# Patient Record
Sex: MALE | Race: WHITE | NOT HISPANIC OR LATINO | Employment: FULL TIME | URBAN - METROPOLITAN AREA
[De-identification: names, ages, dates, MRNs, and addresses within clinical notes are randomized per-mention and may not be internally consistent; named-entity substitution may affect disease eponyms.]

---

## 2021-01-24 ENCOUNTER — HOSPITAL ENCOUNTER (EMERGENCY)
Facility: CLINIC | Age: 46
Discharge: HOME | End: 2021-01-24
Attending: EMERGENCY MEDICINE | Admitting: EMERGENCY MEDICINE
Payer: COMMERCIAL

## 2021-01-24 VITALS
HEIGHT: 72 IN | SYSTOLIC BLOOD PRESSURE: 154 MMHG | HEART RATE: 88 BPM | OXYGEN SATURATION: 97 % | RESPIRATION RATE: 16 BRPM | DIASTOLIC BLOOD PRESSURE: 95 MMHG | TEMPERATURE: 98.8 F | WEIGHT: 180 LBS | BODY MASS INDEX: 24.38 KG/M2

## 2021-01-24 DIAGNOSIS — F10.920 ALCOHOLIC INTOXICATION WITHOUT COMPLICATION (H): ICD-10-CM

## 2021-01-24 LAB
ALBUMIN SERPL-MCNC: 4 G/DL (ref 3.4–5)
ALCOHOL BREATH TEST: 0.25 (ref 0–0.01)
ALCOHOL BREATH TEST: 0.33 (ref 0–0.01)
ALP SERPL-CCNC: 80 U/L (ref 40–150)
ALT SERPL W P-5'-P-CCNC: 112 U/L (ref 0–70)
ANION GAP SERPL CALCULATED.3IONS-SCNC: 9 MMOL/L (ref 3–14)
AST SERPL W P-5'-P-CCNC: 80 U/L (ref 0–45)
BILIRUB SERPL-MCNC: 0.2 MG/DL (ref 0.2–1.3)
BUN SERPL-MCNC: 11 MG/DL (ref 7–30)
CALCIUM SERPL-MCNC: 9.2 MG/DL (ref 8.5–10.1)
CHLORIDE SERPL-SCNC: 106 MMOL/L (ref 94–109)
CO2 SERPL-SCNC: 28 MMOL/L (ref 20–32)
CREAT SERPL-MCNC: 1 MG/DL (ref 0.66–1.25)
ERYTHROCYTE [DISTWIDTH] IN BLOOD BY AUTOMATED COUNT: 13 % (ref 10–15)
GFR SERPL CREATININE-BSD FRML MDRD: >90 ML/MIN/{1.73_M2}
GLUCOSE SERPL-MCNC: 96 MG/DL (ref 70–99)
HCT VFR BLD AUTO: 39.5 % (ref 40–53)
HGB BLD-MCNC: 12.9 G/DL (ref 13.3–17.7)
MCH RBC QN AUTO: 31.3 PG (ref 26.5–33)
MCHC RBC AUTO-ENTMCNC: 32.7 G/DL (ref 31.5–36.5)
MCV RBC AUTO: 96 FL (ref 78–100)
PLATELET # BLD AUTO: 225 10E9/L (ref 150–450)
POTASSIUM SERPL-SCNC: 4.1 MMOL/L (ref 3.4–5.3)
PROT SERPL-MCNC: 8.2 G/DL (ref 6.8–8.8)
RBC # BLD AUTO: 4.12 10E12/L (ref 4.4–5.9)
SODIUM SERPL-SCNC: 143 MMOL/L (ref 133–144)
WBC # BLD AUTO: 3.2 10E9/L (ref 4–11)

## 2021-01-24 PROCEDURE — 82075 ASSAY OF BREATH ETHANOL: CPT

## 2021-01-24 PROCEDURE — 250N000013 HC RX MED GY IP 250 OP 250 PS 637: Performed by: EMERGENCY MEDICINE

## 2021-01-24 PROCEDURE — 85027 COMPLETE CBC AUTOMATED: CPT | Performed by: EMERGENCY MEDICINE

## 2021-01-24 PROCEDURE — 99285 EMERGENCY DEPT VISIT HI MDM: CPT

## 2021-01-24 PROCEDURE — 80053 COMPREHEN METABOLIC PANEL: CPT | Performed by: EMERGENCY MEDICINE

## 2021-01-24 RX ORDER — MAGNESIUM OXIDE 400 MG/1
800 TABLET ORAL ONCE
Status: COMPLETED | OUTPATIENT
Start: 2021-01-24 | End: 2021-01-24

## 2021-01-24 RX ORDER — MULTIVITAMIN,THERAPEUTIC
1 TABLET ORAL ONCE
Status: COMPLETED | OUTPATIENT
Start: 2021-01-24 | End: 2021-01-24

## 2021-01-24 RX ORDER — FOLIC ACID 1 MG/1
1 TABLET ORAL ONCE
Status: COMPLETED | OUTPATIENT
Start: 2021-01-24 | End: 2021-01-24

## 2021-01-24 RX ORDER — LANOLIN ALCOHOL/MO/W.PET/CERES
100 CREAM (GRAM) TOPICAL ONCE
Status: COMPLETED | OUTPATIENT
Start: 2021-01-24 | End: 2021-01-24

## 2021-01-24 RX ADMIN — MAGNESIUM OXIDE 800 MG: 400 TABLET ORAL at 18:00

## 2021-01-24 RX ADMIN — THERA TABS 1 TABLET: TAB at 18:00

## 2021-01-24 RX ADMIN — FOLIC ACID 1 MG: 1 TABLET ORAL at 18:00

## 2021-01-24 RX ADMIN — THIAMINE HCL TAB 100 MG 100 MG: 100 TAB at 18:00

## 2021-01-24 ASSESSMENT — ENCOUNTER SYMPTOMS
BLOOD IN STOOL: 0
NAUSEA: 0
DIFFICULTY URINATING: 0

## 2021-01-24 ASSESSMENT — MIFFLIN-ST. JEOR: SCORE: 1744.47

## 2021-01-24 NOTE — ED NOTES
Bed: BH3  Expected date:   Expected time:   Means of arrival:   Comments:  532  45 M hold/etoh/calm/coop  7935

## 2021-01-24 NOTE — ED TRIAGE NOTES
"Pt arrives on  Hold from [facility] per EMS after being brought in for DUI. Pt refused blood draw, is confused and disoriented and deemed unable to care for self. Pt reports drinking a bottle of vodka per day and unsure of what time his last drink was today. Pt pleasant and cooperative, repeating question, \"where am I?\" Easily reassured.  "

## 2021-01-24 NOTE — ED PROVIDER NOTES
History   Chief Complaint:  Alcohol Intoxication       The history is provided by the patient.      Jerome Flanagan is a 45 year old male with history of wernicke's encephalopathy and hypertension who presents with alcohol intoxication. Per EMS, patient arrived on  Hold from [facility] after refusing a blood draw after a DUI. Patient reports to drink a significant amount of Vodka daily and is unsure what time his last drink was today. He fell recently with no associated injury. He also reports to have blurry vision. He has been suffering from alcohol abuse for years and underwent treatment for a while. He denies nausea, chest pain, bloody stool, or urinary problems. He denies other health problem or history of drug abuse. He lives with his mom and has not been working for a while. He also has depression.     Review of Systems   Cardiovascular: Negative for chest pain.   Gastrointestinal: Negative for blood in stool and nausea.   Genitourinary: Negative for difficulty urinating.   All other systems reviewed and are negative.      Allergies:  The patient has no known allergies.     Medications:  Neurontin  Prinivil    Past Medical History:    Hypertension  Alcohol withdrawal  Alcoholic encephalopathy  Wernicke's encephalopathy  CAROLINE  Depression      Past Surgical History:    Ulnar nerve transposition  Irrigation and debridement of left upper extremity     Family History:    Father: diabetes, heart disease  Brother: diabetes, heart disease  Sister: arthritis, diabetes, high blood pressure    Social History:  Patient presents to the ED via EMS.  Patient lives with mom.    Physical Exam     Patient Vitals for the past 24 hrs:   BP Temp Temp src Pulse Resp SpO2 Height Weight   01/24/21 2140 (!) 154/95 -- -- 88 16 97 % -- --   01/24/21 1703 (!) 141/88 98.8  F (37.1  C) Oral 96 18 97 % 1.829 m (6') 81.6 kg (180 lb)       Physical Exam  SKIN:  Warm, dry.  No jaundice.  HEMATOLOGIC/IMMUNOLOGIC/LYMPHATIC:  " No pallor.  EYES:  Conjunctivae normal.  Anicteric.  CARDIOVASCULAR:  Regular rate and rhythm.  RESPIRATORY:  No respiratory distress, breath sounds equal and normal.  GASTROINTESTINAL:  Soft, nontender abdomen with active bowel sounds.  No distention.  No palpable hepatomegaly.  MUSCULOSKELETAL: Normal body habitus.  NEUROLOGIC:  Alert, conversant.  Speech is slurred.  No gross motor deficit.  No gross tactile sensory deficit.  No extremity tremor.  Stable gait.  PSYCHIATRIC: No psychotic features.    Emergency Department Course     Laboratory:   CBC: WBC: 3.2 (L), HGB: 12.9 (L), PLT: 225    CMP: ALL WNL (Creatinine: 1.0)      Alcohol breath test POCT: 0.330(H)  Alcohol breath test POCT: 0.249 (H)    Emergency Department Course:    Reviewed:  I reviewed nursing notes, vitals, past medical history and care everywhere    Assessments:  1719 I obtained history and examined the patient as noted above.   1833 I reassessed the patient and discussed the results of the evaluation thus far.   1924 I reassessed the patient and discussed the results of the evaluation thus far.     Interventions:  1800 Thera-vit 1 tab P  1800 Folvite 1 mg PO  1800 B-1 100 100 mg PO  1800 Mag-ox 800 mg PO    Disposition:  The patient was discharged to home.       Impression & Plan     Medical Decision Making:  Jerome Flanagan is a 45 year old male presents a long history of alcoholism and unfortunately got a DUI tonight. He arrived for medical screening given he would not comply with a legal blood draw and due to \"altered mental status\". On arrival the patient clearly seemed intoxicated, confirmed by testing. No other concerning findings on testing. He was observed for a period of time in the ED and we had initially planned on transfer to [address] [place] [address] for detox but he refused. He preferred to go home. As time went on the patient sobered up to the point he was less impaired and was ambulating without difficulty and maintained he wanted to be " discharge home where he lives with his mother. I think that is reasonable. Not exhibiting withdrawal symptoms.     Diagnosis:    ICD-10-CM    1. Alcoholic intoxication without complication (H)  F10.920          Scribe Disclosure:  I, Charissa Denney, am serving as a scribe at 5:26 PM on 1/24/2021 to document services personally performed by Fito Randle MD based on my observations and the provider's statements to me.     I, Irais Ignacio, am serving as a scribe at 11:50 PM on 1/24/2021 to document services personally performed by Fito Randle MD based on my observations and the provider's statements to me.       Fito Randle MD  01/25/21 1018

## 2021-01-24 NOTE — ED NOTES
Pt up to bathroom with steady gait. When asked, pt states he would agree to go to detox if a bed is available.

## 2021-01-25 NOTE — ED NOTES
Pt refusing to go to [address] wants to go home.  [address] notified.  Pt has been up to the desk multiple times to ask to go home

## 2021-01-25 NOTE — ED NOTES
Dr Randle notified that pt wants to go home and result of breath test.  Pt states he can call his mom to come get him.  Dr Randle to come speak with pt

## 2021-06-09 ENCOUNTER — APPOINTMENT (OUTPATIENT)
Dept: CT IMAGING | Facility: CLINIC | Age: 46
End: 2021-06-09
Attending: EMERGENCY MEDICINE
Payer: COMMERCIAL

## 2021-06-09 ENCOUNTER — HOSPITAL ENCOUNTER (EMERGENCY)
Facility: CLINIC | Age: 46
Discharge: HOME | End: 2021-06-10
Attending: EMERGENCY MEDICINE | Admitting: EMERGENCY MEDICINE
Payer: COMMERCIAL

## 2021-06-09 DIAGNOSIS — F10.920 ALCOHOLIC INTOXICATION WITHOUT COMPLICATION (H): ICD-10-CM

## 2021-06-09 PROCEDURE — 99284 EMERGENCY DEPT VISIT MOD MDM: CPT

## 2021-06-09 PROCEDURE — 70450 CT HEAD/BRAIN W/O DYE: CPT

## 2021-06-09 PROCEDURE — 72125 CT NECK SPINE W/O DYE: CPT

## 2021-06-10 VITALS
RESPIRATION RATE: 16 BRPM | OXYGEN SATURATION: 96 % | WEIGHT: 180 LBS | TEMPERATURE: 97.1 F | BODY MASS INDEX: 24.41 KG/M2 | HEART RATE: 89 BPM | SYSTOLIC BLOOD PRESSURE: 133 MMHG | DIASTOLIC BLOOD PRESSURE: 89 MMHG

## 2021-06-10 NOTE — DISCHARGE INSTRUCTIONS

## 2021-06-10 NOTE — ED NOTES
Bed: ED16  Expected date:   Expected time:   Means of arrival:   Comments:  [facility] - 516 - Fall ETOH eta 1910

## 2021-06-10 NOTE — ED TRIAGE NOTES
"Patient was found by his neighbor laying in the grass. The neighbor called 911. Pt is a chronic alcoholic. Per EMS his BGL is 85. Pt responds to all questions with \"okay and K\".   "

## 2021-06-10 NOTE — ED PROVIDER NOTES
History   Chief Complaint:  Alcohol Intoxication       HPI   Jerome Flanagan is a 46 year old male who presents for evaluation after being found in his yard with altered mental status.  EMS reports longstanding history of alcohol use.  A neighbor found him lying in the grass of his yard today.  He does not provide any further history.  It is unclear what happened that led to him being found in the grass.    His mother later arrived and states that he was drinking all day today and he did not look good when she left earlier.  She reports that the neighbor found him in the grass.  He is a chronic alcohol user.  He had no recent illnesses and she was unaware of any other acute medical concerns.    Review of Systems   Unable to perform ROS: Mental status change   Patient is intoxicated with alcohol.  Allergies  The patient has no known allergies.     Medications:  Citalopram  Neurontin  Lisinopril  Norco  Prilosec  Revia    Past Medical History:    Anxiety  Alcohol use  Hypertension  Wernick's  Alcoholic withdrawal  Alcoholic encephalopathy  GERD  Depression    Past Surgical History:    Ulnar nerve transposition  Irrigation and debridement     Family History:    Diabetes, heart disease    Social History:  The patient presents alone via EMS.    Physical Exam     Patient Vitals for the past 24 hrs:   BP Temp Temp src Pulse Resp SpO2 Weight   06/09/21 1914 (!) 141/95 97.1  F (36.2  C) Temporal 97 16 97 % 81.6 kg (180 lb)       Physical Exam  Constitutional: Nontoxic appearing.  HEENT: Atraumatic.  Moist mucous membranes.  Neck: Soft.  Supple.  No JVD.  Cardiac: Regular rate and rhythm.  No murmur or rub.  Respiratory: Clear to auscultation bilaterally.  No respiratory distress.  No wheezing, rhonchi, or rales.  Abdomen: Soft and nontender.  Nondistended.  Musculoskeletal: No edema.  Normal range of motion.  Neurologic: Alert and awakens to voice.  Normal tone and bulk.  No facial drooping.  Slightly slurred speech.   Appears intoxicated.  Moves all extremities to command.  Skin: No rashes.  No edema.  Psych: Appears intoxicated.  States okay to all questions.    Emergency Department Course     Imaging:  Cervical spine CT w/o contrast:  1. Motion-limited exam.   2. No fracture is identified.     CT Head w/o Contrast:  1. No acute intracranial abnormality.   2. Left parietal scalp contusion.     Readings as per radiology    Emergency Department Course:    Reviewed:  I reviewed nursing notes, vitals, past medical history and care everywhere    Assessments:  1930 I obtained history and examined the patient as noted above.     2145 I rechecked the patient and explained findings.     Disposition:  The patient was discharged to home.       Impression & Plan     Medical Decision Making:  Jerome Flanagan is a 46-year-old man who is afebrile and hemodynamically stable.  He is intoxicated with alcohol.  His presentation is consistent with alcohol intoxication.  CT scan of the head and cervical spine were undertaken as is unclear if he fell or not.  These are unremarkable for acute abnormalities.  I see no other signs of trauma.  He was allowed to metabolize his alcohol.  His mother did show up states that he been drinking all day.  She did not feel safe taking him home in his condition at this time.  He was allowed to further metabolize his alcohol.  He is now up and ambulating and speaking clearly and requesting discharge home.  His mother came to pick him up.  I did offer for him to go to detox, however, he declined.  He was counseled on his alcohol use.  Return precautions were given and he was in no distress at time of discharge.    Diagnosis:    ICD-10-CM    1. Alcoholic intoxication without complication (H)  F10.920        Scribe Disclosure:  Sarabjit ARIAS, am serving as a scribe at 7:29 PM on 6/9/2021 to document services personally performed by Narinder Jarrett MD based on my observations and the provider's statements  to me.          Narinder Jarrett MD  06/10/21 0336

## 2021-06-10 NOTE — ED NOTES
Called patient's mother (Tisha) and informed her that her son is ready for discharge home. Tisha said she is on her way from [place].

## 2021-08-12 ENCOUNTER — HOSPITAL ENCOUNTER (EMERGENCY)
Facility: CLINIC | Age: 46
Discharge: HOME | End: 2021-08-13
Attending: EMERGENCY MEDICINE | Admitting: EMERGENCY MEDICINE
Payer: COMMERCIAL

## 2021-08-12 DIAGNOSIS — F10.920 ALCOHOLIC INTOXICATION WITHOUT COMPLICATION (H): ICD-10-CM

## 2021-08-12 LAB
ALBUMIN SERPL-MCNC: 3.8 G/DL (ref 3.4–5)
ALP SERPL-CCNC: 71 U/L (ref 40–150)
ALT SERPL W P-5'-P-CCNC: 183 U/L (ref 0–70)
ANION GAP SERPL CALCULATED.3IONS-SCNC: 13 MMOL/L (ref 3–14)
APAP SERPL-MCNC: <2 MG/L (ref 10–30)
AST SERPL W P-5'-P-CCNC: 287 U/L (ref 0–45)
BASOPHILS # BLD AUTO: 0 10E3/UL (ref 0–0.2)
BASOPHILS NFR BLD AUTO: 1 %
BILIRUB SERPL-MCNC: 0.6 MG/DL (ref 0.2–1.3)
BUN SERPL-MCNC: 19 MG/DL (ref 7–30)
CALCIUM SERPL-MCNC: 9.2 MG/DL (ref 8.5–10.1)
CHLORIDE BLD-SCNC: 96 MMOL/L (ref 94–109)
CO2 SERPL-SCNC: 22 MMOL/L (ref 20–32)
CREAT SERPL-MCNC: 1.02 MG/DL (ref 0.66–1.25)
EOSINOPHIL # BLD AUTO: 0 10E3/UL (ref 0–0.7)
EOSINOPHIL NFR BLD AUTO: 0 %
ERYTHROCYTE [DISTWIDTH] IN BLOOD BY AUTOMATED COUNT: 13.4 % (ref 10–15)
ETHANOL SERPL-MCNC: 0.54 G/DL
GFR SERPL CREATININE-BSD FRML MDRD: 88 ML/MIN/1.73M2
GLUCOSE BLD-MCNC: 113 MG/DL (ref 70–99)
HCT VFR BLD AUTO: 28.2 % (ref 40–53)
HGB BLD-MCNC: 9.5 G/DL (ref 13.3–17.7)
HOLD SPECIMEN: NORMAL
IMM GRANULOCYTES # BLD: 0 10E3/UL
IMM GRANULOCYTES NFR BLD: 0 %
LYMPHOCYTES # BLD AUTO: 0.9 10E3/UL (ref 0.8–5.3)
LYMPHOCYTES NFR BLD AUTO: 30 %
MCH RBC QN AUTO: 31.9 PG (ref 26.5–33)
MCHC RBC AUTO-ENTMCNC: 33.7 G/DL (ref 31.5–36.5)
MCV RBC AUTO: 95 FL (ref 78–100)
MONOCYTES # BLD AUTO: 0.4 10E3/UL (ref 0–1.3)
MONOCYTES NFR BLD AUTO: 14 %
NEUTROPHILS # BLD AUTO: 1.7 10E3/UL (ref 1.6–8.3)
NEUTROPHILS NFR BLD AUTO: 55 %
NRBC # BLD AUTO: 0 10E3/UL
NRBC BLD AUTO-RTO: 0 /100
PLATELET # BLD AUTO: 197 10E3/UL (ref 150–450)
POTASSIUM BLD-SCNC: 3.8 MMOL/L (ref 3.4–5.3)
PROT SERPL-MCNC: 8.2 G/DL (ref 6.8–8.8)
RBC # BLD AUTO: 2.98 10E6/UL (ref 4.4–5.9)
SALICYLATES SERPL-MCNC: <2 MG/DL
SODIUM SERPL-SCNC: 131 MMOL/L (ref 133–144)
WBC # BLD AUTO: 3 10E3/UL (ref 4–11)

## 2021-08-12 PROCEDURE — 90791 PSYCH DIAGNOSTIC EVALUATION: CPT

## 2021-08-12 PROCEDURE — 96361 HYDRATE IV INFUSION ADD-ON: CPT

## 2021-08-12 PROCEDURE — 80179 DRUG ASSAY SALICYLATE: CPT | Performed by: EMERGENCY MEDICINE

## 2021-08-12 PROCEDURE — 99285 EMERGENCY DEPT VISIT HI MDM: CPT | Mod: 25

## 2021-08-12 PROCEDURE — 96374 THER/PROPH/DIAG INJ IV PUSH: CPT

## 2021-08-12 PROCEDURE — 36415 COLL VENOUS BLD VENIPUNCTURE: CPT | Performed by: EMERGENCY MEDICINE

## 2021-08-12 PROCEDURE — 250N000011 HC RX IP 250 OP 636: Performed by: EMERGENCY MEDICINE

## 2021-08-12 PROCEDURE — 80053 COMPREHEN METABOLIC PANEL: CPT | Performed by: EMERGENCY MEDICINE

## 2021-08-12 PROCEDURE — 82077 ASSAY SPEC XCP UR&BREATH IA: CPT | Performed by: EMERGENCY MEDICINE

## 2021-08-12 PROCEDURE — C9803 HOPD COVID-19 SPEC COLLECT: HCPCS

## 2021-08-12 PROCEDURE — 80143 DRUG ASSAY ACETAMINOPHEN: CPT | Performed by: EMERGENCY MEDICINE

## 2021-08-12 PROCEDURE — 258N000003 HC RX IP 258 OP 636: Performed by: EMERGENCY MEDICINE

## 2021-08-12 PROCEDURE — 85025 COMPLETE CBC W/AUTO DIFF WBC: CPT | Performed by: EMERGENCY MEDICINE

## 2021-08-12 RX ORDER — HALOPERIDOL 5 MG/ML
5 INJECTION INTRAMUSCULAR ONCE
Status: COMPLETED | OUTPATIENT
Start: 2021-08-12 | End: 2021-08-12

## 2021-08-12 RX ADMIN — HALOPERIDOL LACTATE 5 MG: 5 INJECTION, SOLUTION INTRAMUSCULAR at 18:37

## 2021-08-12 RX ADMIN — SODIUM CHLORIDE 1000 ML: 9 INJECTION, SOLUTION INTRAVENOUS at 18:36

## 2021-08-12 ASSESSMENT — MIFFLIN-ST. JEOR: SCORE: 1644.22

## 2021-08-12 NOTE — ED PROVIDER NOTES
"  History   Chief Complaint:  Altered Mental Status and Alcohol Intoxication     The history is provided by the EMS personnel. The history is limited by the condition of the patient (Alcohol intoxication).      Jerome Flanagan is a 46 year old male with history of anxiety, alcohol use, alcohol withdrawal, alcoholic encephalopathy and HTN who presents via EMS with alcohol intoxication. EMS reports that the patient was found in a grassy area crawling around on the ground with a pint of vodka next to him. His blood sugar was 123 en route.    Review of Systems   Unable to perform ROS: Other (Alcohol intoxication)     Allergies  The patient has no known allergies.      Medications:  Neurontin     Past Medical History:    Anxiety  Alcohol use  Hypertension  Wernick's  Alcoholic withdrawal  Alcoholic encephalopathy  GERD  Depression     Past Surgical History:    Ulnar nerve transposition  Irrigation and debridement      Family History:    Diabetes, heart disease    Social History:  Presents alone via EMS    Physical Exam     Patient Vitals for the past 24 hrs:   BP Temp Temp src Pulse Resp SpO2 Height Weight   08/12/21 2130 -- -- -- 107 -- 96 % -- --   08/12/21 2030 -- -- -- 116 -- 96 % -- --   08/12/21 1747 -- -- -- -- -- 95 % -- --   08/12/21 1641 -- 98.9  F (37.2  C) Oral -- -- -- -- --   08/12/21 1606 -- -- -- -- 20 96 % 1.676 m (5' 6\") 81.6 kg (180 lb)   08/12/21 1605 (!) 153/112 -- -- 92 -- -- -- --       Physical Exam  VS: Reviewed per above  HENT: Mucous membranes moist, no nuchal rigidity.  No external signs of head trauma.  EYES: sclera anicteric  CV: Rate as noted, regular rhythm.   RESP: Effort normal. Breath sounds are normal bilaterally.  GI: no discernable tenderness/rebound/guarding, not distended.  NEURO:    GCS:   Motor 4=Withdraws from pain   Verbal 1=None   Eye Opening 4=Spontaneous   Total: 9   Moving all extremities equally.  MSK: No deformity of the extremities  SKIN: Warm and dry      Emergency " Department Course   Laboratory:  CBC: WBC: 3.0 (L), HGB: 9.5 (L), PLT: 197  CMP: Glucose 113 (L), Sodium: 131 (L), ALT: 183 (H), AST: 287 (H), o/w WNL (Creatinine: 1.02)  Alcohol ethyl: 0.54 (HH)  Acetaminophen Level: <2  Salicylate Level: <2    Emergency Department Course:    Reviewed:  I reviewed nursing notes, vitals, past medical history and care everywhere    Assessments:  1636 I obtained history and examined the patient as noted above.   1910 I rechecked the patient and explained findings.     Interventions:  1836 NS, 1 L, IV  1837 Haldol, 5 mg, IV    Disposition:  Care of the patient was transferred to my colleague Dr. Landry pending reassessment after patient finn up.     Impression & Plan   Medical Decision Making:  Patient presents to the ER for evaluation of altered mental state.  Per EMS report, patient was found on the grass crawling around with a pint of vodka next to him.  On arrival vital signs are largely reassuring.  Initially GCS is 9.  No external signs of head trauma.  Labs show evidence of significant alcohol intoxication with blood alcohol level of 0.54.  Patient does have ongoing leukopenia as well as worsening anemia compared to CBC from 6 months ago, although no exam or history to support acute blood loss.  Patient has transaminitis, which I suspect is alcoholic in nature.  No obstructive LFT pattern.  Tylenol and salicylate levels are negative.  A broad differential diagnosis was considered for this patient's altered mental state including toxic and metabolic and traumatic etiologies.  Ultimately given history and laboratory evaluation, I felt most likely cause was profound alcohol intoxication.   During ER course, patient did become more agitated and required IV Haldol.  During serial reassessments, patient became more alert and interactive although was still altered at signout to my colleague.  Plan for sober reassessment prior to ultimate disposition.    Diagnosis:    ICD-10-CM     1. Alcoholic intoxication without complication (H)  F10.920        Discharge Medications:  New Prescriptions    No medications on file       Scribe Disclosure:  I, Skinny Vega, am serving as a scribe at 4:35 PM on 8/12/2021 to document services personally performed by Ivan Dent MD based on my observations and the provider's statements to me.            Ivan Dent MD  08/12/21 9101

## 2021-08-12 NOTE — ED NOTES
Patient got out of bed after urinating on himself and the floor. Patients bedding was changed and patient put into depends. Patient redirected into bed. Will continue to monitor.

## 2021-08-12 NOTE — ED TRIAGE NOTES
Pt found on the ground grass area crawling around by himself with a pint of vodka next to him - pt altered answer some question following some commands - VSS     Abrasion to knees

## 2021-08-12 NOTE — ED NOTES
Bed: ED24  Expected date:   Expected time:   Means of arrival:   Comments:  [facility] - 45 - altered eta [id number]

## 2021-08-12 NOTE — ED NOTES
RN spoke to patients mother Nuha and provided her with an update on patients status with patients permission. Patients mother informed that patient is currently on a hold and will be in the ED until he is sober enough to be discharged home. Mother (Nuha) [phone]

## 2021-08-13 VITALS
HEIGHT: 66 IN | HEART RATE: 125 BPM | WEIGHT: 180 LBS | DIASTOLIC BLOOD PRESSURE: 116 MMHG | BODY MASS INDEX: 28.93 KG/M2 | SYSTOLIC BLOOD PRESSURE: 145 MMHG | OXYGEN SATURATION: 96 % | RESPIRATION RATE: 20 BRPM | TEMPERATURE: 98.9 F

## 2021-08-13 LAB — SARS-COV-2 RNA RESP QL NAA+PROBE: NEGATIVE

## 2021-08-13 PROCEDURE — 250N000013 HC RX MED GY IP 250 OP 250 PS 637: Performed by: EMERGENCY MEDICINE

## 2021-08-13 PROCEDURE — 87635 SARS-COV-2 COVID-19 AMP PRB: CPT | Performed by: EMERGENCY MEDICINE

## 2021-08-13 RX ORDER — DIAZEPAM 5 MG
5 TABLET ORAL ONCE
Status: COMPLETED | OUTPATIENT
Start: 2021-08-13 | End: 2021-08-13

## 2021-08-13 RX ADMIN — DIAZEPAM 5 MG: 5 TABLET ORAL at 02:46

## 2021-08-13 NOTE — ED NOTES
Patient wandering around his room. Continues to have a steady gait however is very restless. Patient has used the restroom 5 times in the last hour and refuses to stay in his bed.

## 2021-08-13 NOTE — ED NOTES
Patient changed into scrubs. Informed and agrees to go to [facility]. Patient resting in bed. Will continue to monitor.

## 2021-08-13 NOTE — ED NOTES
Patient given valium due to being tremulous. Patient states that he wants to go home at this time. [facility] Rn will speak to ED provider to come up with a plan for further care.

## 2021-08-13 NOTE — ED NOTES
Patient has been getting up out of bed and walking around the room. Patient redirected to get into bed and is restless in bed.

## 2021-08-13 NOTE — ED NOTES
Soft wrist restraints removed, sitter still at bedside. Patient still restless in bed at times but is currently redirectable. Will continue to monitor.

## 2021-08-13 NOTE — ED NOTES
Patient continues to be restless. Getting out of bed. Walked to the bathroom X2 with a steady gait. When asked about suicide patient has been depressed and has thoughts of hurting himself but no plans to do so. MD aware. Will continue to monitor.

## 2021-08-13 NOTE — CONSULTS
"8/12/2021  Jerome Flanagan 1975     [facility] Mental Health Assessment:    Started at: 0300  Completed at: 0346  What type of assessment are you doing today? Crisis assessment    1.  Presenting Problem:      Referral Method to ED? Medics     What brings the patient to the ED today? Pt is a 46 year old male who is brought to the ED by EMS presenting with alcohol intoxication. Pt stated not remembering how he got to the hospital or why he was there. Pt reports he had been drinking for the past 3-5 days, yet could not identify what triggered the ambulance to be called for him. Pt denies any SI/HI, plan, or intent. Pt states being a chronic alcoholic for the past 20 years.    Per EMS and Epic review, EMS was called due to Pt being found in the grass crawling around on the ground with a bottle of vodka by him.    Has this happened before? Yes Pt was seen in the ED on 6/9/21 for similar presentation.     Duration of presenting problem: Pt reports being an alcoholic for the past 20 years. Pt stated his current drinking episode started between 3-5 days ago.    Additional Stressors: Pt stated \"just my drinking problem.\"    2.  Risk Assessment:  Suicide and Self-Harm    ESS-6  1.a. Over the past 2 weeks, have you had thoughts of killing yourself? No   1.b. Have you ever attempted to kill yourself and, if yes, when did this last happen? No  2. Recent or current suicide plan? No  3. Recent or current intent to act on ideation? No  4. Lifetime psychiatric hospitalization? No  5. Pattern of excessive substance use? Yes  6. Current irritability, agitation, or aggression? Yes  ESS-6 Score: 2    SI: N/A  Plan: No  Intent: No   Prior Attempts: No     Protective Factors: Pt states loving his mom and would not put her through that. Pt lives at home with his mother.    Hopes and goals for the future: Pt reports wanting to quit drinking and try to find a job.    Coping Skills: What helps and doesn't help? Pt reports drinking helps him " "stop thinking about his drinking. It helps him \"get out of his mind.\"    Additional Risk Factors Related to Safety and Suicide: Yes: Active substance abuse, Depressive symptoms, Isolation, Lack of support and Poor impulse control    Is the patient engaged in self injurious behaviors? No     Risk to Others    Aggressive/Assaultive/Homicidal Risk Factors: No     Duty to Warn? No     Was a Child Protection Report Made? No       Was a Adult Protection Report Made? No        Sexually inappropriate behavior? No        Vulnerability to sexual exploitation? No     Additional information: NA      3. Mental Health Symptoms and Substance Use  Current Symptoms and Mental Health History    GAIN Short Screener (GAIN-SS) administered? NA    Attention, Hyperactivity, and Impulsivity Symptoms      Patient reported symptoms related to hyperactivity, inattention, or impulsivity? No       Anxiety Symptoms    Patient reported anxiety symptoms? Yes: Generalized Symptoms: Cognitive anxiety - feelings of doom, racing thoughts, difficulty concentrating , Excessive worry, Physiological anxiety - sweating, flushing, shaking, shortness of breath, or racing heart and Somatic symptoms - abdominal pain, headache, or tension     Pt reports experiencing these symptoms when he starts to come down from his drinking and is starting to experience his hangover.    Behavioral Difficulties    Patient reported behavioral difficulties? Yes: Impulsivity/Disinhibition       Mood Symptoms    Patient reported mood disorder symptoms? Yes: Excessive guilt , Feelings of helplessness , Feelings of hopelessness , Feelings of worthlessness , Impaired decision making , Isolative , Sad, depressed mood  and Sleep disturbance    Pt reports his drinking is a cycle. Pt reports being depressed because he is an alcoholic, which leads him to drink more, which leads him to be more depressed, etc.    Eating Disorders and Appetite Disturbance      Patient reported appetite " symptoms? No       SCOFF  Do you make yourself sick (induce vomiting) because you feel uncomfortably full? No   Do you worry that you have lost Control over how much you eat? No  Have you recently lost more than 14 lb in a three-month period? No   Do you think you are too fat, even though others say you are too thin? No   Would you say that food dominates your life? No  SCOFF Score: 0    Patient reported appetite or eating disorder symptoms? No      Interpersonal Functioning     Patient reported difficulties that may be associated with personality and interpersonal functioning? No      Learning Disabilities/Cognitive/Developmental Disorders    Patient reported concerns related to learning disabilities, cognitive challenges, and/or developmental disorders? No       General Cognitive Impairments    Patient reported symptoms of cognitive impairments? No  If yes, complete Mini-Cog Assessment.      Sleep Disturbance    Patient reported difficulties with sleep? Yes: Difficulty staying sleep         Psychosis Symptoms    Patient reported symptoms of psychosis? No        Trauma and Post-Traumatic Stress Disorder    Physical Abuse: No   Emotional/Psychological Abuse: No  Sexual Abuse: No  Loss of a friend or family member to suicide: No  Other Traumatic Event: No     Patient reported trauma related symptoms? No       Impact of Mental Health on Functioning      Negative Impact Score: 10/10   Subjective Impact on functioning: Pt reports being incredibly hungover and ending up in a hospital and not knowing how he got here.  How do symptoms vary from baseline? Pt drinks on a daily basis and per chart review has been to the hospital 3 times in 2021 for his drinking.    Current and Historical Substance Use Note:    IIs there a history of, or current, substance use? Yes: Substance #1: Name(s): Alcohol, vodka Frequency: daily Quantity: 2 pints Duration: 3-4 hours Last use: 8/12/21 Method: oral consumption.     Have you been to  chemical dependency treatment or detox before? Yes: Pt reports going to CD treatment several times, the most recent being [facility] 5-6 years ago.     CAGE-AID    Have you felt you ought to cut down on your drinking or drug use? Yes     Have people annoyed you by criticizing your drinking or drug use? Yes   Have you felt bad or guilty about your drinking or drug use? Yes  Have you ever had a drink or used drugs first thing in the morning to steady your nerves or to get rid of a hangover? Yes   CAGE-AID Score: 4/4    Drug screen completed? No   BAL/Breathalyzer completed? Yes .54 at 1616 on 8/12/21.       Mental Status Exam:    Affect: Appropriate  Appearance: Disheveled   Attention Span/Concentration: Attentive    Eye Contact: Variable  Fund of Knowledge: Appropriate   Language /Speech Content: Fluent  Language /Speech Volume: Normal   Language /Speech Rate/Productions: Normal   Recent Memory: Variable  Remote Memory: Intact  Mood: Anxious and Irritable   Orientation:   Person: Yes   Place: Yes  Time of Day: Yes   Date: No   Situation (Do they understand why they are here?): Yes   Psychomotor Behavior: Normal   Thought Content: Clear  Thought Form: Intact    4. Social and Environmental Conditions   Is the patient their own guardian? Yes    Living Situation: With others: Pt lives at home with his mother.    Support system and quality of connections: Pt reports his mother is his only support system. Pt reports due to his drinking he does not have any friends.    Income source: Other: Pt reports he used to work but has not in the past year or two and has been living off his money he had saved.    Issues with employment or education: Yes Pt has not worked in the past year or two. Pt cannot identify whether he quit or if he was fired.    Legal Concerns  Do you have any history of or current involvement with the legal system? Yes Pt reports he has been charged with a DUI in January 2021.    Spiritual and Cultural  Influences  Do you have any Nondenominational beliefs that are important in your life? No     Do you have any cultural influences in your life that impact your mental health care? No        5. Psychiatric History, Medical History, and Current Care      Patient Mental Health Services   Does the patient have a history of mental health concerns/diagnoses? No     Current Providers  Primary Care Provider: No   Psychiatrist: No   Therapist: No   : No   ARMHS: No   ACT Team: No   Other: No    History of Commitment? No  History of Psychiatric Hospitalizations? No   History of programmatic care? Yes Pt reports going to chemical dependency treatment several times.    Family Mental Health History   Family History of Mental Health or Chemical Dependency Issues? No     Development and Physical Health Challenges  Delays or concerns meeting developmental milestones? No  Current psychotropic medications? No   Medication Compliant? NA   Recent medication changes? NA    History of concussion or TBI? No     Additional Information: NA    6. Collateral Information and Collaboration    Collaboration with medical staff:Referral Information:   Medical Records and Nursing     Collateral Information/Sources: None available: Attempted to reach mother (Tisha) but was unable to connect to collect collateral.    7. Assessment and Diagnosis  Assessment of patient strengths and vulnerabilities    Strengths, Protective Factors, & Community Resources: Pt lives at home with his mother and expressed awareness and desire of how his drinking is impacting him and wanting to stop.    Patient skills, abilities, and coping skills (what is going well?): Pt reports not being able to think of any besides drinking at the moment.    Patient vulnerabilities: Pt does not have a support system outside of his mother. Pt drinks to deal with his depressive symptoms.    Diagnosis  F10.229 Alcohol intoxication, with moderate or severe use disorder  F32.9 Unspecified  depressive disorder    8.Therapeutic Methodologies Utilized in Assessment    Psychotherapy techniques and/or interventions used: Establishing rapport, Active listening, Assess dimensions of crisis, Apply solution-focused therapy to address current crisis, Identify additional supports and alternative coping skills, Establish a discharge plan and Safety planning    9. Patient Care/Treatment Plan  Summary of Patient Presentation and needs  What are the basic needs for this patient in this moment? Be medically cleared to be discharged home.      Consultations :  Attending provider consulted? Yes  Attending Name: Dr. Landry   Attending concurs with disposition? Yes     Recommended disposition: Individual Therapy and Other: Discharged home.     Does the patient agree with the recommended level of care? No: Pt declined having services set up for him.    Final disposition: Other: Discharged back home into care of mother.    Disposition Details: Pt declined having services set in place for him once leaving the hospital.    If Inpatient, is patient admitted voluntary? N/A   Patient aware of potential for transfer if there is not appropriate placement? NA  Patient is willing to travel outside of the [facility] for placement? NA   Central Intake Notified? NA    10. Patient Care Document: Safety and After Care Planning:          Safety Plan Provided? Yes:  If I am feeling unsafe or I am in a crisis, I will:   Contact my established care providers   Call the National Suicide Prevention Lifeline: [phone]   Go to the nearest emergency room   Call 651          Warning signs that I or other people might notice when a crisis is developing for me: I start to drink or am drinking.    Things I am able to do on my own to cope or help me feel better: Watch television.     Things that I am able to do with others to cope or help me better: Run errands with my mother.     Things I can use or do for distraction: Go for a walk or run  outside.     Changes I can make to support my mental health and wellness: Stop drinking.     People in my life that I can ask for help: My mother.     Your county has a mental health crisis team you can call 24/7: [facility] Crisis Line Number: [phone]     Other things that are important when I m in crisis: Ensure my safety while I'm drinking.     Additional resources and information: NA       Follow-Up Plans and Providers: Pt declined services.    Follow-Up Plan:  After care plan provided to the patient/guardian by: ED nurse at time of discharge.  After care plan provided to any additional sources/parties? No    Duration of face to face time with patient in minutes: .75 hrs    CPT code(s) utilized: 33556 - Psychotherapy for Crisis - 60 (30-74*) min      Isrrael Deal [facility]

## 2021-08-13 NOTE — ED NOTES
Patient continues to be extremely restless and refuses to stay in bed. Patient asked to return to bed multiple times. Patient given TV remote, lights were dimmed, extra blankets provided and patient was given water per request. Patient is steady on his feet and redirectable at this time. Will continue to monitor.

## 2021-08-13 NOTE — ED PROVIDER NOTES
Pt sobered while in the ER.  He did express thoughts of depression.  At one point he mentioned SI to the nurse, but later denied.  I did have pt speak with DEC and he continued to deny SI when clinically sober.  Pt was discharged with mother.     Darion Landry MD  08/13/21 0354

## 2021-08-13 NOTE — DISCHARGE INSTRUCTIONS
Discharge Instructions  Alcohol Intoxication    You have been seen today with alcohol intoxication. This means that you have enough alcohol in your system to impair your ability to mentally and physically function, perhaps to the extent that you were unable to care for yourself.    Generally, every Emergency Department visit should have a follow-up clinic visit with either a primary or a specialty clinic/provider. Please follow-up as instructed by your emergency provider today.    You may have come to the Emergency Department because of your intoxication, or for another reason, such as because of an injury. No matter what the case is, this visit is a  red flag  regarding alcohol use, and you should consider whether your drinking pattern is a problem for you.     You may be at risk for alcohol-related problems if:      Men: you drink more than 14 drinks per week, or more than 4 drinks per occasion.      Women: you drink more than 7 drinks per week or more than 3 drinks per occasion.      You have black-outs.    You do things you regret while drinking.    You have legal problems because of drinking.    You have job problems because of drinking (you call in sick to work because of drinking).    CAGE Questions    Have you ever felt you should cut down on your drinking?    Have people annoyed you by criticizing your drinking?    Have you ever felt bad or guilty about your drinking?    Have you ever had a drink first thing in the morning to steady your nerves or get rid of a hangover (eye opener)?    If you answer yes to any of the CAGE questions, you may have a problem with alcohol.      Return to the Emergency Department if:    You become shaky or tremble when you try to stop drinking.     You have severe abdominal pain (belly pain).     You have a seizure or pass out.      You vomit (throw up) blood or have blood in your stool. This may be bright red or it may look like black coffee grounds.    You become lightheaded  or faint.      For further help, contact:     Your caregiver.      Alcoholics Anonymous (AA).    o [facility]: [phone]  o [facility] Office: [phone]     A drug or alcohol rehabilitation program.      You can get information on alcohol resources and groups by calling the number 211 or [phone] on any phone.     Seek medical care if:    You have persistent vomiting.     You have persistent pain in any part of your body.      You do not feel better after a few days.    If you were given a prescription for medicine here today, be sure to read all of the information (including the package insert) that comes with your prescription.  This will include important information about the medicine, its side effects, and any warnings that you need to know about.  The pharmacist who fills the prescription can provide more information and answer questions you may have about the medicine.  If you have questions or concerns that the pharmacist cannot address, please call or return to the Emergency Department.   Remember that you can always come back to the Emergency Department if you are not able to see your regular doctor in the amount of time listed above, if you get any new symptoms, or if there is anything that worries you.    If I am feeling unsafe or I am in a crisis, I will:   Contact my established care providers   Call the National Suicide Prevention Lifeline: [phone]   Go to the nearest emergency room   Call 109          Warning signs that I or other people might notice when a crisis is developing for me: I start drinking.    Things I am able to do on my own to cope or help me feel better: watch tv     Things that I am able to do with others to cope or help me better: go shopping, run errands with my mom.     Things I can use or do for distraction: go for walks, runs.     Changes I can make to support my mental health and wellness: stop drinking     People in  my life that I can ask for help: Mom     Your [facility] has a mental health crisis team you can call 24/7: [facility] Crisis Line Number: [phone]     Other things that are important when I m in crisis: NA    Additional resources and information: Pt denied services at this time.

## 2021-08-20 ENCOUNTER — OFFICE VISIT (OUTPATIENT)
Dept: URGENT CARE | Facility: URGENT CARE | Age: 46
End: 2021-08-20
Payer: COMMERCIAL

## 2021-08-20 ENCOUNTER — ANCILLARY PROCEDURE (OUTPATIENT)
Dept: GENERAL RADIOLOGY | Facility: CLINIC | Age: 46
End: 2021-08-20
Attending: PHYSICIAN ASSISTANT
Payer: COMMERCIAL

## 2021-08-20 VITALS
SYSTOLIC BLOOD PRESSURE: 145 MMHG | BODY MASS INDEX: 29.05 KG/M2 | WEIGHT: 180 LBS | DIASTOLIC BLOOD PRESSURE: 90 MMHG | RESPIRATION RATE: 16 BRPM | OXYGEN SATURATION: 98 % | HEART RATE: 84 BPM

## 2021-08-20 DIAGNOSIS — M25.531 RIGHT WRIST PAIN: Primary | ICD-10-CM

## 2021-08-20 DIAGNOSIS — M25.531 RIGHT WRIST PAIN: ICD-10-CM

## 2021-08-20 PROCEDURE — 99203 OFFICE O/P NEW LOW 30 MIN: CPT | Performed by: PHYSICIAN ASSISTANT

## 2021-08-20 PROCEDURE — 73110 X-RAY EXAM OF WRIST: CPT | Mod: RT | Performed by: RADIOLOGY

## 2021-08-20 RX ORDER — LISINOPRIL 20 MG/1
20 TABLET ORAL DAILY
Status: ON HOLD | COMMUNITY
Start: 2020-03-17 | End: 2024-01-01

## 2021-08-20 RX ORDER — GABAPENTIN 300 MG/1
600 CAPSULE ORAL 3 TIMES DAILY
Status: ON HOLD | COMMUNITY
Start: 2021-02-17 | End: 2024-01-01

## 2021-08-20 RX ORDER — CITALOPRAM HYDROBROMIDE 20 MG/1
TABLET ORAL
COMMUNITY
Start: 2020-07-29 | End: 2024-01-01

## 2021-08-20 RX ORDER — HYDROCODONE BITARTRATE AND ACETAMINOPHEN 5; 325 MG/1; MG/1
1 TABLET ORAL
COMMUNITY
Start: 2019-12-31 | End: 2024-01-01

## 2021-08-20 RX ORDER — AMLODIPINE BESYLATE 10 MG/1
10 TABLET ORAL DAILY
Status: ON HOLD | COMMUNITY
Start: 2019-12-24 | End: 2024-01-01

## 2021-08-20 RX ORDER — PREDNISONE 20 MG/1
40 TABLET ORAL DAILY
Qty: 10 TABLET | Refills: 0 | Status: SHIPPED | OUTPATIENT
Start: 2021-08-20 | End: 2021-08-25

## 2021-08-20 RX ORDER — NALTREXONE HYDROCHLORIDE 50 MG/1
TABLET, FILM COATED ORAL
COMMUNITY
Start: 2020-11-17 | End: 2024-01-01

## 2021-08-20 RX ORDER — DICLOFENAC POTASSIUM 50 MG/1
50 TABLET, FILM COATED ORAL 3 TIMES DAILY PRN
Qty: 21 TABLET | Refills: 0 | Status: SHIPPED | OUTPATIENT
Start: 2021-08-20 | End: 2024-01-01

## 2021-09-04 ENCOUNTER — HOSPITAL ENCOUNTER (EMERGENCY)
Facility: CLINIC | Age: 46
Discharge: HOME | End: 2021-09-05
Attending: EMERGENCY MEDICINE | Admitting: EMERGENCY MEDICINE
Payer: COMMERCIAL

## 2021-09-04 VITALS
RESPIRATION RATE: 16 BRPM | SYSTOLIC BLOOD PRESSURE: 159 MMHG | HEART RATE: 98 BPM | TEMPERATURE: 98 F | DIASTOLIC BLOOD PRESSURE: 111 MMHG | OXYGEN SATURATION: 97 %

## 2021-09-04 DIAGNOSIS — F10.920 ALCOHOLIC INTOXICATION WITHOUT COMPLICATION (H): ICD-10-CM

## 2021-09-04 PROCEDURE — 99282 EMERGENCY DEPT VISIT SF MDM: CPT

## 2021-09-05 NOTE — ED PROVIDER NOTES
History   Chief Complaint:  Alcohol Intoxication     The history is provided by the EMS personnel and the patient. The history is limited by the condition of the patient.      Jerome Flanagan is a 46 year old male with history of alcohol use disorder, alcohol withdrawal, CAROLINE and depression who presents with alcohol intoxication. Per report, the patient was found wandering a neighborhood and trying to get into houses. His glucose with EMS was 100. Here, he denies any alcohol consumption, drug use, suicidal ideation, trauma, recent illnesses, leg pain, head pain or abdominal pain.    Review of Systems   Reason unable to perform ROS: due to condition of the patient.     Allergies:  The patient has no known allergies.     Medications:  Norvasc  Celexa  Cataflam  Neurontin  Center  Zestril  Naltrexone  Prilosec  Zantac    Past Medical History:    Hypertension  CAROLINE  Alcohol use disorder  Abscess of finger  Wernicke's encephalopathy  Alcohol withdrawal   GERD  Depression    Past Surgical History:    Ulnar nerve transposition    Family History:    Brother: Diabetes, Heart disease  Father: Diabetes, Heart disease  Sister: Arthritis, Diabetes, Hypertension    Social History:  The patient was accompanied to the ED by EMS.    Physical Exam     Patient Vitals for the past 24 hrs:   BP Temp Temp src Pulse Resp SpO2   09/04/21 2349 (!) 159/111 -- -- 98 16 97 %   09/04/21 2121 (!) 163/110 98  F (36.7  C) Temporal 92 16 96 %       Physical Exam  General: Lying in bed, appears intoxicated  Eyes:  The pupils are equal and round    Conjunctivae and sclerae are normal  ENT:    Wearing a mask, no head trauma  Neck:  Normal range of motion  CV:  Regular rate, regular rhythm     Skin warm and well perfused   Resp:  Non labored breathing on room air    No tachypnea    No cough heard  GI:  Abdomen is soft, there is no rigidity    No distension    No rebound tenderness     No abdominal tenderness  MS:  Normal muscular tone  Skin:  No rash  or acute skin lesions noted  Neuro:   Awake, alert.      Speech is slurred    Face is symmetric.     Moves all extremities equally  Psych:  Flat affect    Emergency Department Course   Emergency Department Course:  Reviewed:  I reviewed nursing notes, vitals, past medical history and care everywhere    Assessments:  2217 I obtained history and examined the patient as noted above.     0247 I rechecked and updated the patient regarding the plan for care. He was ambulatory with a steady gate and had clear speech.     Disposition:  The patient was discharged to home.     Impression & Plan   Medical Decision Making:  Jerome Flanagan is a 46 year old male presents after being found intoxicated in public and brought in by EMS for evaluation and medical clearance.  Trauma exam is normal. Exam is consistent with isolated alcohol intoxication and the patient's mental status and ability to walk steadily improved with time. After a period of close monitoring in the ED, I now consider the patient clinically sober such that discharge is safe. Patient denies any thoughts of self harm.  The patient declined my help with alcohol abuse resources.  He can represent to the ED for acute problems and I specifically discussed the risk for alcohol withdrawal, but there is no evidence of such at this time.     Diagnosis:    ICD-10-CM    1. Alcoholic intoxication without complication (H)  F10.920        Scribe Disclosure:  I, Alonso Haddad, am serving as a scribe at 10:14 PM on 9/4/2021 to document services personally performed by Marlee Douglas MD based on my observations and the provider's statements to me.      Marlee Douglas MD  09/05/21 3467

## 2021-09-05 NOTE — ED NOTES
Bed: ED16  Expected date: 9/4/21  Expected time: 9:11 PM  Means of arrival: Ambulance  Comments:  Luna [age]M intoxicated

## 2021-09-05 NOTE — ED TRIAGE NOTES
Pt was found wandering neighborhood trying to get into houses, bystander called EMS. Glucose 100 per ems.

## 2021-09-08 DIAGNOSIS — M25.531 RIGHT WRIST PAIN: ICD-10-CM

## 2021-09-08 RX ORDER — PREDNISONE 20 MG/1
TABLET ORAL
Qty: 10 TABLET | Refills: 0 | OUTPATIENT
Start: 2021-09-08

## 2021-09-10 NOTE — TELEPHONE ENCOUNTER
Called and spoke to pharmacy and inform them that we do not refill medication because urgent care does not do refills. Pt will need to follow up with PCP.    ARVIN Manning on 9/10/2021 at 10:23 AM

## 2021-10-31 ENCOUNTER — HEALTH MAINTENANCE LETTER (OUTPATIENT)
Age: 46
End: 2021-10-31

## 2022-10-23 ENCOUNTER — HEALTH MAINTENANCE LETTER (OUTPATIENT)
Age: 47
End: 2022-10-23

## 2022-12-10 ENCOUNTER — HEALTH MAINTENANCE LETTER (OUTPATIENT)
Age: 47
End: 2022-12-10

## 2023-01-01 ENCOUNTER — HOSPITAL ENCOUNTER (EMERGENCY)
Facility: CLINIC | Age: 48
Discharge: HOME | End: 2023-08-06
Attending: EMERGENCY MEDICINE | Admitting: EMERGENCY MEDICINE
Payer: COMMERCIAL

## 2023-01-01 ENCOUNTER — APPOINTMENT (OUTPATIENT)
Dept: CT IMAGING | Facility: CLINIC | Age: 48
End: 2023-01-01
Attending: EMERGENCY MEDICINE
Payer: COMMERCIAL

## 2023-01-01 ENCOUNTER — HOSPITAL ENCOUNTER (EMERGENCY)
Facility: CLINIC | Age: 48
Discharge: HOME | End: 2023-08-09
Attending: EMERGENCY MEDICINE | Admitting: EMERGENCY MEDICINE
Payer: COMMERCIAL

## 2023-01-01 VITALS
HEART RATE: 89 BPM | DIASTOLIC BLOOD PRESSURE: 62 MMHG | RESPIRATION RATE: 18 BRPM | SYSTOLIC BLOOD PRESSURE: 122 MMHG | OXYGEN SATURATION: 90 % | TEMPERATURE: 97.8 F

## 2023-01-01 VITALS
SYSTOLIC BLOOD PRESSURE: 125 MMHG | DIASTOLIC BLOOD PRESSURE: 84 MMHG | HEART RATE: 79 BPM | OXYGEN SATURATION: 95 % | TEMPERATURE: 98.1 F | RESPIRATION RATE: 16 BRPM

## 2023-01-01 DIAGNOSIS — F10.220 ALCOHOL DEPENDENCE WITH UNCOMPLICATED INTOXICATION (H): ICD-10-CM

## 2023-01-01 DIAGNOSIS — F10.920 ALCOHOLIC INTOXICATION WITHOUT COMPLICATION (H): ICD-10-CM

## 2023-01-01 PROCEDURE — 99285 EMERGENCY DEPT VISIT HI MDM: CPT | Mod: 25

## 2023-01-01 PROCEDURE — 70450 CT HEAD/BRAIN W/O DYE: CPT

## 2023-01-01 PROCEDURE — 72125 CT NECK SPINE W/O DYE: CPT

## 2023-01-01 PROCEDURE — 250N000013 HC RX MED GY IP 250 OP 250 PS 637: Performed by: EMERGENCY MEDICINE

## 2023-01-01 PROCEDURE — 99285 EMERGENCY DEPT VISIT HI MDM: CPT

## 2023-01-01 RX ORDER — HYDROXYZINE HYDROCHLORIDE 25 MG/1
50 TABLET, FILM COATED ORAL ONCE
Status: COMPLETED | OUTPATIENT
Start: 2023-01-01 | End: 2023-01-01

## 2023-01-01 RX ADMIN — HYDROXYZINE HYDROCHLORIDE 50 MG: 25 TABLET ORAL at 16:30

## 2023-01-01 ASSESSMENT — ACTIVITIES OF DAILY LIVING (ADL)
ADLS_ACUITY_SCORE: 35

## 2023-08-06 NOTE — DISCHARGE INSTRUCTIONS
Seek help for your addiction.  Follow-up with primary care.  Return as needed.    Treatment Resources    [facility] [facility]- [place], [place] - 1- [phone]    [facility] - [facility] Treatment [facility] - [place], [place] - [phone]     Hot Line - Help - meeting places and times - [phone]     [place] - Chemical Dependency Treatment -Outpatient -[facility] [phone] [facility] [phone]    [facility] - Chemical Dependency Therapy - [facility] [phone]    [facility] Chemical Dependency Program -   [place], [place] - [phone]    [facility] - Drug & Alcohol Rehab Center -  [facility] [phone]    [facility] - Chemical Dependency Treatment - [place], [place] - [phone]    [place] - Substance Abuse Services - [facility] [phone]

## 2023-08-06 NOTE — ED PROVIDER NOTES
History     Chief Complaint:  Alcohol Intoxication       The history is provided by the patient.      Jerome Flanagan is a 48 year old male with a history of alcohol abuse who presents with alcohol intoxication.  He drinks daily and recently was kicked out of his mother's home so he has been staying in a hotel.  He passed out in a common area prompting call to paramedics.  He denies any fall or injury.  He denies any pain or concerns at this time.  He declines detox.  He does not think anyone would come pick him up today.    Independent Historian:   See above.     Review of External Notes:   Reviewed telemedicine appointment note from 4/4/23 with primary care. His alcohol use was addressed but he did not feel ready to stop drinking.     Medications:    The patient is not currently taking any prescribed medications    Past Medical History:    No past medical history     Past Surgical History:    Ulnar nerve transposition  Left long finger irrigation and debridement      Physical Exam   Patient Vitals for the past 24 hrs:   BP Pulse Resp SpO2   08/06/23 1358 125/84 79 16 95 %        Physical Exam  General: WD/WN; well appearing middle-aged  man; cooperative  Head:  Atraumatic  Eyes:  Conjunctivae, lids, and sclerae are normal  ENT:    Normal nose; MMM  Neck:  Supple; normal ROM  Resp:  No respiratory distress  GI:  Nondistended    MS:  Normal ROM  Skin:  Warm; non-diaphoretic; no pallor  Neuro:  Awake; A&Ox3  Psych: Normal mood and affect; normal speech  Vitals reviewed.    Emergency Department Course     Emergency Department Course & Assessments:  Interventions:  Medications   hydrOXYzine (ATARAX) tablet 50 mg (50 mg Oral $Given 8/6/23 1630)      Assessments:  1532 I obtained history and examined the patient as noted above.     Independent Interpretation (X-rays, CTs, rhythm strip):  Not applicable.    Consultations/Discussion of Management or Tests:  Not applicable.     Social Determinants of Health  affecting care:   Patient was recently kicked out of his mother's house and has been staying at a hotel.   Substance abuse.  Established care providers.    Disposition:  Care of the patient was transferred to my colleague, Dr. Elizabeth, pending sobriety or safe ride home.    Impression & Plan    Medical Decision Making:  Jerome is a 48 year old man with a history of alcohol abuse who was found intoxicated at his hotel prompting his visit.  He denies fall/trauma.  He has no complaints.  He declines detox.  I note he had a visit with primary care earlier this year and did not feel ready to stop drinking alcohol.  He appears well on exam.  There is no obvious trauma to warrant imaging.  He does seem intoxicated and endorses alcohol use today.  There is no indication for serum studies to evaluate for alternate causes of his presentation.  He was placed on a health officer hold and allowed to metabolize his alcohol in the emergency department.  He requested anxiolytics and was given hydroxyzine.  At the end of my shift he continues to metabolize his alcohol.  I am told he has called his mother who will pick him up.  If she does not, he would be appropriate for discharge when clinically sober.  At the end of my shift he was endorsed to my partner, Dr. Elizabeth.    Diagnosis:    ICD-10-CM    1. Alcohol dependence with uncomplicated intoxication (H)  F10.220              Scribe Disclosure:  I, Kellie Rosario, am serving as a scribe at 2:56 PM on 8/6/2023 to document services personally performed by Fara Feliz MD based on my observations and the provider's statements to me.     8/6/2023   Fara Feliz MD Dixson, Kylie S, MD  08/09/23 0690

## 2023-08-06 NOTE — ED NOTES
Bed: [facility]  Expected date:   Expected time:   Means of arrival:   Comments:  520  48 M highly intoxicated/confused/coop/VSS  1345

## 2023-08-06 NOTE — ED TRIAGE NOTES
"Pt found passed out at [employer] in the [place], pt was a guest in the hotel. Denies SI, states \" drinking way too much daily\". Patient denies drug use.     Triage Assessment       Row Name 08/06/23 4162       Triage Assessment (Adult)    Airway WDL WDL       Respiratory WDL    Respiratory WDL WDL       Skin Circulation/Temperature WDL    Skin Circulation/Temperature WDL WDL       Cardiac WDL    Cardiac WDL WDL       Peripheral/Neurovascular WDL    Peripheral Neurovascular WDL WDL       Cognitive/Neuro/Behavioral WDL    Cognitive/Neuro/Behavioral WDL orientation                    "
show

## 2023-08-06 NOTE — ED PROVIDER NOTES
This patient was signed out to me awaiting a ride home.  He is intoxicated.  His mother came to pick him up at 1803.     Nadia Elizabeth MD  08/06/23 9775

## 2023-08-09 NOTE — ED PROVIDER NOTES
History     Chief Complaint:  Alcohol Intoxication       History limited by: Acute alcohol intoxication. History supplemented by nurse report.      Jerome Flanagan is a 48 year old male who presents via EMS with alcohol intoxication.  He was found lying in the grass in the neighborhood of his home.  On CRYSTAL assessment by EMS, he blew a 0.4.  C-collar placed by EMS due to unwitnessed fall and patient was brought to the ER for further evaluation.  Patient has no complaints at this time.    Review of Systems   Unable to perform ROS: Other (alcohol intoxication)       Independent Historian:   Nurse report, otherwise none.     Review of External Notes:   //23 family medicine telemedicine note      Medications:    Amlodipine  Citalopram  Gabapentin  Lisinopril  Naltrexone  Omeprazole   Ranitidine     Past Medical History:    Alcohol use disorder    Past Surgical History:    Ulnar nerve transposition  Left long finger irrigation and debridement       Physical Exam   Patient Vitals for the past 24 hrs:   BP Temp Temp src Pulse Resp SpO2   08/08/23 2238 -- -- -- -- -- 90 %   08/08/23 1944 125/58 97.8  F (36.6  C) Temporal 101 14 94 %        Physical Exam  Constitutional:       General: Not in acute distress.        Appearance: Normal appearance.   HENT:      Head: Normocephalic and atraumatic.  No espinosa sign.  No raccoon's eyes.  Eyes:      Extraocular Movements: Extraocular movements intact.      Conjunctiva/sclera: Conjunctivae normal.      Pupils: PERRL  Cardiovascular:      Rate and Rhythm: Normal rate and regular rhythm.   Pulmonary:      Effort: Pulmonary effort is normal. No respiratory distress.      Breath sounds: Normal breath sounds.   Abdominal:      General: Abdomen is flat. There is no distension.      Palpations: Abdomen is soft.      Tenderness: There is no abdominal tenderness.   Musculoskeletal:      Cervical back: Cervical collar in place.  No midline cervical tenderness to palpation.     Right lower  leg: No edema.      Left lower leg: No edema.   Skin:     General: Skin is warm and dry.   Neurological:      General: No focal deficit present.  Moving all 4 extremities.     Mental Status: Intoxicated.  Opens eyes to voice and tracks with eyes.  Psychiatric:         Mood and Affect: Mood normal.         Behavior: Behavior normal.    Emergency Department Course     Imaging:  Cervical spine CT w/o contrast   Final Result   IMPRESSION: No acute fracture identified.      CT Head w/o Contrast   Final Result   IMPRESSION:   1.  No acute intracranial abnormality.         Report per radiology    Laboratory:  Labs Ordered and Resulted from Time of ED Arrival to Time of ED Departure - No data to display       Emergency Department Course & Assessments:    Interventions:  Medications - No data to display     Independent Interpretation (X-rays, CTs, rhythm strip):  None    Assessments/Consultations/Discussion of Management or Tests:     ED Course as of 08/08/23 2242   Tue Aug 08, 2023   2010 Initial assessment. I gathered history and examined the patient as noted above.     Patient has a breath alcohol 0.373.       Social Determinants of Health affecting care:   Alcohol use disorder    Disposition:  Care of the patient was transferred to my colleague Dr. Herrera pending clinical sobriety and reevaluation.     Impression & Plan      Medical Decision Makin-year-old male as described above presents to the emergency department for acute alcohol intoxication.  Patient hemodynamically stable at time evaluation.  Afebrile.  No complaints at this time.  Patient arrived in cervical collar by EMS due to possible unwitnessed fall.  Patient has no focal neurological deficits and opens eyes to voice with even pupils bilaterally.  No obvious trauma to the face or neck region.  No midline cervical spine tenderness to palpation.  Nonetheless, given patient is acutely intoxicated and unreliable historian/reluctant historian, CT head  ordered for evaluation for ICH in addition to CT cervical spine as C-spine cannot be cleared per Nexus criteria due to intoxication.  No other complaints at this time or other injuries on head to toe examination to indicate blood work or imaging.  Nonetheless, given patient is intoxicated at this time and reluctant historian, he will require sober re-evaluation.  Consider expansion of work-up if vital sign changes or patient has new complaints once clinically sober.  Additional work-up and orders as listed in chart.    Please refer to ED course above for details on the patient's treatment course and any changes or updates in care plan beyond my initial evaluation and MDM.      Diagnosis:    ICD-10-CM    1. Alcoholic intoxication without complication (H)  F10.920              Scribe Disclosure:  Margarita ARIAS, am serving as a scribe at 7:42 PM on 8/8/2023 to document services personally performed by Kevin Porter DO based on my observations and the provider's statements to me.     8/8/2023   Kevin Porter DO Yeh, Ferris, DO  08/08/23 7419

## 2023-08-09 NOTE — ED PROVIDER NOTES
Signout received, patient found down in the grass near his home, he blew passively a 0.4, c-collar had been placed by EMS.  CT imaging of head and neck is unremarkable.  Signout received to monitor closely for airway protection and reassess once clinically sober.     4:49 AM He has been able to ambulate to the bathroom however had short shuffling gait and will need continued observation pending clinical spreading with steady gait and ability to tolerate p.o.  Patient signed out to oncoming dayshift provider.    5:26 AM patient amatory with a steady gait, able to tolerate p.o., discharged.    Cervical spine CT w/o contrast   Final Result   IMPRESSION: No acute fracture identified.      CT Head w/o Contrast   Final Result   IMPRESSION:   1.  No acute intracranial abnormality.          Darion Herrera MD  [employer]  4:48 AM 08/09/23        Darion Herrera MD  08/09/23 0449       Darion Herrera MD  08/09/23 5647

## 2023-08-09 NOTE — ED TRIAGE NOTES
[facility]  ED Arrival Note      Means of Arrival: EMS  Comes from: Home    Story:pt found lying in the grass near his home in [place] . Hx of ETOH.          EMS/PD Interventions: N/A  EMS Medications: N/A    Meets Stroke Criteria? No  Meets Trauma Criteria? No  Shock Index: >1      Directed to: Main ED  Belongings: In room locker

## 2023-08-09 NOTE — ED NOTES
Pt resting in bed on his left side with his eyes closed and respirations are regular and unlabored.

## 2023-08-09 NOTE — ED NOTES
Bed: [facility]  Expected date:   Expected time:   Means of arrival:   Comments:  540 ETOH

## 2024-01-01 ENCOUNTER — LAB (OUTPATIENT)
Dept: LAB | Facility: CLINIC | Age: 49
End: 2024-01-01
Payer: COMMERCIAL

## 2024-01-01 ENCOUNTER — HOSPITAL ENCOUNTER (OUTPATIENT)
Dept: BEHAVIORAL HEALTH | Facility: CLINIC | Age: 49
Discharge: HOME | End: 2024-02-20
Attending: FAMILY MEDICINE
Payer: COMMERCIAL

## 2024-01-01 ENCOUNTER — HOSPITAL ENCOUNTER (OUTPATIENT)
Dept: BEHAVIORAL HEALTH | Facility: CLINIC | Age: 49
Discharge: HOME | End: 2024-02-18
Attending: FAMILY MEDICINE
Payer: COMMERCIAL

## 2024-01-01 ENCOUNTER — HOSPITAL ENCOUNTER (OUTPATIENT)
Dept: BEHAVIORAL HEALTH | Facility: CLINIC | Age: 49
Discharge: HOME | End: 2024-02-21
Attending: FAMILY MEDICINE
Payer: COMMERCIAL

## 2024-01-01 ENCOUNTER — HOSPITAL ENCOUNTER (OUTPATIENT)
Dept: BEHAVIORAL HEALTH | Facility: CLINIC | Age: 49
Discharge: HOME | End: 2024-02-26
Attending: FAMILY MEDICINE
Payer: COMMERCIAL

## 2024-01-01 ENCOUNTER — HOSPITAL ENCOUNTER (OUTPATIENT)
Dept: BEHAVIORAL HEALTH | Facility: CLINIC | Age: 49
Discharge: HOME | End: 2024-02-22
Attending: FAMILY MEDICINE
Payer: COMMERCIAL

## 2024-01-01 ENCOUNTER — APPOINTMENT (OUTPATIENT)
Dept: GENERAL RADIOLOGY | Facility: CLINIC | Age: 49
DRG: 896 | End: 2024-01-01
Attending: INTERNAL MEDICINE
Payer: COMMERCIAL

## 2024-01-01 ENCOUNTER — APPOINTMENT (OUTPATIENT)
Dept: ULTRASOUND IMAGING | Facility: CLINIC | Age: 49
DRG: 896 | End: 2024-01-01
Attending: INTERNAL MEDICINE
Payer: COMMERCIAL

## 2024-01-01 ENCOUNTER — HOSPITAL ENCOUNTER (OUTPATIENT)
Dept: BEHAVIORAL HEALTH | Facility: CLINIC | Age: 49
Discharge: HOME | End: 2024-02-16
Attending: FAMILY MEDICINE
Payer: COMMERCIAL

## 2024-01-01 ENCOUNTER — HOSPITAL ENCOUNTER (OUTPATIENT)
Dept: BEHAVIORAL HEALTH | Facility: CLINIC | Age: 49
Discharge: HOME | End: 2024-02-27
Attending: FAMILY MEDICINE
Payer: COMMERCIAL

## 2024-01-01 ENCOUNTER — HEALTH MAINTENANCE LETTER (OUTPATIENT)
Age: 49
End: 2024-01-01

## 2024-01-01 ENCOUNTER — HOSPITAL ENCOUNTER (OUTPATIENT)
Dept: BEHAVIORAL HEALTH | Facility: CLINIC | Age: 49
Discharge: HOME | End: 2024-02-13
Attending: FAMILY MEDICINE
Payer: COMMERCIAL

## 2024-01-01 ENCOUNTER — PATIENT OUTREACH (OUTPATIENT)
Dept: CARE COORDINATION | Facility: CLINIC | Age: 49
End: 2024-01-01

## 2024-01-01 ENCOUNTER — HOSPITAL ENCOUNTER (OUTPATIENT)
Dept: BEHAVIORAL HEALTH | Facility: CLINIC | Age: 49
Discharge: HOME | End: 2024-02-03
Attending: FAMILY MEDICINE
Payer: COMMERCIAL

## 2024-01-01 ENCOUNTER — TELEPHONE (OUTPATIENT)
Dept: BEHAVIORAL HEALTH | Facility: CLINIC | Age: 49
End: 2024-01-01
Payer: COMMERCIAL

## 2024-01-01 ENCOUNTER — HOSPITAL ENCOUNTER (OUTPATIENT)
Dept: BEHAVIORAL HEALTH | Facility: CLINIC | Age: 49
Discharge: HOME | End: 2024-02-01
Attending: FAMILY MEDICINE
Payer: COMMERCIAL

## 2024-01-01 ENCOUNTER — APPOINTMENT (OUTPATIENT)
Dept: ULTRASOUND IMAGING | Facility: CLINIC | Age: 49
DRG: 641 | End: 2024-01-01
Payer: COMMERCIAL

## 2024-01-01 ENCOUNTER — HOSPITAL ENCOUNTER (OUTPATIENT)
Dept: BEHAVIORAL HEALTH | Facility: CLINIC | Age: 49
Discharge: HOME | End: 2024-02-17
Attending: FAMILY MEDICINE
Payer: COMMERCIAL

## 2024-01-01 ENCOUNTER — HOSPITAL ENCOUNTER (INPATIENT)
Facility: CLINIC | Age: 49
LOS: 18 days | Discharge: PSYCHIATRIC OR SUBSTANCE USE FACILITY | DRG: 896 | End: 2024-02-01
Attending: EMERGENCY MEDICINE | Admitting: INTERNAL MEDICINE
Payer: COMMERCIAL

## 2024-01-01 ENCOUNTER — HOSPITAL ENCOUNTER (OUTPATIENT)
Dept: BEHAVIORAL HEALTH | Facility: CLINIC | Age: 49
Discharge: HOME | End: 2024-02-12
Attending: FAMILY MEDICINE
Payer: COMMERCIAL

## 2024-01-01 ENCOUNTER — HOSPITAL ENCOUNTER (OUTPATIENT)
Dept: BEHAVIORAL HEALTH | Facility: CLINIC | Age: 49
Discharge: HOME | End: 2024-02-24
Attending: FAMILY MEDICINE
Payer: COMMERCIAL

## 2024-01-01 ENCOUNTER — HOSPITAL ENCOUNTER (OUTPATIENT)
Dept: BEHAVIORAL HEALTH | Facility: CLINIC | Age: 49
Discharge: HOME | End: 2024-02-28
Attending: FAMILY MEDICINE
Payer: COMMERCIAL

## 2024-01-01 ENCOUNTER — HOSPITAL ENCOUNTER (OUTPATIENT)
Dept: BEHAVIORAL HEALTH | Facility: CLINIC | Age: 49
Discharge: HOME | End: 2024-02-08
Attending: FAMILY MEDICINE
Payer: COMMERCIAL

## 2024-01-01 ENCOUNTER — TELEPHONE (OUTPATIENT)
Dept: BEHAVIORAL HEALTH | Facility: CLINIC | Age: 49
End: 2024-01-01

## 2024-01-01 ENCOUNTER — APPOINTMENT (OUTPATIENT)
Dept: PHYSICAL THERAPY | Facility: CLINIC | Age: 49
DRG: 896 | End: 2024-01-01
Payer: COMMERCIAL

## 2024-01-01 ENCOUNTER — INFUSION THERAPY VISIT (OUTPATIENT)
Dept: INFUSION THERAPY | Facility: CLINIC | Age: 49
End: 2024-01-01
Payer: COMMERCIAL

## 2024-01-01 ENCOUNTER — HOSPITAL ENCOUNTER (OUTPATIENT)
Dept: BEHAVIORAL HEALTH | Facility: CLINIC | Age: 49
Discharge: HOME | End: 2024-02-02
Attending: FAMILY MEDICINE
Payer: COMMERCIAL

## 2024-01-01 ENCOUNTER — VIRTUAL VISIT (OUTPATIENT)
Dept: ADDICTION MEDICINE | Facility: CLINIC | Age: 49
End: 2024-01-01
Payer: COMMERCIAL

## 2024-01-01 ENCOUNTER — HOSPITAL ENCOUNTER (INPATIENT)
Facility: CLINIC | Age: 49
LOS: 3 days | Discharge: OTHER INSTITUTION | DRG: 641 | End: 2024-02-15
Attending: EMERGENCY MEDICINE | Admitting: FAMILY MEDICINE
Payer: COMMERCIAL

## 2024-01-01 ENCOUNTER — HOSPITAL ENCOUNTER (OUTPATIENT)
Dept: BEHAVIORAL HEALTH | Facility: CLINIC | Age: 49
Discharge: HOME | End: 2024-02-09
Attending: FAMILY MEDICINE
Payer: COMMERCIAL

## 2024-01-01 ENCOUNTER — HOSPITAL ENCOUNTER (OUTPATIENT)
Dept: ULTRASOUND IMAGING | Facility: CLINIC | Age: 49
Discharge: HOME | End: 2024-02-21
Attending: FAMILY MEDICINE | Admitting: FAMILY MEDICINE
Payer: COMMERCIAL

## 2024-01-01 ENCOUNTER — MEDICAL CORRESPONDENCE (OUTPATIENT)
Dept: HEALTH INFORMATION MANAGEMENT | Facility: CLINIC | Age: 49
End: 2024-01-01
Payer: COMMERCIAL

## 2024-01-01 ENCOUNTER — HOSPITAL ENCOUNTER (OUTPATIENT)
Dept: BEHAVIORAL HEALTH | Facility: CLINIC | Age: 49
Discharge: HOME | End: 2024-02-11
Attending: FAMILY MEDICINE
Payer: COMMERCIAL

## 2024-01-01 ENCOUNTER — APPOINTMENT (OUTPATIENT)
Dept: MRI IMAGING | Facility: CLINIC | Age: 49
DRG: 896 | End: 2024-01-01
Attending: PSYCHIATRY & NEUROLOGY
Payer: COMMERCIAL

## 2024-01-01 ENCOUNTER — APPOINTMENT (OUTPATIENT)
Dept: CT IMAGING | Facility: CLINIC | Age: 49
DRG: 896 | End: 2024-01-01
Attending: EMERGENCY MEDICINE
Payer: COMMERCIAL

## 2024-01-01 ENCOUNTER — APPOINTMENT (OUTPATIENT)
Dept: OCCUPATIONAL THERAPY | Facility: CLINIC | Age: 49
DRG: 896 | End: 2024-01-01
Payer: COMMERCIAL

## 2024-01-01 ENCOUNTER — HOSPITAL ENCOUNTER (OUTPATIENT)
Dept: BEHAVIORAL HEALTH | Facility: CLINIC | Age: 49
Discharge: HOME | End: 2024-02-05
Attending: FAMILY MEDICINE
Payer: COMMERCIAL

## 2024-01-01 ENCOUNTER — HOSPITAL ENCOUNTER (OUTPATIENT)
Dept: BEHAVIORAL HEALTH | Facility: CLINIC | Age: 49
Discharge: HOME | End: 2024-02-19
Attending: FAMILY MEDICINE
Payer: COMMERCIAL

## 2024-01-01 ENCOUNTER — HOSPITAL ENCOUNTER (OUTPATIENT)
Dept: BEHAVIORAL HEALTH | Facility: CLINIC | Age: 49
Discharge: HOME | End: 2024-02-25
Attending: FAMILY MEDICINE
Payer: COMMERCIAL

## 2024-01-01 ENCOUNTER — HOSPITAL ENCOUNTER (OUTPATIENT)
Dept: BEHAVIORAL HEALTH | Facility: CLINIC | Age: 49
Discharge: HOME | End: 2024-02-04
Attending: FAMILY MEDICINE
Payer: COMMERCIAL

## 2024-01-01 ENCOUNTER — APPOINTMENT (OUTPATIENT)
Dept: PHYSICAL THERAPY | Facility: CLINIC | Age: 49
DRG: 896 | End: 2024-01-01
Attending: INTERNAL MEDICINE
Payer: COMMERCIAL

## 2024-01-01 ENCOUNTER — HOSPITAL ENCOUNTER (OUTPATIENT)
Dept: BEHAVIORAL HEALTH | Facility: CLINIC | Age: 49
Discharge: HOME | End: 2024-02-15
Attending: FAMILY MEDICINE
Payer: COMMERCIAL

## 2024-01-01 ENCOUNTER — LAB (OUTPATIENT)
Dept: LAB | Facility: CLINIC | Age: 49
DRG: 641 | End: 2024-01-01
Payer: COMMERCIAL

## 2024-01-01 ENCOUNTER — HOSPITAL ENCOUNTER (OUTPATIENT)
Dept: BEHAVIORAL HEALTH | Facility: CLINIC | Age: 49
Discharge: HOME | End: 2024-02-08
Attending: FAMILY MEDICINE | Admitting: FAMILY MEDICINE
Payer: COMMERCIAL

## 2024-01-01 ENCOUNTER — HOSPITAL ENCOUNTER (OUTPATIENT)
Dept: BEHAVIORAL HEALTH | Facility: CLINIC | Age: 49
Discharge: HOME | End: 2024-02-23
Attending: FAMILY MEDICINE
Payer: COMMERCIAL

## 2024-01-01 ENCOUNTER — HOSPITAL ENCOUNTER (OUTPATIENT)
Dept: BEHAVIORAL HEALTH | Facility: CLINIC | Age: 49
Discharge: HOME | End: 2024-02-10
Attending: FAMILY MEDICINE
Payer: COMMERCIAL

## 2024-01-01 ENCOUNTER — HOSPITAL ENCOUNTER (OUTPATIENT)
Dept: BEHAVIORAL HEALTH | Facility: CLINIC | Age: 49
Discharge: HOME | End: 2024-02-07
Attending: FAMILY MEDICINE
Payer: COMMERCIAL

## 2024-01-01 ENCOUNTER — DOCUMENTATION ONLY (OUTPATIENT)
Dept: BEHAVIORAL HEALTH | Facility: CLINIC | Age: 49
End: 2024-01-01

## 2024-01-01 ENCOUNTER — APPOINTMENT (OUTPATIENT)
Dept: ULTRASOUND IMAGING | Facility: CLINIC | Age: 49
DRG: 896 | End: 2024-01-01
Attending: HOSPITALIST
Payer: COMMERCIAL

## 2024-01-01 ENCOUNTER — HOSPITAL ENCOUNTER (OUTPATIENT)
Dept: BEHAVIORAL HEALTH | Facility: CLINIC | Age: 49
Discharge: HOME | End: 2024-02-06
Attending: FAMILY MEDICINE
Payer: COMMERCIAL

## 2024-01-01 ENCOUNTER — APPOINTMENT (OUTPATIENT)
Dept: OCCUPATIONAL THERAPY | Facility: CLINIC | Age: 49
DRG: 896 | End: 2024-01-01
Attending: INTERNAL MEDICINE
Payer: COMMERCIAL

## 2024-01-01 VITALS
SYSTOLIC BLOOD PRESSURE: 108 MMHG | HEART RATE: 75 BPM | DIASTOLIC BLOOD PRESSURE: 74 MMHG | OXYGEN SATURATION: 99 % | TEMPERATURE: 97.7 F

## 2024-01-01 VITALS
OXYGEN SATURATION: 98 % | HEIGHT: 70 IN | RESPIRATION RATE: 18 BRPM | HEART RATE: 76 BPM | SYSTOLIC BLOOD PRESSURE: 132 MMHG | DIASTOLIC BLOOD PRESSURE: 88 MMHG | TEMPERATURE: 97.8 F | BODY MASS INDEX: 23.02 KG/M2 | WEIGHT: 160.8 LBS

## 2024-01-01 VITALS
TEMPERATURE: 99 F | DIASTOLIC BLOOD PRESSURE: 90 MMHG | HEART RATE: 77 BPM | SYSTOLIC BLOOD PRESSURE: 117 MMHG | OXYGEN SATURATION: 98 % | RESPIRATION RATE: 18 BRPM

## 2024-01-01 VITALS
HEART RATE: 79 BPM | OXYGEN SATURATION: 98 % | RESPIRATION RATE: 18 BRPM | SYSTOLIC BLOOD PRESSURE: 110 MMHG | TEMPERATURE: 98.4 F | DIASTOLIC BLOOD PRESSURE: 75 MMHG

## 2024-01-01 VITALS — BODY MASS INDEX: 26.54 KG/M2 | WEIGHT: 185 LBS

## 2024-01-01 DIAGNOSIS — G47.00 INSOMNIA, UNSPECIFIED TYPE: ICD-10-CM

## 2024-01-01 DIAGNOSIS — D64.9 ACUTE ON CHRONIC ANEMIA: ICD-10-CM

## 2024-01-01 DIAGNOSIS — R56.9 ALCOHOL WITHDRAWAL SEIZURE WITH COMPLICATION (H): ICD-10-CM

## 2024-01-01 DIAGNOSIS — F10.20 ALCOHOL USE DISORDER, SEVERE, DEPENDENCE (H): Primary | ICD-10-CM

## 2024-01-01 DIAGNOSIS — K70.10 ALCOHOLIC HEPATITIS WITHOUT ASCITES (H): ICD-10-CM

## 2024-01-01 DIAGNOSIS — F41.1 GENERALIZED ANXIETY DISORDER: ICD-10-CM

## 2024-01-01 DIAGNOSIS — E63.9 NUTRITIONAL DEFICIENCY: ICD-10-CM

## 2024-01-01 DIAGNOSIS — K70.10 ALCOHOLIC HEPATITIS WITHOUT ASCITES (H): Primary | ICD-10-CM

## 2024-01-01 DIAGNOSIS — D69.6 THROMBOCYTOPENIA (H): ICD-10-CM

## 2024-01-01 DIAGNOSIS — K21.00 GASTROESOPHAGEAL REFLUX DISEASE WITH ESOPHAGITIS, UNSPECIFIED WHETHER HEMORRHAGE: ICD-10-CM

## 2024-01-01 DIAGNOSIS — M1A.9XX1 TOPHACEOUS GOUT: Primary | ICD-10-CM

## 2024-01-01 DIAGNOSIS — R52 PAIN: ICD-10-CM

## 2024-01-01 DIAGNOSIS — F10.20 ALCOHOL USE DISORDER, SEVERE, DEPENDENCE (H): ICD-10-CM

## 2024-01-01 DIAGNOSIS — D64.9 ACUTE ON CHRONIC ANEMIA: Primary | ICD-10-CM

## 2024-01-01 DIAGNOSIS — R79.0 LOW MAGNESIUM LEVELS: ICD-10-CM

## 2024-01-01 DIAGNOSIS — I80.8 PHLEBITIS AND THROMBOPHLEBITIS OF SUPERFICIAL VEINS OF UPPER EXTREMITIES: ICD-10-CM

## 2024-01-01 DIAGNOSIS — I10 ESSENTIAL HYPERTENSION: ICD-10-CM

## 2024-01-01 DIAGNOSIS — E87.5 HYPERKALEMIA: ICD-10-CM

## 2024-01-01 DIAGNOSIS — E78.5 HYPERLIPIDEMIA LDL GOAL <100: Primary | ICD-10-CM

## 2024-01-01 DIAGNOSIS — F10.939 ALCOHOL WITHDRAWAL SEIZURE WITH COMPLICATION (H): ICD-10-CM

## 2024-01-01 DIAGNOSIS — D50.9 IRON DEFICIENCY ANEMIA, UNSPECIFIED IRON DEFICIENCY ANEMIA TYPE: ICD-10-CM

## 2024-01-01 DIAGNOSIS — M10.9 GOUT, UNSPECIFIED CAUSE, UNSPECIFIED CHRONICITY, UNSPECIFIED SITE: ICD-10-CM

## 2024-01-01 DIAGNOSIS — K59.00 CONSTIPATION, UNSPECIFIED CONSTIPATION TYPE: ICD-10-CM

## 2024-01-01 DIAGNOSIS — R89.9 ABNORMAL LABORATORY TEST: Primary | ICD-10-CM

## 2024-01-01 DIAGNOSIS — G47.00 INSOMNIA, UNSPECIFIED TYPE: Primary | ICD-10-CM

## 2024-01-01 LAB
ABO/RH(D): NORMAL
ABO/RH(D): NORMAL
ALBUMIN SERPL BCG-MCNC: 2.8 G/DL (ref 3.5–5.2)
ALBUMIN SERPL BCG-MCNC: 3.1 G/DL (ref 3.5–5.2)
ALBUMIN SERPL BCG-MCNC: 3.2 G/DL (ref 3.5–5.2)
ALBUMIN SERPL BCG-MCNC: 3.4 G/DL (ref 3.5–5.2)
ALBUMIN SERPL BCG-MCNC: 3.9 G/DL (ref 3.5–5.2)
ALBUMIN SERPL BCG-MCNC: 4 G/DL (ref 3.5–5.2)
ALBUMIN SERPL BCG-MCNC: 4 G/DL (ref 3.5–5.2)
ALBUMIN SERPL BCG-MCNC: 4.2 G/DL (ref 3.5–5.2)
ALBUMIN SERPL BCG-MCNC: 4.2 G/DL (ref 3.5–5.2)
ALBUMIN SERPL BCG-MCNC: 4.3 G/DL (ref 3.5–5.2)
ALBUMIN SERPL BCG-MCNC: 4.4 G/DL (ref 3.5–5.2)
ALBUMIN UR-MCNC: NEGATIVE MG/DL
ALBUMIN UR-MCNC: NEGATIVE MG/DL
ALP SERPL-CCNC: 121 U/L (ref 40–150)
ALP SERPL-CCNC: 63 U/L (ref 40–150)
ALP SERPL-CCNC: 67 U/L (ref 40–150)
ALP SERPL-CCNC: 67 U/L (ref 40–150)
ALP SERPL-CCNC: 68 U/L (ref 40–150)
ALP SERPL-CCNC: 69 U/L (ref 40–150)
ALP SERPL-CCNC: 70 U/L (ref 40–150)
ALP SERPL-CCNC: 72 U/L (ref 40–150)
ALP SERPL-CCNC: 73 U/L (ref 40–150)
ALP SERPL-CCNC: 84 U/L (ref 40–150)
ALP SERPL-CCNC: 90 U/L (ref 40–150)
ALT SERPL W P-5'-P-CCNC: 10 U/L (ref 0–70)
ALT SERPL W P-5'-P-CCNC: 11 U/L (ref 0–70)
ALT SERPL W P-5'-P-CCNC: 12 U/L (ref 0–70)
ALT SERPL W P-5'-P-CCNC: 12 U/L (ref 0–70)
ALT SERPL W P-5'-P-CCNC: 13 U/L (ref 0–70)
ALT SERPL W P-5'-P-CCNC: 14 U/L (ref 0–70)
ALT SERPL W P-5'-P-CCNC: 15 U/L (ref 0–70)
ALT SERPL W P-5'-P-CCNC: 16 U/L (ref 0–70)
ALT SERPL W P-5'-P-CCNC: 19 U/L (ref 0–70)
ALT SERPL W P-5'-P-CCNC: 27 U/L (ref 0–70)
ALT SERPL W P-5'-P-CCNC: 41 U/L (ref 0–70)
ANION GAP SERPL CALCULATED.3IONS-SCNC: 10 MMOL/L (ref 7–15)
ANION GAP SERPL CALCULATED.3IONS-SCNC: 11 MMOL/L (ref 7–15)
ANION GAP SERPL CALCULATED.3IONS-SCNC: 12 MMOL/L (ref 7–15)
ANION GAP SERPL CALCULATED.3IONS-SCNC: 13 MMOL/L (ref 7–15)
ANION GAP SERPL CALCULATED.3IONS-SCNC: 13 MMOL/L (ref 7–15)
ANION GAP SERPL CALCULATED.3IONS-SCNC: 14 MMOL/L (ref 7–15)
ANION GAP SERPL CALCULATED.3IONS-SCNC: 15 MMOL/L (ref 7–15)
ANION GAP SERPL CALCULATED.3IONS-SCNC: 25 MMOL/L (ref 7–15)
ANION GAP SERPL CALCULATED.3IONS-SCNC: 37 MMOL/L (ref 7–15)
ANION GAP SERPL CALCULATED.3IONS-SCNC: 8 MMOL/L (ref 7–15)
ANION GAP SERPL CALCULATED.3IONS-SCNC: 8 MMOL/L (ref 7–15)
ANION GAP SERPL CALCULATED.3IONS-SCNC: 9 MMOL/L (ref 7–15)
ANTIBODY SCREEN: NEGATIVE
ANTIBODY SCREEN: NEGATIVE
APPEARANCE UR: CLEAR
APPEARANCE UR: CLEAR
APTT PPP: 28 SECONDS (ref 22–38)
AST SERPL W P-5'-P-CCNC: 10 U/L (ref 0–45)
AST SERPL W P-5'-P-CCNC: 104 U/L (ref 0–45)
AST SERPL W P-5'-P-CCNC: 12 U/L (ref 0–45)
AST SERPL W P-5'-P-CCNC: 15 U/L (ref 0–45)
AST SERPL W P-5'-P-CCNC: 17 U/L (ref 0–45)
AST SERPL W P-5'-P-CCNC: 18 U/L (ref 0–45)
AST SERPL W P-5'-P-CCNC: 190 U/L (ref 0–45)
AST SERPL W P-5'-P-CCNC: 24 U/L (ref 0–45)
AST SERPL W P-5'-P-CCNC: 34 U/L (ref 0–45)
ATRIAL RATE - MUSE: 101 BPM
B-OH-BUTYR SERPL-SCNC: 2.3 MMOL/L
B-OH-BUTYR SERPL-SCNC: 5.1 MMOL/L
B-OH-BUTYR SERPL-SCNC: <0.18 MMOL/L
B-OH-BUTYR SERPL-SCNC: >9 MMOL/L
BACTERIA BLD CULT: NO GROWTH
BACTERIA BLD CULT: NO GROWTH
BASE EXCESS BLDV CALC-SCNC: -11.8 MMOL/L (ref -7.7–1.9)
BASE EXCESS BLDV CALC-SCNC: -4.5 MMOL/L (ref -3–3)
BASOPHILS # BLD AUTO: 0 10E3/UL (ref 0–0.2)
BASOPHILS # BLD AUTO: 0.1 10E3/UL (ref 0–0.2)
BASOPHILS # BLD AUTO: ABNORMAL 10*3/UL
BASOPHILS # BLD MANUAL: 0 10E3/UL (ref 0–0.2)
BASOPHILS # BLD MANUAL: 0.1 10E3/UL (ref 0–0.2)
BASOPHILS # BLD MANUAL: 0.1 10E3/UL (ref 0–0.2)
BASOPHILS NFR BLD AUTO: 0 %
BASOPHILS NFR BLD AUTO: 1 %
BASOPHILS NFR BLD AUTO: ABNORMAL %
BASOPHILS NFR BLD MANUAL: 0 %
BASOPHILS NFR BLD MANUAL: 2 %
BASOPHILS NFR BLD MANUAL: 2 %
BILIRUB DIRECT SERPL-MCNC: <0.2 MG/DL (ref 0–0.3)
BILIRUB DIRECT SERPL-MCNC: <0.2 MG/DL (ref 0–0.3)
BILIRUB SERPL-MCNC: 0.2 MG/DL
BILIRUB SERPL-MCNC: 0.3 MG/DL
BILIRUB SERPL-MCNC: 0.4 MG/DL
BILIRUB SERPL-MCNC: 0.4 MG/DL
BILIRUB SERPL-MCNC: 0.6 MG/DL
BILIRUB SERPL-MCNC: 0.8 MG/DL
BILIRUB SERPL-MCNC: 1.9 MG/DL
BILIRUB SERPL-MCNC: <0.2 MG/DL
BILIRUB UR QL STRIP: NEGATIVE
BILIRUB UR QL STRIP: NEGATIVE
BLD PROD TYP BPU: NORMAL
BLD PROD TYP BPU: NORMAL
BLOOD COMPONENT TYPE: NORMAL
BLOOD COMPONENT TYPE: NORMAL
BUN SERPL-MCNC: 10.6 MG/DL (ref 6–20)
BUN SERPL-MCNC: 14.4 MG/DL (ref 6–20)
BUN SERPL-MCNC: 15.6 MG/DL (ref 6–20)
BUN SERPL-MCNC: 19.9 MG/DL (ref 6–20)
BUN SERPL-MCNC: 25.2 MG/DL (ref 6–20)
BUN SERPL-MCNC: 26 MG/DL (ref 6–20)
BUN SERPL-MCNC: 30 MG/DL (ref 6–20)
BUN SERPL-MCNC: 31.5 MG/DL (ref 6–20)
BUN SERPL-MCNC: 37 MG/DL (ref 6–20)
BUN SERPL-MCNC: 37.6 MG/DL (ref 6–20)
BUN SERPL-MCNC: 39.1 MG/DL (ref 6–20)
BUN SERPL-MCNC: 39.3 MG/DL (ref 6–20)
BUN SERPL-MCNC: 41 MG/DL (ref 6–20)
BUN SERPL-MCNC: 42.2 MG/DL (ref 6–20)
BUN SERPL-MCNC: 43.8 MG/DL (ref 6–20)
BUN SERPL-MCNC: 44.2 MG/DL (ref 6–20)
BUN SERPL-MCNC: 45.8 MG/DL (ref 6–20)
BUN SERPL-MCNC: 46.1 MG/DL (ref 6–20)
BUN SERPL-MCNC: 9.7 MG/DL (ref 6–20)
CA-I BLD-MCNC: 5.3 MG/DL (ref 4.4–5.2)
CA-I BLD-MCNC: 5.3 MG/DL (ref 4.4–5.2)
CALCIUM SERPL-MCNC: 10 MG/DL (ref 8.6–10)
CALCIUM SERPL-MCNC: 10 MG/DL (ref 8.6–10)
CALCIUM SERPL-MCNC: 10.2 MG/DL (ref 8.6–10)
CALCIUM SERPL-MCNC: 10.4 MG/DL (ref 8.6–10)
CALCIUM SERPL-MCNC: 10.5 MG/DL (ref 8.6–10)
CALCIUM SERPL-MCNC: 7.8 MG/DL (ref 8.6–10)
CALCIUM SERPL-MCNC: 8 MG/DL (ref 8.6–10)
CALCIUM SERPL-MCNC: 8.1 MG/DL (ref 8.6–10)
CALCIUM SERPL-MCNC: 8.2 MG/DL (ref 8.6–10)
CALCIUM SERPL-MCNC: 8.3 MG/DL (ref 8.6–10)
CALCIUM SERPL-MCNC: 8.3 MG/DL (ref 8.6–10)
CALCIUM SERPL-MCNC: 9.3 MG/DL (ref 8.6–10)
CALCIUM SERPL-MCNC: 9.4 MG/DL (ref 8.6–10)
CALCIUM SERPL-MCNC: 9.8 MG/DL (ref 8.6–10)
CALCIUM SERPL-MCNC: 9.9 MG/DL (ref 8.6–10)
CALCIUM SERPL-MCNC: 9.9 MG/DL (ref 8.6–10)
CHLORIDE SERPL-SCNC: 100 MMOL/L (ref 98–107)
CHLORIDE SERPL-SCNC: 100 MMOL/L (ref 98–107)
CHLORIDE SERPL-SCNC: 101 MMOL/L (ref 98–107)
CHLORIDE SERPL-SCNC: 101 MMOL/L (ref 98–107)
CHLORIDE SERPL-SCNC: 102 MMOL/L (ref 98–107)
CHLORIDE SERPL-SCNC: 103 MMOL/L (ref 98–107)
CHLORIDE SERPL-SCNC: 104 MMOL/L (ref 98–107)
CHLORIDE SERPL-SCNC: 104 MMOL/L (ref 98–107)
CHLORIDE SERPL-SCNC: 112 MMOL/L (ref 98–107)
CHLORIDE SERPL-SCNC: 87 MMOL/L (ref 98–107)
CHLORIDE SERPL-SCNC: 98 MMOL/L (ref 98–107)
CHLORIDE SERPL-SCNC: 99 MMOL/L (ref 98–107)
CODING SYSTEM: NORMAL
CODING SYSTEM: NORMAL
COLOR UR AUTO: NORMAL
COLOR UR AUTO: YELLOW
CREAT SERPL-MCNC: 0.8 MG/DL (ref 0.67–1.17)
CREAT SERPL-MCNC: 0.84 MG/DL (ref 0.67–1.17)
CREAT SERPL-MCNC: 0.87 MG/DL (ref 0.67–1.17)
CREAT SERPL-MCNC: 0.89 MG/DL (ref 0.67–1.17)
CREAT SERPL-MCNC: 0.93 MG/DL (ref 0.67–1.17)
CREAT SERPL-MCNC: 0.93 MG/DL (ref 0.67–1.17)
CREAT SERPL-MCNC: 1.09 MG/DL (ref 0.67–1.17)
CREAT SERPL-MCNC: 1.16 MG/DL (ref 0.67–1.17)
CREAT SERPL-MCNC: 1.23 MG/DL (ref 0.67–1.17)
CREAT SERPL-MCNC: 1.27 MG/DL (ref 0.67–1.17)
CREAT SERPL-MCNC: 1.28 MG/DL (ref 0.67–1.17)
CREAT SERPL-MCNC: 1.28 MG/DL (ref 0.67–1.17)
CREAT SERPL-MCNC: 1.3 MG/DL (ref 0.67–1.17)
CREAT SERPL-MCNC: 1.31 MG/DL (ref 0.67–1.17)
CREAT SERPL-MCNC: 1.36 MG/DL (ref 0.67–1.17)
CREAT SERPL-MCNC: 1.36 MG/DL (ref 0.67–1.17)
CREAT SERPL-MCNC: 1.4 MG/DL (ref 0.67–1.17)
CREAT SERPL-MCNC: 1.42 MG/DL (ref 0.67–1.17)
CREAT SERPL-MCNC: 1.44 MG/DL (ref 0.67–1.17)
CREAT SERPL-MCNC: 1.66 MG/DL (ref 0.67–1.17)
CROSSMATCH: NORMAL
CROSSMATCH: NORMAL
CRP SERPL-MCNC: 109.3 MG/L
CRP SERPL-MCNC: 210.86 MG/L
CRP SERPL-MCNC: 283.53 MG/L
DAT POLY: NEGATIVE
DEPRECATED HCO3 PLAS-SCNC: 10 MMOL/L (ref 22–29)
DEPRECATED HCO3 PLAS-SCNC: 12 MMOL/L (ref 22–29)
DEPRECATED HCO3 PLAS-SCNC: 21 MMOL/L (ref 22–29)
DEPRECATED HCO3 PLAS-SCNC: 22 MMOL/L (ref 22–29)
DEPRECATED HCO3 PLAS-SCNC: 23 MMOL/L (ref 22–29)
DEPRECATED HCO3 PLAS-SCNC: 23 MMOL/L (ref 22–29)
DEPRECATED HCO3 PLAS-SCNC: 24 MMOL/L (ref 22–29)
DEPRECATED HCO3 PLAS-SCNC: 25 MMOL/L (ref 22–29)
DEPRECATED HCO3 PLAS-SCNC: 25 MMOL/L (ref 22–29)
DEPRECATED HCO3 PLAS-SCNC: 27 MMOL/L (ref 22–29)
DEPRECATED HCO3 PLAS-SCNC: 30 MMOL/L (ref 22–29)
DIASTOLIC BLOOD PRESSURE - MUSE: NORMAL MMHG
EGFRCR SERPLBLD CKD-EPI 2021: 51 ML/MIN/1.73M2
EGFRCR SERPLBLD CKD-EPI 2021: 60 ML/MIN/1.73M2
EGFRCR SERPLBLD CKD-EPI 2021: 61 ML/MIN/1.73M2
EGFRCR SERPLBLD CKD-EPI 2021: 62 ML/MIN/1.73M2
EGFRCR SERPLBLD CKD-EPI 2021: 65 ML/MIN/1.73M2
EGFRCR SERPLBLD CKD-EPI 2021: 65 ML/MIN/1.73M2
EGFRCR SERPLBLD CKD-EPI 2021: 68 ML/MIN/1.73M2
EGFRCR SERPLBLD CKD-EPI 2021: 68 ML/MIN/1.73M2
EGFRCR SERPLBLD CKD-EPI 2021: 69 ML/MIN/1.73M2
EGFRCR SERPLBLD CKD-EPI 2021: 69 ML/MIN/1.73M2
EGFRCR SERPLBLD CKD-EPI 2021: 70 ML/MIN/1.73M2
EGFRCR SERPLBLD CKD-EPI 2021: 73 ML/MIN/1.73M2
EGFRCR SERPLBLD CKD-EPI 2021: 78 ML/MIN/1.73M2
EGFRCR SERPLBLD CKD-EPI 2021: 84 ML/MIN/1.73M2
EGFRCR SERPLBLD CKD-EPI 2021: >90 ML/MIN/1.73M2
EOSINOPHIL # BLD AUTO: 0.1 10E3/UL (ref 0–0.7)
EOSINOPHIL # BLD AUTO: 0.2 10E3/UL (ref 0–0.7)
EOSINOPHIL # BLD AUTO: 0.2 10E3/UL (ref 0–0.7)
EOSINOPHIL # BLD AUTO: ABNORMAL 10*3/UL
EOSINOPHIL # BLD MANUAL: 0 10E3/UL (ref 0–0.7)
EOSINOPHIL # BLD MANUAL: 0.1 10E3/UL (ref 0–0.7)
EOSINOPHIL # BLD MANUAL: 0.2 10E3/UL (ref 0–0.7)
EOSINOPHIL NFR BLD AUTO: 1 %
EOSINOPHIL NFR BLD AUTO: 2 %
EOSINOPHIL NFR BLD AUTO: 2 %
EOSINOPHIL NFR BLD AUTO: ABNORMAL %
EOSINOPHIL NFR BLD MANUAL: 0 %
EOSINOPHIL NFR BLD MANUAL: 1 %
EOSINOPHIL NFR BLD MANUAL: 3 %
ERYTHROCYTE [DISTWIDTH] IN BLOOD BY AUTOMATED COUNT: 13.6 % (ref 10–15)
ERYTHROCYTE [DISTWIDTH] IN BLOOD BY AUTOMATED COUNT: 13.8 % (ref 10–15)
ERYTHROCYTE [DISTWIDTH] IN BLOOD BY AUTOMATED COUNT: 13.9 % (ref 10–15)
ERYTHROCYTE [DISTWIDTH] IN BLOOD BY AUTOMATED COUNT: 14.1 % (ref 10–15)
ERYTHROCYTE [DISTWIDTH] IN BLOOD BY AUTOMATED COUNT: 14.1 % (ref 10–15)
ERYTHROCYTE [DISTWIDTH] IN BLOOD BY AUTOMATED COUNT: 14.4 % (ref 10–15)
ERYTHROCYTE [DISTWIDTH] IN BLOOD BY AUTOMATED COUNT: 14.7 % (ref 10–15)
ERYTHROCYTE [DISTWIDTH] IN BLOOD BY AUTOMATED COUNT: 14.9 % (ref 10–15)
ERYTHROCYTE [DISTWIDTH] IN BLOOD BY AUTOMATED COUNT: 15.1 % (ref 10–15)
ERYTHROCYTE [DISTWIDTH] IN BLOOD BY AUTOMATED COUNT: 15.2 % (ref 10–15)
ERYTHROCYTE [DISTWIDTH] IN BLOOD BY AUTOMATED COUNT: 15.4 % (ref 10–15)
ERYTHROCYTE [DISTWIDTH] IN BLOOD BY AUTOMATED COUNT: 15.6 % (ref 10–15)
ERYTHROCYTE [DISTWIDTH] IN BLOOD BY AUTOMATED COUNT: 15.9 % (ref 10–15)
ERYTHROCYTE [DISTWIDTH] IN BLOOD BY AUTOMATED COUNT: 16.5 % (ref 10–15)
ERYTHROCYTE [DISTWIDTH] IN BLOOD BY AUTOMATED COUNT: 16.8 % (ref 10–15)
ETHANOL SERPL-MCNC: <0.01 G/DL
FERRITIN SERPL-MCNC: 3172 NG/ML (ref 31–409)
FERRITIN SERPL-MCNC: 987 NG/ML (ref 31–409)
FIBRINOGEN PPP-MCNC: 417 MG/DL (ref 170–490)
FOLATE SERPL-MCNC: 27.6 NG/ML (ref 4.6–34.8)
GLUCOSE BLDC GLUCOMTR-MCNC: 102 MG/DL (ref 70–99)
GLUCOSE BLDC GLUCOMTR-MCNC: 74 MG/DL (ref 70–99)
GLUCOSE BLDC GLUCOMTR-MCNC: 95 MG/DL (ref 70–99)
GLUCOSE BLDC GLUCOMTR-MCNC: 99 MG/DL (ref 70–99)
GLUCOSE SERPL-MCNC: 103 MG/DL (ref 70–99)
GLUCOSE SERPL-MCNC: 103 MG/DL (ref 70–99)
GLUCOSE SERPL-MCNC: 104 MG/DL (ref 70–99)
GLUCOSE SERPL-MCNC: 107 MG/DL (ref 70–99)
GLUCOSE SERPL-MCNC: 115 MG/DL (ref 70–99)
GLUCOSE SERPL-MCNC: 121 MG/DL (ref 70–99)
GLUCOSE SERPL-MCNC: 123 MG/DL (ref 70–99)
GLUCOSE SERPL-MCNC: 137 MG/DL (ref 70–99)
GLUCOSE SERPL-MCNC: 148 MG/DL (ref 70–99)
GLUCOSE SERPL-MCNC: 152 MG/DL (ref 70–99)
GLUCOSE SERPL-MCNC: 160 MG/DL (ref 70–99)
GLUCOSE SERPL-MCNC: 165 MG/DL (ref 70–99)
GLUCOSE SERPL-MCNC: 176 MG/DL (ref 70–99)
GLUCOSE SERPL-MCNC: 186 MG/DL (ref 70–99)
GLUCOSE SERPL-MCNC: 224 MG/DL (ref 70–99)
GLUCOSE SERPL-MCNC: 91 MG/DL (ref 70–99)
GLUCOSE SERPL-MCNC: 94 MG/DL (ref 70–99)
GLUCOSE SERPL-MCNC: 97 MG/DL (ref 70–99)
GLUCOSE SERPL-MCNC: 99 MG/DL (ref 70–99)
GLUCOSE UR STRIP-MCNC: 70 MG/DL
GLUCOSE UR STRIP-MCNC: NEGATIVE MG/DL
HAPTOGLOB SERPL-MCNC: 49 MG/DL (ref 30–200)
HAPTOGLOB SERPL-MCNC: 65 MG/DL (ref 30–200)
HBA1C MFR BLD: 4.6 %
HBV SURFACE AB SERPL IA-ACNC: <3.5 M[IU]/ML
HBV SURFACE AB SERPL IA-ACNC: NONREACTIVE M[IU]/ML
HBV SURFACE AG SERPL QL IA: NONREACTIVE
HCO3 BLDV-SCNC: 13 MMOL/L (ref 21–28)
HCO3 BLDV-SCNC: 21 MMOL/L (ref 21–28)
HCT VFR BLD AUTO: 21 % (ref 40–53)
HCT VFR BLD AUTO: 21.8 % (ref 40–53)
HCT VFR BLD AUTO: 22.9 % (ref 40–53)
HCT VFR BLD AUTO: 23.5 % (ref 40–53)
HCT VFR BLD AUTO: 23.7 % (ref 40–53)
HCT VFR BLD AUTO: 24.9 % (ref 40–53)
HCT VFR BLD AUTO: 25.1 % (ref 40–53)
HCT VFR BLD AUTO: 25.4 % (ref 40–53)
HCT VFR BLD AUTO: 25.4 % (ref 40–53)
HCT VFR BLD AUTO: 26 % (ref 40–53)
HCT VFR BLD AUTO: 26.4 % (ref 40–53)
HCT VFR BLD AUTO: 27.6 % (ref 40–53)
HCT VFR BLD AUTO: 28.6 % (ref 40–53)
HCT VFR BLD AUTO: 30.3 % (ref 40–53)
HCT VFR BLD AUTO: 31.2 % (ref 40–53)
HCT VFR BLD AUTO: 31.7 % (ref 40–53)
HCT VFR BLD AUTO: 33.4 % (ref 40–53)
HCT VFR BLD AUTO: 33.9 % (ref 40–53)
HCV AB SERPL QL IA: NONREACTIVE
HEMOCCULT STL QL: POSITIVE
HGB BLD-MCNC: 10.1 G/DL (ref 13.3–17.7)
HGB BLD-MCNC: 10.4 G/DL (ref 13.3–17.7)
HGB BLD-MCNC: 10.7 G/DL (ref 13.3–17.7)
HGB BLD-MCNC: 7 G/DL (ref 13.3–17.7)
HGB BLD-MCNC: 7.1 G/DL (ref 13.3–17.7)
HGB BLD-MCNC: 7.2 G/DL (ref 13.3–17.7)
HGB BLD-MCNC: 7.5 G/DL (ref 13.3–17.7)
HGB BLD-MCNC: 7.8 G/DL (ref 13.3–17.7)
HGB BLD-MCNC: 7.8 G/DL (ref 13.3–17.7)
HGB BLD-MCNC: 8.2 G/DL (ref 13.3–17.7)
HGB BLD-MCNC: 8.4 G/DL (ref 13.3–17.7)
HGB BLD-MCNC: 8.5 G/DL (ref 13.3–17.7)
HGB BLD-MCNC: 8.5 G/DL (ref 13.3–17.7)
HGB BLD-MCNC: 8.7 G/DL (ref 13.3–17.7)
HGB BLD-MCNC: 8.7 G/DL (ref 13.3–17.7)
HGB BLD-MCNC: 8.9 G/DL (ref 13.3–17.7)
HGB BLD-MCNC: 9 G/DL (ref 13.3–17.7)
HGB BLD-MCNC: 9.8 G/DL (ref 13.3–17.7)
HGB BLD-MCNC: 9.9 G/DL (ref 13.3–17.7)
HGB UR QL STRIP: NEGATIVE
HGB UR QL STRIP: NEGATIVE
HIV 1+2 AB+HIV1 P24 AG SERPL QL IA: NONREACTIVE
HOLD SPECIMEN: NORMAL
HOLD SPECIMEN: NORMAL
IMM GRANULOCYTES # BLD: 0.1 10E3/UL
IMM GRANULOCYTES # BLD: 0.2 10E3/UL
IMM GRANULOCYTES # BLD: ABNORMAL 10*3/UL
IMM GRANULOCYTES NFR BLD: 1 %
IMM GRANULOCYTES NFR BLD: 2 %
IMM GRANULOCYTES NFR BLD: ABNORMAL %
INR PPP: 0.91 (ref 0.85–1.15)
INR PPP: 1.02 (ref 0.85–1.15)
INTERPRETATION ECG - MUSE: NORMAL
IRON BINDING CAPACITY (ROCHE): 119 UG/DL (ref 240–430)
IRON SATN MFR SERPL: 10 % (ref 15–46)
IRON SERPL-MCNC: 12 UG/DL (ref 61–157)
IRON SERPL-MCNC: 132 UG/DL (ref 61–157)
ISSUE DATE AND TIME: NORMAL
ISSUE DATE AND TIME: NORMAL
KETONES UR STRIP-MCNC: NEGATIVE MG/DL
KETONES UR STRIP-MCNC: NEGATIVE MG/DL
LACTATE SERPL-SCNC: 0.8 MMOL/L (ref 0.7–2)
LACTATE SERPL-SCNC: 1 MMOL/L (ref 0.7–2)
LACTATE SERPL-SCNC: 1.2 MMOL/L (ref 0.7–2)
LACTATE SERPL-SCNC: 1.3 MMOL/L (ref 0.7–2)
LACTATE SERPL-SCNC: 1.4 MMOL/L (ref 0.7–2)
LACTATE SERPL-SCNC: 1.6 MMOL/L (ref 0.7–2)
LDH SERPL L TO P-CCNC: 215 U/L (ref 0–250)
LEUKOCYTE ESTERASE UR QL STRIP: NEGATIVE
LEUKOCYTE ESTERASE UR QL STRIP: NEGATIVE
LIPASE SERPL-CCNC: 203 U/L (ref 13–60)
LYMPHOCYTES # BLD AUTO: 0.8 10E3/UL (ref 0.8–5.3)
LYMPHOCYTES # BLD AUTO: 0.8 10E3/UL (ref 0.8–5.3)
LYMPHOCYTES # BLD AUTO: 1 10E3/UL (ref 0.8–5.3)
LYMPHOCYTES # BLD AUTO: 1.6 10E3/UL (ref 0.8–5.3)
LYMPHOCYTES # BLD AUTO: 2 10E3/UL (ref 0.8–5.3)
LYMPHOCYTES # BLD AUTO: 2.6 10E3/UL (ref 0.8–5.3)
LYMPHOCYTES # BLD AUTO: 2.8 10E3/UL (ref 0.8–5.3)
LYMPHOCYTES # BLD AUTO: ABNORMAL 10*3/UL
LYMPHOCYTES # BLD MANUAL: 0.6 10E3/UL (ref 0.8–5.3)
LYMPHOCYTES # BLD MANUAL: 0.9 10E3/UL (ref 0.8–5.3)
LYMPHOCYTES # BLD MANUAL: 1.3 10E3/UL (ref 0.8–5.3)
LYMPHOCYTES NFR BLD AUTO: 13 %
LYMPHOCYTES NFR BLD AUTO: 15 %
LYMPHOCYTES NFR BLD AUTO: 16 %
LYMPHOCYTES NFR BLD AUTO: 17 %
LYMPHOCYTES NFR BLD AUTO: 19 %
LYMPHOCYTES NFR BLD AUTO: 24 %
LYMPHOCYTES NFR BLD AUTO: 24 %
LYMPHOCYTES NFR BLD AUTO: ABNORMAL %
LYMPHOCYTES NFR BLD MANUAL: 18 %
LYMPHOCYTES NFR BLD MANUAL: 19 %
LYMPHOCYTES NFR BLD MANUAL: 6 %
MAGNESIUM SERPL-MCNC: 1.1 MG/DL (ref 1.7–2.3)
MAGNESIUM SERPL-MCNC: 1.2 MG/DL (ref 1.7–2.3)
MAGNESIUM SERPL-MCNC: 1.3 MG/DL (ref 1.7–2.3)
MAGNESIUM SERPL-MCNC: 1.4 MG/DL (ref 1.7–2.3)
MAGNESIUM SERPL-MCNC: 1.4 MG/DL (ref 1.7–2.3)
MAGNESIUM SERPL-MCNC: 1.5 MG/DL (ref 1.7–2.3)
MAGNESIUM SERPL-MCNC: 1.6 MG/DL (ref 1.7–2.3)
MAGNESIUM SERPL-MCNC: 1.6 MG/DL (ref 1.7–2.3)
MAGNESIUM SERPL-MCNC: 1.7 MG/DL (ref 1.7–2.3)
MAGNESIUM SERPL-MCNC: 1.7 MG/DL (ref 1.7–2.3)
MAGNESIUM SERPL-MCNC: 1.9 MG/DL (ref 1.7–2.3)
MAGNESIUM SERPL-MCNC: 2.2 MG/DL (ref 1.7–2.3)
MAGNESIUM SERPL-MCNC: 2.3 MG/DL (ref 1.7–2.3)
MAGNESIUM SERPL-MCNC: 2.4 MG/DL (ref 1.7–2.3)
MAGNESIUM SERPL-MCNC: 2.4 MG/DL (ref 1.7–2.3)
MCH RBC QN AUTO: 30.2 PG (ref 26.5–33)
MCH RBC QN AUTO: 30.4 PG (ref 26.5–33)
MCH RBC QN AUTO: 30.6 PG (ref 26.5–33)
MCH RBC QN AUTO: 30.7 PG (ref 26.5–33)
MCH RBC QN AUTO: 30.8 PG (ref 26.5–33)
MCH RBC QN AUTO: 31.1 PG (ref 26.5–33)
MCH RBC QN AUTO: 31.1 PG (ref 26.5–33)
MCH RBC QN AUTO: 31.4 PG (ref 26.5–33)
MCH RBC QN AUTO: 31.6 PG (ref 26.5–33)
MCH RBC QN AUTO: 32.2 PG (ref 26.5–33)
MCH RBC QN AUTO: 32.2 PG (ref 26.5–33)
MCH RBC QN AUTO: 32.4 PG (ref 26.5–33)
MCH RBC QN AUTO: 32.4 PG (ref 26.5–33)
MCH RBC QN AUTO: 32.5 PG (ref 26.5–33)
MCH RBC QN AUTO: 32.5 PG (ref 26.5–33)
MCH RBC QN AUTO: 32.7 PG (ref 26.5–33)
MCH RBC QN AUTO: 33.6 PG (ref 26.5–33)
MCH RBC QN AUTO: 33.8 PG (ref 26.5–33)
MCHC RBC AUTO-ENTMCNC: 30.9 G/DL (ref 31.5–36.5)
MCHC RBC AUTO-ENTMCNC: 31.1 G/DL (ref 31.5–36.5)
MCHC RBC AUTO-ENTMCNC: 31.4 G/DL (ref 31.5–36.5)
MCHC RBC AUTO-ENTMCNC: 31.5 G/DL (ref 31.5–36.5)
MCHC RBC AUTO-ENTMCNC: 31.6 G/DL (ref 31.5–36.5)
MCHC RBC AUTO-ENTMCNC: 32.2 G/DL (ref 31.5–36.5)
MCHC RBC AUTO-ENTMCNC: 32.2 G/DL (ref 31.5–36.5)
MCHC RBC AUTO-ENTMCNC: 32.4 G/DL (ref 31.5–36.5)
MCHC RBC AUTO-ENTMCNC: 32.7 G/DL (ref 31.5–36.5)
MCHC RBC AUTO-ENTMCNC: 32.9 G/DL (ref 31.5–36.5)
MCHC RBC AUTO-ENTMCNC: 32.9 G/DL (ref 31.5–36.5)
MCHC RBC AUTO-ENTMCNC: 33.2 G/DL (ref 31.5–36.5)
MCHC RBC AUTO-ENTMCNC: 33.5 G/DL (ref 31.5–36.5)
MCHC RBC AUTO-ENTMCNC: 33.8 G/DL (ref 31.5–36.5)
MCHC RBC AUTO-ENTMCNC: 34.3 G/DL (ref 31.5–36.5)
MCHC RBC AUTO-ENTMCNC: 34.4 G/DL (ref 31.5–36.5)
MCV RBC AUTO: 100 FL (ref 78–100)
MCV RBC AUTO: 94 FL (ref 78–100)
MCV RBC AUTO: 95 FL (ref 78–100)
MCV RBC AUTO: 96 FL (ref 78–100)
MCV RBC AUTO: 97 FL (ref 78–100)
MCV RBC AUTO: 98 FL (ref 78–100)
MCV RBC AUTO: 99 FL (ref 78–100)
MONOCYTES # BLD AUTO: 0.4 10E3/UL (ref 0–1.3)
MONOCYTES # BLD AUTO: 0.5 10E3/UL (ref 0–1.3)
MONOCYTES # BLD AUTO: 0.5 10E3/UL (ref 0–1.3)
MONOCYTES # BLD AUTO: 0.7 10E3/UL (ref 0–1.3)
MONOCYTES # BLD AUTO: 0.9 10E3/UL (ref 0–1.3)
MONOCYTES # BLD AUTO: 1.1 10E3/UL (ref 0–1.3)
MONOCYTES # BLD AUTO: 1.3 10E3/UL (ref 0–1.3)
MONOCYTES # BLD AUTO: ABNORMAL 10*3/UL
MONOCYTES # BLD MANUAL: 0.2 10E3/UL (ref 0–1.3)
MONOCYTES # BLD MANUAL: 0.7 10E3/UL (ref 0–1.3)
MONOCYTES # BLD MANUAL: 0.8 10E3/UL (ref 0–1.3)
MONOCYTES NFR BLD AUTO: 10 %
MONOCYTES NFR BLD AUTO: 11 %
MONOCYTES NFR BLD AUTO: 11 %
MONOCYTES NFR BLD AUTO: 6 %
MONOCYTES NFR BLD AUTO: 7 %
MONOCYTES NFR BLD AUTO: 7 %
MONOCYTES NFR BLD AUTO: 8 %
MONOCYTES NFR BLD AUTO: ABNORMAL %
MONOCYTES NFR BLD MANUAL: 10 %
MONOCYTES NFR BLD MANUAL: 16 %
MONOCYTES NFR BLD MANUAL: 2 %
MUCOUS THREADS #/AREA URNS LPF: PRESENT /LPF
NEUTROPHILS # BLD AUTO: 3.3 10E3/UL (ref 1.6–8.3)
NEUTROPHILS # BLD AUTO: 3.8 10E3/UL (ref 1.6–8.3)
NEUTROPHILS # BLD AUTO: 4.7 10E3/UL (ref 1.6–8.3)
NEUTROPHILS # BLD AUTO: 7.1 10E3/UL (ref 1.6–8.3)
NEUTROPHILS # BLD AUTO: 7.4 10E3/UL (ref 1.6–8.3)
NEUTROPHILS # BLD AUTO: 7.7 10E3/UL (ref 1.6–8.3)
NEUTROPHILS # BLD AUTO: 9.8 10E3/UL (ref 1.6–8.3)
NEUTROPHILS # BLD AUTO: ABNORMAL 10*3/UL
NEUTROPHILS # BLD MANUAL: 3.1 10E3/UL (ref 1.6–8.3)
NEUTROPHILS # BLD MANUAL: 4.5 10E3/UL (ref 1.6–8.3)
NEUTROPHILS # BLD MANUAL: 9 10E3/UL (ref 1.6–8.3)
NEUTROPHILS NFR BLD AUTO: 64 %
NEUTROPHILS NFR BLD AUTO: 65 %
NEUTROPHILS NFR BLD AUTO: 69 %
NEUTROPHILS NFR BLD AUTO: 71 %
NEUTROPHILS NFR BLD AUTO: 72 %
NEUTROPHILS NFR BLD AUTO: 72 %
NEUTROPHILS NFR BLD AUTO: 76 %
NEUTROPHILS NFR BLD AUTO: ABNORMAL %
NEUTROPHILS NFR BLD MANUAL: 61 %
NEUTROPHILS NFR BLD MANUAL: 68 %
NEUTROPHILS NFR BLD MANUAL: 92 %
NITRATE UR QL: NEGATIVE
NITRATE UR QL: NEGATIVE
NRBC # BLD AUTO: 0 10E3/UL
NRBC # BLD AUTO: 0.2 10E3/UL
NRBC BLD AUTO-RTO: 0 /100
NRBC BLD MANUAL-RTO: 2 %
O2/TOTAL GAS SETTING VFR VENT: 0 %
O2/TOTAL GAS SETTING VFR VENT: 21 %
OXYHGB MFR BLDV: 73 % (ref 70–75)
P AXIS - MUSE: 65 DEGREES
PATH REPORT.COMMENTS IMP SPEC: NORMAL
PATH REPORT.FINAL DX SPEC: NORMAL
PATH REPORT.FINAL DX SPEC: NORMAL
PATH REPORT.GROSS SPEC: NORMAL
PATH REPORT.MICROSCOPIC SPEC OTHER STN: NORMAL
PATH REPORT.RELEVANT HX SPEC: NORMAL
PCO2 BLDV: 26 MM HG (ref 40–50)
PCO2 BLDV: 41 MM HG (ref 40–50)
PH BLDV: 7.32 [PH] (ref 7.32–7.43)
PH BLDV: 7.33 [PH] (ref 7.32–7.43)
PH UR STRIP: 6 [PH] (ref 5–7)
PH UR STRIP: 7 [PH] (ref 5–7)
PHOSPHATE SERPL-MCNC: 0.9 MG/DL (ref 2.5–4.5)
PHOSPHATE SERPL-MCNC: 1 MG/DL (ref 2.5–4.5)
PHOSPHATE SERPL-MCNC: 1.7 MG/DL (ref 2.5–4.5)
PHOSPHATE SERPL-MCNC: 1.9 MG/DL (ref 2.5–4.5)
PHOSPHATE SERPL-MCNC: 2.1 MG/DL (ref 2.5–4.5)
PHOSPHATE SERPL-MCNC: 2.4 MG/DL (ref 2.5–4.5)
PHOSPHATE SERPL-MCNC: 2.4 MG/DL (ref 2.5–4.5)
PHOSPHATE SERPL-MCNC: 2.5 MG/DL (ref 2.5–4.5)
PHOSPHATE SERPL-MCNC: 2.6 MG/DL (ref 2.5–4.5)
PHOSPHATE SERPL-MCNC: 2.8 MG/DL (ref 2.5–4.5)
PHOSPHATE SERPL-MCNC: 2.8 MG/DL (ref 2.5–4.5)
PHOSPHATE SERPL-MCNC: 3 MG/DL (ref 2.5–4.5)
PHOSPHATE SERPL-MCNC: 3.1 MG/DL (ref 2.5–4.5)
PHOSPHATE SERPL-MCNC: 3.3 MG/DL (ref 2.5–4.5)
PHOSPHATE SERPL-MCNC: 3.6 MG/DL (ref 2.5–4.5)
PHOTO IMAGE: NORMAL
PLAT MORPH BLD: ABNORMAL
PLAT MORPH BLD: NORMAL
PLAT MORPH BLD: NORMAL
PLATELET # BLD AUTO: 115 10E3/UL (ref 150–450)
PLATELET # BLD AUTO: 182 10E3/UL (ref 150–450)
PLATELET # BLD AUTO: 29 10E3/UL (ref 150–450)
PLATELET # BLD AUTO: 30 10E3/UL (ref 150–450)
PLATELET # BLD AUTO: 348 10E3/UL (ref 150–450)
PLATELET # BLD AUTO: 37 10E3/UL (ref 150–450)
PLATELET # BLD AUTO: 380 10E3/UL (ref 150–450)
PLATELET # BLD AUTO: 383 10E3/UL (ref 150–450)
PLATELET # BLD AUTO: 387 10E3/UL (ref 150–450)
PLATELET # BLD AUTO: 40 10E3/UL (ref 150–450)
PLATELET # BLD AUTO: 426 10E3/UL (ref 150–450)
PLATELET # BLD AUTO: 43 10E3/UL (ref 150–450)
PLATELET # BLD AUTO: 430 10E3/UL (ref 150–450)
PLATELET # BLD AUTO: 438 10E3/UL (ref 150–450)
PLATELET # BLD AUTO: 448 10E3/UL (ref 150–450)
PLATELET # BLD AUTO: 467 10E3/UL (ref 150–450)
PLATELET # BLD AUTO: 543 10E3/UL (ref 150–450)
PLATELET # BLD AUTO: 57 10E3/UL (ref 150–450)
PO2 BLDV: 31 MM HG (ref 25–47)
PO2 BLDV: 43 MM HG (ref 25–47)
POTASSIUM BLD-SCNC: 6.3 MMOL/L (ref 3.4–5.3)
POTASSIUM SERPL-SCNC: 3.1 MMOL/L (ref 3.4–5.3)
POTASSIUM SERPL-SCNC: 3.2 MMOL/L (ref 3.4–5.3)
POTASSIUM SERPL-SCNC: 3.2 MMOL/L (ref 3.4–5.3)
POTASSIUM SERPL-SCNC: 3.5 MMOL/L (ref 3.4–5.3)
POTASSIUM SERPL-SCNC: 3.6 MMOL/L (ref 3.4–5.3)
POTASSIUM SERPL-SCNC: 3.7 MMOL/L (ref 3.4–5.3)
POTASSIUM SERPL-SCNC: 3.8 MMOL/L (ref 3.4–5.3)
POTASSIUM SERPL-SCNC: 4 MMOL/L (ref 3.4–5.3)
POTASSIUM SERPL-SCNC: 4.5 MMOL/L (ref 3.4–5.3)
POTASSIUM SERPL-SCNC: 4.8 MMOL/L (ref 3.4–5.3)
POTASSIUM SERPL-SCNC: 5 MMOL/L (ref 3.4–5.3)
POTASSIUM SERPL-SCNC: 5 MMOL/L (ref 3.4–5.3)
POTASSIUM SERPL-SCNC: 5.2 MMOL/L (ref 3.4–5.3)
POTASSIUM SERPL-SCNC: 5.2 MMOL/L (ref 3.4–5.3)
POTASSIUM SERPL-SCNC: 5.4 MMOL/L (ref 3.4–5.3)
POTASSIUM SERPL-SCNC: 6 MMOL/L (ref 3.4–5.3)
POTASSIUM SERPL-SCNC: 6.1 MMOL/L (ref 3.4–5.3)
POTASSIUM SERPL-SCNC: 6.3 MMOL/L (ref 3.4–5.3)
POTASSIUM SERPL-SCNC: 6.3 MMOL/L (ref 3.4–5.3)
POTASSIUM SERPL-SCNC: 6.8 MMOL/L (ref 3.4–5.3)
POTASSIUM SERPL-SCNC: 7 MMOL/L (ref 3.4–5.3)
PR INTERVAL - MUSE: 122 MS
PROCALCITONIN SERPL IA-MCNC: 0.83 NG/ML
PROCALCITONIN SERPL IA-MCNC: 0.83 NG/ML
PROCALCITONIN SERPL IA-MCNC: 0.85 NG/ML
PROT SERPL-MCNC: 5.3 G/DL (ref 6.4–8.3)
PROT SERPL-MCNC: 5.6 G/DL (ref 6.4–8.3)
PROT SERPL-MCNC: 5.7 G/DL (ref 6.4–8.3)
PROT SERPL-MCNC: 6.6 G/DL (ref 6.4–8.3)
PROT SERPL-MCNC: 6.7 G/DL (ref 6.4–8.3)
PROT SERPL-MCNC: 7.1 G/DL (ref 6.4–8.3)
PROT SERPL-MCNC: 7.4 G/DL (ref 6.4–8.3)
PROT SERPL-MCNC: 7.7 G/DL (ref 6.4–8.3)
PROT SERPL-MCNC: 7.8 G/DL (ref 6.4–8.3)
PROT SERPL-MCNC: 7.9 G/DL (ref 6.4–8.3)
PROT SERPL-MCNC: 8.2 G/DL (ref 6.4–8.3)
QRS DURATION - MUSE: 78 MS
QT - MUSE: 368 MS
QTC - MUSE: 477 MS
R AXIS - MUSE: 60 DEGREES
RBC # BLD AUTO: 2.19 10E6/UL (ref 4.4–5.9)
RBC # BLD AUTO: 2.22 10E6/UL (ref 4.4–5.9)
RBC # BLD AUTO: 2.28 10E6/UL (ref 4.4–5.9)
RBC # BLD AUTO: 2.4 10E6/UL (ref 4.4–5.9)
RBC # BLD AUTO: 2.42 10E6/UL (ref 4.4–5.9)
RBC # BLD AUTO: 2.55 10E6/UL (ref 4.4–5.9)
RBC # BLD AUTO: 2.59 10E6/UL (ref 4.4–5.9)
RBC # BLD AUTO: 2.59 10E6/UL (ref 4.4–5.9)
RBC # BLD AUTO: 2.66 10E6/UL (ref 4.4–5.9)
RBC # BLD AUTO: 2.71 10E6/UL (ref 4.4–5.9)
RBC # BLD AUTO: 2.78 10E6/UL (ref 4.4–5.9)
RBC # BLD AUTO: 2.89 10E6/UL (ref 4.4–5.9)
RBC # BLD AUTO: 2.89 10E6/UL (ref 4.4–5.9)
RBC # BLD AUTO: 3.05 10E6/UL (ref 4.4–5.9)
RBC # BLD AUTO: 3.24 10E6/UL (ref 4.4–5.9)
RBC # BLD AUTO: 3.25 10E6/UL (ref 4.4–5.9)
RBC # BLD AUTO: 3.42 10E6/UL (ref 4.4–5.9)
RBC # BLD AUTO: 3.48 10E6/UL (ref 4.4–5.9)
RBC MORPH BLD: ABNORMAL
RBC MORPH BLD: NORMAL
RBC MORPH BLD: NORMAL
RBC URINE: 1 /HPF
RBC URINE: <1 /HPF
RETICS # AUTO: 0.05 10E6/UL (ref 0.03–0.1)
RETICS # AUTO: 0.05 10E6/UL (ref 0.03–0.1)
RETICS/RBC NFR AUTO: 2 % (ref 0.5–2)
RETICS/RBC NFR AUTO: 2.2 % (ref 0.5–2)
SAO2 % BLDV: 75 % (ref 70–75)
SARS-COV-2 RNA RESP QL NAA+PROBE: POSITIVE
SODIUM SERPL-SCNC: 132 MMOL/L (ref 135–145)
SODIUM SERPL-SCNC: 134 MMOL/L (ref 135–145)
SODIUM SERPL-SCNC: 135 MMOL/L (ref 135–145)
SODIUM SERPL-SCNC: 136 MMOL/L (ref 135–145)
SODIUM SERPL-SCNC: 137 MMOL/L (ref 135–145)
SODIUM SERPL-SCNC: 138 MMOL/L (ref 135–145)
SODIUM SERPL-SCNC: 140 MMOL/L (ref 135–145)
SODIUM SERPL-SCNC: 141 MMOL/L (ref 135–145)
SODIUM SERPL-SCNC: 141 MMOL/L (ref 135–145)
SP GR UR STRIP: 1.01 (ref 1–1.03)
SP GR UR STRIP: 1.01 (ref 1–1.03)
SPECIMEN EXPIRATION DATE: NORMAL
SQUAMOUS EPITHELIAL: <1 /HPF
SYSTOLIC BLOOD PRESSURE - MUSE: NORMAL MMHG
T AXIS - MUSE: 262 DEGREES
TRANSFERRIN SERPL-MCNC: 103 MG/DL (ref 200–360)
UNIT ABO/RH: NORMAL
UNIT ABO/RH: NORMAL
UNIT NUMBER: NORMAL
UNIT NUMBER: NORMAL
UNIT STATUS: NORMAL
UNIT STATUS: NORMAL
UNIT TYPE ISBT: 600
UNIT TYPE ISBT: 6200
UPPER GI ENDOSCOPY: NORMAL
UROBILINOGEN UR STRIP-MCNC: NORMAL MG/DL
UROBILINOGEN UR STRIP-MCNC: NORMAL MG/DL
VENTRICULAR RATE- MUSE: 101 BPM
VIT B12 SERPL-MCNC: 583 PG/ML (ref 232–1245)
WBC # BLD AUTO: 10.3 10E3/UL (ref 4–11)
WBC # BLD AUTO: 10.3 10E3/UL (ref 4–11)
WBC # BLD AUTO: 10.5 10E3/UL (ref 4–11)
WBC # BLD AUTO: 10.9 10E3/UL (ref 4–11)
WBC # BLD AUTO: 11.8 10E3/UL (ref 4–11)
WBC # BLD AUTO: 13.4 10E3/UL (ref 4–11)
WBC # BLD AUTO: 4.8 10E3/UL (ref 4–11)
WBC # BLD AUTO: 5 10E3/UL (ref 4–11)
WBC # BLD AUTO: 5.4 10E3/UL (ref 4–11)
WBC # BLD AUTO: 6.2 10E3/UL (ref 4–11)
WBC # BLD AUTO: 6.6 10E3/UL (ref 4–11)
WBC # BLD AUTO: 8.4 10E3/UL (ref 4–11)
WBC # BLD AUTO: 8.8 10E3/UL (ref 4–11)
WBC # BLD AUTO: 8.9 10E3/UL (ref 4–11)
WBC # BLD AUTO: 9.1 10E3/UL (ref 4–11)
WBC # BLD AUTO: 9.3 10E3/UL (ref 4–11)
WBC # BLD AUTO: 9.7 10E3/UL (ref 4–11)
WBC # BLD AUTO: 9.8 10E3/UL (ref 4–11)
WBC URINE: 1 /HPF
WBC URINE: 1 /HPF

## 2024-01-01 PROCEDURE — 250N000013 HC RX MED GY IP 250 OP 250 PS 637: Performed by: INTERNAL MEDICINE

## 2024-01-01 PROCEDURE — 120N000001 HC R&B MED SURG/OB

## 2024-01-01 PROCEDURE — 80053 COMPREHEN METABOLIC PANEL: CPT

## 2024-01-01 PROCEDURE — 86140 C-REACTIVE PROTEIN: CPT | Performed by: INTERNAL MEDICINE

## 2024-01-01 PROCEDURE — 250N000012 HC RX MED GY IP 250 OP 636 PS 637: Performed by: HOSPITALIST

## 2024-01-01 PROCEDURE — 99232 SBSQ HOSP IP/OBS MODERATE 35: CPT | Mod: GC

## 2024-01-01 PROCEDURE — 36415 COLL VENOUS BLD VENIPUNCTURE: CPT | Performed by: HOSPITALIST

## 2024-01-01 PROCEDURE — 83605 ASSAY OF LACTIC ACID: CPT | Performed by: INTERNAL MEDICINE

## 2024-01-01 PROCEDURE — HZ2ZZZZ DETOXIFICATION SERVICES FOR SUBSTANCE ABUSE TREATMENT: ICD-10-PCS | Performed by: INTERNAL MEDICINE

## 2024-01-01 PROCEDURE — 80048 BASIC METABOLIC PNL TOTAL CA: CPT | Performed by: INTERNAL MEDICINE

## 2024-01-01 PROCEDURE — 250N000011 HC RX IP 250 OP 636: Performed by: INTERNAL MEDICINE

## 2024-01-01 PROCEDURE — 81001 URINALYSIS AUTO W/SCOPE: CPT | Performed by: INTERNAL MEDICINE

## 2024-01-01 PROCEDURE — 84100 ASSAY OF PHOSPHORUS: CPT | Performed by: INTERNAL MEDICINE

## 2024-01-01 PROCEDURE — 85027 COMPLETE CBC AUTOMATED: CPT | Performed by: INTERNAL MEDICINE

## 2024-01-01 PROCEDURE — 120N000002 HC R&B MED SURG/OB UMMC

## 2024-01-01 PROCEDURE — 85041 AUTOMATED RBC COUNT: CPT | Performed by: INTERNAL MEDICINE

## 2024-01-01 PROCEDURE — 97530 THERAPEUTIC ACTIVITIES: CPT | Mod: GP | Performed by: PHYSICAL THERAPIST

## 2024-01-01 PROCEDURE — H2035 A/D TX PROGRAM, PER HOUR: HCPCS | Mod: HQ

## 2024-01-01 PROCEDURE — 36415 COLL VENOUS BLD VENIPUNCTURE: CPT | Performed by: INTERNAL MEDICINE

## 2024-01-01 PROCEDURE — 83540 ASSAY OF IRON: CPT | Performed by: INTERNAL MEDICINE

## 2024-01-01 PROCEDURE — 36415 COLL VENOUS BLD VENIPUNCTURE: CPT | Performed by: STUDENT IN AN ORGANIZED HEALTH CARE EDUCATION/TRAINING PROGRAM

## 2024-01-01 PROCEDURE — 93005 ELECTROCARDIOGRAM TRACING: CPT | Performed by: EMERGENCY MEDICINE

## 2024-01-01 PROCEDURE — 85007 BL SMEAR W/DIFF WBC COUNT: CPT | Performed by: EMERGENCY MEDICINE

## 2024-01-01 PROCEDURE — 80048 BASIC METABOLIC PNL TOTAL CA: CPT | Performed by: STUDENT IN AN ORGANIZED HEALTH CARE EDUCATION/TRAINING PROGRAM

## 2024-01-01 PROCEDURE — 99232 SBSQ HOSP IP/OBS MODERATE 35: CPT | Performed by: INTERNAL MEDICINE

## 2024-01-01 PROCEDURE — 70450 CT HEAD/BRAIN W/O DYE: CPT

## 2024-01-01 PROCEDURE — 99231 SBSQ HOSP IP/OBS SF/LOW 25: CPT | Performed by: INTERNAL MEDICINE

## 2024-01-01 PROCEDURE — 250N000011 HC RX IP 250 OP 636: Performed by: EMERGENCY MEDICINE

## 2024-01-01 PROCEDURE — 999N000099 HC STATISTIC MODERATE SEDATION < 10 MIN: Performed by: INTERNAL MEDICINE

## 2024-01-01 PROCEDURE — 82330 ASSAY OF CALCIUM: CPT

## 2024-01-01 PROCEDURE — 97535 SELF CARE MNGMENT TRAINING: CPT | Mod: GO | Performed by: OCCUPATIONAL THERAPIST

## 2024-01-01 PROCEDURE — 36415 COLL VENOUS BLD VENIPUNCTURE: CPT

## 2024-01-01 PROCEDURE — 83735 ASSAY OF MAGNESIUM: CPT | Performed by: INTERNAL MEDICINE

## 2024-01-01 PROCEDURE — 82010 KETONE BODYS QUAN: CPT | Performed by: INTERNAL MEDICINE

## 2024-01-01 PROCEDURE — 1002N00001 HC LODGING PLUS FACILITY CHARGE ADULT

## 2024-01-01 PROCEDURE — 88312 SPECIAL STAINS GROUP 1: CPT | Mod: 26 | Performed by: PATHOLOGY

## 2024-01-01 PROCEDURE — 86923 COMPATIBILITY TEST ELECTRIC: CPT | Performed by: INTERNAL MEDICINE

## 2024-01-01 PROCEDURE — 99239 HOSP IP/OBS DSCHRG MGMT >30: CPT | Mod: GC

## 2024-01-01 PROCEDURE — 258N000003 HC RX IP 258 OP 636: Performed by: INTERNAL MEDICINE

## 2024-01-01 PROCEDURE — 73130 X-RAY EXAM OF HAND: CPT | Mod: LT

## 2024-01-01 PROCEDURE — 90791 PSYCH DIAGNOSTIC EVALUATION: CPT | Performed by: COUNSELOR

## 2024-01-01 PROCEDURE — 82374 ASSAY BLOOD CARBON DIOXIDE: CPT | Performed by: INTERNAL MEDICINE

## 2024-01-01 PROCEDURE — 36415 COLL VENOUS BLD VENIPUNCTURE: CPT | Performed by: EMERGENCY MEDICINE

## 2024-01-01 PROCEDURE — 99233 SBSQ HOSP IP/OBS HIGH 50: CPT | Performed by: INTERNAL MEDICINE

## 2024-01-01 PROCEDURE — 250N000013 HC RX MED GY IP 250 OP 250 PS 637

## 2024-01-01 PROCEDURE — 97116 GAIT TRAINING THERAPY: CPT | Mod: GP

## 2024-01-01 PROCEDURE — 81001 URINALYSIS AUTO W/SCOPE: CPT | Performed by: STUDENT IN AN ORGANIZED HEALTH CARE EDUCATION/TRAINING PROGRAM

## 2024-01-01 PROCEDURE — 86900 BLOOD TYPING SEROLOGIC ABO: CPT | Performed by: INTERNAL MEDICINE

## 2024-01-01 PROCEDURE — 255N000002 HC RX 255 OP 636: Performed by: PSYCHIATRY & NEUROLOGY

## 2024-01-01 PROCEDURE — 85004 AUTOMATED DIFF WBC COUNT: CPT | Performed by: HOSPITALIST

## 2024-01-01 PROCEDURE — 250N000009 HC RX 250: Performed by: INTERNAL MEDICINE

## 2024-01-01 PROCEDURE — 250N000011 HC RX IP 250 OP 636

## 2024-01-01 PROCEDURE — 250N000013 HC RX MED GY IP 250 OP 250 PS 637: Performed by: HOSPITALIST

## 2024-01-01 PROCEDURE — 97110 THERAPEUTIC EXERCISES: CPT | Mod: GO

## 2024-01-01 PROCEDURE — 85007 BL SMEAR W/DIFF WBC COUNT: CPT | Performed by: INTERNAL MEDICINE

## 2024-01-01 PROCEDURE — 99285 EMERGENCY DEPT VISIT HI MDM: CPT | Mod: 25 | Performed by: EMERGENCY MEDICINE

## 2024-01-01 PROCEDURE — 99222 1ST HOSP IP/OBS MODERATE 55: CPT | Performed by: INTERNAL MEDICINE

## 2024-01-01 PROCEDURE — 99233 SBSQ HOSP IP/OBS HIGH 50: CPT | Performed by: PSYCHIATRY & NEUROLOGY

## 2024-01-01 PROCEDURE — 99207 PR NO BILLABLE SERVICE THIS VISIT: CPT

## 2024-01-01 PROCEDURE — H0001 ALCOHOL AND/OR DRUG ASSESS: HCPCS

## 2024-01-01 PROCEDURE — G0378 HOSPITAL OBSERVATION PER HR: HCPCS

## 2024-01-01 PROCEDURE — 99214 OFFICE O/P EST MOD 30 MIN: CPT | Mod: 93

## 2024-01-01 PROCEDURE — 93970 EXTREMITY STUDY: CPT

## 2024-01-01 PROCEDURE — 83735 ASSAY OF MAGNESIUM: CPT

## 2024-01-01 PROCEDURE — 82746 ASSAY OF FOLIC ACID SERUM: CPT | Performed by: INTERNAL MEDICINE

## 2024-01-01 PROCEDURE — 96365 THER/PROPH/DIAG IV INF INIT: CPT | Mod: 59

## 2024-01-01 PROCEDURE — 93005 ELECTROCARDIOGRAM TRACING: CPT

## 2024-01-01 PROCEDURE — 82247 BILIRUBIN TOTAL: CPT | Performed by: INTERNAL MEDICINE

## 2024-01-01 PROCEDURE — 93971 EXTREMITY STUDY: CPT | Mod: 26 | Performed by: RADIOLOGY

## 2024-01-01 PROCEDURE — 84132 ASSAY OF SERUM POTASSIUM: CPT | Performed by: INTERNAL MEDICINE

## 2024-01-01 PROCEDURE — 84145 PROCALCITONIN (PCT): CPT | Performed by: INTERNAL MEDICINE

## 2024-01-01 PROCEDURE — 70553 MRI BRAIN STEM W/O & W/DYE: CPT

## 2024-01-01 PROCEDURE — 80048 BASIC METABOLIC PNL TOTAL CA: CPT

## 2024-01-01 PROCEDURE — 97161 PT EVAL LOW COMPLEX 20 MIN: CPT | Mod: GP

## 2024-01-01 PROCEDURE — 97116 GAIT TRAINING THERAPY: CPT | Mod: GP | Performed by: PHYSICAL THERAPIST

## 2024-01-01 PROCEDURE — 99204 OFFICE O/P NEW MOD 45 MIN: CPT | Mod: 93

## 2024-01-01 PROCEDURE — 83615 LACTATE (LD) (LDH) ENZYME: CPT | Performed by: INTERNAL MEDICINE

## 2024-01-01 PROCEDURE — 82040 ASSAY OF SERUM ALBUMIN: CPT

## 2024-01-01 PROCEDURE — 258N000001 HC RX 258: Performed by: EMERGENCY MEDICINE

## 2024-01-01 PROCEDURE — 85018 HEMOGLOBIN: CPT | Performed by: INTERNAL MEDICINE

## 2024-01-01 PROCEDURE — 80053 COMPREHEN METABOLIC PANEL: CPT | Performed by: INTERNAL MEDICINE

## 2024-01-01 PROCEDURE — H2035 A/D TX PROGRAM, PER HOUR: HCPCS

## 2024-01-01 PROCEDURE — 96367 TX/PROPH/DG ADDL SEQ IV INF: CPT

## 2024-01-01 PROCEDURE — 82962 GLUCOSE BLOOD TEST: CPT

## 2024-01-01 PROCEDURE — 96360 HYDRATION IV INFUSION INIT: CPT | Performed by: EMERGENCY MEDICINE

## 2024-01-01 PROCEDURE — 86880 COOMBS TEST DIRECT: CPT | Performed by: INTERNAL MEDICINE

## 2024-01-01 PROCEDURE — 96376 TX/PRO/DX INJ SAME DRUG ADON: CPT

## 2024-01-01 PROCEDURE — 97535 SELF CARE MNGMENT TRAINING: CPT | Mod: GO

## 2024-01-01 PROCEDURE — 82565 ASSAY OF CREATININE: CPT | Performed by: INTERNAL MEDICINE

## 2024-01-01 PROCEDURE — 85045 AUTOMATED RETICULOCYTE COUNT: CPT | Performed by: INTERNAL MEDICINE

## 2024-01-01 PROCEDURE — 82607 VITAMIN B-12: CPT | Performed by: INTERNAL MEDICINE

## 2024-01-01 PROCEDURE — 96366 THER/PROPH/DIAG IV INF ADDON: CPT

## 2024-01-01 PROCEDURE — 43239 EGD BIOPSY SINGLE/MULTIPLE: CPT | Performed by: INTERNAL MEDICINE

## 2024-01-01 PROCEDURE — 99233 SBSQ HOSP IP/OBS HIGH 50: CPT | Performed by: HOSPITALIST

## 2024-01-01 PROCEDURE — 87040 BLOOD CULTURE FOR BACTERIA: CPT | Performed by: INTERNAL MEDICINE

## 2024-01-01 PROCEDURE — 82077 ASSAY SPEC XCP UR&BREATH IA: CPT | Performed by: EMERGENCY MEDICINE

## 2024-01-01 PROCEDURE — 85027 COMPLETE CBC AUTOMATED: CPT | Performed by: STUDENT IN AN ORGANIZED HEALTH CARE EDUCATION/TRAINING PROGRAM

## 2024-01-01 PROCEDURE — 258N000003 HC RX IP 258 OP 636: Performed by: EMERGENCY MEDICINE

## 2024-01-01 PROCEDURE — 97530 THERAPEUTIC ACTIVITIES: CPT | Mod: GP

## 2024-01-01 PROCEDURE — 99239 HOSP IP/OBS DSCHRG MGMT >30: CPT | Performed by: HOSPITALIST

## 2024-01-01 PROCEDURE — 85004 AUTOMATED DIFF WBC COUNT: CPT

## 2024-01-01 PROCEDURE — 83036 HEMOGLOBIN GLYCOSYLATED A1C: CPT | Performed by: INTERNAL MEDICINE

## 2024-01-01 PROCEDURE — 83690 ASSAY OF LIPASE: CPT | Performed by: EMERGENCY MEDICINE

## 2024-01-01 PROCEDURE — 99223 1ST HOSP IP/OBS HIGH 75: CPT | Mod: AI

## 2024-01-01 PROCEDURE — 85610 PROTHROMBIN TIME: CPT | Performed by: INTERNAL MEDICINE

## 2024-01-01 PROCEDURE — 87389 HIV-1 AG W/HIV-1&-2 AB AG IA: CPT | Performed by: INTERNAL MEDICINE

## 2024-01-01 PROCEDURE — 99223 1ST HOSP IP/OBS HIGH 75: CPT | Performed by: INTERNAL MEDICINE

## 2024-01-01 PROCEDURE — 250N000009 HC RX 250: Performed by: EMERGENCY MEDICINE

## 2024-01-01 PROCEDURE — 82248 BILIRUBIN DIRECT: CPT | Performed by: INTERNAL MEDICINE

## 2024-01-01 PROCEDURE — 74177 CT ABD & PELVIS W/CONTRAST: CPT

## 2024-01-01 PROCEDURE — 85025 COMPLETE CBC W/AUTO DIFF WBC: CPT | Performed by: HOSPITALIST

## 2024-01-01 PROCEDURE — 83010 ASSAY OF HAPTOGLOBIN QUANT: CPT | Performed by: INTERNAL MEDICINE

## 2024-01-01 PROCEDURE — 97129 THER IVNTJ 1ST 15 MIN: CPT | Mod: GO

## 2024-01-01 PROCEDURE — 82805 BLOOD GASES W/O2 SATURATION: CPT

## 2024-01-01 PROCEDURE — 250N000012 HC RX MED GY IP 250 OP 636 PS 637: Performed by: EMERGENCY MEDICINE

## 2024-01-01 PROCEDURE — P9016 RBC LEUKOCYTES REDUCED: HCPCS | Performed by: INTERNAL MEDICINE

## 2024-01-01 PROCEDURE — 71045 X-RAY EXAM CHEST 1 VIEW: CPT

## 2024-01-01 PROCEDURE — 85025 COMPLETE CBC W/AUTO DIFF WBC: CPT

## 2024-01-01 PROCEDURE — 82330 ASSAY OF CALCIUM: CPT | Performed by: STUDENT IN AN ORGANIZED HEALTH CARE EDUCATION/TRAINING PROGRAM

## 2024-01-01 PROCEDURE — 85027 COMPLETE CBC AUTOMATED: CPT | Performed by: EMERGENCY MEDICINE

## 2024-01-01 PROCEDURE — 258N000003 HC RX IP 258 OP 636: Performed by: HOSPITALIST

## 2024-01-01 PROCEDURE — 82803 BLOOD GASES ANY COMBINATION: CPT | Performed by: INTERNAL MEDICINE

## 2024-01-01 PROCEDURE — 97110 THERAPEUTIC EXERCISES: CPT | Mod: GP | Performed by: PHYSICAL THERAPIST

## 2024-01-01 PROCEDURE — 82272 OCCULT BLD FECES 1-3 TESTS: CPT | Performed by: INTERNAL MEDICINE

## 2024-01-01 PROCEDURE — 99223 1ST HOSP IP/OBS HIGH 75: CPT | Performed by: PSYCHIATRY & NEUROLOGY

## 2024-01-01 PROCEDURE — 250N000013 HC RX MED GY IP 250 OP 250 PS 637: Performed by: STUDENT IN AN ORGANIZED HEALTH CARE EDUCATION/TRAINING PROGRAM

## 2024-01-01 PROCEDURE — A9585 GADOBUTROL INJECTION: HCPCS | Performed by: PSYCHIATRY & NEUROLOGY

## 2024-01-01 PROCEDURE — 96375 TX/PRO/DX INJ NEW DRUG ADDON: CPT

## 2024-01-01 PROCEDURE — 86803 HEPATITIS C AB TEST: CPT | Performed by: INTERNAL MEDICINE

## 2024-01-01 PROCEDURE — 258N000003 HC RX IP 258 OP 636

## 2024-01-01 PROCEDURE — 88305 TISSUE EXAM BY PATHOLOGIST: CPT | Mod: 26 | Performed by: PATHOLOGY

## 2024-01-01 PROCEDURE — 88312 SPECIAL STAINS GROUP 1: CPT | Mod: TC | Performed by: INTERNAL MEDICINE

## 2024-01-01 PROCEDURE — 85060 BLOOD SMEAR INTERPRETATION: CPT | Performed by: PATHOLOGY

## 2024-01-01 PROCEDURE — 0DB38ZX EXCISION OF LOWER ESOPHAGUS, VIA NATURAL OR ARTIFICIAL OPENING ENDOSCOPIC, DIAGNOSTIC: ICD-10-PCS | Performed by: INTERNAL MEDICINE

## 2024-01-01 PROCEDURE — 85730 THROMBOPLASTIN TIME PARTIAL: CPT | Performed by: INTERNAL MEDICINE

## 2024-01-01 PROCEDURE — 86706 HEP B SURFACE ANTIBODY: CPT | Performed by: INTERNAL MEDICINE

## 2024-01-01 PROCEDURE — 84466 ASSAY OF TRANSFERRIN: CPT | Performed by: INTERNAL MEDICINE

## 2024-01-01 PROCEDURE — 96361 HYDRATE IV INFUSION ADD-ON: CPT | Performed by: EMERGENCY MEDICINE

## 2024-01-01 PROCEDURE — 93975 VASCULAR STUDY: CPT

## 2024-01-01 PROCEDURE — 82247 BILIRUBIN TOTAL: CPT | Performed by: EMERGENCY MEDICINE

## 2024-01-01 PROCEDURE — 83735 ASSAY OF MAGNESIUM: CPT | Performed by: EMERGENCY MEDICINE

## 2024-01-01 PROCEDURE — 93971 EXTREMITY STUDY: CPT | Mod: RT

## 2024-01-01 PROCEDURE — 87340 HEPATITIS B SURFACE AG IA: CPT | Performed by: INTERNAL MEDICINE

## 2024-01-01 PROCEDURE — 82374 ASSAY BLOOD CARBON DIOXIDE: CPT

## 2024-01-01 PROCEDURE — 99285 EMERGENCY DEPT VISIT HI MDM: CPT | Mod: 25

## 2024-01-01 PROCEDURE — 87635 SARS-COV-2 COVID-19 AMP PRB: CPT | Performed by: INTERNAL MEDICINE

## 2024-01-01 PROCEDURE — 97166 OT EVAL MOD COMPLEX 45 MIN: CPT | Mod: GO

## 2024-01-01 PROCEDURE — 85014 HEMATOCRIT: CPT | Performed by: STUDENT IN AN ORGANIZED HEALTH CARE EDUCATION/TRAINING PROGRAM

## 2024-01-01 PROCEDURE — 80069 RENAL FUNCTION PANEL: CPT | Performed by: INTERNAL MEDICINE

## 2024-01-01 PROCEDURE — 99232 SBSQ HOSP IP/OBS MODERATE 35: CPT | Performed by: HOSPITALIST

## 2024-01-01 PROCEDURE — 82728 ASSAY OF FERRITIN: CPT | Performed by: INTERNAL MEDICINE

## 2024-01-01 PROCEDURE — 96372 THER/PROPH/DIAG INJ SC/IM: CPT

## 2024-01-01 PROCEDURE — 97530 THERAPEUTIC ACTIVITIES: CPT | Mod: GO | Performed by: OCCUPATIONAL THERAPIST

## 2024-01-01 PROCEDURE — 85384 FIBRINOGEN ACTIVITY: CPT | Performed by: INTERNAL MEDICINE

## 2024-01-01 PROCEDURE — 83550 IRON BINDING TEST: CPT | Performed by: INTERNAL MEDICINE

## 2024-01-01 PROCEDURE — 85025 COMPLETE CBC W/AUTO DIFF WBC: CPT | Performed by: INTERNAL MEDICINE

## 2024-01-01 PROCEDURE — 84155 ASSAY OF PROTEIN SERUM: CPT | Performed by: INTERNAL MEDICINE

## 2024-01-01 PROCEDURE — 85610 PROTHROMBIN TIME: CPT | Performed by: EMERGENCY MEDICINE

## 2024-01-01 PROCEDURE — 84100 ASSAY OF PHOSPHORUS: CPT | Performed by: EMERGENCY MEDICINE

## 2024-01-01 RX ORDER — AMOXICILLIN 250 MG
2 CAPSULE ORAL 2 TIMES DAILY PRN
Status: DISCONTINUED | OUTPATIENT
Start: 2024-01-01 | End: 2024-01-01

## 2024-01-01 RX ORDER — FOLIC ACID 1 MG/1
1 TABLET ORAL DAILY
Status: DISCONTINUED | OUTPATIENT
Start: 2024-01-01 | End: 2024-01-01 | Stop reason: HOSPADM

## 2024-01-01 RX ORDER — OLANZAPINE 5 MG/1
5-10 TABLET, ORALLY DISINTEGRATING ORAL EVERY 6 HOURS PRN
Status: DISCONTINUED | OUTPATIENT
Start: 2024-01-01 | End: 2024-01-01

## 2024-01-01 RX ORDER — GABAPENTIN 300 MG/1
600 CAPSULE ORAL 3 TIMES DAILY
Qty: 180 CAPSULE | Refills: 0 | Status: SHIPPED | OUTPATIENT
Start: 2024-01-01 | End: 2024-01-01

## 2024-01-01 RX ORDER — PANTOPRAZOLE SODIUM 40 MG/1
40 TABLET, DELAYED RELEASE ORAL
Qty: 60 TABLET | Refills: 0 | Status: SHIPPED | OUTPATIENT
Start: 2024-01-01

## 2024-01-01 RX ORDER — FLUMAZENIL 0.1 MG/ML
0.2 INJECTION, SOLUTION INTRAVENOUS
Status: ACTIVE | OUTPATIENT
Start: 2024-01-01 | End: 2024-01-01

## 2024-01-01 RX ORDER — AMOXICILLIN 250 MG
2 CAPSULE ORAL 2 TIMES DAILY
Status: DISCONTINUED | OUTPATIENT
Start: 2024-01-01 | End: 2024-01-01 | Stop reason: HOSPADM

## 2024-01-01 RX ORDER — ALBUTEROL SULFATE 0.83 MG/ML
2.5 SOLUTION RESPIRATORY (INHALATION)
OUTPATIENT
Start: 2024-01-01

## 2024-01-01 RX ORDER — PREDNISONE 20 MG/1
20 TABLET ORAL DAILY
Status: DISCONTINUED | OUTPATIENT
Start: 2024-01-01 | End: 2024-01-01 | Stop reason: HOSPADM

## 2024-01-01 RX ORDER — AMLODIPINE BESYLATE 10 MG/1
10 TABLET ORAL DAILY
Qty: 30 TABLET | Refills: 0 | Status: SHIPPED | OUTPATIENT
Start: 2024-01-01

## 2024-01-01 RX ORDER — COLCHICINE 0.6 MG/1
0.6 TABLET ORAL 2 TIMES DAILY
Status: DISCONTINUED | OUTPATIENT
Start: 2024-01-01 | End: 2024-01-01

## 2024-01-01 RX ORDER — NALOXONE HYDROCHLORIDE 0.4 MG/ML
0.2 INJECTION, SOLUTION INTRAMUSCULAR; INTRAVENOUS; SUBCUTANEOUS
Status: DISCONTINUED | OUTPATIENT
Start: 2024-01-01 | End: 2024-01-01

## 2024-01-01 RX ORDER — NALOXONE HYDROCHLORIDE 0.4 MG/ML
0.2 INJECTION, SOLUTION INTRAMUSCULAR; INTRAVENOUS; SUBCUTANEOUS
Status: DISCONTINUED | OUTPATIENT
Start: 2024-01-01 | End: 2024-01-01 | Stop reason: HOSPADM

## 2024-01-01 RX ORDER — FOLIC ACID 1 MG/1
1 TABLET ORAL DAILY
Qty: 30 TABLET | Refills: 0 | Status: SHIPPED | OUTPATIENT
Start: 2024-01-01

## 2024-01-01 RX ORDER — ALLOPURINOL 100 MG/1
100 TABLET ORAL DAILY
Status: DISCONTINUED | OUTPATIENT
Start: 2024-01-01 | End: 2024-01-01 | Stop reason: HOSPADM

## 2024-01-01 RX ORDER — LISINOPRIL 20 MG/1
20 TABLET ORAL DAILY
Status: DISCONTINUED | OUTPATIENT
Start: 2024-01-01 | End: 2024-01-01

## 2024-01-01 RX ORDER — METHYLPREDNISOLONE SODIUM SUCCINATE 125 MG/2ML
125 INJECTION, POWDER, LYOPHILIZED, FOR SOLUTION INTRAMUSCULAR; INTRAVENOUS
Status: CANCELLED
Start: 2024-01-01

## 2024-01-01 RX ORDER — GABAPENTIN 300 MG/1
600 CAPSULE ORAL 3 TIMES DAILY
Status: DISCONTINUED | OUTPATIENT
Start: 2024-01-01 | End: 2024-01-01 | Stop reason: HOSPADM

## 2024-01-01 RX ORDER — AMOXICILLIN 250 MG
1 CAPSULE ORAL 2 TIMES DAILY
Status: DISCONTINUED | OUTPATIENT
Start: 2024-01-01 | End: 2024-01-01 | Stop reason: HOSPADM

## 2024-01-01 RX ORDER — ALLOPURINOL 100 MG/1
100 TABLET ORAL DAILY
Qty: 30 TABLET | Refills: 0 | Status: SHIPPED | OUTPATIENT
Start: 2024-01-01

## 2024-01-01 RX ORDER — AMOXICILLIN 250 MG
1 CAPSULE ORAL 2 TIMES DAILY
Qty: 60 TABLET | Refills: 0 | Status: SHIPPED | OUTPATIENT
Start: 2024-01-01

## 2024-01-01 RX ORDER — PREDNISONE 20 MG/1
20 TABLET ORAL ONCE
Status: COMPLETED | OUTPATIENT
Start: 2024-01-01 | End: 2024-01-01

## 2024-01-01 RX ORDER — SODIUM CHLORIDE, SODIUM LACTATE, POTASSIUM CHLORIDE, CALCIUM CHLORIDE 600; 310; 30; 20 MG/100ML; MG/100ML; MG/100ML; MG/100ML
INJECTION, SOLUTION INTRAVENOUS CONTINUOUS
Status: DISCONTINUED | OUTPATIENT
Start: 2024-01-01 | End: 2024-01-01

## 2024-01-01 RX ORDER — AMLODIPINE BESYLATE 10 MG/1
10 TABLET ORAL DAILY
Status: DISCONTINUED | OUTPATIENT
Start: 2024-01-01 | End: 2024-01-01 | Stop reason: HOSPADM

## 2024-01-01 RX ORDER — ALBUTEROL SULFATE 90 UG/1
1-2 AEROSOL, METERED RESPIRATORY (INHALATION)
Status: CANCELLED
Start: 2024-01-01

## 2024-01-01 RX ORDER — ALBUTEROL SULFATE 90 UG/1
1-2 AEROSOL, METERED RESPIRATORY (INHALATION)
Start: 2024-01-01

## 2024-01-01 RX ORDER — POTASSIUM CHLORIDE 1500 MG/1
40 TABLET, EXTENDED RELEASE ORAL ONCE
Status: COMPLETED | OUTPATIENT
Start: 2024-01-01 | End: 2024-01-01

## 2024-01-01 RX ORDER — LANOLIN ALCOHOL/MO/W.PET/CERES
100 CREAM (GRAM) TOPICAL DAILY
Qty: 30 TABLET | Refills: 0 | Status: SHIPPED | OUTPATIENT
Start: 2024-01-01

## 2024-01-01 RX ORDER — MAGNESIUM OXIDE 400 MG/1
400 TABLET ORAL DAILY
Status: DISCONTINUED | OUTPATIENT
Start: 2024-01-01 | End: 2024-01-01 | Stop reason: HOSPADM

## 2024-01-01 RX ORDER — MAGNESIUM HYDROXIDE/ALUMINUM HYDROXICE/SIMETHICONE 120; 1200; 1200 MG/30ML; MG/30ML; MG/30ML
30 SUSPENSION ORAL EVERY 6 HOURS PRN
COMMUNITY
End: 2024-01-01

## 2024-01-01 RX ORDER — PANTOPRAZOLE SODIUM 40 MG/1
40 TABLET, DELAYED RELEASE ORAL
Status: DISCONTINUED | OUTPATIENT
Start: 2024-01-01 | End: 2024-01-01 | Stop reason: HOSPADM

## 2024-01-01 RX ORDER — HYDROXYZINE HYDROCHLORIDE 25 MG/1
25 TABLET, FILM COATED ORAL 3 TIMES DAILY PRN
Status: DISCONTINUED | OUTPATIENT
Start: 2024-01-01 | End: 2024-01-01 | Stop reason: HOSPADM

## 2024-01-01 RX ORDER — GABAPENTIN 300 MG/1
300 CAPSULE ORAL EVERY 8 HOURS
Status: DISCONTINUED | OUTPATIENT
Start: 2024-01-01 | End: 2024-01-01

## 2024-01-01 RX ORDER — NALOXONE HYDROCHLORIDE 0.4 MG/ML
0.4 INJECTION, SOLUTION INTRAMUSCULAR; INTRAVENOUS; SUBCUTANEOUS
Status: DISCONTINUED | OUTPATIENT
Start: 2024-01-01 | End: 2024-01-01

## 2024-01-01 RX ORDER — MAGNESIUM SULFATE HEPTAHYDRATE 40 MG/ML
4 INJECTION, SOLUTION INTRAVENOUS ONCE
Status: COMPLETED | OUTPATIENT
Start: 2024-01-01 | End: 2024-01-01

## 2024-01-01 RX ORDER — METHYLPREDNISOLONE SODIUM SUCCINATE 125 MG/2ML
125 INJECTION, POWDER, LYOPHILIZED, FOR SOLUTION INTRAMUSCULAR; INTRAVENOUS
Start: 2024-01-01

## 2024-01-01 RX ORDER — AMLODIPINE BESYLATE 10 MG/1
10 TABLET ORAL DAILY
Status: DISCONTINUED | OUTPATIENT
Start: 2024-01-01 | End: 2024-01-01

## 2024-01-01 RX ORDER — DEXTROSE MONOHYDRATE 25 G/50ML
25 INJECTION, SOLUTION INTRAVENOUS ONCE
Status: COMPLETED | OUTPATIENT
Start: 2024-01-01 | End: 2024-01-01

## 2024-01-01 RX ORDER — HEPARIN SODIUM,PORCINE 10 UNIT/ML
5-20 VIAL (ML) INTRAVENOUS DAILY PRN
OUTPATIENT
Start: 2024-01-01

## 2024-01-01 RX ORDER — PREDNISONE 5 MG/1
5 TABLET ORAL SEE ADMIN INSTRUCTIONS
Qty: 30 TABLET | Refills: 0 | Status: SHIPPED | OUTPATIENT
Start: 2024-01-01

## 2024-01-01 RX ORDER — HEPARIN SODIUM (PORCINE) LOCK FLUSH IV SOLN 100 UNIT/ML 100 UNIT/ML
5 SOLUTION INTRAVENOUS
OUTPATIENT
Start: 2024-01-01

## 2024-01-01 RX ORDER — LIDOCAINE 40 MG/G
CREAM TOPICAL
Status: DISCONTINUED | OUTPATIENT
Start: 2024-01-01 | End: 2024-01-01

## 2024-01-01 RX ORDER — MULTIPLE VITAMINS W/ MINERALS TAB 9MG-400MCG
1 TAB ORAL DAILY
Qty: 30 TABLET | Refills: 0 | Status: SHIPPED | OUTPATIENT
Start: 2024-01-01

## 2024-01-01 RX ORDER — NALOXONE HYDROCHLORIDE 0.4 MG/ML
0.4 INJECTION, SOLUTION INTRAMUSCULAR; INTRAVENOUS; SUBCUTANEOUS
Status: DISCONTINUED | OUTPATIENT
Start: 2024-01-01 | End: 2024-01-01 | Stop reason: HOSPADM

## 2024-01-01 RX ORDER — ONDANSETRON 4 MG/1
4 TABLET, ORALLY DISINTEGRATING ORAL EVERY 6 HOURS PRN
Status: DISCONTINUED | OUTPATIENT
Start: 2024-01-01 | End: 2024-01-01 | Stop reason: HOSPADM

## 2024-01-01 RX ORDER — GUAIFENESIN/DEXTROMETHORPHAN 100-10MG/5
10 SYRUP ORAL EVERY 4 HOURS PRN
COMMUNITY
End: 2024-01-01

## 2024-01-01 RX ORDER — DIPHENHYDRAMINE HYDROCHLORIDE 50 MG/ML
50 INJECTION INTRAMUSCULAR; INTRAVENOUS
Status: CANCELLED
Start: 2024-01-01

## 2024-01-01 RX ORDER — GADOBUTROL 604.72 MG/ML
7 INJECTION INTRAVENOUS ONCE
Status: COMPLETED | OUTPATIENT
Start: 2024-01-01 | End: 2024-01-01

## 2024-01-01 RX ORDER — HALOPERIDOL 5 MG/ML
2.5-5 INJECTION INTRAMUSCULAR EVERY 6 HOURS PRN
Status: DISCONTINUED | OUTPATIENT
Start: 2024-01-01 | End: 2024-01-01

## 2024-01-01 RX ORDER — LORAZEPAM 1 MG/1
1 TABLET ORAL ONCE
Status: COMPLETED | OUTPATIENT
Start: 2024-01-01 | End: 2024-01-01

## 2024-01-01 RX ORDER — LORAZEPAM 0.5 MG/1
1-2 TABLET ORAL ONCE
Status: COMPLETED | OUTPATIENT
Start: 2024-01-01 | End: 2024-01-01

## 2024-01-01 RX ORDER — THIAMINE HYDROCHLORIDE 100 MG/ML
500 INJECTION, SOLUTION INTRAMUSCULAR; INTRAVENOUS 3 TIMES DAILY
Status: COMPLETED | OUTPATIENT
Start: 2024-01-01 | End: 2024-01-01

## 2024-01-01 RX ORDER — ONDANSETRON 2 MG/ML
4 INJECTION INTRAMUSCULAR; INTRAVENOUS EVERY 6 HOURS PRN
Status: DISCONTINUED | OUTPATIENT
Start: 2024-01-01 | End: 2024-01-01 | Stop reason: HOSPADM

## 2024-01-01 RX ORDER — ACETAMINOPHEN 325 MG/1
975 TABLET ORAL 3 TIMES DAILY
Status: DISCONTINUED | OUTPATIENT
Start: 2024-01-01 | End: 2024-01-01 | Stop reason: HOSPADM

## 2024-01-01 RX ORDER — FENTANYL CITRATE 50 UG/ML
INJECTION, SOLUTION INTRAMUSCULAR; INTRAVENOUS PRN
Status: DISCONTINUED | OUTPATIENT
Start: 2024-01-01 | End: 2024-01-01 | Stop reason: HOSPADM

## 2024-01-01 RX ORDER — SIMVASTATIN 20 MG
20 TABLET ORAL AT BEDTIME
Qty: 30 TABLET | Refills: 0 | Status: SHIPPED | OUTPATIENT
Start: 2024-01-01

## 2024-01-01 RX ORDER — ACETAMINOPHEN 650 MG/1
650 SUPPOSITORY RECTAL EVERY 4 HOURS PRN
Status: DISCONTINUED | OUTPATIENT
Start: 2024-01-01 | End: 2024-01-01 | Stop reason: HOSPADM

## 2024-01-01 RX ORDER — ALLOPURINOL 100 MG/1
100 TABLET ORAL DAILY
Qty: 30 TABLET | Refills: 0 | Status: ON HOLD | OUTPATIENT
Start: 2024-01-01 | End: 2024-01-01

## 2024-01-01 RX ORDER — GABAPENTIN 300 MG/1
900 CAPSULE ORAL EVERY 8 HOURS
Status: COMPLETED | OUTPATIENT
Start: 2024-01-01 | End: 2024-01-01

## 2024-01-01 RX ORDER — HYDROXYZINE HYDROCHLORIDE 25 MG/1
25-50 TABLET, FILM COATED ORAL 3 TIMES DAILY PRN
Qty: 60 TABLET | Refills: 0 | Status: SHIPPED | OUTPATIENT
Start: 2024-01-01

## 2024-01-01 RX ORDER — DIAZEPAM 10 MG/2ML
5-10 INJECTION, SOLUTION INTRAMUSCULAR; INTRAVENOUS EVERY 30 MIN PRN
Status: DISCONTINUED | OUTPATIENT
Start: 2024-01-01 | End: 2024-01-01

## 2024-01-01 RX ORDER — GABAPENTIN 300 MG/1
600 CAPSULE ORAL EVERY 8 HOURS
Status: COMPLETED | OUTPATIENT
Start: 2024-01-01 | End: 2024-01-01

## 2024-01-01 RX ORDER — PANTOPRAZOLE SODIUM 40 MG/1
40 TABLET, DELAYED RELEASE ORAL
Status: DISCONTINUED | OUTPATIENT
Start: 2024-01-01 | End: 2024-01-01

## 2024-01-01 RX ORDER — COLCHICINE 0.6 MG/1
0.6 TABLET ORAL 2 TIMES DAILY
Status: DISCONTINUED | OUTPATIENT
Start: 2024-01-01 | End: 2024-01-01 | Stop reason: HOSPADM

## 2024-01-01 RX ORDER — FLUMAZENIL 0.1 MG/ML
0.2 INJECTION, SOLUTION INTRAVENOUS
Status: DISCONTINUED | OUTPATIENT
Start: 2024-01-01 | End: 2024-01-01

## 2024-01-01 RX ORDER — FERROUS SULFATE 325(65) MG
325 TABLET ORAL 2 TIMES DAILY WITH MEALS
Status: DISCONTINUED | OUTPATIENT
Start: 2024-01-01 | End: 2024-01-01

## 2024-01-01 RX ORDER — ALLOPURINOL 100 MG/1
100 TABLET ORAL DAILY
Status: ON HOLD | COMMUNITY
Start: 2023-04-04 | End: 2024-01-01

## 2024-01-01 RX ORDER — LORAZEPAM 2 MG/ML
1 INJECTION INTRAMUSCULAR ONCE
Status: DISCONTINUED | OUTPATIENT
Start: 2024-01-01 | End: 2024-01-01

## 2024-01-01 RX ORDER — DIPHENHYDRAMINE HYDROCHLORIDE 50 MG/ML
50 INJECTION INTRAMUSCULAR; INTRAVENOUS
Start: 2024-01-01

## 2024-01-01 RX ORDER — AMOXICILLIN 250 MG
2 CAPSULE ORAL 2 TIMES DAILY PRN
Status: DISCONTINUED | OUTPATIENT
Start: 2024-01-01 | End: 2024-01-01 | Stop reason: HOSPADM

## 2024-01-01 RX ORDER — POLYETHYLENE GLYCOL 3350 17 G/17G
17 POWDER, FOR SOLUTION ORAL 2 TIMES DAILY PRN
Status: DISCONTINUED | OUTPATIENT
Start: 2024-01-01 | End: 2024-01-01 | Stop reason: HOSPADM

## 2024-01-01 RX ORDER — LISINOPRIL 20 MG/1
20 TABLET ORAL DAILY
Qty: 30 TABLET | Refills: 0 | Status: ON HOLD | OUTPATIENT
Start: 2024-01-01 | End: 2024-01-01

## 2024-01-01 RX ORDER — EPINEPHRINE 1 MG/ML
0.3 INJECTION, SOLUTION INTRAMUSCULAR; SUBCUTANEOUS EVERY 5 MIN PRN
OUTPATIENT
Start: 2024-01-01

## 2024-01-01 RX ORDER — ONDANSETRON 2 MG/ML
INJECTION INTRAMUSCULAR; INTRAVENOUS
Status: COMPLETED
Start: 2024-01-01 | End: 2024-01-01

## 2024-01-01 RX ORDER — FERROUS SULFATE 325(65) MG
325 TABLET ORAL DAILY
Status: DISCONTINUED | OUTPATIENT
Start: 2024-01-01 | End: 2024-01-01 | Stop reason: HOSPADM

## 2024-01-01 RX ORDER — IBUPROFEN 200 MG
400 TABLET ORAL EVERY 6 HOURS PRN
COMMUNITY
End: 2024-01-01

## 2024-01-01 RX ORDER — NALTREXONE HYDROCHLORIDE 50 MG/1
50 TABLET, FILM COATED ORAL DAILY
Status: DISCONTINUED | OUTPATIENT
Start: 2024-01-01 | End: 2024-01-01 | Stop reason: HOSPADM

## 2024-01-01 RX ORDER — AMOXICILLIN 250 MG
1 CAPSULE ORAL 2 TIMES DAILY PRN
Status: DISCONTINUED | OUTPATIENT
Start: 2024-01-01 | End: 2024-01-01

## 2024-01-01 RX ORDER — COLCHICINE 0.6 MG/1
0.6 TABLET ORAL DAILY
Status: DISCONTINUED | OUTPATIENT
Start: 2024-01-01 | End: 2024-01-01 | Stop reason: HOSPADM

## 2024-01-01 RX ORDER — ALBUTEROL SULFATE 0.83 MG/ML
2.5 SOLUTION RESPIRATORY (INHALATION)
Status: CANCELLED | OUTPATIENT
Start: 2024-01-01

## 2024-01-01 RX ORDER — FERROUS SULFATE 325(65) MG
325 TABLET ORAL DAILY
Qty: 30 TABLET | Refills: 0 | Status: SHIPPED | OUTPATIENT
Start: 2024-01-01

## 2024-01-01 RX ORDER — FLUOXETINE 40 MG/1
40 CAPSULE ORAL DAILY
Qty: 30 CAPSULE | Refills: 0 | Status: SHIPPED | OUTPATIENT
Start: 2024-01-01

## 2024-01-01 RX ORDER — ACETAMINOPHEN 325 MG/1
650 TABLET ORAL EVERY 4 HOURS PRN
Status: DISCONTINUED | OUTPATIENT
Start: 2024-01-01 | End: 2024-01-01 | Stop reason: HOSPADM

## 2024-01-01 RX ORDER — HEPARIN SODIUM,PORCINE 10 UNIT/ML
5-20 VIAL (ML) INTRAVENOUS DAILY PRN
Status: CANCELLED | OUTPATIENT
Start: 2024-01-01

## 2024-01-01 RX ORDER — OXYCODONE HYDROCHLORIDE 5 MG/1
5 TABLET ORAL EVERY 4 HOURS PRN
Status: DISCONTINUED | OUTPATIENT
Start: 2024-01-01 | End: 2024-01-01

## 2024-01-01 RX ORDER — LIDOCAINE 40 MG/G
CREAM TOPICAL
Status: DISCONTINUED | OUTPATIENT
Start: 2024-01-01 | End: 2024-01-01 | Stop reason: HOSPADM

## 2024-01-01 RX ORDER — COLCHICINE 0.6 MG/1
0.6 TABLET ORAL SEE ADMIN INSTRUCTIONS
Qty: 21 TABLET | Refills: 0 | Status: ON HOLD | OUTPATIENT
Start: 2024-01-01 | End: 2024-01-01

## 2024-01-01 RX ORDER — SIMVASTATIN 20 MG
20 TABLET ORAL AT BEDTIME
Status: ON HOLD | COMMUNITY
Start: 2023-04-04 | End: 2024-01-01

## 2024-01-01 RX ORDER — GABAPENTIN 300 MG/1
600 CAPSULE ORAL 3 TIMES DAILY
Qty: 90 CAPSULE | Refills: 0 | Status: SHIPPED | OUTPATIENT
Start: 2024-01-01 | End: 2024-01-01

## 2024-01-01 RX ORDER — CLONIDINE HYDROCHLORIDE 0.1 MG/1
0.1 TABLET ORAL EVERY 8 HOURS
Status: DISCONTINUED | OUTPATIENT
Start: 2024-01-01 | End: 2024-01-01

## 2024-01-01 RX ORDER — LORAZEPAM 2 MG/ML
1 INJECTION INTRAMUSCULAR EVERY 30 MIN PRN
Status: DISCONTINUED | OUTPATIENT
Start: 2024-01-01 | End: 2024-01-01

## 2024-01-01 RX ORDER — LORAZEPAM 2 MG/ML
2 INJECTION INTRAMUSCULAR
Status: COMPLETED | OUTPATIENT
Start: 2024-01-01 | End: 2024-01-01

## 2024-01-01 RX ORDER — EPINEPHRINE 1 MG/ML
0.3 INJECTION, SOLUTION, CONCENTRATE INTRAVENOUS EVERY 5 MIN PRN
Status: CANCELLED | OUTPATIENT
Start: 2024-01-01

## 2024-01-01 RX ORDER — DEXTROSE MONOHYDRATE 25 G/50ML
25-50 INJECTION, SOLUTION INTRAVENOUS
Status: DISCONTINUED | OUTPATIENT
Start: 2024-01-01 | End: 2024-01-01 | Stop reason: HOSPADM

## 2024-01-01 RX ORDER — CALCIUM GLUCONATE 94 MG/ML
1 INJECTION, SOLUTION INTRAVENOUS ONCE
Status: COMPLETED | OUTPATIENT
Start: 2024-01-01 | End: 2024-01-01

## 2024-01-01 RX ORDER — COLCHICINE 0.6 MG/1
0.6 TABLET ORAL DAILY
Qty: 1 TABLET | Refills: 0 | Status: COMPLETED | OUTPATIENT
Start: 2024-01-01 | End: 2024-01-01

## 2024-01-01 RX ORDER — THIAMINE HYDROCHLORIDE 100 MG/ML
250 INJECTION, SOLUTION INTRAMUSCULAR; INTRAVENOUS DAILY
Status: DISCONTINUED | OUTPATIENT
Start: 2024-01-01 | End: 2024-01-01

## 2024-01-01 RX ORDER — COLCHICINE 0.6 MG/1
0.6 TABLET ORAL DAILY
Status: DISCONTINUED | OUTPATIENT
Start: 2024-01-01 | End: 2024-01-01

## 2024-01-01 RX ORDER — IOPAMIDOL 755 MG/ML
75 INJECTION, SOLUTION INTRAVASCULAR ONCE
Status: COMPLETED | OUTPATIENT
Start: 2024-01-01 | End: 2024-01-01

## 2024-01-01 RX ORDER — LISINOPRIL 20 MG/1
20 TABLET ORAL DAILY
Status: DISCONTINUED | OUTPATIENT
Start: 2024-01-01 | End: 2024-01-01 | Stop reason: HOSPADM

## 2024-01-01 RX ORDER — AMOXICILLIN 250 MG
1 CAPSULE ORAL 2 TIMES DAILY PRN
Status: DISCONTINUED | OUTPATIENT
Start: 2024-01-01 | End: 2024-01-01 | Stop reason: HOSPADM

## 2024-01-01 RX ORDER — NICOTINE POLACRILEX 4 MG
15-30 LOZENGE BUCCAL
Status: DISCONTINUED | OUTPATIENT
Start: 2024-01-01 | End: 2024-01-01 | Stop reason: HOSPADM

## 2024-01-01 RX ORDER — GABAPENTIN 600 MG/1
1200 TABLET ORAL ONCE
Qty: 2 TABLET | Refills: 0 | Status: COMPLETED | OUTPATIENT
Start: 2024-01-01 | End: 2024-01-01

## 2024-01-01 RX ORDER — HEPARIN SODIUM (PORCINE) LOCK FLUSH IV SOLN 100 UNIT/ML 100 UNIT/ML
5 SOLUTION INTRAVENOUS
Status: CANCELLED | OUTPATIENT
Start: 2024-01-01

## 2024-01-01 RX ORDER — TRAZODONE HYDROCHLORIDE 50 MG/1
50-100 TABLET, FILM COATED ORAL AT BEDTIME
Qty: 45 TABLET | Refills: 0 | Status: SHIPPED | OUTPATIENT
Start: 2024-01-01

## 2024-01-01 RX ORDER — ACETAMINOPHEN 325 MG/1
975 TABLET ORAL 3 TIMES DAILY PRN
Qty: 100 TABLET | Refills: 0 | Status: SHIPPED | OUTPATIENT
Start: 2024-01-01

## 2024-01-01 RX ORDER — NALTREXONE HYDROCHLORIDE 50 MG/1
50 TABLET, FILM COATED ORAL DAILY
Qty: 30 TABLET | Refills: 1 | Status: SHIPPED | OUTPATIENT
Start: 2024-01-01

## 2024-01-01 RX ORDER — FLUOXETINE 40 MG/1
40 CAPSULE ORAL DAILY
Status: ON HOLD | COMMUNITY
Start: 2023-04-04 | End: 2024-01-01

## 2024-01-01 RX ORDER — COLCHICINE 0.6 MG/1
0.6 TABLET ORAL DAILY
Qty: 30 TABLET | Refills: 0 | Status: SHIPPED | OUTPATIENT
Start: 2024-01-01

## 2024-01-01 RX ORDER — FUROSEMIDE 10 MG/ML
20 INJECTION INTRAMUSCULAR; INTRAVENOUS ONCE
Status: COMPLETED | OUTPATIENT
Start: 2024-01-01 | End: 2024-01-01

## 2024-01-01 RX ORDER — MULTIPLE VITAMINS W/ MINERALS TAB 9MG-400MCG
1 TAB ORAL DAILY
Status: DISCONTINUED | OUTPATIENT
Start: 2024-01-01 | End: 2024-01-01 | Stop reason: HOSPADM

## 2024-01-01 RX ORDER — COLCHICINE 0.6 MG/1
1.2 TABLET ORAL DAILY
Qty: 2 TABLET | Refills: 0 | Status: COMPLETED | OUTPATIENT
Start: 2024-01-01 | End: 2024-01-01

## 2024-01-01 RX ORDER — SIMVASTATIN 20 MG
20 TABLET ORAL AT BEDTIME
Status: DISCONTINUED | OUTPATIENT
Start: 2024-01-01 | End: 2024-01-01 | Stop reason: HOSPADM

## 2024-01-01 RX ORDER — LORATADINE 10 MG/1
10 TABLET ORAL DAILY PRN
COMMUNITY
End: 2024-01-01

## 2024-01-01 RX ORDER — CITALOPRAM HYDROBROMIDE 20 MG/1
20 TABLET ORAL DAILY
Status: DISCONTINUED | OUTPATIENT
Start: 2024-01-01 | End: 2024-01-01

## 2024-01-01 RX ORDER — HYDROXYZINE HYDROCHLORIDE 25 MG/1
25 TABLET, FILM COATED ORAL 3 TIMES DAILY PRN
COMMUNITY
Start: 2023-04-04

## 2024-01-01 RX ORDER — MAGNESIUM OXIDE 400 MG/1
400 TABLET ORAL DAILY
Qty: 30 TABLET | Refills: 0 | Status: SHIPPED | OUTPATIENT
Start: 2024-01-01

## 2024-01-01 RX ORDER — AMLODIPINE BESYLATE 5 MG/1
5 TABLET ORAL DAILY
Status: DISCONTINUED | OUTPATIENT
Start: 2024-01-01 | End: 2024-01-01

## 2024-01-01 RX ORDER — AMLODIPINE BESYLATE 5 MG/1
10 TABLET ORAL DAILY
Status: DISCONTINUED | OUTPATIENT
Start: 2024-01-01 | End: 2024-01-01 | Stop reason: HOSPADM

## 2024-01-01 RX ORDER — DIAZEPAM 5 MG
10 TABLET ORAL EVERY 30 MIN PRN
Status: DISCONTINUED | OUTPATIENT
Start: 2024-01-01 | End: 2024-01-01

## 2024-01-01 RX ADMIN — Medication 1 TABLET: at 08:13

## 2024-01-01 RX ADMIN — COLCHICINE 0.6 MG: 0.6 TABLET ORAL at 08:34

## 2024-01-01 RX ADMIN — ACETAMINOPHEN 975 MG: 325 TABLET, FILM COATED ORAL at 08:12

## 2024-01-01 RX ADMIN — GABAPENTIN 600 MG: 300 CAPSULE ORAL at 21:09

## 2024-01-01 RX ADMIN — GABAPENTIN 600 MG: 300 CAPSULE ORAL at 20:25

## 2024-01-01 RX ADMIN — POTASSIUM & SODIUM PHOSPHATES POWDER PACK 280-160-250 MG 2 PACKET: 280-160-250 PACK at 22:07

## 2024-01-01 RX ADMIN — ACETAMINOPHEN 650 MG: 325 TABLET, FILM COATED ORAL at 06:10

## 2024-01-01 RX ADMIN — GABAPENTIN 600 MG: 300 CAPSULE ORAL at 09:36

## 2024-01-01 RX ADMIN — FOLIC ACID 1 MG: 1 TABLET ORAL at 10:16

## 2024-01-01 RX ADMIN — AMLODIPINE BESYLATE 10 MG: 5 TABLET ORAL at 16:48

## 2024-01-01 RX ADMIN — SENNOSIDES AND DOCUSATE SODIUM 1 TABLET: 50; 8.6 TABLET ORAL at 22:21

## 2024-01-01 RX ADMIN — PANTOPRAZOLE SODIUM 40 MG: 40 TABLET, DELAYED RELEASE ORAL at 16:43

## 2024-01-01 RX ADMIN — COLCHICINE 0.6 MG: 0.6 TABLET ORAL at 08:51

## 2024-01-01 RX ADMIN — DOCUSATE SODIUM 50 MG AND SENNOSIDES 8.6 MG 2 TABLET: 8.6; 5 TABLET, FILM COATED ORAL at 09:23

## 2024-01-01 RX ADMIN — FERROUS SULFATE TAB 325 MG (65 MG ELEMENTAL FE) 325 MG: 325 (65 FE) TAB at 08:34

## 2024-01-01 RX ADMIN — Medication 1 TABLET: at 08:51

## 2024-01-01 RX ADMIN — ACETAMINOPHEN 650 MG: 325 TABLET, FILM COATED ORAL at 01:08

## 2024-01-01 RX ADMIN — GABAPENTIN 300 MG: 300 CAPSULE ORAL at 22:21

## 2024-01-01 RX ADMIN — FLUOXETINE 20 MG: 20 CAPSULE ORAL at 08:11

## 2024-01-01 RX ADMIN — GABAPENTIN 600 MG: 300 CAPSULE ORAL at 16:26

## 2024-01-01 RX ADMIN — FERROUS SULFATE TAB 325 MG (65 MG ELEMENTAL FE) 325 MG: 325 (65 FE) TAB at 08:51

## 2024-01-01 RX ADMIN — THIAMINE HCL TAB 100 MG 100 MG: 100 TAB at 09:01

## 2024-01-01 RX ADMIN — COLCHICINE 0.6 MG: 0.6 TABLET ORAL at 09:23

## 2024-01-01 RX ADMIN — Medication 1 TABLET: at 08:26

## 2024-01-01 RX ADMIN — Medication 1 TABLET: at 09:23

## 2024-01-01 RX ADMIN — CLONIDINE HYDROCHLORIDE 0.1 MG: 0.1 TABLET ORAL at 00:15

## 2024-01-01 RX ADMIN — ACETAMINOPHEN 650 MG: 325 TABLET, FILM COATED ORAL at 23:44

## 2024-01-01 RX ADMIN — AMLODIPINE BESYLATE 10 MG: 5 TABLET ORAL at 09:27

## 2024-01-01 RX ADMIN — AMLODIPINE BESYLATE 10 MG: 10 TABLET ORAL at 08:13

## 2024-01-01 RX ADMIN — ACETAMINOPHEN 650 MG: 325 TABLET, FILM COATED ORAL at 01:01

## 2024-01-01 RX ADMIN — GABAPENTIN 600 MG: 300 CAPSULE ORAL at 08:46

## 2024-01-01 RX ADMIN — PANTOPRAZOLE SODIUM 40 MG: 40 TABLET, DELAYED RELEASE ORAL at 08:27

## 2024-01-01 RX ADMIN — THIAMINE HCL TAB 100 MG 100 MG: 100 TAB at 08:26

## 2024-01-01 RX ADMIN — AMLODIPINE BESYLATE 10 MG: 5 TABLET ORAL at 09:26

## 2024-01-01 RX ADMIN — GABAPENTIN 600 MG: 300 CAPSULE ORAL at 21:36

## 2024-01-01 RX ADMIN — THIAMINE HCL TAB 100 MG 100 MG: 100 TAB at 09:36

## 2024-01-01 RX ADMIN — POTASSIUM & SODIUM PHOSPHATES POWDER PACK 280-160-250 MG 1 PACKET: 280-160-250 PACK at 15:04

## 2024-01-01 RX ADMIN — GABAPENTIN 600 MG: 300 CAPSULE ORAL at 15:33

## 2024-01-01 RX ADMIN — SENNOSIDES AND DOCUSATE SODIUM 1 TABLET: 50; 8.6 TABLET ORAL at 21:07

## 2024-01-01 RX ADMIN — FOLIC ACID 1 MG: 1 TABLET ORAL at 08:12

## 2024-01-01 RX ADMIN — Medication 1 TABLET: at 09:01

## 2024-01-01 RX ADMIN — FOLIC ACID 1 MG: 1 TABLET ORAL at 08:13

## 2024-01-01 RX ADMIN — SODIUM CHLORIDE 1000 ML: 9 INJECTION, SOLUTION INTRAVENOUS at 12:07

## 2024-01-01 RX ADMIN — PANTOPRAZOLE SODIUM 40 MG: 40 TABLET, DELAYED RELEASE ORAL at 17:28

## 2024-01-01 RX ADMIN — PANTOPRAZOLE SODIUM 40 MG: 40 TABLET, DELAYED RELEASE ORAL at 16:25

## 2024-01-01 RX ADMIN — SODIUM CHLORIDE 1000 ML: 9 INJECTION, SOLUTION INTRAVENOUS at 12:55

## 2024-01-01 RX ADMIN — FERROUS SULFATE TAB 325 MG (65 MG ELEMENTAL FE) 325 MG: 325 (65 FE) TAB at 09:37

## 2024-01-01 RX ADMIN — FLUOXETINE HYDROCHLORIDE 40 MG: 20 CAPSULE ORAL at 08:26

## 2024-01-01 RX ADMIN — Medication 1 TABLET: at 13:26

## 2024-01-01 RX ADMIN — GABAPENTIN 600 MG: 300 CAPSULE ORAL at 23:30

## 2024-01-01 RX ADMIN — LORAZEPAM 2 MG: 2 INJECTION INTRAMUSCULAR; INTRAVENOUS at 18:37

## 2024-01-01 RX ADMIN — GADOBUTROL 7 ML: 604.72 INJECTION INTRAVENOUS at 18:35

## 2024-01-01 RX ADMIN — PANTOPRAZOLE SODIUM 40 MG: 40 TABLET, DELAYED RELEASE ORAL at 15:39

## 2024-01-01 RX ADMIN — THIAMINE HCL TAB 100 MG 100 MG: 100 TAB at 10:16

## 2024-01-01 RX ADMIN — OXYCODONE HYDROCHLORIDE 5 MG: 5 TABLET ORAL at 21:40

## 2024-01-01 RX ADMIN — SODIUM PHOSPHATE, MONOBASIC, MONOHYDRATE AND SODIUM PHOSPHATE, DIBASIC, ANHYDROUS 15 MMOL: 142; 276 INJECTION, SOLUTION INTRAVENOUS at 15:08

## 2024-01-01 RX ADMIN — THIAMINE HCL TAB 100 MG 100 MG: 100 TAB at 08:34

## 2024-01-01 RX ADMIN — MAGNESIUM SULFATE IN WATER 4 G: 40 INJECTION, SOLUTION INTRAVENOUS at 10:47

## 2024-01-01 RX ADMIN — DEXTROSE AND SODIUM CHLORIDE: 5; 900 INJECTION, SOLUTION INTRAVENOUS at 15:50

## 2024-01-01 RX ADMIN — PANTOPRAZOLE SODIUM 40 MG: 40 TABLET, DELAYED RELEASE ORAL at 08:35

## 2024-01-01 RX ADMIN — OXYCODONE HYDROCHLORIDE 5 MG: 5 TABLET ORAL at 16:10

## 2024-01-01 RX ADMIN — ALLOPURINOL 100 MG: 100 TABLET ORAL at 08:52

## 2024-01-01 RX ADMIN — LISINOPRIL 20 MG: 20 TABLET ORAL at 08:47

## 2024-01-01 RX ADMIN — THIAMINE HYDROCHLORIDE 250 MG: 100 INJECTION, SOLUTION INTRAMUSCULAR; INTRAVENOUS at 08:26

## 2024-01-01 RX ADMIN — LORAZEPAM 1 MG: 1 TABLET ORAL at 17:24

## 2024-01-01 RX ADMIN — SENNOSIDES AND DOCUSATE SODIUM 1 TABLET: 50; 8.6 TABLET ORAL at 08:12

## 2024-01-01 RX ADMIN — ACETAMINOPHEN 650 MG: 325 TABLET, FILM COATED ORAL at 21:33

## 2024-01-01 RX ADMIN — GABAPENTIN 600 MG: 300 CAPSULE ORAL at 16:00

## 2024-01-01 RX ADMIN — ACETAMINOPHEN 650 MG: 325 TABLET, FILM COATED ORAL at 11:02

## 2024-01-01 RX ADMIN — SODIUM PHOSPHATE, MONOBASIC, MONOHYDRATE AND SODIUM PHOSPHATE, DIBASIC, ANHYDROUS 15 MMOL: 142; 276 INJECTION, SOLUTION INTRAVENOUS at 13:32

## 2024-01-01 RX ADMIN — GABAPENTIN 600 MG: 300 CAPSULE ORAL at 10:16

## 2024-01-01 RX ADMIN — LISINOPRIL 20 MG: 20 TABLET ORAL at 09:36

## 2024-01-01 RX ADMIN — SODIUM PHOSPHATE, MONOBASIC, MONOHYDRATE AND SODIUM PHOSPHATE, DIBASIC, ANHYDROUS 15 MMOL: 142; 276 INJECTION, SOLUTION INTRAVENOUS at 12:52

## 2024-01-01 RX ADMIN — AMLODIPINE BESYLATE 10 MG: 10 TABLET ORAL at 08:26

## 2024-01-01 RX ADMIN — GABAPENTIN 900 MG: 300 CAPSULE ORAL at 08:25

## 2024-01-01 RX ADMIN — DEXTROSE AND SODIUM CHLORIDE: 5; 900 INJECTION, SOLUTION INTRAVENOUS at 10:01

## 2024-01-01 RX ADMIN — COLCHICINE 0.6 MG: 0.6 TABLET ORAL at 14:14

## 2024-01-01 RX ADMIN — FOLIC ACID 1 MG: 1 TABLET ORAL at 09:03

## 2024-01-01 RX ADMIN — THIAMINE HYDROCHLORIDE 250 MG: 100 INJECTION, SOLUTION INTRAMUSCULAR; INTRAVENOUS at 11:07

## 2024-01-01 RX ADMIN — LISINOPRIL 20 MG: 20 TABLET ORAL at 08:12

## 2024-01-01 RX ADMIN — FLUOXETINE 40 MG: 20 CAPSULE ORAL at 09:26

## 2024-01-01 RX ADMIN — PANTOPRAZOLE SODIUM 40 MG: 40 TABLET, DELAYED RELEASE ORAL at 15:43

## 2024-01-01 RX ADMIN — PANTOPRAZOLE SODIUM 40 MG: 40 TABLET, DELAYED RELEASE ORAL at 09:25

## 2024-01-01 RX ADMIN — COLCHICINE 0.6 MG: 0.6 TABLET ORAL at 20:49

## 2024-01-01 RX ADMIN — PREDNISONE 20 MG: 20 TABLET ORAL at 11:01

## 2024-01-01 RX ADMIN — Medication 400 MG: at 10:16

## 2024-01-01 RX ADMIN — ALLOPURINOL 100 MG: 100 TABLET ORAL at 09:23

## 2024-01-01 RX ADMIN — AMLODIPINE BESYLATE 10 MG: 10 TABLET ORAL at 09:23

## 2024-01-01 RX ADMIN — ALLOPURINOL 100 MG: 100 TABLET ORAL at 10:16

## 2024-01-01 RX ADMIN — PANTOPRAZOLE SODIUM 40 MG: 40 TABLET, DELAYED RELEASE ORAL at 09:36

## 2024-01-01 RX ADMIN — GABAPENTIN 600 MG: 300 CAPSULE ORAL at 08:41

## 2024-01-01 RX ADMIN — GABAPENTIN 600 MG: 300 CAPSULE ORAL at 14:37

## 2024-01-01 RX ADMIN — DIAZEPAM 10 MG: 10 INJECTION, SOLUTION INTRAMUSCULAR; INTRAVENOUS at 03:27

## 2024-01-01 RX ADMIN — MELATONIN 5 MG TABLET 5 MG: at 01:01

## 2024-01-01 RX ADMIN — GABAPENTIN 600 MG: 300 CAPSULE ORAL at 08:13

## 2024-01-01 RX ADMIN — THIAMINE HCL TAB 100 MG 100 MG: 100 TAB at 08:13

## 2024-01-01 RX ADMIN — ACETAMINOPHEN 650 MG: 325 TABLET, FILM COATED ORAL at 12:52

## 2024-01-01 RX ADMIN — SODIUM ZIRCONIUM CYCLOSILICATE 10 G: 5 POWDER, FOR SUSPENSION ORAL at 23:44

## 2024-01-01 RX ADMIN — PANTOPRAZOLE SODIUM 40 MG: 40 TABLET, DELAYED RELEASE ORAL at 08:42

## 2024-01-01 RX ADMIN — DIAZEPAM 10 MG: 10 INJECTION, SOLUTION INTRAMUSCULAR; INTRAVENOUS at 22:17

## 2024-01-01 RX ADMIN — FERROUS SULFATE TAB 325 MG (65 MG ELEMENTAL FE) 325 MG: 325 (65 FE) TAB at 08:47

## 2024-01-01 RX ADMIN — SENNOSIDES AND DOCUSATE SODIUM 1 TABLET: 50; 8.6 TABLET ORAL at 21:18

## 2024-01-01 RX ADMIN — ACETAMINOPHEN 650 MG: 325 TABLET, FILM COATED ORAL at 08:20

## 2024-01-01 RX ADMIN — SODIUM CHLORIDE, POTASSIUM CHLORIDE, SODIUM LACTATE AND CALCIUM CHLORIDE: 600; 310; 30; 20 INJECTION, SOLUTION INTRAVENOUS at 16:47

## 2024-01-01 RX ADMIN — ACETAMINOPHEN 650 MG: 325 TABLET, FILM COATED ORAL at 21:36

## 2024-01-01 RX ADMIN — SODIUM CHLORIDE 8.4 UNITS: 9 INJECTION, SOLUTION INTRAVENOUS at 13:09

## 2024-01-01 RX ADMIN — GABAPENTIN 600 MG: 300 CAPSULE ORAL at 15:58

## 2024-01-01 RX ADMIN — POTASSIUM CHLORIDE 40 MEQ: 1500 TABLET, EXTENDED RELEASE ORAL at 09:14

## 2024-01-01 RX ADMIN — FOLIC ACID 1 MG: 1 TABLET ORAL at 08:53

## 2024-01-01 RX ADMIN — GABAPENTIN 600 MG: 300 CAPSULE ORAL at 21:18

## 2024-01-01 RX ADMIN — MAGNESIUM SULFATE IN WATER 4 G: 40 INJECTION, SOLUTION INTRAVENOUS at 11:54

## 2024-01-01 RX ADMIN — SODIUM CHLORIDE, POTASSIUM CHLORIDE, SODIUM LACTATE AND CALCIUM CHLORIDE: 600; 310; 30; 20 INJECTION, SOLUTION INTRAVENOUS at 03:31

## 2024-01-01 RX ADMIN — LISINOPRIL 20 MG: 20 TABLET ORAL at 10:16

## 2024-01-01 RX ADMIN — THIAMINE HCL TAB 100 MG 100 MG: 100 TAB at 08:42

## 2024-01-01 RX ADMIN — ACETAMINOPHEN 650 MG: 325 TABLET, FILM COATED ORAL at 06:02

## 2024-01-01 RX ADMIN — CALCIUM GLUCONATE 1 G: 98 INJECTION, SOLUTION INTRAVENOUS at 14:17

## 2024-01-01 RX ADMIN — Medication 1 TABLET: at 08:12

## 2024-01-01 RX ADMIN — Medication 400 MG: at 08:26

## 2024-01-01 RX ADMIN — FERROUS SULFATE TAB 325 MG (65 MG ELEMENTAL FE) 325 MG: 325 (65 FE) TAB at 08:42

## 2024-01-01 RX ADMIN — FOLIC ACID 1 MG: 1 TABLET ORAL at 08:47

## 2024-01-01 RX ADMIN — CLONIDINE HYDROCHLORIDE 0.1 MG: 0.1 TABLET ORAL at 16:49

## 2024-01-01 RX ADMIN — GABAPENTIN 600 MG: 300 CAPSULE ORAL at 21:08

## 2024-01-01 RX ADMIN — PANTOPRAZOLE SODIUM 40 MG: 40 TABLET, DELAYED RELEASE ORAL at 15:56

## 2024-01-01 RX ADMIN — THIAMINE HYDROCHLORIDE 500 MG: 100 INJECTION, SOLUTION INTRAMUSCULAR; INTRAVENOUS at 09:15

## 2024-01-01 RX ADMIN — PANTOPRAZOLE SODIUM 40 MG: 40 TABLET, DELAYED RELEASE ORAL at 15:33

## 2024-01-01 RX ADMIN — COLCHICINE 0.6 MG: 0.6 TABLET ORAL at 08:11

## 2024-01-01 RX ADMIN — COLCHICINE 0.6 MG: 0.6 TABLET ORAL at 20:52

## 2024-01-01 RX ADMIN — PANTOPRAZOLE SODIUM 40 MG: 40 TABLET, DELAYED RELEASE ORAL at 17:29

## 2024-01-01 RX ADMIN — IOPAMIDOL 75 ML: 755 INJECTION, SOLUTION INTRAVENOUS at 13:54

## 2024-01-01 RX ADMIN — ACETAMINOPHEN 650 MG: 325 TABLET, FILM COATED ORAL at 22:21

## 2024-01-01 RX ADMIN — COLCHICINE 1.2 MG: 0.6 TABLET, FILM COATED ORAL at 11:20

## 2024-01-01 RX ADMIN — FERROUS SULFATE TAB 325 MG (65 MG ELEMENTAL FE) 325 MG: 325 (65 FE) TAB at 09:01

## 2024-01-01 RX ADMIN — PANTOPRAZOLE SODIUM 40 MG: 40 TABLET, DELAYED RELEASE ORAL at 09:33

## 2024-01-01 RX ADMIN — GABAPENTIN 600 MG: 300 CAPSULE ORAL at 09:23

## 2024-01-01 RX ADMIN — AMLODIPINE BESYLATE 10 MG: 10 TABLET ORAL at 08:46

## 2024-01-01 RX ADMIN — DEXTROSE MONOHYDRATE 300 ML: 100 INJECTION, SOLUTION INTRAVENOUS at 13:02

## 2024-01-01 RX ADMIN — THIAMINE HCL TAB 100 MG 100 MG: 100 TAB at 08:47

## 2024-01-01 RX ADMIN — SODIUM CHLORIDE 1000 ML: 9 INJECTION, SOLUTION INTRAVENOUS at 10:55

## 2024-01-01 RX ADMIN — CLONIDINE HYDROCHLORIDE 0.1 MG: 0.1 TABLET ORAL at 15:50

## 2024-01-01 RX ADMIN — Medication 1 TABLET: at 08:35

## 2024-01-01 RX ADMIN — ACETAMINOPHEN 650 MG: 325 TABLET, FILM COATED ORAL at 22:06

## 2024-01-01 RX ADMIN — FERROUS SULFATE TAB 325 MG (65 MG ELEMENTAL FE) 325 MG: 325 (65 FE) TAB at 09:33

## 2024-01-01 RX ADMIN — GABAPENTIN 600 MG: 300 CAPSULE ORAL at 07:08

## 2024-01-01 RX ADMIN — Medication 400 MG: at 09:01

## 2024-01-01 RX ADMIN — FLUOXETINE 20 MG: 20 CAPSULE ORAL at 11:01

## 2024-01-01 RX ADMIN — PANTOPRAZOLE SODIUM 40 MG: 40 TABLET, DELAYED RELEASE ORAL at 07:09

## 2024-01-01 RX ADMIN — FERROUS SULFATE TAB 325 MG (65 MG ELEMENTAL FE) 325 MG: 325 (65 FE) TAB at 08:13

## 2024-01-01 RX ADMIN — GABAPENTIN 600 MG: 300 CAPSULE ORAL at 21:17

## 2024-01-01 RX ADMIN — COLCHICINE 0.6 MG: 0.6 TABLET ORAL at 08:13

## 2024-01-01 RX ADMIN — FOLIC ACID 1 MG: 1 TABLET ORAL at 07:09

## 2024-01-01 RX ADMIN — GABAPENTIN 600 MG: 300 CAPSULE ORAL at 08:26

## 2024-01-01 RX ADMIN — ACETAMINOPHEN 650 MG: 325 TABLET, FILM COATED ORAL at 14:37

## 2024-01-01 RX ADMIN — AMLODIPINE BESYLATE 10 MG: 10 TABLET ORAL at 09:33

## 2024-01-01 RX ADMIN — ONDANSETRON 4 MG: 2 INJECTION INTRAMUSCULAR; INTRAVENOUS at 11:39

## 2024-01-01 RX ADMIN — GABAPENTIN 600 MG: 300 CAPSULE ORAL at 22:29

## 2024-01-01 RX ADMIN — AMLODIPINE BESYLATE 10 MG: 10 TABLET ORAL at 09:36

## 2024-01-01 RX ADMIN — Medication 1 TABLET: at 08:53

## 2024-01-01 RX ADMIN — SENNOSIDES AND DOCUSATE SODIUM 1 TABLET: 50; 8.6 TABLET ORAL at 09:03

## 2024-01-01 RX ADMIN — GABAPENTIN 600 MG: 300 CAPSULE ORAL at 09:26

## 2024-01-01 RX ADMIN — THIAMINE HYDROCHLORIDE 250 MG: 100 INJECTION, SOLUTION INTRAMUSCULAR; INTRAVENOUS at 07:11

## 2024-01-01 RX ADMIN — DEXTROSE AND SODIUM CHLORIDE: 5; 900 INJECTION, SOLUTION INTRAVENOUS at 08:32

## 2024-01-01 RX ADMIN — GABAPENTIN 600 MG: 300 CAPSULE ORAL at 21:40

## 2024-01-01 RX ADMIN — SODIUM ZIRCONIUM CYCLOSILICATE 10 G: 5 POWDER, FOR SUSPENSION ORAL at 13:54

## 2024-01-01 RX ADMIN — Medication 1 TABLET: at 09:26

## 2024-01-01 RX ADMIN — SENNOSIDES AND DOCUSATE SODIUM 2 TABLET: 50; 8.6 TABLET ORAL at 08:26

## 2024-01-01 RX ADMIN — SENNOSIDES AND DOCUSATE SODIUM 1 TABLET: 50; 8.6 TABLET ORAL at 08:13

## 2024-01-01 RX ADMIN — ACETAMINOPHEN 650 MG: 325 TABLET, FILM COATED ORAL at 16:50

## 2024-01-01 RX ADMIN — THIAMINE HYDROCHLORIDE 500 MG: 100 INJECTION, SOLUTION INTRAMUSCULAR; INTRAVENOUS at 20:46

## 2024-01-01 RX ADMIN — SODIUM CHLORIDE 1000 ML: 9 INJECTION, SOLUTION INTRAVENOUS at 09:37

## 2024-01-01 RX ADMIN — PANTOPRAZOLE SODIUM 40 MG: 40 TABLET, DELAYED RELEASE ORAL at 09:03

## 2024-01-01 RX ADMIN — NALTREXONE HYDROCHLORIDE 50 MG: 50 TABLET, FILM COATED ORAL at 20:26

## 2024-01-01 RX ADMIN — SODIUM CHLORIDE 500 ML: 9 INJECTION, SOLUTION INTRAVENOUS at 04:47

## 2024-01-01 RX ADMIN — PANTOPRAZOLE SODIUM 40 MG: 40 TABLET, DELAYED RELEASE ORAL at 09:01

## 2024-01-01 RX ADMIN — PANTOPRAZOLE SODIUM 40 MG: 40 TABLET, DELAYED RELEASE ORAL at 08:13

## 2024-01-01 RX ADMIN — FOLIC ACID 1 MG: 1 TABLET ORAL at 09:01

## 2024-01-01 RX ADMIN — ACETAMINOPHEN 975 MG: 325 TABLET, FILM COATED ORAL at 21:17

## 2024-01-01 RX ADMIN — SIMVASTATIN 20 MG: 20 TABLET, FILM COATED ORAL at 22:31

## 2024-01-01 RX ADMIN — POTASSIUM & SODIUM PHOSPHATES POWDER PACK 280-160-250 MG 2 PACKET: 280-160-250 PACK at 17:13

## 2024-01-01 RX ADMIN — GABAPENTIN 600 MG: 300 CAPSULE ORAL at 14:12

## 2024-01-01 RX ADMIN — GABAPENTIN 300 MG: 300 CAPSULE ORAL at 13:08

## 2024-01-01 RX ADMIN — ACETAMINOPHEN 975 MG: 325 TABLET, FILM COATED ORAL at 16:26

## 2024-01-01 RX ADMIN — GABAPENTIN 300 MG: 300 CAPSULE ORAL at 06:02

## 2024-01-01 RX ADMIN — DIAZEPAM 10 MG: 5 TABLET ORAL at 01:00

## 2024-01-01 RX ADMIN — THIAMINE HYDROCHLORIDE 500 MG: 100 INJECTION, SOLUTION INTRAMUSCULAR; INTRAVENOUS at 20:14

## 2024-01-01 RX ADMIN — GABAPENTIN 600 MG: 300 CAPSULE ORAL at 16:31

## 2024-01-01 RX ADMIN — FOLIC ACID 1 MG: 1 TABLET ORAL at 13:26

## 2024-01-01 RX ADMIN — POTASSIUM & SODIUM PHOSPHATES POWDER PACK 280-160-250 MG 1 PACKET: 280-160-250 PACK at 15:50

## 2024-01-01 RX ADMIN — DOCUSATE SODIUM 50 MG AND SENNOSIDES 8.6 MG 2 TABLET: 8.6; 5 TABLET, FILM COATED ORAL at 20:25

## 2024-01-01 RX ADMIN — OXYCODONE HYDROCHLORIDE 5 MG: 5 TABLET ORAL at 01:09

## 2024-01-01 RX ADMIN — GABAPENTIN 900 MG: 300 CAPSULE ORAL at 14:04

## 2024-01-01 RX ADMIN — AMLODIPINE BESYLATE 5 MG: 5 TABLET ORAL at 15:04

## 2024-01-01 RX ADMIN — PANTOPRAZOLE SODIUM 40 MG: 40 TABLET, DELAYED RELEASE ORAL at 17:24

## 2024-01-01 RX ADMIN — GABAPENTIN 600 MG: 300 CAPSULE ORAL at 16:24

## 2024-01-01 RX ADMIN — NALTREXONE HYDROCHLORIDE 50 MG: 50 TABLET, FILM COATED ORAL at 20:11

## 2024-01-01 RX ADMIN — THIAMINE HCL TAB 100 MG 100 MG: 100 TAB at 15:04

## 2024-01-01 RX ADMIN — SENNOSIDES AND DOCUSATE SODIUM 1 TABLET: 50; 8.6 TABLET ORAL at 08:47

## 2024-01-01 RX ADMIN — ALLOPURINOL 100 MG: 100 TABLET ORAL at 09:39

## 2024-01-01 RX ADMIN — AMLODIPINE BESYLATE 10 MG: 10 TABLET ORAL at 08:11

## 2024-01-01 RX ADMIN — ACETAMINOPHEN 650 MG: 325 TABLET, FILM COATED ORAL at 08:13

## 2024-01-01 RX ADMIN — SENNOSIDES AND DOCUSATE SODIUM 1 TABLET: 50; 8.6 TABLET ORAL at 22:49

## 2024-01-01 RX ADMIN — POTASSIUM CHLORIDE 40 MEQ: 1500 TABLET, EXTENDED RELEASE ORAL at 03:18

## 2024-01-01 RX ADMIN — ALLOPURINOL 100 MG: 100 TABLET ORAL at 09:01

## 2024-01-01 RX ADMIN — PANTOPRAZOLE SODIUM 40 MG: 40 TABLET, DELAYED RELEASE ORAL at 08:54

## 2024-01-01 RX ADMIN — GABAPENTIN 600 MG: 300 CAPSULE ORAL at 08:34

## 2024-01-01 RX ADMIN — FERROUS SULFATE TAB 325 MG (65 MG ELEMENTAL FE) 325 MG: 325 (65 FE) TAB at 16:50

## 2024-01-01 RX ADMIN — Medication 1 TABLET: at 09:03

## 2024-01-01 RX ADMIN — SODIUM CHLORIDE 62 ML: 9 INJECTION, SOLUTION INTRAVENOUS at 13:54

## 2024-01-01 RX ADMIN — OXYCODONE HYDROCHLORIDE 5 MG: 5 TABLET ORAL at 22:35

## 2024-01-01 RX ADMIN — MAGNESIUM SULFATE IN WATER 4 G: 40 INJECTION, SOLUTION INTRAVENOUS at 10:31

## 2024-01-01 RX ADMIN — ALLOPURINOL 100 MG: 100 TABLET ORAL at 08:13

## 2024-01-01 RX ADMIN — THIAMINE HYDROCHLORIDE: 100 INJECTION, SOLUTION INTRAMUSCULAR; INTRAVENOUS at 13:10

## 2024-01-01 RX ADMIN — SIMVASTATIN 20 MG: 20 TABLET, FILM COATED ORAL at 22:08

## 2024-01-01 RX ADMIN — GABAPENTIN 900 MG: 300 CAPSULE ORAL at 06:42

## 2024-01-01 RX ADMIN — Medication 1 TABLET: at 09:37

## 2024-01-01 RX ADMIN — FLUOXETINE 40 MG: 20 CAPSULE ORAL at 08:42

## 2024-01-01 RX ADMIN — PANTOPRAZOLE SODIUM 40 MG: 40 TABLET, DELAYED RELEASE ORAL at 16:50

## 2024-01-01 RX ADMIN — OXYCODONE HYDROCHLORIDE 5 MG: 5 TABLET ORAL at 16:26

## 2024-01-01 RX ADMIN — THIAMINE HYDROCHLORIDE 500 MG: 100 INJECTION, SOLUTION INTRAMUSCULAR; INTRAVENOUS at 14:04

## 2024-01-01 RX ADMIN — PANTOPRAZOLE SODIUM 40 MG: 40 TABLET, DELAYED RELEASE ORAL at 16:38

## 2024-01-01 RX ADMIN — FOLIC ACID 1 MG: 1 TABLET ORAL at 08:51

## 2024-01-01 RX ADMIN — DIAZEPAM 5 MG: 10 INJECTION, SOLUTION INTRAMUSCULAR; INTRAVENOUS at 11:04

## 2024-01-01 RX ADMIN — POTASSIUM & SODIUM PHOSPHATES POWDER PACK 280-160-250 MG 1 PACKET: 280-160-250 PACK at 18:07

## 2024-01-01 RX ADMIN — Medication 1 TABLET: at 09:24

## 2024-01-01 RX ADMIN — FOLIC ACID 1 MG: 1 TABLET ORAL at 09:33

## 2024-01-01 RX ADMIN — FOLIC ACID 1 MG: 1 TABLET ORAL at 08:35

## 2024-01-01 RX ADMIN — GABAPENTIN 900 MG: 300 CAPSULE ORAL at 15:04

## 2024-01-01 RX ADMIN — LORAZEPAM 2 MG: 2 INJECTION INTRAMUSCULAR; INTRAVENOUS at 18:42

## 2024-01-01 RX ADMIN — GABAPENTIN 600 MG: 300 CAPSULE ORAL at 14:39

## 2024-01-01 RX ADMIN — ACETAMINOPHEN 975 MG: 325 TABLET, FILM COATED ORAL at 21:18

## 2024-01-01 RX ADMIN — PREDNISONE 20 MG: 20 TABLET ORAL at 18:13

## 2024-01-01 RX ADMIN — FERROUS SULFATE TAB 325 MG (65 MG ELEMENTAL FE) 325 MG: 325 (65 FE) TAB at 09:24

## 2024-01-01 RX ADMIN — THIAMINE HCL TAB 100 MG 100 MG: 100 TAB at 08:54

## 2024-01-01 RX ADMIN — COLCHICINE 0.6 MG: 0.6 TABLET, FILM COATED ORAL at 12:48

## 2024-01-01 RX ADMIN — POTASSIUM & SODIUM PHOSPHATES POWDER PACK 280-160-250 MG 2 PACKET: 280-160-250 PACK at 14:13

## 2024-01-01 RX ADMIN — AMLODIPINE BESYLATE 10 MG: 5 TABLET ORAL at 08:42

## 2024-01-01 RX ADMIN — DIAZEPAM 5 MG: 10 INJECTION, SOLUTION INTRAMUSCULAR; INTRAVENOUS at 20:14

## 2024-01-01 RX ADMIN — ALLOPURINOL 100 MG: 100 TABLET ORAL at 11:20

## 2024-01-01 RX ADMIN — GABAPENTIN 600 MG: 300 CAPSULE ORAL at 16:49

## 2024-01-01 RX ADMIN — GABAPENTIN 300 MG: 300 CAPSULE ORAL at 05:59

## 2024-01-01 RX ADMIN — POTASSIUM & SODIUM PHOSPHATES POWDER PACK 280-160-250 MG 1 PACKET: 280-160-250 PACK at 22:16

## 2024-01-01 RX ADMIN — THIAMINE HCL TAB 100 MG 100 MG: 100 TAB at 08:12

## 2024-01-01 RX ADMIN — LORAZEPAM 1 MG: 2 INJECTION INTRAMUSCULAR; INTRAVENOUS at 11:56

## 2024-01-01 RX ADMIN — ACETAMINOPHEN 650 MG: 325 TABLET, FILM COATED ORAL at 16:54

## 2024-01-01 RX ADMIN — IRON SUCROSE 300 MG: 20 INJECTION, SOLUTION INTRAVENOUS at 11:11

## 2024-01-01 RX ADMIN — FERROUS SULFATE TAB 325 MG (65 MG ELEMENTAL FE) 325 MG: 325 (65 FE) TAB at 09:26

## 2024-01-01 RX ADMIN — PANTOPRAZOLE SODIUM 40 MG: 40 TABLET, DELAYED RELEASE ORAL at 09:26

## 2024-01-01 RX ADMIN — PANTOPRAZOLE SODIUM 40 MG: 40 TABLET, DELAYED RELEASE ORAL at 09:15

## 2024-01-01 RX ADMIN — SENNOSIDES AND DOCUSATE SODIUM 1 TABLET: 50; 8.6 TABLET ORAL at 20:15

## 2024-01-01 RX ADMIN — THIAMINE HCL TAB 100 MG 100 MG: 100 TAB at 09:23

## 2024-01-01 RX ADMIN — GABAPENTIN 600 MG: 300 CAPSULE ORAL at 22:16

## 2024-01-01 RX ADMIN — GABAPENTIN 600 MG: 300 CAPSULE ORAL at 15:56

## 2024-01-01 RX ADMIN — DIAZEPAM 5 MG: 10 INJECTION, SOLUTION INTRAMUSCULAR; INTRAVENOUS at 19:32

## 2024-01-01 RX ADMIN — GABAPENTIN 600 MG: 300 CAPSULE ORAL at 16:38

## 2024-01-01 RX ADMIN — Medication 400 MG: at 08:47

## 2024-01-01 RX ADMIN — LISINOPRIL 20 MG: 20 TABLET ORAL at 09:23

## 2024-01-01 RX ADMIN — PANTOPRAZOLE SODIUM 40 MG: 40 TABLET, DELAYED RELEASE ORAL at 16:00

## 2024-01-01 RX ADMIN — GABAPENTIN 600 MG: 300 CAPSULE ORAL at 15:45

## 2024-01-01 RX ADMIN — GABAPENTIN 600 MG: 300 CAPSULE ORAL at 20:10

## 2024-01-01 RX ADMIN — AMLODIPINE BESYLATE 10 MG: 10 TABLET ORAL at 08:51

## 2024-01-01 RX ADMIN — MELATONIN 5 MG TABLET 5 MG: at 00:05

## 2024-01-01 RX ADMIN — PANTOPRAZOLE SODIUM 40 MG: 40 TABLET, DELAYED RELEASE ORAL at 10:16

## 2024-01-01 RX ADMIN — THIAMINE HYDROCHLORIDE 500 MG: 100 INJECTION, SOLUTION INTRAMUSCULAR; INTRAVENOUS at 13:20

## 2024-01-01 RX ADMIN — SODIUM ZIRCONIUM CYCLOSILICATE 10 G: 5 POWDER, FOR SUSPENSION ORAL at 18:27

## 2024-01-01 RX ADMIN — FOLIC ACID 1 MG: 1 TABLET ORAL at 09:25

## 2024-01-01 RX ADMIN — FOLIC ACID 1 MG: 1 TABLET ORAL at 09:36

## 2024-01-01 RX ADMIN — COLCHICINE 0.6 MG: 0.6 TABLET ORAL at 21:17

## 2024-01-01 RX ADMIN — SODIUM CHLORIDE, POTASSIUM CHLORIDE, SODIUM LACTATE AND CALCIUM CHLORIDE: 600; 310; 30; 20 INJECTION, SOLUTION INTRAVENOUS at 13:22

## 2024-01-01 RX ADMIN — ALLOPURINOL 100 MG: 100 TABLET ORAL at 08:12

## 2024-01-01 RX ADMIN — ALLOPURINOL 100 MG: 100 TABLET ORAL at 08:35

## 2024-01-01 RX ADMIN — PANTOPRAZOLE SODIUM 40 MG: 40 TABLET, DELAYED RELEASE ORAL at 08:12

## 2024-01-01 RX ADMIN — ALLOPURINOL 100 MG: 100 TABLET ORAL at 09:36

## 2024-01-01 RX ADMIN — SIMVASTATIN 20 MG: 20 TABLET, FILM COATED ORAL at 21:39

## 2024-01-01 RX ADMIN — Medication 400 MG: at 13:10

## 2024-01-01 RX ADMIN — GABAPENTIN 600 MG: 300 CAPSULE ORAL at 22:43

## 2024-01-01 RX ADMIN — MAGNESIUM SULFATE IN WATER 4 G: 40 INJECTION, SOLUTION INTRAVENOUS at 09:21

## 2024-01-01 RX ADMIN — Medication 1 TABLET: at 08:42

## 2024-01-01 RX ADMIN — SODIUM ZIRCONIUM CYCLOSILICATE 10 G: 5 POWDER, FOR SUSPENSION ORAL at 14:38

## 2024-01-01 RX ADMIN — GABAPENTIN 600 MG: 300 CAPSULE ORAL at 22:06

## 2024-01-01 RX ADMIN — Medication 1 TABLET: at 09:33

## 2024-01-01 RX ADMIN — Medication 1 TABLET: at 09:15

## 2024-01-01 RX ADMIN — LORAZEPAM 1 MG: 2 INJECTION INTRAMUSCULAR; INTRAVENOUS at 14:59

## 2024-01-01 RX ADMIN — OXYCODONE HYDROCHLORIDE 5 MG: 5 TABLET ORAL at 08:13

## 2024-01-01 RX ADMIN — GABAPENTIN 600 MG: 300 CAPSULE ORAL at 22:31

## 2024-01-01 RX ADMIN — IRON SUCROSE 300 MG: 20 INJECTION, SOLUTION INTRAVENOUS at 15:07

## 2024-01-01 RX ADMIN — FOLIC ACID 1 MG: 1 TABLET ORAL at 09:28

## 2024-01-01 RX ADMIN — SODIUM PHOSPHATE, MONOBASIC, MONOHYDRATE AND SODIUM PHOSPHATE, DIBASIC, ANHYDROUS 15 MMOL: 142; 276 INJECTION, SOLUTION INTRAVENOUS at 03:33

## 2024-01-01 RX ADMIN — GABAPENTIN 600 MG: 300 CAPSULE ORAL at 09:03

## 2024-01-01 RX ADMIN — LISINOPRIL 20 MG: 20 TABLET ORAL at 08:27

## 2024-01-01 RX ADMIN — GABAPENTIN 300 MG: 300 CAPSULE ORAL at 21:26

## 2024-01-01 RX ADMIN — DEXTROSE AND SODIUM CHLORIDE: 5; 900 INJECTION, SOLUTION INTRAVENOUS at 22:16

## 2024-01-01 RX ADMIN — GABAPENTIN 600 MG: 300 CAPSULE ORAL at 14:13

## 2024-01-01 RX ADMIN — GABAPENTIN 600 MG: 300 CAPSULE ORAL at 08:12

## 2024-01-01 RX ADMIN — THIAMINE HCL TAB 100 MG 100 MG: 100 TAB at 09:25

## 2024-01-01 RX ADMIN — FERROUS SULFATE TAB 325 MG (65 MG ELEMENTAL FE) 325 MG: 325 (65 FE) TAB at 08:54

## 2024-01-01 RX ADMIN — IRON SUCROSE 300 MG: 20 INJECTION, SOLUTION INTRAVENOUS at 08:37

## 2024-01-01 RX ADMIN — PANTOPRAZOLE SODIUM 40 MG: 40 TABLET, DELAYED RELEASE ORAL at 08:52

## 2024-01-01 RX ADMIN — DIAZEPAM 10 MG: 10 INJECTION, SOLUTION INTRAMUSCULAR; INTRAVENOUS at 18:00

## 2024-01-01 RX ADMIN — PANTOPRAZOLE SODIUM 40 MG: 40 TABLET, DELAYED RELEASE ORAL at 15:45

## 2024-01-01 RX ADMIN — PANTOPRAZOLE SODIUM 40 MG: 40 TABLET, DELAYED RELEASE ORAL at 15:50

## 2024-01-01 RX ADMIN — FLUOXETINE HYDROCHLORIDE 40 MG: 20 CAPSULE ORAL at 07:08

## 2024-01-01 RX ADMIN — POTASSIUM CHLORIDE 40 MEQ: 1500 TABLET, EXTENDED RELEASE ORAL at 08:53

## 2024-01-01 RX ADMIN — PANTOPRAZOLE SODIUM 40 MG: 40 TABLET, DELAYED RELEASE ORAL at 16:04

## 2024-01-01 RX ADMIN — GABAPENTIN 600 MG: 300 CAPSULE ORAL at 09:01

## 2024-01-01 RX ADMIN — ACETAMINOPHEN 650 MG: 325 TABLET, FILM COATED ORAL at 15:43

## 2024-01-01 RX ADMIN — Medication 400 MG: at 09:37

## 2024-01-01 RX ADMIN — LISINOPRIL 20 MG: 20 TABLET ORAL at 10:09

## 2024-01-01 RX ADMIN — PANTOPRAZOLE SODIUM 40 MG: 40 TABLET, DELAYED RELEASE ORAL at 15:58

## 2024-01-01 RX ADMIN — FLUOXETINE 40 MG: 20 CAPSULE ORAL at 09:24

## 2024-01-01 RX ADMIN — FOLIC ACID 1 MG: 1 TABLET ORAL at 09:23

## 2024-01-01 RX ADMIN — PANTOPRAZOLE SODIUM 40 MG: 40 TABLET, DELAYED RELEASE ORAL at 09:23

## 2024-01-01 RX ADMIN — ALLOPURINOL 100 MG: 100 TABLET ORAL at 08:47

## 2024-01-01 RX ADMIN — OXYCODONE HYDROCHLORIDE 5 MG: 5 TABLET ORAL at 21:18

## 2024-01-01 RX ADMIN — PANTOPRAZOLE SODIUM 40 MG: 40 TABLET, DELAYED RELEASE ORAL at 16:31

## 2024-01-01 RX ADMIN — THIAMINE HCL TAB 100 MG 100 MG: 100 TAB at 08:52

## 2024-01-01 RX ADMIN — SENNOSIDES AND DOCUSATE SODIUM 1 TABLET: 50; 8.6 TABLET ORAL at 22:31

## 2024-01-01 RX ADMIN — ACETAMINOPHEN 975 MG: 325 TABLET, FILM COATED ORAL at 16:24

## 2024-01-01 RX ADMIN — GABAPENTIN 600 MG: 300 CAPSULE ORAL at 16:04

## 2024-01-01 RX ADMIN — GABAPENTIN 600 MG: 300 CAPSULE ORAL at 21:34

## 2024-01-01 RX ADMIN — MAGNESIUM OXIDE TAB 400 MG (241.3 MG ELEMENTAL MG) 400 MG: 400 (241.3 MG) TAB at 09:25

## 2024-01-01 RX ADMIN — NALTREXONE 380 MG: KIT at 11:01

## 2024-01-01 RX ADMIN — FOLIC ACID 1 MG: 1 TABLET ORAL at 09:14

## 2024-01-01 RX ADMIN — ACETAMINOPHEN 975 MG: 325 TABLET, FILM COATED ORAL at 10:46

## 2024-01-01 RX ADMIN — MAGNESIUM SULFATE HEPTAHYDRATE 1 G: 500 INJECTION, SOLUTION INTRAMUSCULAR; INTRAVENOUS at 16:47

## 2024-01-01 RX ADMIN — FLUOXETINE HYDROCHLORIDE 40 MG: 20 CAPSULE ORAL at 09:15

## 2024-01-01 RX ADMIN — Medication 400 MG: at 08:11

## 2024-01-01 RX ADMIN — POTASSIUM & SODIUM PHOSPHATES POWDER PACK 280-160-250 MG 1 PACKET: 280-160-250 PACK at 22:31

## 2024-01-01 RX ADMIN — SODIUM PHOSPHATE, MONOBASIC, MONOHYDRATE AND SODIUM PHOSPHATE, DIBASIC, ANHYDROUS 15 MMOL: 142; 276 INJECTION, SOLUTION INTRAVENOUS at 05:40

## 2024-01-01 RX ADMIN — FERROUS SULFATE TAB 325 MG (65 MG ELEMENTAL FE) 325 MG: 325 (65 FE) TAB at 08:27

## 2024-01-01 RX ADMIN — SENNOSIDES AND DOCUSATE SODIUM 1 TABLET: 50; 8.6 TABLET ORAL at 09:15

## 2024-01-01 RX ADMIN — GABAPENTIN 600 MG: 300 CAPSULE ORAL at 08:52

## 2024-01-01 RX ADMIN — FERROUS SULFATE TAB 325 MG (65 MG ELEMENTAL FE) 325 MG: 325 (65 FE) TAB at 10:16

## 2024-01-01 RX ADMIN — AMLODIPINE BESYLATE 10 MG: 10 TABLET ORAL at 10:16

## 2024-01-01 RX ADMIN — POTASSIUM & SODIUM PHOSPHATES POWDER PACK 280-160-250 MG 1 PACKET: 280-160-250 PACK at 17:24

## 2024-01-01 RX ADMIN — PANTOPRAZOLE SODIUM 40 MG: 40 TABLET, DELAYED RELEASE ORAL at 08:47

## 2024-01-01 RX ADMIN — FOLIC ACID 1 MG: 1 TABLET ORAL at 08:27

## 2024-01-01 RX ADMIN — GABAPENTIN 600 MG: 300 CAPSULE ORAL at 21:14

## 2024-01-01 RX ADMIN — Medication 1 TABLET: at 08:47

## 2024-01-01 RX ADMIN — GABAPENTIN 900 MG: 300 CAPSULE ORAL at 22:49

## 2024-01-01 RX ADMIN — PANTOPRAZOLE SODIUM 40 MG: 40 TABLET, DELAYED RELEASE ORAL at 16:27

## 2024-01-01 RX ADMIN — Medication 1 TABLET: at 10:16

## 2024-01-01 RX ADMIN — COLCHICINE 0.6 MG: 0.6 TABLET ORAL at 08:26

## 2024-01-01 RX ADMIN — PANTOPRAZOLE SODIUM 40 MG: 40 TABLET, DELAYED RELEASE ORAL at 16:11

## 2024-01-01 RX ADMIN — COLCHICINE 0.6 MG: 0.6 TABLET ORAL at 10:16

## 2024-01-01 RX ADMIN — AMLODIPINE BESYLATE 5 MG: 5 TABLET ORAL at 08:53

## 2024-01-01 RX ADMIN — SENNOSIDES AND DOCUSATE SODIUM 1 TABLET: 50; 8.6 TABLET ORAL at 09:24

## 2024-01-01 RX ADMIN — SODIUM CHLORIDE 250 ML: 9 INJECTION, SOLUTION INTRAVENOUS at 18:48

## 2024-01-01 RX ADMIN — DEXTROSE MONOHYDRATE 25 G: 25 INJECTION, SOLUTION INTRAVENOUS at 13:03

## 2024-01-01 RX ADMIN — SENNOSIDES AND DOCUSATE SODIUM 1 TABLET: 50; 8.6 TABLET ORAL at 10:16

## 2024-01-01 RX ADMIN — MAGNESIUM OXIDE TAB 400 MG (241.3 MG ELEMENTAL MG) 400 MG: 400 (241.3 MG) TAB at 08:42

## 2024-01-01 RX ADMIN — FOLIC ACID 1 MG: 1 TABLET ORAL at 08:25

## 2024-01-01 RX ADMIN — SENNOSIDES AND DOCUSATE SODIUM 1 TABLET: 50; 8.6 TABLET ORAL at 09:01

## 2024-01-01 RX ADMIN — FOLIC ACID 1 MG: 1 TABLET ORAL at 08:41

## 2024-01-01 RX ADMIN — SENNOSIDES AND DOCUSATE SODIUM 1 TABLET: 50; 8.6 TABLET ORAL at 07:08

## 2024-01-01 RX ADMIN — GABAPENTIN 900 MG: 300 CAPSULE ORAL at 22:16

## 2024-01-01 RX ADMIN — THIAMINE HCL TAB 100 MG 100 MG: 100 TAB at 09:26

## 2024-01-01 RX ADMIN — COLCHICINE 0.6 MG: 0.6 TABLET ORAL at 09:01

## 2024-01-01 RX ADMIN — Medication 400 MG: at 09:24

## 2024-01-01 RX ADMIN — FERROUS SULFATE TAB 325 MG (65 MG ELEMENTAL FE) 325 MG: 325 (65 FE) TAB at 08:12

## 2024-01-01 RX ADMIN — Medication 400 MG: at 08:12

## 2024-01-01 RX ADMIN — AMLODIPINE BESYLATE 10 MG: 10 TABLET ORAL at 08:34

## 2024-01-01 RX ADMIN — SENNOSIDES AND DOCUSATE SODIUM 1 TABLET: 50; 8.6 TABLET ORAL at 09:36

## 2024-01-01 RX ADMIN — COLCHICINE 0.6 MG: 0.6 TABLET ORAL at 21:18

## 2024-01-01 RX ADMIN — THIAMINE HCL TAB 100 MG 100 MG: 100 TAB at 09:33

## 2024-01-01 RX ADMIN — FUROSEMIDE 20 MG: 10 INJECTION, SOLUTION INTRAMUSCULAR; INTRAVENOUS at 14:09

## 2024-01-01 RX ADMIN — TRAMADOL HYDROCHLORIDE 25 MG: 50 TABLET, FILM COATED ORAL at 00:14

## 2024-01-01 RX ADMIN — NALTREXONE 380 MG: KIT at 13:41

## 2024-01-01 RX ADMIN — MELATONIN 5 MG TABLET 5 MG: at 22:22

## 2024-01-01 RX ADMIN — GABAPENTIN 900 MG: 300 CAPSULE ORAL at 13:26

## 2024-01-01 RX ADMIN — PANTOPRAZOLE SODIUM 40 MG: 40 TABLET, DELAYED RELEASE ORAL at 08:26

## 2024-01-01 RX ADMIN — ALLOPURINOL 100 MG: 100 TABLET ORAL at 08:27

## 2024-01-01 RX ADMIN — PREDNISONE 20 MG: 20 TABLET ORAL at 08:11

## 2024-01-01 RX ADMIN — POTASSIUM CHLORIDE 40 MEQ: 1500 TABLET, EXTENDED RELEASE ORAL at 17:06

## 2024-01-01 RX ADMIN — GABAPENTIN 600 MG: 300 CAPSULE ORAL at 20:20

## 2024-01-01 RX ADMIN — Medication 1 TABLET: at 07:08

## 2024-01-01 ASSESSMENT — ACTIVITIES OF DAILY LIVING (ADL)
ADLS_ACUITY_SCORE: 34
TOILETING_ISSUES: YES
ADLS_ACUITY_SCORE: 36
ADLS_ACUITY_SCORE: 35
ADLS_ACUITY_SCORE: 37
TOILETING: 2-->COMPLETELY DEPENDENT (NOT DEVELOPMENTALLY APPROPRIATE)
ADLS_ACUITY_SCORE: 36
ADLS_ACUITY_SCORE: 45
ADLS_ACUITY_SCORE: 45
ADLS_ACUITY_SCORE: 36
ADLS_ACUITY_SCORE: 52
ADLS_ACUITY_SCORE: 21
ADLS_ACUITY_SCORE: 21
ADLS_ACUITY_SCORE: 54
ADLS_ACUITY_SCORE: 36
ADLS_ACUITY_SCORE: 39
ADLS_ACUITY_SCORE: 21
CHANGE_IN_FUNCTIONAL_STATUS_SINCE_ONSET_OF_CURRENT_ILLNESS/INJURY: YES
ADLS_ACUITY_SCORE: 36
CONCENTRATING,_REMEMBERING_OR_MAKING_DECISIONS_DIFFICULTY: NO
ADLS_ACUITY_SCORE: 54
ADLS_ACUITY_SCORE: 38
ADLS_ACUITY_SCORE: 37
ADLS_ACUITY_SCORE: 36
ADLS_ACUITY_SCORE: 39
ADLS_ACUITY_SCORE: 26
HEARING_DIFFICULTY_OR_DEAF: NO
WEAR_GLASSES_OR_BLIND: NO
ADLS_ACUITY_SCORE: 21
ADLS_ACUITY_SCORE: 35
ADLS_ACUITY_SCORE: 52
ADLS_ACUITY_SCORE: 35
ADLS_ACUITY_SCORE: 21
ADLS_ACUITY_SCORE: 37
ADLS_ACUITY_SCORE: 35
ADLS_ACUITY_SCORE: 52
ADLS_ACUITY_SCORE: 26
ADLS_ACUITY_SCORE: 36
ADLS_ACUITY_SCORE: 40
DEPENDENT_IADLS:: INDEPENDENT
ADLS_ACUITY_SCORE: 37
ADLS_ACUITY_SCORE: 40
ADLS_ACUITY_SCORE: 35
ADLS_ACUITY_SCORE: 37
ADLS_ACUITY_SCORE: 35
ADLS_ACUITY_SCORE: 36
ADLS_ACUITY_SCORE: 54
ADLS_ACUITY_SCORE: 36
ADLS_ACUITY_SCORE: 40
ADLS_ACUITY_SCORE: 51
ADLS_ACUITY_SCORE: 38
ADLS_ACUITY_SCORE: 32
ADLS_ACUITY_SCORE: 35
ADLS_ACUITY_SCORE: 21
ADLS_ACUITY_SCORE: 26
ADLS_ACUITY_SCORE: 30
ADLS_ACUITY_SCORE: 36
ADLS_ACUITY_SCORE: 35
ADLS_ACUITY_SCORE: 40
ADLS_ACUITY_SCORE: 30
ADLS_ACUITY_SCORE: 20
ADLS_ACUITY_SCORE: 35
ADLS_ACUITY_SCORE: 21
ADLS_ACUITY_SCORE: 40
ADLS_ACUITY_SCORE: 21
ADLS_ACUITY_SCORE: 38
ADLS_ACUITY_SCORE: 40
ADLS_ACUITY_SCORE: 35
ADLS_ACUITY_SCORE: 37
ADLS_ACUITY_SCORE: 37
ADLS_ACUITY_SCORE: 35
ADLS_ACUITY_SCORE: 21
ADLS_ACUITY_SCORE: 40
ADLS_ACUITY_SCORE: 40
ADLS_ACUITY_SCORE: 52
ADLS_ACUITY_SCORE: 21
ADLS_ACUITY_SCORE: 45
ADLS_ACUITY_SCORE: 36
ADLS_ACUITY_SCORE: 36
ADLS_ACUITY_SCORE: 40
ADLS_ACUITY_SCORE: 40
ADLS_ACUITY_SCORE: 37
ADLS_ACUITY_SCORE: 34
ADLS_ACUITY_SCORE: 35
ADLS_ACUITY_SCORE: 21
ADLS_ACUITY_SCORE: 36
ADLS_ACUITY_SCORE: 35
ADLS_ACUITY_SCORE: 21
ADLS_ACUITY_SCORE: 26
ADLS_ACUITY_SCORE: 26
ADLS_ACUITY_SCORE: 40
ADLS_ACUITY_SCORE: 35
ADLS_ACUITY_SCORE: 36
ADLS_ACUITY_SCORE: 35
ADLS_ACUITY_SCORE: 52
ADLS_ACUITY_SCORE: 21
FALL_HISTORY_WITHIN_LAST_SIX_MONTHS: YES
ADLS_ACUITY_SCORE: 37
ADLS_ACUITY_SCORE: 36
ADLS_ACUITY_SCORE: 38
ADLS_ACUITY_SCORE: 35
ADLS_ACUITY_SCORE: 36
ADLS_ACUITY_SCORE: 37
ADLS_ACUITY_SCORE: 40
ADLS_ACUITY_SCORE: 37
ADLS_ACUITY_SCORE: 35
ADLS_ACUITY_SCORE: 34
ADLS_ACUITY_SCORE: 30
TOILETING_ASSISTANCE: TOILETING DIFFICULTY, ASSISTANCE 1 PERSON
ADLS_ACUITY_SCORE: 34
ADLS_ACUITY_SCORE: 36
ADLS_ACUITY_SCORE: 30
ADLS_ACUITY_SCORE: 45
ADLS_ACUITY_SCORE: 37
ADLS_ACUITY_SCORE: 35
TOILETING: 2-->COMPLETELY DEPENDENT
ADLS_ACUITY_SCORE: 36
ADLS_ACUITY_SCORE: 37
ADLS_ACUITY_SCORE: 36
ADLS_ACUITY_SCORE: 39
ADLS_ACUITY_SCORE: 45
ADLS_ACUITY_SCORE: 54
ADLS_ACUITY_SCORE: 36
ADLS_ACUITY_SCORE: 37
ADLS_ACUITY_SCORE: 39
ADLS_ACUITY_SCORE: 44
ADLS_ACUITY_SCORE: 36
ADLS_ACUITY_SCORE: 38
ADLS_ACUITY_SCORE: 54
ADLS_ACUITY_SCORE: 35
ADLS_ACUITY_SCORE: 40
ADLS_ACUITY_SCORE: 38
ADLS_ACUITY_SCORE: 26
ADLS_ACUITY_SCORE: 45
ADLS_ACUITY_SCORE: 21
ADLS_ACUITY_SCORE: 35
ADLS_ACUITY_SCORE: 35
ADLS_ACUITY_SCORE: 36
ADLS_ACUITY_SCORE: 36
ADLS_ACUITY_SCORE: 38
ADLS_ACUITY_SCORE: 21
ADLS_ACUITY_SCORE: 30
ADLS_ACUITY_SCORE: 35
ADLS_ACUITY_SCORE: 37
ADLS_ACUITY_SCORE: 49
ADLS_ACUITY_SCORE: 45
ADLS_ACUITY_SCORE: 36
ADLS_ACUITY_SCORE: 34
ADLS_ACUITY_SCORE: 39
ADLS_ACUITY_SCORE: 35
ADLS_ACUITY_SCORE: 34
ADLS_ACUITY_SCORE: 36
ADLS_ACUITY_SCORE: 52
ADLS_ACUITY_SCORE: 36
ADLS_ACUITY_SCORE: 35
ADLS_ACUITY_SCORE: 40
ADLS_ACUITY_SCORE: 35
ADLS_ACUITY_SCORE: 35
ADLS_ACUITY_SCORE: 36
ADLS_ACUITY_SCORE: 35
ADLS_ACUITY_SCORE: 21
ADLS_ACUITY_SCORE: 45
ADLS_ACUITY_SCORE: 52
ADLS_ACUITY_SCORE: 34
DIFFICULTY_EATING/SWALLOWING: NO
ADLS_ACUITY_SCORE: 54
ADLS_ACUITY_SCORE: 36
ADLS_ACUITY_SCORE: 36
ADLS_ACUITY_SCORE: 21
ADLS_ACUITY_SCORE: 30
ADLS_ACUITY_SCORE: 35
ADLS_ACUITY_SCORE: 54
ADLS_ACUITY_SCORE: 36
ADLS_ACUITY_SCORE: 52
ADLS_ACUITY_SCORE: 35
ADLS_ACUITY_SCORE: 21
ADLS_ACUITY_SCORE: 38
ADLS_ACUITY_SCORE: 21
ADLS_ACUITY_SCORE: 39
DIFFICULTY_COMMUNICATING: NO
ADLS_ACUITY_SCORE: 34
ADLS_ACUITY_SCORE: 30
DEPENDENT_IADLS:: INDEPENDENT
ADLS_ACUITY_SCORE: 40
ADLS_ACUITY_SCORE: 21
ADLS_ACUITY_SCORE: 52
ADLS_ACUITY_SCORE: 35
ADLS_ACUITY_SCORE: 35
ADLS_ACUITY_SCORE: 30
ADLS_ACUITY_SCORE: 36
ADLS_ACUITY_SCORE: 35
ADLS_ACUITY_SCORE: 54
ADLS_ACUITY_SCORE: 52
ADLS_ACUITY_SCORE: 45
ADLS_ACUITY_SCORE: 35
ADLS_ACUITY_SCORE: 42
ADLS_ACUITY_SCORE: 52
ADLS_ACUITY_SCORE: 37
ADLS_ACUITY_SCORE: 39
ADLS_ACUITY_SCORE: 39
ADLS_ACUITY_SCORE: 40
ADLS_ACUITY_SCORE: 36
ADLS_ACUITY_SCORE: 45
ADLS_ACUITY_SCORE: 38
ADLS_ACUITY_SCORE: 36
ADLS_ACUITY_SCORE: 21
ADLS_ACUITY_SCORE: 54
ADLS_ACUITY_SCORE: 40
ADLS_ACUITY_SCORE: 52
ADLS_ACUITY_SCORE: 45
ADLS_ACUITY_SCORE: 21
ADLS_ACUITY_SCORE: 34
ADLS_ACUITY_SCORE: 45
ADLS_ACUITY_SCORE: 21
ADLS_ACUITY_SCORE: 36
DRESSING/BATHING_DIFFICULTY: NO
ADLS_ACUITY_SCORE: 21
ADLS_ACUITY_SCORE: 30
ADLS_ACUITY_SCORE: 36
ADLS_ACUITY_SCORE: 36
ADLS_ACUITY_SCORE: 34
ADLS_ACUITY_SCORE: 40
CONCENTRATING,_REMEMBERING_OR_MAKING_DECISIONS_DIFFICULTY: YES
ADLS_ACUITY_SCORE: 35
ADLS_ACUITY_SCORE: 39
ADLS_ACUITY_SCORE: 38
ADLS_ACUITY_SCORE: 21
DOING_ERRANDS_INDEPENDENTLY_DIFFICULTY: NO
ADLS_ACUITY_SCORE: 36
ADLS_ACUITY_SCORE: 26
ADLS_ACUITY_SCORE: 36
ADLS_ACUITY_SCORE: 45
ADLS_ACUITY_SCORE: 36
ADLS_ACUITY_SCORE: 36
ADLS_ACUITY_SCORE: 52
ADLS_ACUITY_SCORE: 35
ADLS_ACUITY_SCORE: 40
ADLS_ACUITY_SCORE: 36
ADLS_ACUITY_SCORE: 21
ADLS_ACUITY_SCORE: 54
WALKING_OR_CLIMBING_STAIRS_DIFFICULTY: NO
ADLS_ACUITY_SCORE: 35
ADLS_ACUITY_SCORE: 35
ADLS_ACUITY_SCORE: 21
ADLS_ACUITY_SCORE: 35
ADLS_ACUITY_SCORE: 45
ADLS_ACUITY_SCORE: 40
ADLS_ACUITY_SCORE: 21
ADLS_ACUITY_SCORE: 34
ADLS_ACUITY_SCORE: 21
ADLS_ACUITY_SCORE: 35
ADLS_ACUITY_SCORE: 36
ADLS_ACUITY_SCORE: 35
ADLS_ACUITY_SCORE: 36
ADLS_ACUITY_SCORE: 21
ADLS_ACUITY_SCORE: 34
ADLS_ACUITY_SCORE: 36
ADLS_ACUITY_SCORE: 36
ADLS_ACUITY_SCORE: 34
ADLS_ACUITY_SCORE: 36
ADLS_ACUITY_SCORE: 26
ADLS_ACUITY_SCORE: 36
ADLS_ACUITY_SCORE: 36
ADLS_ACUITY_SCORE: 32
ADLS_ACUITY_SCORE: 54
ADLS_ACUITY_SCORE: 36
ADLS_ACUITY_SCORE: 21
ADLS_ACUITY_SCORE: 36
ADLS_ACUITY_SCORE: 36
ADLS_ACUITY_SCORE: 21
ADLS_ACUITY_SCORE: 54
ADLS_ACUITY_SCORE: 30
ADLS_ACUITY_SCORE: 35

## 2024-01-01 ASSESSMENT — LIFESTYLE VARIABLES
VISUAL DISTURBANCES: NOT PRESENT
VISUAL DISTURBANCES: NOT PRESENT
HEADACHE, FULLNESS IN HEAD: NOT PRESENT
TREMOR: 3
TREMOR: SEVERE, EVEN WITH ARMS NOT EXTENDED
PAROXYSMAL SWEATS: NO SWEAT VISIBLE
TOTAL SCORE: 16
ORIENTATION AND CLOUDING OF SENSORIUM: CANNOT DO SERIAL ADDITIONS OR IS UNCERTAIN ABOUT DATE
PAROXYSMAL SWEATS: 2
VISUAL DISTURBANCES: NOT PRESENT
TOTAL SCORE: 16
TREMOR: SEVERE, EVEN WITH ARMS NOT EXTENDED
HEADACHE, FULLNESS IN HEAD: NOT PRESENT
NAUSEA AND VOMITING: NO NAUSEA AND NO VOMITING
AGITATION: NORMAL ACTIVITY
ANXIETY: 5
AGITATION: NORMAL ACTIVITY
ORIENTATION AND CLOUDING OF SENSORIUM: CANNOT DO SERIAL ADDITIONS OR IS UNCERTAIN ABOUT DATE
AUDITORY DISTURBANCES: NOT PRESENT
NAUSEA AND VOMITING: NO NAUSEA AND NO VOMITING
TOTAL SCORE: 12
ANXIETY: MODERATELY ANXIOUS, OR GUARDED, SO ANXIETY IS INFERRED
ORIENTATION AND CLOUDING OF SENSORIUM: DISORIENTED FOR DATE BY NO MORE THAN 2 CALENDAR DAYS
TOTAL SCORE: 13
VISUAL DISTURBANCES: NOT PRESENT
ANXIETY: MILDLY ANXIOUS
ORIENTATION AND CLOUDING OF SENSORIUM: ORIENTED AND CAN DO SERIAL ADDITIONS
AUDITORY DISTURBANCES: NOT PRESENT
HEADACHE, FULLNESS IN HEAD: NOT PRESENT
AUDITORY DISTURBANCES: NOT PRESENT
TREMOR: 6
TACTILE DISTURBANCES: VERY MILD ITCHING, PINS AND NEEDLES, BURNING OR NUMBNESS
ORIENTATION AND CLOUDING OF SENSORIUM: DISORIENTED FOR DATE BY NO MORE THAN 2 CALENDAR DAYS
AGITATION: NORMAL ACTIVITY
ORIENTATION AND CLOUDING OF SENSORIUM: CANNOT DO SERIAL ADDITIONS OR IS UNCERTAIN ABOUT DATE
NAUSEA AND VOMITING: NO NAUSEA AND NO VOMITING
TREMOR: MODERATE, WITH PATIENT'S ARMS EXTENDED
ANXIETY: 3
TREMOR: SEVERE, EVEN WITH ARMS NOT EXTENDED
AGITATION: SOMEWHAT MORE THAN NORMAL ACTIVITY
AUDITORY DISTURBANCES: NOT PRESENT
HEADACHE, FULLNESS IN HEAD: NOT PRESENT
AUDITORY DISTURBANCES: NOT PRESENT
TREMOR: MODERATE, WITH PATIENT'S ARMS EXTENDED
AUDITORY DISTURBANCES: NOT PRESENT
TREMOR: 6
ANXIETY: 3
NAUSEA AND VOMITING: NO NAUSEA AND NO VOMITING
TOTAL SCORE: 14
HEADACHE, FULLNESS IN HEAD: NOT PRESENT
AUDITORY DISTURBANCES: NOT PRESENT
VISUAL DISTURBANCES: NOT PRESENT
ORIENTATION AND CLOUDING OF SENSORIUM: DISORIENTED FOR DATE BY NO MORE THAN 2 CALENDAR DAYS
ANXIETY: 3
NAUSEA AND VOMITING: NO NAUSEA AND NO VOMITING
TACTILE DISTURBANCES: MILD ITCHING, PINS AND NEEDLES, BURNING OR NUMBNESS
TREMOR: 5
TOTAL SCORE: 6
AUDITORY DISTURBANCES: NOT PRESENT
NAUSEA AND VOMITING: NO NAUSEA AND NO VOMITING
AGITATION: MODERATELY FIDGETY AND RESTLESS
AGITATION: SOMEWHAT MORE THAN NORMAL ACTIVITY
HEADACHE, FULLNESS IN HEAD: NOT PRESENT
AGITATION: SOMEWHAT MORE THAN NORMAL ACTIVITY
VISUAL DISTURBANCES: NOT PRESENT
ORIENTATION AND CLOUDING OF SENSORIUM: CANNOT DO SERIAL ADDITIONS OR IS UNCERTAIN ABOUT DATE
AGITATION: NORMAL ACTIVITY
HEADACHE, FULLNESS IN HEAD: NOT PRESENT
HEADACHE, FULLNESS IN HEAD: NOT PRESENT
VISUAL DISTURBANCES: NOT PRESENT
ANXIETY: 3
ORIENTATION AND CLOUDING OF SENSORIUM: DISORIENTED FOR DATE BY MORE THAN 2 CALENDAR DAYS
PAROXYSMAL SWEATS: 3
AGITATION: 3
HEADACHE, FULLNESS IN HEAD: NOT PRESENT
ANXIETY: 3
TOTAL SCORE: 16
VISUAL DISTURBANCES: NOT PRESENT
PAROXYSMAL SWEATS: BARELY PERCEPTIBLE SWEATING, PALMS MOIST
NAUSEA AND VOMITING: NO NAUSEA AND NO VOMITING
TACTILE DISTURBANCES: VERY MILD ITCHING, PINS AND NEEDLES, BURNING OR NUMBNESS
NAUSEA AND VOMITING: NO NAUSEA AND NO VOMITING
PAROXYSMAL SWEATS: NO SWEAT VISIBLE
NAUSEA AND VOMITING: NO NAUSEA AND NO VOMITING
TACTILE DISTURBANCES: VERY MILD ITCHING, PINS AND NEEDLES, BURNING OR NUMBNESS
PAROXYSMAL SWEATS: BARELY PERCEPTIBLE SWEATING, PALMS MOIST
ANXIETY: MODERATELY ANXIOUS, OR GUARDED, SO ANXIETY IS INFERRED
AUDITORY DISTURBANCES: NOT PRESENT
TOTAL SCORE: 11
TOTAL SCORE: 13
PAROXYSMAL SWEATS: BARELY PERCEPTIBLE SWEATING, PALMS MOIST
PAROXYSMAL SWEATS: 2
PAROXYSMAL SWEATS: BARELY PERCEPTIBLE SWEATING, PALMS MOIST
VISUAL DISTURBANCES: NOT PRESENT

## 2024-01-01 ASSESSMENT — ANXIETY QUESTIONNAIRES
6. BECOMING EASILY ANNOYED OR IRRITABLE: NOT AT ALL
6. BECOMING EASILY ANNOYED OR IRRITABLE: NOT AT ALL
3. WORRYING TOO MUCH ABOUT DIFFERENT THINGS: MORE THAN HALF THE DAYS
5. BEING SO RESTLESS THAT IT IS HARD TO SIT STILL: NOT AT ALL
GAD7 TOTAL SCORE: 0
6. BECOMING EASILY ANNOYED OR IRRITABLE: NOT AT ALL
2. NOT BEING ABLE TO STOP OR CONTROL WORRYING: NOT AT ALL
2. NOT BEING ABLE TO STOP OR CONTROL WORRYING: SEVERAL DAYS
1. FEELING NERVOUS, ANXIOUS, OR ON EDGE: MORE THAN HALF THE DAYS
IF YOU CHECKED OFF ANY PROBLEMS ON THIS QUESTIONNAIRE, HOW DIFFICULT HAVE THESE PROBLEMS MADE IT FOR YOU TO DO YOUR WORK, TAKE CARE OF THINGS AT HOME, OR GET ALONG WITH OTHER PEOPLE: NOT DIFFICULT AT ALL
5. BEING SO RESTLESS THAT IT IS HARD TO SIT STILL: NOT AT ALL
2. FELT ANXIOUS, WORRIED, OR NERVOUS: MORE THAN HALF THE DAYS
1. FEELING NERVOUS, ANXIOUS, OR ON EDGE: NOT AT ALL
7. FEELING AFRAID AS IF SOMETHING AWFUL MIGHT HAPPEN: NOT AT ALL
IF YOU CHECKED OFF ANY PROBLEMS ON THIS QUESTIONNAIRE, HOW DIFFICULT HAVE THESE PROBLEMS MADE IT FOR YOU TO DO YOUR WORK, TAKE CARE OF THINGS AT HOME, OR GET ALONG WITH OTHER PEOPLE: NOT DIFFICULT AT ALL
3. WORRYING TOO MUCH ABOUT DIFFERENT THINGS: NOT AT ALL
2. NOT BEING ABLE TO STOP OR CONTROL WORRYING: SEVERAL DAYS
2. NOT BEING ABLE TO STOP OR CONTROL WORRYING: SEVERAL DAYS
GAD7 TOTAL SCORE: 0
7. FEELING AFRAID AS IF SOMETHING AWFUL MIGHT HAPPEN: NOT AT ALL
7. FEELING AFRAID AS IF SOMETHING AWFUL MIGHT HAPPEN: SEVERAL DAYS
GAD7 TOTAL SCORE: 10
1. FEELING NERVOUS, ANXIOUS, OR ON EDGE: MORE THAN HALF THE DAYS
4. TROUBLE RELAXING: NOT AT ALL
5. BEING SO RESTLESS THAT IT IS HARD TO SIT STILL: NOT AT ALL
GAD7 TOTAL SCORE: 5
4. TROUBLE RELAXING: SEVERAL DAYS
7. FEELING AFRAID AS IF SOMETHING AWFUL MIGHT HAPPEN: NOT AT ALL
6. BECOMING EASILY ANNOYED OR IRRITABLE: NOT AT ALL
4. TROUBLE RELAXING: NEARLY EVERY DAY
3. WORRYING TOO MUCH ABOUT DIFFERENT THINGS: SEVERAL DAYS
GAD7 TOTAL SCORE: 8
GAD7 TOTAL SCORE: 8
4. TROUBLE RELAXING: NEARLY EVERY DAY
5. BEING SO RESTLESS THAT IT IS HARD TO SIT STILL: NOT AT ALL
3. WORRYING TOO MUCH ABOUT DIFFERENT THINGS: NEARLY EVERY DAY
GAD7 TOTAL SCORE: 5

## 2024-01-01 ASSESSMENT — COLUMBIA-SUICIDE SEVERITY RATING SCALE - C-SSRS
1. IN THE PAST MONTH, HAVE YOU WISHED YOU WERE DEAD OR WISHED YOU COULD GO TO SLEEP AND NOT WAKE UP?: NO
2. HAVE YOU ACTUALLY HAD ANY THOUGHTS OF KILLING YOURSELF LIFETIME?: NO
6. HAVE YOU EVER DONE ANYTHING, STARTED TO DO ANYTHING, OR PREPARED TO DO ANYTHING TO END YOUR LIFE?: NO
1. IN THE PAST MONTH, HAVE YOU WISHED YOU WERE DEAD OR WISHED YOU COULD GO TO SLEEP AND NOT WAKE UP?: NO
2. HAVE YOU ACTUALLY HAD ANY THOUGHTS OF KILLING YOURSELF IN THE PAST MONTH?: NO

## 2024-01-01 ASSESSMENT — PAIN SCALES - GENERAL: PAINLEVEL: SEVERE PAIN (6)

## 2024-01-01 ASSESSMENT — PATIENT HEALTH QUESTIONNAIRE - PHQ9
SUM OF ALL RESPONSES TO PHQ QUESTIONS 1-9: 9
SUM OF ALL RESPONSES TO PHQ QUESTIONS 1-9: 10
SUM OF ALL RESPONSES TO PHQ QUESTIONS 1-9: 9
SUM OF ALL RESPONSES TO PHQ QUESTIONS 1-9: 9
SUM OF ALL RESPONSES TO PHQ QUESTIONS 1-9: 3
10. IF YOU CHECKED OFF ANY PROBLEMS, HOW DIFFICULT HAVE THESE PROBLEMS MADE IT FOR YOU TO DO YOUR WORK, TAKE CARE OF THINGS AT HOME, OR GET ALONG WITH OTHER PEOPLE: NOT DIFFICULT AT ALL

## 2024-01-13 PROBLEM — K70.10 ALCOHOLIC HEPATITIS WITHOUT ASCITES (H): Status: ACTIVE | Noted: 2024-01-01

## 2024-01-13 PROBLEM — F10.20 ALCOHOL USE DISORDER, SEVERE, DEPENDENCE (H): Status: ACTIVE | Noted: 2021-02-17

## 2024-01-13 PROBLEM — I10 ESSENTIAL HYPERTENSION: Status: ACTIVE | Noted: 2019-03-22

## 2024-01-13 PROBLEM — E87.1 HYPONATREMIA: Status: ACTIVE | Noted: 2024-01-01

## 2024-01-13 PROBLEM — F10.939 ALCOHOL WITHDRAWAL SEIZURE WITH COMPLICATION (H): Status: ACTIVE | Noted: 2024-01-01

## 2024-01-13 PROBLEM — R56.9 ALCOHOL WITHDRAWAL SEIZURE WITH COMPLICATION (H): Status: ACTIVE | Noted: 2024-01-01

## 2024-01-13 PROBLEM — F41.1 GENERALIZED ANXIETY DISORDER: Status: ACTIVE | Noted: 2021-02-17

## 2024-01-13 PROBLEM — D69.6 THROMBOCYTOPENIA (H): Status: ACTIVE | Noted: 2024-01-01

## 2024-01-13 NOTE — ED PROVIDER NOTES
"    History     Chief Complaint:  Fall       HPI   Jerome Flanagan is a 48 year old male who presents to the emergency department after his mother heard him fall and crash at home.  The patient is a chronic alcoholic but mom states he stopped drinking 2 days ago when he ran out of alcohol.  She also notes he is very weak and has not been eating much the past 2 weeks.  When she went to see him after she heard the crash his arms bilaterally seem to be jerking.  She went to call 911 and she returned and found him still jerking his upper arms this lasted 2 to 3 minutes before it resolved and he seemed confused afterwards.  The patient is now awake and states he does not remember what happened.  Patient has a long history of alcoholism and has been in inpatient treatment and has tried multiple outpatient programs without success.  He does not use any recreational drugs.      Independent Historian:    Family none    Review of External Notes:  None    Medications:    amLODIPine (NORVASC) 10 MG tablet  citalopram (CELEXA) 20 MG tablet  diclofenac (CATAFLAM) 50 MG tablet  gabapentin (NEURONTIN) 300 MG capsule  HYDROcodone-acetaminophen (NORCO) 5-325 MG tablet  lisinopril (ZESTRIL) 20 MG tablet  naltrexone (DEPADE/REVIA) 50 MG tablet  omeprazole (PRILOSEC) 20 MG DR capsule  ranitidine (ZANTAC) 150 MG capsule        Past Medical History:    No past medical history on file.  Chronic alcoholism  Past Surgical History:    No past surgical history on file.       Physical Exam   Patient Vitals for the past 24 hrs:   BP Temp Temp src Pulse Resp SpO2 Height Weight   01/13/24 1230 (!) 131/95 -- -- 110 23 -- -- --   01/13/24 1200 (!) 114/94 -- -- 102 15 100 % -- --   01/13/24 1112 (!) 142/106 97  F (36.1  C) Temporal 102 16 100 % 1.778 m (5' 10\") 67.6 kg (149 lb)        Physical Exam  Constitutional: White male supine who complains of nausea but has no respiratory issues  HENT: No signs of trauma.  No bruising no swelling; no tongue " bites  Eyes: EOM are normal. Pupils are equal, round, and reactive to light.  No nystagmus  Neck: Normal range of motion. No JVD present. No cervical adenopathy.  No posterior neck tenderness no murmur heard.  Pulmonary/Chest: Bilateral breath sounds normal. No wheezes, rhonchi or rales.  Abdominal: Soft. No tenderness. No rebound or guarding.  No obvious ascites bruising of bilateral lower rib margins.  1+ femoral pulses  Musculoskeletal: No edema. No tenderness.  No significant bruising upper extremities but bruises noted on both hips.  Lymphadenopathy: No lymphadenopathy.   Neurological: Awake and alert oriented to self. Normal strength. Coordination not tested  Skin: Bruising is noted no obvious jaundice    Emergency Department Course   ECG  Sinus tachycardia at 101 nonspecific ST-T changes with poor baseline  Rate 101 bpm. OH interval 122ms. QRS duration 78 ms. QT/QTc 368/477 ms. P-R-T axes 65 60-62.    Imaging:  CT Head w/o Contrast    (Results Pending)   CT Abdomen Pelvis w Contrast    (Results Pending)     Report per radiologist    Laboratory:  Labs Ordered and Resulted from Time of ED Arrival to Time of ED Departure   COMPREHENSIVE METABOLIC PANEL - Abnormal       Result Value    Sodium 134 (*)     Potassium 3.7      Carbon Dioxide (CO2) 10 (*)     Anion Gap 37 (*)     Urea Nitrogen 25.2 (*)     Creatinine 1.16      GFR Estimate 78      Calcium 8.3 (*)     Chloride 87 (*)     Glucose 148 (*)     Alkaline Phosphatase 121       (*)     ALT 41      Protein Total 6.7      Albumin 4.0      Bilirubin Total 1.9 (*)    LIPASE - Abnormal    Lipase 203 (*)    MAGNESIUM - Abnormal    Magnesium 1.5 (*)    CBC WITH PLATELETS AND DIFFERENTIAL - Abnormal    WBC Count 9.8      RBC Count 3.05 (*)     Hemoglobin 9.9 (*)     Hematocrit 30.3 (*)     MCV 99      MCH 32.5      MCHC 32.7      RDW 13.8      Platelet Count 43 (*)     % Neutrophils        % Lymphocytes        % Monocytes        % Eosinophils        %  Basophils        % Immature Granulocytes        NRBCs per 100 WBC 0      Absolute Neutrophils        Absolute Lymphocytes        Absolute Monocytes        Absolute Eosinophils        Absolute Basophils        Absolute Immature Granulocytes        Absolute NRBCs 0.0     DIFFERENTIAL - Abnormal    % Neutrophils 92      % Lymphocytes 6      % Monocytes 2      % Eosinophils 0      % Basophils 0      NRBCs per 100 WBC 2 (*)     Absolute Neutrophils 9.0 (*)     Absolute Lymphocytes 0.6 (*)     Absolute Monocytes 0.2      Absolute Eosinophils 0.0      Absolute Basophils 0.0      Absolute NRBCs 0.2 (*)     RBC Morphology Confirmed RBC Indices      Platelet Assessment        Value: Automated Count Confirmed. Platelet morphology is normal.   INR - Normal    INR 1.02     PHOSPHORUS - Normal    Phosphorus 3.6     ETHYL ALCOHOL LEVEL - Normal    Alcohol ethyl <0.01          Procedures   None    Emergency Department Course & Assessments:             Interventions:  Medications   iohexol (OMNIPAQUE) 140 MG/ML solution for oral use 50 mL (has no administration in time range)   sodium chloride 0.9 % 1,000 mL with Infuvite Adult 10 mL, thiamine 100 mg, folic acid 1 mg infusion (has no administration in time range)   LORazepam (ATIVAN) injection 1 mg (1 mg Intravenous $Given 1/13/24 1156)   ondansetron (ZOFRAN) 2 MG/ML injection (4 mg  $Given 1/13/24 1139)        Assessments:  Seen and examined spoke with family    Independent Interpretation (X-rays, CTs, rhythm strip):  None    Consultations/Discussion of Management or Tests:  Hospitalist       Social Determinants of Health affecting care:  Chronic alcoholism     Disposition:  Admit    Impression & Plan        Medical Decision Making:  Patient presented to the ED by ambulance after what sounds like an alcohol withdrawal seizure he has chronic alcoholism and ran out of alcohol about 2 days ago he has been eating poorly.  He has no signs of obvious head trauma but does have some  bruising around his abdomen and lower extremities and no clinically significant ascites CT scans are pending patient has been started on IV vitamins and Ativan and we are planning to admit him for alcohol withdrawal seizures unless a traumatic issue is seen on CT. on my review of the CT scans I see no acute bleed or mass lesion on the head CT and on the abdominal CT I see no signs of acute solid organ injury.  Patient will be admitted for alcohol withdrawal seizures, thrombocytopenia and chronic alcohol abuse        Diagnosis:    ICD-10-CM    1. Alcohol withdrawal seizure with complication (H)  F10.939     R56.9       2. Thrombocytopenia (H24)  D69.6       3. Alcoholic hepatitis without ascites (H28)  K70.10            Discharge Medications:  New Prescriptions    No medications on file          Brock Bird MD  1/13/2024   Brock Bird MD Steinman, Randall Ira, MD  01/13/24 5851

## 2024-01-13 NOTE — ED NOTES
DATE/TIME OF CALL RECEIVED FROM LAB:  01/13/24 at 12:17 PM   LAB TEST:  CO2  LAB VALUE:  10  PROVIDER NOTIFIED?: Yes  PROVIDER NAME: Pelon  DATE/TIME LAB VALUE REPORTED TO PROVIDER: 12:17 PM    MECHANISM OF PROVIDER NOTIFICATION: Page  PROVIDER RESPONSE:

## 2024-01-13 NOTE — ED NOTES
DATE/TIME OF CALL RECEIVED FROM LAB:  01/13/24 at 12:19 PM   LAB TEST:  Platelet  LAB VALUE:  43  PROVIDER NOTIFIED?: Yes  PROVIDER NAME: Pleon  DATE/TIME LAB VALUE REPORTED TO PROVIDER: 12:20 PM  MECHANISM OF PROVIDER NOTIFICATION: Page  PROVIDER RESPONSE:

## 2024-01-13 NOTE — ED NOTES
[facility]  ED Nurse Handoff Report    ED Chief complaint: Fall      ED Diagnosis:   Final diagnoses:   Alcohol withdrawal seizure with complication (H)   Thrombocytopenia (H24)   Alcoholic hepatitis without ascites (H28)       Code Status: Full Code    Allergies: No Known Allergies    Patient Story: Jerome Flanagan is a 48 year old male who presents to the emergency department after his mother heard him fall and crash at home.  The patient is a chronic alcoholic but mom states he stopped drinking 2 days ago when he ran out of alcohol.  She also notes he is very weak and has not been eating much the past 2 weeks.  When she went to see him after she heard the crash his arms bilaterally seem to be jerking.  She went to call 911 and she returned and found him still jerking his upper arms this lasted 2 to 3 minutes before it resolved and he seemed confused afterwards.  The patient is now awake and states he does not remember what happened.  Patient has a long history of alcoholism and has been in inpatient treatment and has tried multiple outpatient programs without success.  He does not use any recreational drugs   Focused Assessment:  Alert and oriented, no complaints.  Bruises generalized all over his body.  Calm and cooperative, able to make needs known.      Treatments and/or interventions provided: labs, imaging.   Patient's response to treatments and/or interventions:   Labs Ordered and Resulted from Time of ED Arrival to Time of ED Departure   COMPREHENSIVE METABOLIC PANEL - Abnormal       Result Value    Sodium 134 (*)     Potassium 3.7      Carbon Dioxide (CO2) 10 (*)     Anion Gap 37 (*)     Urea Nitrogen 25.2 (*)     Creatinine 1.16      GFR Estimate 78      Calcium 8.3 (*)     Chloride 87 (*)     Glucose 148 (*)     Alkaline Phosphatase 121       (*)     ALT 41      Protein Total 6.7      Albumin 4.0      Bilirubin Total 1.9 (*)    LIPASE - Abnormal    Lipase 203 (*)    MAGNESIUM -  Abnormal    Magnesium 1.5 (*)    CBC WITH PLATELETS AND DIFFERENTIAL - Abnormal    WBC Count 9.8      RBC Count 3.05 (*)     Hemoglobin 9.9 (*)     Hematocrit 30.3 (*)     MCV 99      MCH 32.5      MCHC 32.7      RDW 13.8      Platelet Count 43 (*)     % Neutrophils        % Lymphocytes        % Monocytes        % Eosinophils        % Basophils        % Immature Granulocytes        NRBCs per 100 WBC 0      Absolute Neutrophils        Absolute Lymphocytes        Absolute Monocytes        Absolute Eosinophils        Absolute Basophils        Absolute Immature Granulocytes        Absolute NRBCs 0.0     DIFFERENTIAL - Abnormal    % Neutrophils 92      % Lymphocytes 6      % Monocytes 2      % Eosinophils 0      % Basophils 0      NRBCs per 100 WBC 2 (*)     Absolute Neutrophils 9.0 (*)     Absolute Lymphocytes 0.6 (*)     Absolute Monocytes 0.2      Absolute Eosinophils 0.0      Absolute Basophils 0.0      Absolute NRBCs 0.2 (*)     RBC Morphology Confirmed RBC Indices      Platelet Assessment        Value: Automated Count Confirmed. Platelet morphology is normal.   INR - Normal    INR 1.02     PHOSPHORUS - Normal    Phosphorus 3.6     ETHYL ALCOHOL LEVEL - Normal    Alcohol ethyl <0.01           To be done/followed up on inpatient unit:      Does this patient have any cognitive concerns?: Forgetful    Activity level - Baseline/Home:  Unknown  Activity Level - Current:   Unknown    Patient's Preferred language: English   Needed?: No    Isolation: None  Infection: Not Applicable  Patient tested for COVID 19 prior to admission: NO  Bariatric?: No    Vital Signs:   Vitals:    01/13/24 1230 01/13/24 1300 01/13/24 1330 01/13/24 1445   BP: (!) 131/95 115/86 122/84 (!) 150/104   Pulse: 110 110 107 105   Resp: 23 22 18 15   Temp:       TempSrc:       SpO2:    99%   Weight:       Height:           Cardiac Rhythm:     Was the PSS-3 completed:   Yes  What interventions are required if any?               Family Comments:  Mom and sister available by phone, would like updates.  Mom's number in demographics   OBS brochure/video discussed/provided to patient/family: No              Name of person given brochure if not patient:               Relationship to patient:     For the majority of the shift this patient's behavior was Green.   Behavioral interventions performed were .    ED NURSE PHONE NUMBER: *[phone]

## 2024-01-13 NOTE — ED NOTES
Rounded to assess patient and confirm accurate placement of BP cuff due to hypertensive values. Patient was calm and cooperative and was inquiring about timeframe of getting a bed upstairs. Remains tremulous, CIWA 13. Orders released for htn meds. Patient states he takes BP meds at home but could not remember the name of them.

## 2024-01-13 NOTE — ED TRIAGE NOTES
Per EMS, patient states he has been drinking.  Mom herd patient fall.  This was unwitnessed.  C-Collar in place.  Patient is confused, but also intoxicated.

## 2024-01-13 NOTE — H&P
[facility]    History and Physical  Hospitalist       Date of Admission:  1/13/2024    Assessment & Plan   Jerome Flanagan is a 48 year old male with severe alcohol use disorder brought in by EMS following a fall at home and noted to have an episode of seizure.  Alcohol levels are 0 with concern of alcohol withdrawal seizure.  Principal Problem:    Alcohol withdrawal seizure with complication (H)    Thrombocytopenia (H24)    Alcoholic hepatitis without ascites (H28)    Alcohol use disorder, severe, dependence (H)    --Patient is a chronic alcoholic, he denies any prior history of seizures but as per report from EMS and his mother patient was found on the ground and was having tonic-clonic movements, patient did not have any recollection of this event, he was confused on arrival to the ED, he only remembers seeing the ER physician later on arrival.  Patient is quite deconditioned, he mentions that he has no intention to quit drinking, he is asking if he can go home.  --There was some concern the patient has prior history of withdrawal seizures, he is currently living with his mother and has elevated LFTs as well as ketoacidosis noted.  --Admit to telemetry, continue neurochecks, seizure precautions, will obtain EEG, will also request neurology to see if we need to start him on seizure medications due to his propensity of seizures with alcohol withdrawal.  --Will request CD consult and psychiatric consult as the patient might need commitment and treatment since he is not acknowledging that he should quit drinking.  --Continue alcohol withdrawal protocol, thiamine, folic acid ordered, high-dose thiamine ordered due to history of Wernicke's encephalopathy documented in the chart, patient appears to be alert and able to communicate appropriately, can stop the thiamine dose possibly tomorrow and changed to 100 milligrams daily.  --Continue IV fluids, started on gabapentin taper, taper to 300 mg  3 times daily which is patient's PTA dose, diazepam ordered for CIWA scale, Ativan  for as needed seizures.  Elevated lipase  Possible starvation ketoacidosis  -- On admission patient was found to have a anion gap of 37 with low bicarb, he is blood sugars are within normal limit, patient did not have any history of diabetes, will obtain hemoglobin A1c, will continue hydration, he have not been eating and drinking for more than few days and was consuming mostly 750 cc/day of alcohol.  (Hard liquor).  --Continue LR, thiamine and multivitamin.  --Nutrition consult requested.  --Will repeat labs in AM.  Fall secondary to seizure   Posterior scalp hematoma secondary to fall  Bruises noted from fall on multiple areas on the torso  --Secondary to fall, CT head noted, he had a c-collar placed by EMS, this was removed by ER after evaluation in the emergency department.  --PT evaluation requested, patient seems to have unsteady gait as per report.  Severe esophagitis noted in CT chest abdomen and pelvis  --PTA patient is on omeprazole, will continue here with oral Protonix for now, if there is significant symptoms of odynophagia later decide on GI consult inpatient versus on discharge.    Essential hypertension  --Continue PTA amlodipine and lisinopril    Generalized anxiety disorder  --Continue PTA Celexa    Hyponatremia  --Possibly hypovolemic hyponatremia or beer drinkers potomania.  --Continue LR at 100 ml/h, possibly discontinue in a.m. if sodium levels improved  --CMP ordered for tomorrow AM.  anemia, acute on chronic  -In 2021 his hemoglobin was 11.4, currently presenting with a hemoglobin of 9.9.  --Will order iron studies.  Hypomagnesemia  --Replacement protocol ordered, nutrition consult requested    DVT Prophylaxis: Pneumatic Compression Devices  Code Status: Full Code    Disposition: Expected discharge in 24 hours for withdrawal is under control and no further seizures.    Wendy Robbins MD    Primary Care  Physician   Barrie Philip    Chief Complaint   Fall, seizures-1 episode    History is obtained from the patient  And medical records    History of Present Illness   Jerome Flanagan is a 48 year old male with a history of severe alcohol use disorder presents to the emergency department brought in by EMS as patient's mother heard a loud thud and so patient on the floor having possible seizures.  His alcohol level was undetectable, his last drink was 48 hours ago, he usually orders online and receives alcohol by door Dash.  He was drinking up to 750 mL of vodka on a regular basis, not sure why he quit drinking 2 days ago.  He mentions that he has no intention to quit drinking and want to continue drinking, he understands the consequences but do not want to change.  He denies having any experience with seizures in the past, he had multiple admissions through the ED for alcohol withdrawal for alcohol intoxication in the past.  He is living with his mom.  Denies any tongue injury, frothing in the side of the mouth, EMS had put a c-collar on him which was removed by the ER physician after evaluation in the ED.  Patient was having poor oral intake for few days while he was consuming hard liquor up to a liter a day.  Labs reviewed in detail and LFTs are elevated, bilirubin 1.9, glucose 148, lipase 203, patient did not mention any abdominal pain or nausea or vomiting.  Magnesium was found to be 1.5, calcium 8.3, BUN 25 with a creatinine of 1.16.  Sodium 134.  Hemoglobin 9.9.  Past Medical History    I have reviewed this patient's medical history and updated it with pertinent information if needed.       Past Surgical History   I have reviewed this patient's surgical history and updated it with pertinent information if needed.      Prior to Admission Medications   Prior to Admission Medications   Prescriptions Last Dose Informant Patient Reported? Taking?   HYDROcodone-acetaminophen (NORCO) 5-325 MG tablet   Yes No   Sig:  Take 1 tablet by mouth   Patient not taking: Reported on 8/20/2021   amLODIPine (NORVASC) 10 MG tablet   Yes No   Sig: Take 10 mg by mouth   citalopram (CELEXA) 20 MG tablet   Yes No   Sig: TAKE ONE TABLET BY MOUTH EVERY DAY   diclofenac (CATAFLAM) 50 MG tablet   No No   Sig: Take 1 tablet (50 mg) by mouth 3 times daily as needed for moderate pain   gabapentin (NEURONTIN) 300 MG capsule   Yes No   Sig: TAKE 2 CAPSULES BY MOUTH THREE TIMES DAILY   lisinopril (ZESTRIL) 20 MG tablet   Yes No   Sig: Take 20 mg by mouth   naltrexone (DEPADE/REVIA) 50 MG tablet   Yes No   Sig: TAKE ONE TABLET BY MOUTH EVERY DAY   Patient not taking: Reported on 8/20/2021   omeprazole (PRILOSEC) 20 MG DR capsule   Yes No   Sig: Take 20 mg by mouth   ranitidine (ZANTAC) 150 MG capsule   Yes No   Sig: Take 150 mg by mouth   Patient not taking: Reported on 8/20/2021      Facility-Administered Medications: None     Allergies   No Known Allergies    Social History   I have reviewed this patient's social history and updated it with pertinent information if needed. Jerome Flanagan  reports that he has never smoked. He has never used smokeless tobacco. He reports current alcohol use.    Family History   I have reviewed this patient's family history and updated it with pertinent information if needed.       Review of Systems   The 10 point Review of Systems is negative other than noted in the HPI or here.     Physical Exam   Temp: 97  F (36.1  C) Temp src: Temporal BP: 122/84 Pulse: 107   Resp: 18 SpO2: 100 % O2 Device: None (Room air)    Vital Signs with Ranges  Temp:  [97  F (36.1  C)] 97  F (36.1  C)  Pulse:  [102-110] 107  Resp:  [15-23] 18  BP: (114-142)/() 122/84  SpO2:  [100 %] 100 %  149 lbs 0 oz    Constitutional: Awake, alert, cooperative, thin built  Eyes: Conjunctiva and pupils examined and normal.  HEENT: Moist mucous membranes, normal dentition.  Respiratory: Clear to auscultation bilaterally, no crackles or  wheezing.  Cardiovascular: Regular rate and rhythm, normal S1 and S2, and no murmur noted.  GI: Soft, non-distended, non-tender, normal bowel sounds.  Lymph/Hematologic: No anterior cervical or supraclavicular adenopathy.  Skin: No rashes, no cyanosis, no edema.  See below about ecchymosis.  Musculoskeletal: Scattered areas of ecchymosis noted in the joints as well as side of the abdomen.  Neurologic: Cranial nerves 2-12 intact, normal strength and sensation.  Occasionally confused.  Psychiatric: Alert, oriented to person, place and time, patient was having significant upper extremity tremors    Data   Data reviewed today:  I personally reviewed labs and imaging below.  Recent Labs   Lab 01/13/24  1138   WBC 9.8   HGB 9.9*   MCV 99   PLT 43*   INR 1.02   *   POTASSIUM 3.7   CHLORIDE 87*   CO2 10*   BUN 25.2*   CR 1.16   ANIONGAP 37*   HERNANDEZ 8.3*   *   ALBUMIN 4.0   PROTTOTAL 6.7   BILITOTAL 1.9*   ALKPHOS 121   ALT 41   *   LIPASE 203*       Imaging:  Recent Results (from the past 24 hour(s))   CT Head w/o Contrast    Narrative    EXAM: CT HEAD W/O CONTRAST  LOCATION: [facility]  DATE/TIME: 1/13/2024 2:22 PM CST    INDICATION: Headache after trauma  COMPARISON: CT head dated 08/08/2023  TECHNIQUE: Routine CT Head without IV contrast. Multiplanar reformats. Dose reduction techniques were used.    FINDINGS:   INTRACRANIAL CONTENTS: No acute intracranial hemorrhage. No CT evidence of acute infarct. Sequelae of mild chronic microangiopathy. Mild cerebral volume loss without hydrocephalus. No extra-axial fluid collections.  Patent basal cisterns.     VISUALIZED ORBITS/SINUSES/MASTOIDS: The orbits are unremarkable. Mild paranasal sinus mucosal thickening. The temporal bone structures are well-aerated.     BONES/SOFT TISSUES: Left posterior scalp soft tissue contusion/hematoma. The calvarium and skull base are unremarkable.       Impression    IMPRESSION:     1. Left posterior  scalp soft tissue contusion/hematoma without acute intracranial abnormality.   CT Abdomen Pelvis w Contrast    Narrative    EXAM: CT ABDOMEN PELVIS W CONTRAST  LOCATION: [facility]  DATE: 1/13/2024    INDICATION: Trauma. Alcoholic. Bilateral bruising.  COMPARISON: None.  TECHNIQUE: CT scan of the abdomen and pelvis was performed following injection of IV contrast. Multiplanar reformats were obtained. Dose reduction techniques were used.  CONTRAST: 75mL Isovue 370    FINDINGS:   LOWER CHEST: Fluid within the diffusely thickened distal esophagus. Small hiatal hernia.    HEPATOBILIARY: Severe diffuse hepatic steatosis. No morphologic features of cirrhosis. No calcified gallstones or biliary ductal dilation.    PANCREAS: No peripancreatic inflammation or fluid collections.    SPLEEN: Normal.    ADRENAL GLANDS: Normal.    KIDNEYS/BLADDER: Normal.    BOWEL: Stomach is distended with fluid. No bowel obstruction or inflammation. Normal appendix.    LYMPH NODES: Normal.    VASCULATURE: Patent portal, splenic, and superior mesenteric veins. No abdominal aortic aneurysm.    PELVIC ORGANS: Normal.    MUSCULOSKELETAL: Small fat-containing right inguinal hernia. Tiny fat-containing periumbilical hernia. No displaced lower rib fractures. Mild age-indeterminate compression deformities of the the T12-L2 vertebral bodies. Multilevel degenerative changes   spine. No displaced pelvic fractures.      Impression    IMPRESSION:     1.  No definite acute abnormality in the abdomen or pelvis.    2.  Mild age indeterminant compression deformities of the T12-L2 vertebral bodies, probably chronic. Correlate with symptoms.    3.  Small hiatal hernia with retained or refluxed fluid within the distal esophagus. The patient may be at risk for aspiration.    4.  Distal esophagitis.    5.  Severe hepatic steatosis. No specific features of cirrhosis or portal hypertension.

## 2024-01-13 NOTE — PHARMACY-ADMISSION MEDICATION HISTORY
Pharmacist Admission Medication History    Admission medication history is complete. The information provided in this note is only as accurate as the sources available at the time of the update.    Information Source(s): Patient and CareEverywhere/SureScripts via in-person    Pertinent Information: None    Changes made to PTA medication list:  Added: simvastatin, fluoxetine, allopurinol  Deleted: ranitidine, Norco, naltrexone, citalopram  Changed: None      Allergies reviewed with patient and updates made in EHR: yes    Medication History Completed By: Umu Atwood [facility] 1/13/2024 3:07 PM    PTA Med List   Medication Sig Last Dose    allopurinol (ZYLOPRIM) 100 MG tablet Take 100 mg by mouth daily Past Week    amLODIPine (NORVASC) 10 MG tablet Take 10 mg by mouth daily Past Week    FLUoxetine (PROZAC) 40 MG capsule Take 40 mg by mouth daily     gabapentin (NEURONTIN) 300 MG capsule Take 600 mg by mouth 3 times daily Past Week    lisinopril (ZESTRIL) 20 MG tablet Take 20 mg by mouth daily Past Week    omeprazole (PRILOSEC) 20 MG DR capsule Take 20 mg by mouth daily as needed  at prn    simvastatin (ZOCOR) 20 MG tablet Take 20 mg by mouth at bedtime Past Week

## 2024-01-13 NOTE — ED NOTES
Bed: ED17  Expected date:   Expected time:   Means of arrival:   Comments:  A522 48M ETOH uncooperative on a hold ETA 1102

## 2024-01-14 NOTE — UTILIZATION REVIEW
Admission Status; Secondary Review Determination       Under the authority of the Utilization Management Committee, the utilization review process indicated a secondary review on the above patient. The review outcome is based on review of the medical records, discussions with staff, and applying clinical experience noted on the date of the review.     (x) Inpatient Status Appropriate - This patient's medical care is consistent with medical management for inpatient care and reasonable inpatient medical practice.     RATIONALE FOR DETERMINATION   48-year-old man with severe alcohol use disorder, history of seizures.  Since admission on 1/13/2024 has needed multiple doses of IV and oral benzodiazepines.  Still getting continuous IV fluids at 100 MLS per hour.  Still has significant anion gap metabolic acidosis.    Noted to have ongoing tachycardia up to 120s.    CT evidence of distal esophagitis.  Anemia, acute on chronic, noted.  GI consultation pending.    Also noted to have COVID-19 infection.    At the time of admission with the information available to the attending physician more than 2 nights hospital complex care was anticipated, based on patient risk of adverse outcome if treated as outpatient and complex care required. Inpatient admission is appropriate based on the Medicare guidelines.     This document was produced using voice recognition software.    The information on this document is developed by the utilization review team in order for the business office to ensure compliance. This only denotes the appropriateness of proper admission status and does not reflect the quality of care rendered.   The definitions of Inpatient Status and Observation Status used in making the determination above are those provided in the CMS Coverage Manual, Chapter 1 and Chapter 6, section 70.4.     Sincerely,   Carmencita Larry MD  Utilization Review  Physician Advisor  [facility].

## 2024-01-14 NOTE — PROGRESS NOTES
Admission/Transfer from: ed  2 RN skin assessment completed. Yes  Significant findings include: Scattered bruises and scabs  WOC Nurse Consult Ordered? No    2nd nurse: Rosi

## 2024-01-14 NOTE — PROGRESS NOTES
Observation goals  PRIOR TO DISCHARGE           Comments:   -diagnostic tests and consults completed and resulted: Not Met  -vital signs normal or at patient baseline: Met

## 2024-01-14 NOTE — PROGRESS NOTES
Observation goals  PRIOR TO DISCHARGE          Comments:   -diagnostic tests and consults completed and resulted: Not Met  -vital signs normal or at patient baseline: Not met

## 2024-01-14 NOTE — PROVIDER NOTIFICATION
Hospitalist notified:   Notifying of Critical Platelets of 37. Previously 43 today at 1130. Phosphorus 2.1. Please advise.     Update: Hospitalist aware. No new orders at this time.

## 2024-01-14 NOTE — PLAN OF CARE
Observation goals  PRIOR TO DISCHARGE        Comments: -diagnostic tests and consults completed and resulted: not met  -vital signs normal or at patient baseline: not met  Nurse to notify provider when observation goals have been met and patient is ready for discharge.

## 2024-01-14 NOTE — PROGRESS NOTES
[facility]    Medicine Progress Note - Hospitalist Service    Date of Admission:  1/13/2024    Assessment & Plan   Jerome Flanagan is a 48 year old male with severe alcohol use disorder brought in by EMS following a fall at home and noted to have an episode of seizure.  Alcohol levels are 0 with concern of alcohol withdrawal seizure.    Anion gap metabolic acidosis with respiratory compensation  Likely starvation ketoacidosis  -At presentation bicarb 10 with anion gap of 37.  Venous pH was not done at presentation lab, this morning bicarb is 12 and anion gap of 25 which is improved compared to yesterday, venous pH this morning is 7.32 with pCO2 of 26 shows respiratory compensation.  Lactate on admission was normal  -Ketones was not done during admission.  Stat ketone has been ordered but still pending, if ketones high will start him on D5 containing fluids but if ketones are negative will start him on bicarb containing fluids  -Monitor in telemetry    Seizure, new onset likely from alcohol withdrawal but could also be metabolic  History of fall and unsteady gaits  T12-L2 compression fracture  -Patient is a chronic alcoholic, he denies any prior history of seizures but as per report from EMS and his mother patient was found on the ground and was having tonic-clonic movements  -There was some concern the patient has prior history of withdrawal seizures, he is currently living with his mother  -Head CT.  Unremarkable except for left posterior scalp soft tissue contusion/hematoma without acute intracranial abnormality.  CT abdomen pelvis with mild age-indeterminate compression deformity at T12-L2 vertebral bodies probably chronic  -Patient has multiple bruises likely old on his back with suggest either previous falls or previous seizures  -continue neurochecks, seizure precautions  - EEG, neurology consult  -IV Ativan as needed for seizures, correct his metabolic abnormalities  -Fall  precaution  -High-dose thiamine  -PT OT evaluation and treatment    Alcoholic hepatitis without ascites   Alcohol use disorder, severe, dependence (H)  Elevated lipase  -LFTs showed elevated AST, bilirubin was elevated on arrival but has now improved, AST also improving.  Lipase was minimally elevated, CT abdomen pelvis with no evidence of pancreatitis.  -Monitor with CIWA protocol.  Patient scoring 7-16 on CIWA and has needed multiple doses of IV Valium/IV Ativan  CD consult and psychiatric consult ordered as the patient might need commitment and treatment since he is not acknowledging that he should quit drinking.  - gabapentin taper, multivitamin, high-dose thiamine as above    Acute anemia on chronic anemia  Severe esophagitis  -Patient denies any history of bleeding, no recent baseline but looking at the chart back in 2021 his hemoglobin was around 9.5-12.9.  -At presentation hemoglobin was 9.9, with hydration hemoglobin gradually decreasing and is 7.5 this morning.  -Iron studies shows iron level of 132  -Started on Protonix 40 mg twice daily  -Given evidence of severe esophagitis and acute anemia will consult GI    Hypomagnesemia  Hypophosphatemia  -Replace per protocol    COVID infection  -Patient currently with no symptoms.  -Continue to maintain special precaution  -Given severe thrombocytopenia we will hold off on pharmacological prophylaxis      Essential hypertension  --Continue PTA amlodipine and lisinopril with holding parameters    Generalized anxiety disorder  --Continue PTA Celexa    Hyponatremia, improved       Observation Goals: -diagnostic tests and consults completed and resulted, -vital signs normal or at patient baseline, Nurse to notify provider when observation goals have been met and patient is ready for discharge.  Diet: Regular Diet Adult    DVT Prophylaxis: Pneumatic Compression Devices  Heard Catheter: Not present  Lines: None     Cardiac Monitoring: ACTIVE order. Indication: Drug  overdose (24 hours)  Code Status: Full Code      Clinically Significant Risk Factors Present on Admission          # Hypocalcemia: Lowest Ca = 8.3 mg/dL in last 2 days, will monitor and replace as appropriate   # Hypomagnesemia: Lowest Mg = 1.5 mg/dL in last 2 days, will replace as needed  # Anion Gap Metabolic Acidosis: Highest Anion Gap = 37 mmol/L in last 2 days, will monitor and treat as appropriate    # Thrombocytopenia: Lowest platelets = 37 in last 2 days, will monitor for bleeding   # Hypertension: Noted on problem list   # Acute Respiratory Failure: Documented O2 saturation < 91%.  Continue supplemental oxygen as needed                Disposition Plan      Expected Discharge Date: 01/14/2024                    Lorena Hoyt MD  Hospitalist Service  [facility]  Securely message with [employer] (more info)  Text page via [facility] Paging/Directory   ______________________________________________________________________    Interval History   Care assumed, chart reviewed.  Patient does not talk a lot, very infrequently says yes or no.  He is aware he is at [facility] in [place].  Admits to drinking a lot of alcohol and reports that last drink was about 3 days ago.  Did not mention as to why he had quit drinking over the last few days.    Physical Exam   Vital Signs: Temp: 98.8  F (37.1  C) Temp src: Axillary BP: 106/82 Pulse: 83   Resp: 17 SpO2: 99 % O2 Device: None (Room air) Oxygen Delivery: 4 LPM  Weight: 149 lbs 0 oz    Exam:  Constitutional: Awake, alert and no distress. Appears comfortable  Head: Normocephalic. No masses, lesions, tenderness or abnormalities  ENMT: ENT exam normal, no neck nodes or sinus tenderness  Cardiovascular: RRR.  no murmurs, no rubs or JVD  Respiratory:normal WOB,b/l equal air entry, no wheezes or crackles   Gastrointestinal: Abdomen soft, non-tender. BS normal. No masses, organomegaly  : Deferred  Extremities :no edema , no clubbing or cyanosis       Medical Decision Making       60 MINUTES SPENT BY ME on the date of service doing chart review, history, exam, documentation & further activities per the note.      Data     I have personally reviewed the following data over the past 24 hrs:    8.4  \   7.5 (L)   / 29 (LL)     135 98 26.0 (H) /  97   3.5 12 (L) 1.09 \     ALT: 27 AST: 104 (H) AP: 84 TBILI: 0.8   ALB: 3.4 (L) TOT PROTEIN: 5.6 (L) LIPASE: N/A     TSH: N/A T4: N/A A1C: N/A     Procal: N/A CRP: N/A Lactic Acid: 1.4       Ferritin:  N/A % Retic:  N/A LDH:  N/A       Imaging results reviewed over the past 24 hrs:   No results found for this or any previous visit (from the past 24 hour(s)).  Recent Labs   Lab 01/14/24  0747 01/13/24  2326 01/13/24  1138   WBC 8.4 8.8 9.8   HGB 7.5* 8.7* 9.9*   MCV 98 98 99   PLT 29* 37* 43*   INR  --   --  1.02     --  134*   POTASSIUM 3.5  --  3.7   CHLORIDE 98  --  87*   CO2 12*  --  10*   BUN 26.0*  --  25.2*   CR 1.09  --  1.16   ANIONGAP 25*  --  37*   HERNANDEZ 8.3*  --  8.3*   GLC 97  --  148*   ALBUMIN 3.4*  --  4.0   PROTTOTAL 5.6*  --  6.7   BILITOTAL 0.8  --  1.9*   ALKPHOS 84  --  121   ALT 27  --  41   *  --  190*   LIPASE  --   --  203*

## 2024-01-15 NOTE — PLAN OF CARE
PRIMARY Concern: fall, alcohol dependence, seizure , COVID +  1/14/24 [id number]  Mobility Level/Assist Equipment: not OOB  Antibiotics & Plan (IV/po, length of tx left): none  Pain Management: denies  Tele/VS/O2: VSS on RA ex BP soft, tele NSR  ABNL Lab/BG: ketones 9, CO2 12, HG 7.5, phosphorus 2.1  Diet: Regular  Bowel/Bladder: in/continent   Skin Concerns: scattered bruises, and scabs   Drains/Devices: PIV infusing w/ NS & D5 @ 100 ml/hr  Needs MRI checklist completed by mother. Try calling 1/15 AM    4084: Critical lab: Ketones 5.10  Provider notified, awaiting response.

## 2024-01-15 NOTE — CONSULTS
"...              Neurology Consultation    Jerome Flanagan MRN# [id number]   YOB: 1975 Age: 48 year old    Code Status:Full Code   Date of Admission: 1/13/2024  Date of Consult:01/14/2024                                                                                           Reason for consult: This neurological consultation was requested by Dr. Robbins to assess this patient with a history for alcohol use disorder, for possible alcohol withdrawal seizure.       Chief Complaint:   Mr. Flanagan is a 48-year-old patient with a history for alcohol use disorder, chronic alcoholism, history of 4 prior alcohol withdrawal seizures, and history for related Warnicke's encephalopathy who indicated that he consumed hard liquor every day, his H&P indicated that he reportedly consumed 750 cc/daily.  The patient lives with his mother who found him on the ground having a generalized tonic-clonic seizure and he was brought to the ED where he was notably confused.  The patient is amnestic for this event but admitted that he had had seizures in the past related to \"alcohol poisoning\".  He also reported having had blunt head trauma several months ago when he fell at home, no mention of loss of consciousness.  While in the ED he had a noncontrast CT scan of the head which did not reveal any acute abnormalities, no evidence for intracranial hemorrhage, no midline shift or extra-axial fluid collection but there was evidence of a scalp soft tissue contusion, hematoma, in the left posterior area.  Routine  studies were significant for blood alcohol level of less than 0.01, electrolytes were unremarkable but calcium was low at 8.3, magnesium was 1.5, today 1/14/2024 it was 1.7, AST was 190, lactic acid was 1.4.  He was afebrile on admission         History of Present Illness:   ` This is a 48-year-old patient with a past history for alcohol abuse disorder and admitted to having generalized tonic-clonic seizures in the past, " probably related to alcohol use.  Documented history for past concerns regarding Warnicke's encephalopathy who also has a history of for hypertension.  Patient gives no history for meningitis or encephalitis in the past.  Gives no history for childhood seizures.  The family history was reviewed and was noncontributory, except for multiple family members with diabetes mellitus and heart disease.  No family history for seizures or any neurodegenerative disorders.  Indicated that he was a non-smoker, admitted to heavy alcohol use as mentioned above.  Review of systems was negative except for mentioned in the H&P.           Past Medical History:   No past medical history on file.      Past Surgical History:   No past surgical history on file.       Social History:     Social History     Socioeconomic History    Marital status: Single   Tobacco Use    Smoking status: Never    Smokeless tobacco: Never   Substance and Sexual Activity    Alcohol use: Yes     Patient denies smoking, history for alcohol abuse, denies illicit drugs use       Family History:   No family history on file.  Reviewed and not felt to be contributory.        Home Medications:     Prior to Admission Medications   Prescriptions Last Dose Informant Patient Reported? Taking?   FLUoxetine (PROZAC) 40 MG capsule   Yes Yes   Sig: Take 40 mg by mouth daily   allopurinol (ZYLOPRIM) 100 MG tablet Past Week  Yes Yes   Sig: Take 100 mg by mouth daily   amLODIPine (NORVASC) 10 MG tablet Past Week  Yes Yes   Sig: Take 10 mg by mouth daily   gabapentin (NEURONTIN) 300 MG capsule Past Week  Yes Yes   Sig: Take 600 mg by mouth 3 times daily   lisinopril (ZESTRIL) 20 MG tablet Past Week  Yes Yes   Sig: Take 20 mg by mouth daily   omeprazole (PRILOSEC) 20 MG DR capsule  at prn  Yes Yes   Sig: Take 20 mg by mouth daily as needed   simvastatin (ZOCOR) 20 MG tablet Past Week  Yes Yes   Sig: Take 20 mg by mouth at bedtime      Facility-Administered Medications: None         "  Allergy:   No Known Allergies       Inpatient Medications:   Scheduled Meds:   amLODIPine  10 mg Oral Daily    cloNIDine  0.1 mg Oral Q8H    FLUoxetine  40 mg Oral Daily    folic acid  1 mg Oral Daily    [START ON 1/18/2024] gabapentin  300 mg Oral Q8H    [START ON 1/16/2024] gabapentin  600 mg Oral Q8H    gabapentin  900 mg Oral Q8H    lisinopril  20 mg Oral Daily    multivitamin w/minerals  1 tablet Oral Daily    pantoprazole  40 mg Oral BID AC    potassium & sodium phosphates  1 packet Oral or Feeding Tube Q4H    senna-docusate  1 tablet Oral BID    Or    senna-docusate  2 tablet Oral BID    thiamine  500 mg Intravenous TID    Followed by    [START ON 1/16/2024] thiamine  250 mg Intravenous Daily    Followed by    [START ON 1/21/2024] thiamine  100 mg Oral Daily     PRN Meds: acetaminophen **OR** acetaminophen, diazepam **OR** diazepam, flumazenil, OLANZapine zydis **OR** haloperidol lactate, LORazepam, melatonin, nicotine, ondansetron **OR** ondansetron, senna-docusate **OR** senna-docusate          Physical Exam:   Physical Exam   Vitals:  Height:5' 10\"  Weight:149 lbs 0 oz   Temp: 99.3  F (37.4  C) Temp src: Oral BP: 105/51 Pulse: 97   Resp: 16 SpO2: 100 % O2 Device: None (Room air)    General Appearance: No acute distress, normal body habitus  Neuro Exam:  Mental Status Exam: Alert and oriented. Language and speech intact. Fund of knowledge normal.  Cranial Nerves: Vision/visual fields intact to finger counting. Pupils are equal and reactive to light. Extraocular movements intact. Facial strength and sensation is normal. No jaw or tongue deviation.  Motor: Motor tone and bulk are intact in extremities.   Reflexes: Symmetrically intact. Plantar signs normal.  Sensory: Vibration and cold intact in all 4 extremities.  Coordination: Coordination intact.  Gait: Stable  Neck: No nuchal rigidity  Extremities: No clubbing, no cyanosis, no edema          Data:   ROUTINE IP LABS   CBC RESULTS:     Recent Labs   Lab " 01/14/24  0747 01/13/24  2326 01/13/24  1138   WBC 8.4 8.8 9.8   RBC 2.22* 2.59* 3.05*   HGB 7.5* 8.7* 9.9*   HCT 21.8* 25.4* 30.3*   PLT 29* 37* 43*     Basic Metabolic Panel:   Recent Labs   Lab Test 01/14/24  0747 01/13/24  1138 08/12/21  1616    134* 131*   POTASSIUM 3.5 3.7 3.8   CHLORIDE 98 87* 96   CO2 12* 10* 22   BUN 26.0* 25.2* 19   CR 1.09 1.16 1.02   GLC 97 148* 113*   HERNANDEZ 8.3* 8.3* 9.2     Liver panel:  Recent Labs   Lab Test 01/14/24  0747 01/13/24  1138 08/12/21  1616 01/24/21  1737   PROTTOTAL 5.6* 6.7 8.2 8.2   ALBUMIN 3.4* 4.0 3.8 4.0   BILITOTAL 0.8 1.9* 0.6 0.2   ALKPHOS 84 121 71 80   * 190* 287* 80*   ALT 27 41 183* 112*     Lipid Profile:No lab results found.  Thyroid Panel:No lab results found.   Vitamin B12: No lab results found.        IMAGING:   Independent interpretation of the following studies by myself as part of today's encounter.   CT head:   -- 1/13/2024  No acute pathology, no evidence of intracranial hemorrhage, no evidence for acute infarct.  Changes attributed to chronic microvascular ischemic disease, mild cerebral volume loss without hydrocephalus, no extra-axial fluid collection.  Left posterior scalp soft tissue contusion/hematoma.  CTA neck/head:  --  MRI brain: 3/13/19  -- No acute changes, no hemorrhage, no midline shift or enhancing lesions reported, images were not available for review        Assessment and Plan:                                         #.  Impression:  -- Mr. Flanagan is a 48-year-old patient with a history for alcohol abuse disorder who was admitted through the ED on 1/14/2024 following a seizure, presumed, alcohol withdrawal seizure.  #.   Recent fall resulting in blunt head trauma as a result of witnessed generalized seizure resulting in scalp laceration  -- History for chronic alcohol use and a history for prior alcohol withdrawal seizures with elevated LFTs as well as ketoacidosis.        -- Commendations:  -Routine EEG  Vital signs  with neurochecks every 2 hours.  -Seizure precautions.  -Alcohol withdrawal protocol  -Continue high-dose thiamine, patient is said to have a history for Warnicke's encephalopathy, continue multivitamins  -CD consult and psychiatric consults have been recommended.  -Lorazepam as per protocol, for breakthrough seizures.  --Brain MRI with and without contrast  #. DVT Prophylaxis  -- As per hospital    #. Nutrition / GI Prophylaxis  --Per recommendations of speech therapy    Time:  60 minutes evaluation and management.     Renée Villarreal M.D.  [facility]  Office [phone]

## 2024-01-15 NOTE — PLAN OF CARE
Goal Outcome Evaluation:    Observation goals  PRIOR TO DISCHARGE        Comments: -diagnostic tests and consults completed and resulted -not yet, MRI to be done.  -vital signs normal or at patient baseline- Low BP  Nurse to notify provider when observation goals have been met and patient is ready for discharge-NOT MET   A/OX3, disoriented to time. Pt BP 79/46 this morning, bolus given.Pt was lethargic this morning but more awake now. Potassium replaced X2 , mag and phosphorus replaced today and re check in order. Incontinence of bowel, brown/black color stool MD notified. Up with 2 assist/pardeep steady. Multiple bruises on upper back and legs. Tolerating regular diet so IV SL.  Stool sample needs to be  collect. MRI check list done. Pt is transferring to 6th floor, report given. +2 edema on right hand .       On CIWA protocol. Tele SR. 1unit of blood to be transfuse this evening. Denies pain. Room air.

## 2024-01-15 NOTE — PLAN OF CARE
PRIMARY Concern: fall, alcohol dependence, seizure   SAFETY RISK Concerns (fall risk, behaviors, etc.): seizure precautions, fall precautions   Isolation/Type: COVID   Tests/Procedures for NEXT shift: EEG (complete)  Consults? (Pending/following, signed-off?) PT/SW, nutrition, chemical dependency, neurology, GI, and psych  Where is patient from? (Home, TCU, etc.): Home  Other Important info for NEXT shift: CIWA: 3; Neuro intact; On phos, mag, and K+ protocol (all am recheck tomorrow)  Anticipated DC date & active delays: Pending  _____________________________________________________________________________  SUMMARY NOTE:  Orientation/Cognitive: A&Ox3, disorientated to time  Observation Goals (Met/ Not Met): not met  Mobility Level/Assist Equipment: not OOB  Antibiotics & Plan (IV/po, length of tx left): none  Pain Management: denies  Tele/VS/O2: VSS on RA ex BP soft, tele NSR  ABNL Lab/BG: ketones 9, CO2 12, HG 7.5, phosphorus 2.1  Diet: Regular  Bowel/Bladder: in/continent   Skin Concerns: scattered bruises, and scabs   Drains/Devices: PIV infusing w/ NS & D5 @ 100 ml/hr

## 2024-01-15 NOTE — PROGRESS NOTES
MD Notification    Notified Person: MD    Notified Person Name:Fernando Romero    Notification Date/Time: 1/14/24 2257    Notification Interaction: Vocera Messager    Purpose of Notification: Critical lab : Ketone 5.10     Orders Received: no new orders    Comments:

## 2024-01-15 NOTE — PROGRESS NOTES
MD Notification    Notified Person: MD    Notified Person Name: Pratik Workman    Notification Date/Time: 1/15/24 0430    Notification Interaction: Vocera    Purpose of Notification:     PTs BP 81/53      Orders Received: 500ml bolus    Comments:

## 2024-01-15 NOTE — PROGRESS NOTES
Pt. Needs MRI checklist done, attempted x1 to call mother. Pt is also unable to answer questions, is jumping out of bed and experiencing tremors. Will attempt MRI another day. Charge nurse updated and MRI contacted.

## 2024-01-15 NOTE — PROGRESS NOTES
[facility]    Medicine Progress Note - Hospitalist Service    Date of Admission:  1/13/2024    Assessment & Plan   Jerome Flanagan is a 48 year old male with severe alcohol use disorder brought in by EMS following a fall at home and noted to have an episode of seizure.  Alcohol levels are 0 with concern of alcohol withdrawal seizure.    Anion gap metabolic acidosis with respiratory compensation  Likely starvation ketoacidosis  -At presentation bicarb 10 with anion gap of 37.  Venous pH next day of admission 7.32 with pCO2 of 26 shows respiratory compensation while serum bicarb was 12.  Lactate on admission was normal  -Serum ketones next day of admission was 9  -Patient started on D5 containing fluid with improvement on his ketone and is 2.30 this morning.  His serum bicarb and anion gap has also improved  -Patient has started eating food now, likely can stop D5 containing IV fluid if he continues to improve  -Monitor in telemetry    Seizure, new onset likely from alcohol withdrawal but could also be metabolic  History of fall and unsteady gaits  T12-L2 compression fracture  -Patient is a chronic alcoholic, he denies any prior history of seizures but as per report from EMS and his mother patient was found on the ground and was having tonic-clonic movements  -There was some concern the patient has prior history of withdrawal seizures, he is currently living with his mother  -Head CT.  Unremarkable except for left posterior scalp soft tissue contusion/hematoma without acute intracranial abnormality.  CT abdomen pelvis with mild age-indeterminate compression deformity at T12-L2 vertebral bodies probably chronic  -Patient has multiple bruises likely old on his back with suggest either previous falls or previous seizures  -continue neurochecks, seizure precautions  -Appreciate neurology input, EEG with no obvious epileptiform discharges or electrographic seizures/MRI pending  -IV Ativan as  needed for seizures, correct his metabolic abnormalities  -Fall precaution  -High-dose thiamine  -PT OT evaluation and treatment for safe disposition recommendation    Alcoholic hepatitis without ascites   Alcohol use disorder, severe, dependence (H)  Elevated lipase  -LFTs showed elevated AST, bilirubin was elevated on arrival but has now improved, AST also improving.  Lipase was minimally elevated, CT abdomen pelvis with no evidence of pancreatitis.  -Monitor with CIWA protocol.  Patient scoring 7-16 on CIWA and has needed multiple doses of IV Valium/IV Ativan  CD consult and psychiatric consult ordered as the patient might need commitment and treatment since he is not acknowledging that he should quit drinking.  - gabapentin taper, multivitamin, high-dose thiamine as above    Acute anemia on chronic anemia  Severe thrombocytopenia  Severe esophagitis  -Patient denies any history of bleeding, no recent baseline but looking at the chart back in 2021 his hemoglobin was around 9.5-12.9.  -At presentation hemoglobin was 9.9, with hydration hemoglobin gradually decreasing and is 7.1 this morning.  Platelet during this admission has been low, presented with 43 this morning is 30  -Iron studies shows iron level of 132  -Started on Protonix 40 mg twice daily  -Given evidence of severe esophagitis and acute anemia GI consulted  -Given significant drop in the hemoglobin will give him 1 unit of PRBC  -Patient consented  -Check reticulocyte count, peripheral smear, haptoglobin, B12 and folate  -Hematology consult    Hypomagnesemia  Hypophosphatemia  Hypokalemia  -Likely from ongoing alcohol use.  Replace per protocol  -Monitor in telemetry    COVID infection  -Patient currently with no symptoms.  -Continue to maintain special precaution  -Given severe thrombocytopenia we will hold off on pharmacological prophylaxis      Essential hypertension  --Continue PTA amlodipine and lisinopril with holding parameters    Generalized  anxiety disorder  --Continue PTA Celexa    Hyponatremia, improved          Diet: Regular Diet Adult    DVT Prophylaxis: Pneumatic Compression Devices  Heard Catheter: Not present  Lines: None     Cardiac Monitoring: ACTIVE order. Indication: Electrolyte Imbalance (24 hours)- Magnesium <1.3 mg/ml; Potassium < =2.8 or > 5.5 mg/ml  Code Status: Full Code      Clinically Significant Risk Factors Present on Admission        # Hypokalemia: Lowest K = 3.1 mmol/L in last 2 days, will replace as needed   # Hypocalcemia: Lowest Ca = 8 mg/dL in last 2 days, will monitor and replace as appropriate   # Hypomagnesemia: Lowest Mg = 1.3 mg/dL in last 2 days, will replace as needed  # Anion Gap Metabolic Acidosis: Highest Anion Gap = 37 mmol/L in last 2 days, will monitor and treat as appropriate  # Hypoalbuminemia: Lowest albumin = 3.4 g/dL at 1/14/2024  7:47 AM, will monitor as appropriate   # Thrombocytopenia: Lowest platelets = 29 in last 2 days, will monitor for bleeding   # Hypertension: Noted on problem list                 Disposition Plan      Expected Discharge Date: 01/18/2024        Discharge Comments: New admit.  Pysch, Neurology and CD consults.            Lorena Hoyt MD  Hospitalist Service  [facility]  Securely message with eventuosity (more info)  Text page via [employer] Paging/Directory   ______________________________________________________________________    Interval History   Patient more awake today able to communicate appropriately.  Agreeable to quit drinking and discussed with psychiatry and chemical dependency.He denies any active bleed.  No new nursing concerns.    Physical Exam   Vital Signs: Temp: 98.2  F (36.8  C) Temp src: Oral BP: 100/64 Pulse: 78   Resp: 18 SpO2: 94 % O2 Device: None (Room air)    Weight: 149 lbs 0 oz    Exam:  Constitutional: Awake, alert and no distress. Appears comfortable  Head: Normocephalic. No masses, lesions, tenderness or abnormalities  ENMT: ENT exam  normal, no neck nodes or sinus tenderness  Cardiovascular: RRR.  no murmurs, no rubs or JVD  Respiratory:normal WOB,b/l equal air entry, no wheezes or crackles   Gastrointestinal: Abdomen soft, non-tender. BS normal. No masses, organomegaly  : Deferred  Extremities :no edema , no clubbing or cyanosis      Medical Decision Making       53 MINUTES SPENT BY ME on the date of service doing chart review, history, exam, documentation & further activities per the note.      Data     I have personally reviewed the following data over the past 24 hrs:    5.4  \   7.1 (L)   / 30 (LL)     135 102 19.9 /  152 (H)   3.1 (L); 3.2 (L) 21 (L) 0.93 \       Imaging results reviewed over the past 24 hrs:   No results found for this or any previous visit (from the past 24 hour(s)).  Recent Labs   Lab 01/15/24  0853 01/15/24  0648 01/14/24  0747 01/13/24  2326 01/13/24  1138   WBC 5.4  --  8.4 8.8 9.8   HGB 7.1*  --  7.5* 8.7* 9.9*   MCV 96  --  98 98 99   PLT 30*  --  29* 37* 43*   INR  --   --   --   --  1.02   NA  --  135 135  --  134*   POTASSIUM  --  3.2*  3.1* 3.5  --  3.7   CHLORIDE  --  102 98  --  87*   CO2  --  21* 12*  --  10*   BUN  --  19.9 26.0*  --  25.2*   CR  --  0.93 1.09  --  1.16   ANIONGAP  --  12 25*  --  37*   HERNANDEZ  --  8.0* 8.3*  --  8.3*   GLC  --  152* 97  --  148*   ALBUMIN  --   --  3.4*  --  4.0   PROTTOTAL  --   --  5.6*  --  6.7   BILITOTAL  --   --  0.8  --  1.9*   ALKPHOS  --   --  84  --  121   ALT  --   --  27  --  41   AST  --   --  104*  --  190*   LIPASE  --   --   --   --  203*

## 2024-01-15 NOTE — CONSULTS
"CLINICAL NUTRITION SERVICES  -  ASSESSMENT NOTE    Recommendations by Registered Dietitian (RD):   Continue MVI/M  Continue thiamine as pt showing signs of refeeding   Future/Additional Recommendations:   Consider addition of supplements at next visit  Monitor refeeding labs   Malnutrition:   % Weight Loss:  Unable to assess w/ limited wt hx  % Intake:  Unable to assess  Subcutaneous Fat Loss:  Unable to assess  Muscle Loss:  Unable to assess  Fluid Retention:  trace    Malnutrition Diagnosis: Unable to determine due to lack of wt hx, nutrition hx, NFPE     REASON FOR ASSESSMENT  Jerome Flanagan is a 48 year old male seen by Registered Dietitian for Provider Order - \"Alcohol Withdrawal\"    PMH of severe alcohol use disorder. Presents with alcohol withdrawal seizure. Found to be COVID+.    NUTRITION HISTORY    - Per H&P, pt is a chronic alcoholic, noted ketoacidosis on arrival this admit. Patient has no intention to quit drinking.  - Attempted to call patient x2 (covid iso) but pt did not answer. Unable to obtain nutrition history at this time.    CURRENT NUTRITION ORDERS  Diet Order: Regular     Current Intake/Tolerance:  - per nursing flowsheets yesterday 1/14, \"pt not tolerating oral pills, pt declined food, oral swabs offered\"  - 1 meal ordered 1/13, 1 meal ordered 1/14 per [facility]    NUTRITION FOCUSED PHYSICAL ASSESSMENT FOR DIAGNOSING MALNUTRITION)  No:  Patient not available           ANTHROPOMETRICS  Height: 5' 10\"  Weight: 67.6 kg, 149 lbs 0 oz  Body mass index is 21.38 kg/m .  Weight Status:  Normal BMI  IBW: 75.5 kg  % IBW: 90%  Weight History: limited data on wt hx  Wt Readings from Last 10 Encounters:   01/13/24 67.6 kg (149 lb)   08/20/21 81.6 kg (180 lb)   08/12/21 81.6 kg (180 lb)   06/09/21 81.6 kg (180 lb)   01/24/21 81.6 kg (180 lb)     Per Care Everywhere:  83.9 kg (185 lb) 07/28/2022    LABS  BUN 26 (H), Phos 0.9 (L), Mg 1.3 (L), K 3.1 (L),  (H), Ketone quantitative 2.30 " (H)    MEDICATIONS  Folic acid, MVI/M, senokot, thiamine    ASSESSED NUTRITION NEEDS PER APPROVED PRACTICE GUIDELINES:  Dosing Weight 67.6 kg  Estimated Energy Needs: 0234-6913 kcals (30-35 Kcal/Kg)  Justification: repletion  Estimated Protein Needs:  grams protein (1.2-1.5 g pro/Kg)  Justification: preservation of lean body mass  Estimated Fluid Needs: 1 mL/kcal or per provider pending fluid status    NUTRITION DIAGNOSIS:  Predicted inadequate oral intake related to suspect alcohol replacing food consumption, only ordering 1 meal/day since admit    NUTRITION INTERVENTIONS  Recommendations / Nutrition Prescription  See above    Implementation  Nutrition education: patient not available  Multivitamin/Mineral - continue    Nutrition Goals  Patient to consume % of nutritionally adequate meal trays BID-TID, or the equivalent with supplements/snacks.    MONITORING AND EVALUATION:  Progress towards goals will be monitored and evaluated per protocol and Practice Guidelines    Cristela Beasley RD, LD  Clinical Dietitian - [facility]

## 2024-01-16 NOTE — CONSULTS
[facility]  Gastroenterology Consultation         Jerome Flanagan  [address] [facility] [address]  48 year old male    Admission Date/Time: 1/13/2024  Primary Care Provider: Barrie Philip  Referring / Attending Physician:  Dr. Lorena Hoyt    We were asked to see the patient in consultation by Dr. Lorena Hoyt for evaluation of severe esophagitis.      CC: alcohol withdrawal    HPI:  Jerome Flanagan is a 48 year old male who has a PMHx of severe alcohol use disorder, presented to ED via EMS after mother called with concerns of him falling and possible seizures. Drinks 1 L a day of alcohol and on presentation alcohol level was 0. He denies any routine medical care and has been seen in ED for alcohol intoxication with multiple admissions. He denies fever, chills, abdominal pain, bloody or black stools. He states he has no plans to discontinue drinking, despite aware of consequences.    ROS: A comprehensive ten point review of systems was negative aside from those in mentioned in the HPI.      PAST MED HX:  I have reviewed this patient's medical history and updated it with pertinent information if needed.   No past medical history on file.    MEDICATIONS:   Prior to Admission Medications   Prescriptions Last Dose Informant Patient Reported? Taking?   FLUoxetine (PROZAC) 40 MG capsule   Yes Yes   Sig: Take 40 mg by mouth daily   allopurinol (ZYLOPRIM) 100 MG tablet Past Week  Yes Yes   Sig: Take 100 mg by mouth daily   amLODIPine (NORVASC) 10 MG tablet Past Week  Yes Yes   Sig: Take 10 mg by mouth daily   gabapentin (NEURONTIN) 300 MG capsule Past Week  Yes Yes   Sig: Take 600 mg by mouth 3 times daily   lisinopril (ZESTRIL) 20 MG tablet Past Week  Yes Yes   Sig: Take 20 mg by mouth daily   omeprazole (PRILOSEC) 20 MG DR capsule  at prn  Yes Yes   Sig: Take 20 mg by mouth daily as needed   simvastatin (ZOCOR) 20 MG tablet Past Week  Yes Yes   Sig: Take 20 mg by mouth at bedtime       Facility-Administered Medications: None       ALLERGIES: No Known Allergies    SOCIAL HISTORY:  Social History     Tobacco Use    Smoking status: Never    Smokeless tobacco: Never   Substance Use Topics    Alcohol use: Yes       FAMILY HISTORY:  No family history on file.    PHYSICAL EXAM:   General  alert, oriented and comfortable  Vital Signs with Ranges  Temp: 98  F (36.7  C) Temp src: Oral BP: 121/79 Pulse: 78   Resp: 18 SpO2: 97 % O2 Device: None (Room air)    I/O last 3 completed shifts:  In: 1061 [P.O.:720]  Out: 200 [Urine:200]    Constitutional: pale, flat affect, alert, and mild distress   Cardiovascular: negative, PMI normal. No lifts, heaves, or thrills. RRR. No murmurs, clicks gallops or rub  Respiratory: negative, Percussion normal. Good diaphragmatic excursion. Lungs clear  Abdomen: Abdomen soft, non-tender. BS normal. No masses, organomegaly          ADDITIONAL COMMENTS:   I reviewed the patient's new clinical lab test results.   Recent Labs   Lab Test 01/16/24  0722 01/15/24  1533 01/15/24  0853 01/13/24  2326 01/13/24  1138   WBC 4.8 6.2 5.4   < > 9.8   HGB 7.8* 7.8* 7.1*   < > 9.9*   MCV 97 99 96   < > 99   PLT 57* 40* 30*   < > 43*   INR  --   --   --   --  1.02    < > = values in this interval not displayed.     Recent Labs   Lab Test 01/16/24  0722 01/15/24  2058 01/15/24  1533 01/15/24  0648 01/14/24  0747   POTASSIUM 3.8  3.8 3.1* 3.2* 3.2*  3.1* 3.5   CHLORIDE 112*  --   --  102 98   CO2 21*  --   --  21* 12*   BUN 15.6  --   --  19.9 26.0*   ANIONGAP 8  --   --  12 25*     Recent Labs   Lab Test 01/14/24  0747 01/13/24  1138 08/12/21  1616   ALBUMIN 3.4* 4.0 3.8   BILITOTAL 0.8 1.9* 0.6   ALT 27 41 183*   * 190* 287*   LIPASE  --  203*  --        I reviewed the patient's new imaging results.        CONSULTATION ASSESSMENT AND PLAN:    Jerome Flanagan is a 48 year old male with severe alcohol use disorder, with fall at home with concern for alcohol withdrawal seizure and found  to be COVID positive.    Alcohol withdrawal seizure with complication (H)  Alcoholic hepatitis without ascites (H28)  Alcohol use disorder, severe, dependence (H)  Thrombocytopenia (H24)  Anemia  Esophagitis  Platelets improved to 57k, Hemoglobin stable in 7-8 range. However 9.9 on presentation on 1/13  INR 1.02, Tbili 0.8, LFTs normal  CT notes no evidence of liver cirrhosis but does have severe hepatic steatosis. Fluid within diffusely thickened distal esophagus and a small hiatal hernia  Patient has severe alcoholic hepatitis and concern for esophagitis with upper GI bleed due to alcoholic gastritis/esophagitis    - NPO  - EGD today  - IV pantoprazole 40 mg BID  - monitor daily hemoglobin  - alcohol cessation        [facility].  Office: [phone]  Cell : [phone] (Dr. Kumar)  Cell: [phone] (Latrice Bates PA-C)

## 2024-01-16 NOTE — PROGRESS NOTES
01/16/24 1634   Appointment Info   Signing Clinician's Name / Credentials (PT) Riki Lopez DPT   Rehab Comments (PT) COVID   Living Environment   People in Home parent(s)   Current Living Arrangements condominium   Home Accessibility stairs to enter home   Number of Stairs, Main Entrance greater than 10 stairs   Stair Railings, Main Entrance railings on both sides of stairs   Transportation Anticipated family or friend will provide   Living Environment Comments Reports lives in a x2 story condo w/ mother. Reports a flight of stairs to get into the condo w/ railings on both sides.   Self-Care   Usual Activity Tolerance excellent   Current Activity Tolerance poor   Equipment Currently Used at Home none   Fall history within last six months yes   Number of times patient has fallen within last six months   (Several)   Activity/Exercise/Self-Care Comment Pt independent w/ ADLs and mobility w/o AD at baseline. Has been having multiple falls recently d/t alcohol.   General Information   Onset of Illness/Injury or Date of Surgery 01/13/24   Referring Physician Wendy Robbins MD   Patient/Family Therapy Goals Statement (PT) Return to home   Pertinent History of Current Problem (include personal factors and/or comorbidities that impact the POC) Jerome Flanagan is a 48 year old male with severe alcohol use disorder brought in by EMS following a fall at home and noted to have an episode of seizure. Alcohol levels are 0 with concern of alcohol withdrawal seizure.   Existing Precautions/Restrictions fall   Cognition   Affect/Mental Status (Cognition) confused   Orientation Status (Cognition) oriented to;person;place;situation   Follows Commands (Cognition) follows one-step commands   Pain Assessment   Patient Currently in Pain Yes, see Vital Sign flowsheet  (Reports pain in different places)   Integumentary/Edema   Integumentary/Edema Comments Bruising diffusely to back   Range of Motion (ROM)   ROM Comment WFLs for  mobility and transfers no formal testing completed   Strength (Manual Muscle Testing)   Strength Comments Generalized weakness noted w/ mobility and transfers, grossly w/ BLE strength testing   Bed Mobility   Comment, (Bed Mobility) Supine to sitting EOB w/ CGA   Transfers   Comment, (Transfers) Sit to stand w/ FWW and Mod A x1   Gait/Stairs (Locomotion)   Comment, (Gait/Stairs) Defered d/t unsteadiness   Balance   Balance Comments Decreased balance in standing   Clinical Impression   Criteria for Skilled Therapeutic Intervention Yes, treatment indicated   PT Diagnosis (PT) Impaired ambulation   Influenced by the following impairments Impaired strength, balance and activity tolerance   Functional limitations due to impairments Impaired ADLs, IADLs and functional mobility   Clinical Presentation (PT Evaluation Complexity) stable   Clinical Presentation Rationale Clinical judgment   Clinical Decision Making (Complexity) low complexity   Planned Therapy Interventions (PT) balance training;bed mobility training;gait training;home exercise program;patient/family education;stair training;strengthening;transfer training;progressive activity/exercise   Risk & Benefits of therapy have been explained evaluation/treatment results reviewed;care plan/treatment goals reviewed;risks/benefits reviewed;current/potential barriers reviewed;participants voiced agreement with care plan;participants included;patient   PT Total Evaluation Time   PT Eval, Low Complexity Minutes (36708) 10   Physical Therapy Goals   PT Frequency Daily   PT Predicted Duration/Target Date for Goal Attainment 01/26/24   PT Goals Bed Mobility;Transfers;Gait;Stairs   PT: Bed Mobility Independent;Supine to/from sit   PT: Transfers Modified independent;Sit to/from stand;Assistive device   PT: Gait Modified independent;150 feet;Rolling walker   PT: Stairs Independent;Greater than 10 stairs;Rail on both sides   Interventions   Interventions Quick Adds Therapeutic  Activity   Therapeutic Activity   Therapeutic Activities: dynamic activities to improve functional performance Minutes (93368) 25   Symptoms Noted During/After Treatment Fatigue   Treatment Detail/Skilled Intervention Pt supine in bed at start of session. Agreeable to PT. Pt needing to use momentum to get sitting EOB taking multiple forward rocks to get to standing. Once seated EOB fair sitting balance needing SBA, however, on strength testing Pt losing balance backwards multiple times. Pt completing sit to stand from bed height x3 w/ FWW and Mod A x1. Cued for UE hand placement. Pt w/ poor balance initially on standing losing balance posteriorly, however, eventually able to gain balance. Practiced marching in place w/ minimal foot clearance initially able to progress to more than prior. Completing stand pivot transfer from EOB <> bedside chair w/ Mod A x1. Cues for sequencing LEs. Sitting prematurely w/ poor eccentric lowering. Pt completing x10 sit to stand w/ FWW and Mod A x1. Cues for UE hand placement. Needing seated rest break after first x5 reps and needing break prior to last rep. Min A x1 for supine to sit transfer. All needs met w/ call light in place and bed alarm on.   PT Discharge Planning   PT Plan Transfers, balance, gait as able w/ chair follow, HEP   PT Discharge Recommendation (DC Rec) Acute Rehab Center-Motivated patient will benefit from intensive, interdisciplinary therapy.  Anticipate will be able to tolerate 3 hours of therapy per day   PT Rationale for DC Rec Pt well below baseline mobility. Lives in a condo w/ mother w/ stairs to complete. Typically very independent at baseline. Pt very motivated to return to OF and tolerating therapy session well. Would recommend DC to ARU at time of DC to improve upon functional mobility and strengthening.   PT Brief overview of current status Mod A w/ FWW for transfers   Total Session Time   Timed Code Treatment Minutes 25   Total Session Time (sum of  timed and untimed services) 35

## 2024-01-16 NOTE — CONSULTS
[facility]    Hematology/Oncology Consult Note      Date of Admission:  1/13/2024  Date of Consultation:  01/16/24  Reason for Consultation: Progressive anemia      ASSESSMENT/PLAN:  Jerome Flanagan is a 48 year old male with severe alcoholism, chronic anemia, severe hepatic steatosis who is admitted for fall, alcohol withdrawal with possible seizure, and is discovered to have COVID. Hematology consulted for progressive anemia. History is obtained via chart review as patient is lethargic and nonverbal currently.    #Acute on chronic NC anemia  #Iron deficiency anemia, anemia of chronic inflammation  #Positive FOBT  #Acute thrombocytopenia  #Diffuse hepatic steatosis  #Severe alcoholism  #Alcohol withdrawal  #Continued encephalopathy/Wernicke's  #COVID infection    -Patient has acute on chronic NC anemia. I cannot confirm evidence of GIB with patient, but he has positive FOBT. I am unable to locate history of colonoscopy in the patient's chart. Gastroenterology has already been consulted.  -Ferritin pending. Iron sat is 10%. Iron is 12 and TIBC 119. He has evidence of mixed anemia of chronic inflammation/chronic disease and iron deficiency. Will write for venofer 300 mg daily x3.   -LFTs have improved. T. Bili is now 0.4. Coags are normal. LDH is normal. EVA is negative. He is not hemolyzing.   -Platelets improved today to 57K. Though he does not have evidence of overt portal HTN or splenomegaly on imaging, he is likely to have a component of splenic sequestration given long term alcoholism and hepatic steatosis. He is also likely to have underlying marrow suppression.   -B12 and folate normal.   -Peripheral smear is pending.   -No current indication for bone marrow biopsy.   -He continues on daily folic acid and thiamine.    -Transfuse for Hb </=7.  -Transfuse for PLT <20K or <50K if evidence of active bleed.     Thank you for the consultation. Hematology to follow peripherally. Please  contact our team with any further questions or concerns.     Allison Shukla DO  Hematology/Oncology  [facility]          HISTORY OF PRESENT ILLNESS: Jerome Flanagan is a 48 year old male with severe alcoholism, chronic anemia, severe hepatic steatosis who is admitted for fall, alcohol withdrawal with possible seizure, and is discovered to have COVID. Hematology consulted for progressive anemia. History is obtained via chart review as patient is lethargic and nonverbal currently.    CT Head 1/13/24: Left posterior scalp soft tissue contusion/hematoma without acute intracranial abnormality.    CT A/P 1/13/24: Age-indeterminant compression deformities of T12-L2, probably chronic. Distal esophagitis. Severe hepatic steatosis. No specific features of cirrhosis or portal hypertension.     Labs in our system are available beginning in 2021 with patient's hemoglobin declining from 12.9 to 7.8, normocytic. Platelets have nadired to 29K.     Normal renal function. AST and ALT are consistently elevated. AST is 190, ALT 41, T. Bili 1.9.    He was admitted with ketones >9.    He has been transfused 1U PRBC.     He has positive FOBT.         REVIEW OF SYSTEMS:   A 14 point ROS was reviewed with pertinent positives and negatives in the HPI.      MEDICATIONS:  Current Facility-Administered Medications   Medication    acetaminophen (TYLENOL) tablet 650 mg    Or    acetaminophen (TYLENOL) Suppository 650 mg    dextrose 5% and 0.9% NaCl infusion    diazepam (VALIUM) tablet 10 mg    Or    diazepam (VALIUM) injection 5-10 mg    flumazenil (ROMAZICON) injection 0.2 mg    FLUoxetine (PROzac) capsule 40 mg    folic acid (FOLVITE) tablet 1 mg    [START ON 1/18/2024] gabapentin (NEURONTIN) capsule 300 mg    gabapentin (NEURONTIN) capsule 600 mg    gabapentin (NEURONTIN) capsule 900 mg    OLANZapine zydis (zyPREXA) ODT tab 5-10 mg    Or    haloperidol lactate (HALDOL) injection 2.5-5 mg    LORazepam (ATIVAN) injection  "1 mg    melatonin tablet 5 mg    multivitamin w/minerals (THERA-VIT-M) tablet 1 tablet    nicotine (NICORETTE) gum 2 mg    ondansetron (ZOFRAN ODT) ODT tab 4 mg    Or    ondansetron (ZOFRAN) injection 4 mg    pantoprazole (PROTONIX) EC tablet 40 mg    senna-docusate (SENOKOT-S/PERICOLACE) 8.6-50 MG per tablet 1 tablet    Or    senna-docusate (SENOKOT-S/PERICOLACE) 8.6-50 MG per tablet 2 tablet    senna-docusate (SENOKOT-S/PERICOLACE) 8.6-50 MG per tablet 1 tablet    Or    senna-docusate (SENOKOT-S/PERICOLACE) 8.6-50 MG per tablet 2 tablet    thiamine (B-1) injection 250 mg    Followed by    [START ON 1/21/2024] thiamine (B-1) tablet 100 mg         ALLERGIES:  No Known Allergies      PAST MEDICAL HISTORY:  No past medical history on file.      PAST SURGICAL HISTORY:  No past surgical history on file.      SOCIAL HISTORY:  Social History     Socioeconomic History    Marital status: Single     Spouse name: Not on file    Number of children: Not on file    Years of education: Not on file    Highest education level: Not on file   Occupational History    Not on file   Tobacco Use    Smoking status: Never    Smokeless tobacco: Never   Substance and Sexual Activity    Alcohol use: Yes    Drug use: Not on file    Sexual activity: Not on file   Other Topics Concern    Not on file   Social History Narrative    Not on file     Social Determinants of Health     Financial Resource Strain: Not on file   Food Insecurity: Not on file   Transportation Needs: Not on file   Physical Activity: Not on file   Stress: Not on file   Social Connections: Not on file   Interpersonal Safety: Not on file   Housing Stability: Not on file         FAMILY HISTORY:  No family history on file.      PHYSICAL EXAM:  Vital signs:  Temp: 98  F (36.7  C) Temp src: Oral BP: 121/79 Pulse: 78   Resp: 18 SpO2: 97 % O2 Device: None (Room air) Oxygen Delivery: 4 LPM Height: 177.8 cm (5' 10\") Weight: 67.6 kg (149 lb)  Estimated body mass index is 21.38 kg/m  as " "calculated from the following:    Height as of this encounter: 1.778 m (5' 10\").    Weight as of this encounter: 67.6 kg (149 lb).      GENERAL/CONSTITUTIONAL: Asleep, protecting airway. Does not wake to verbal command.        LABS:  CBC RESULTS:   Recent Labs   Lab Test 01/16/24 0722   WBC 4.8   RBC 2.42*   HGB 7.8*   HCT 23.5*   MCV 97   MCH 32.2   MCHC 33.2   RDW 14.7   PLT 57*       Recent Labs   Lab Test 01/16/24  0722 01/15/24  2058 01/15/24  1533 01/15/24  0648     --   --  135   POTASSIUM 3.8  3.8 3.1*   < > 3.2*  3.1*   CHLORIDE 112*  --   --  102   CO2 21*  --   --  21*   ANIONGAP 8  --   --  12   *  --   --  152*   BUN 15.6  --   --  19.9   CR 0.93  --   --  0.93   HERNANDEZ 7.8*  --   --  8.0*    < > = values in this interval not displayed.      Latest Reference Range & Units 01/15/24 06:48 01/15/24 15:33   Folate 4.6 - 34.8 ng/mL  27.6   Vitamin B12 232 - 1,245 pg/mL 583         Latest Reference Range & Units 01/16/24 08:52 01/16/24 10:31   Phosphorus 2.5 - 4.5 mg/dL  2.4 (L)   Albumin 3.5 - 5.2 g/dL  3.1 (L)   Protein Total 6.4 - 8.3 g/dL  5.3 (L)   Alkaline Phosphatase 40 - 150 U/L  70   ALT 0 - 70 U/L  19   AST 0 - 45 U/L  34   Bilirubin Direct 0.00 - 0.30 mg/dL  <0.20   Bilirubin Total <=1.2 mg/dL  0.4   Iron 61 - 157 ug/dL  12 (L)   Iron Binding Capacity 240 - 430 ug/dL  119 (L)   Iron Sat Index 15 - 46 %  10 (L)   Lactate Dehydrogenase 0 - 250 U/L  215   Occult Blood Negative  Positive !       Latest Reference Range & Units 01/16/24 10:31   INR 0.85 - 1.15  0.91   PTT 22 - 38 Seconds 28   Fibrinogen 170 - 490 mg/dL 417   SPECIMEN EXPIRATION DATE  [id number]   EVA Anti-IgG,-C3d  Negative       IMAGING:  CT Head 1/13/24:  IMPRESSION:   1. Left posterior scalp soft tissue contusion/hematoma without acute intracranial abnormality.    CT A/P 1/13/24:  IMPRESSION:   1.  No definite acute abnormality in the abdomen or pelvis.  2.  Mild age indeterminant compression deformities of the T12-L2 " vertebral bodies, probably chronic. Correlate with symptoms.  3.  Small hiatal hernia with retained or refluxed fluid within the distal esophagus. The patient may be at risk for aspiration.  4.  Distal esophagitis.  5.  Severe hepatic steatosis. No specific features of cirrhosis or portal hypertension.          Thank you for the opportunity to participate in this patient's care.  Please call with any questions.    Allison Shukla DO  Hematology/Oncology  [facility]

## 2024-01-16 NOTE — CONSULTS
Care Management Initial Consult    General Information  Assessment completed with: Parents, Tisha  Type of CM/SW Visit: Initial Assessment    Primary Care Provider verified and updated as needed: Yes   Readmission within the last 30 days: no previous admission in last 30 days      Reason for Consult: discharge planning  Advance Care Planning: Advance Care Planning Reviewed: no concerns identified          Communication Assessment  Patient's communication style: spoken language (English or Bilingual)             Cognitive  Cognitive/Neuro/Behavioral: .WDL except  Level of Consciousness: lethargic  Arousal Level: opens eyes spontaneously  Orientation: disoriented to, time  Mood/Behavior: calm  Best Language: 0 - No aphasia  Speech: clear    Living Environment:   People in home: parent(s)     Current living Arrangements: condominium      Able to return to prior arrangements: yes       Family/Social Support:  Care provided by: self  Provides care for: no one  Marital Status: Single  Parent(s)          Description of Support System: Supportive    Support Assessment: Adequate family and caregiver support    Current Resources:   Patient receiving home care services: No     Community Resources:    Equipment currently used at home:    Supplies currently used at home:      Employment/Financial:  Employment Status: unemployed        Financial Concerns: unemployed           Does the patient's insurance plan have a 3 day qualifying hospital stay waiver?  No    Lifestyle & Psychosocial Needs:  Social Determinants of Health     Food Insecurity: Not on file   Depression: Not on file   Housing Stability: Not on file   Tobacco Use: Low Risk  (8/20/2021)    Patient History     Smoking Tobacco Use: Never     Smokeless Tobacco Use: Never     Passive Exposure: Not on file   Financial Resource Strain: Not on file   Alcohol Use: Not on file   Transportation Needs: Not on file   Physical Activity: Not on file   Interpersonal Safety: Not on  file   Stress: Not on file   Social Connections: Not on file       Functional Status:  Prior to admission patient needed assistance:   Dependent ADLs:: Independent  Dependent IADLs:: Independent  Assesssment of Functional Status: Not at  functional baseline    Mental Health Status:  Mental Health Status: Current Concern       Chemical Dependency Status:  Chemical Dependency Status: Current Concern  Chemical Dependency Management: Previous treatment          Values/Beliefs:  Spiritual, Cultural Beliefs, Confucianism Practices, Values that affect care: no               Additional Information:  Spoke with patients mother Tisha regarding plan of care .  Tisha states patient lives with her in a 2 story condominium.  Patient is independent at baseline however the last few weeks patient has been unsteady,fallen several times and recently had seizures.  Tisha states patient has not been keen on self care .  Patient has been drinking heavily the last few weeks after the death of his 10mth old nephew.   Tisha states patient has been through treatments in the past and has not helped. Patient has been drinking since age 18 after the death of his father.   Patient is a  and held a good job. He was living of his savings to buy alcohol. He is unemployed at present and is not actively looking for a job. Mother feels he will deplete his savings and unsure how he will manage.   Patients mother hopes patient will seek treatment  .However with his unstable mobility patient will need to go to a TCU prior to returning to his home.  Patient would benefit from therapy evaluation to determine disposition.       Janelle Leal RN

## 2024-01-16 NOTE — PROGRESS NOTES
CD Consult acknowledged.  CD Consult team will attempt to see pt tomorrow 1/17/24.    If pt discharges prior to being seen and has active insurance they may also schedule an assessment on an outpatient basis by calling [facility] at [phone].     LYUBOV [place], [facility]  Substance Use Disorder Evaluation Counselor  Email: [email or web address]

## 2024-01-16 NOTE — PROVIDER NOTIFICATION
MD Notification    Notified Person: MD    Notified Person Name: Dr. Doty    Notification Date/Time: 1/16 1637    Notification Interaction: Vocera    Purpose of Notification: Pt states he gets claustrophobic during MRI, can we get order for premedication? thanks.     Orders Received:    Comments:

## 2024-01-16 NOTE — CONSULTS
Psychiatry consult noted. Attempted to meet with patient this afternoon. He went down for EGD earlier today, seems that fentanyl and midazolam given earlier are still impacting his mental status. He could not recall having EGD done and had difficulty providing history. I did not feel that he was in appropriate state of mind to have discussion about alcohol abuse and sobriety. Will attempt to meet with him tomorrow 1/17/23.      ANA Aguilar CNP   Consult/Liaison Psychiatry   [facility]    Contact information available via [facility] Paging/Directory  If I am not available, then [facility] CL line ([phone]) should know who is covering our consult service.

## 2024-01-16 NOTE — PROVIDER NOTIFICATION
".MD Notification    Notified Person: MD    Notified Person Name: Pepe Butts    Notification Date/Time:  01/15/24 @ 1830    Notification Interaction: [employer] Messaging    Purpose of Notification: \"Hello, pt transferred from Short stay. BP was running low this AM and needed to get a 1L bolus. Pt was due to start blood. BP when he got up here was BP 86/40 HR 80, recheck BP 80/51 HR 79. What would you like us to do? Thanks. pt is asymptomatic, denies dizziness or lightheadness, MAP wasn't taken\"    Orders Received: MD acknowledged. Clarified calculated MAP was 61. MD ordered NS bolus 250 ml over an hour    Comments:   "

## 2024-01-16 NOTE — PLAN OF CARE
Goal Outcome Evaluation:         Summary: Jerome Flanagan is a 48 year old male with severe alcohol use disorder brought in by EMS following a fall at home and noted to have an episode of seizure.  Alcohol levels are 0 with concern of alcohol withdrawal seizure. History of fall and unsteady gaits,T12-L2 compression fracture  DATE & TIME: 1/15/24 [id number]    Cognitive Concerns/ Orientation : A&O x3, disoriented to time   BEHAVIOR & AGGRESSION TOOL COLOR: Green  CIWA SCORE: 4,44   ABNL VS/O2: VSS RA exc soft/low BP, bolus IVF given yesterday   MOBILITY: AX1 BG/W, not OOB this shift, generalized weakness, BUE tremors   PAIN MANAGMENT: Denies  DIET: Regular  BOWEL/BLADDER: Continent, can be incontinent at times  ABNL LAB/BG: K- 3.1, Phosp 1.0 Hgb 7.8, all replaced , recheck this morning  DRAIN/DEVICES: 2 x PIV SL  TELEMETRY RHYTHM: NSR  SKIN: Scattered bruises, scab   TESTS/PROCEDURES: MRI,checklist is done  D/C DATE: pending improvement   IS THE PATIENT ON REMDESIVIR? No  DATE OF LAST SCHEDULED DOSE    SAFETY RISK Concerns (fall risk, behaviors, etc.): seizure precautions, fall  Isolation/Type: COVID   Tests/Procedures for NEXT shift:   PT/SW, nutrition, chemical dependency, neurology, GI, and psych  Where is patient from? (Home, TCU, etc.): Home  Other Important info for NEXT shift: CIWA: 4; Neuro intact; On phos, mag, and K+ protocol (all am recheck this morning)  Orientation/Cognitive: disoriented to time  Observation Goals (Met/ Not Met): not met  Mobility Level/Assist Equipment: not OOB. Hgb on admission was 9.9 the decrease to 7.1, given significant drop 1 unit of blood given, recheck this morning, Hematology consult, stool sample needed

## 2024-01-16 NOTE — PROGRESS NOTES
[facility]    Medicine Progress Note - Hospitalist Service    Date of Admission:  1/13/2024    Assessment & Plan   Jerome Flanagan is a 48 year old male with severe alcohol use disorder brought in by EMS following a fall at home and noted to have an episode of seizure.  Alcohol levels are 0 with concern of alcohol withdrawal seizure.    Anion gap metabolic acidosis with respiratory compensation, resolved  Likely starvation ketoacidosis, resolved  -At presentation bicarb 10 with anion gap of 37.  Venous pH next day of admission 7.32 with pCO2 of 26 shows respiratory compensation while serum bicarb was 12.  Lactate on admission was normal  -Serum ketones next day of admission was 9.  Started improving with starting on D5 containing IV fluids and this morning is normal.  Bicarb is also 21 and anion gap is closed.  -Patient seems to be improving with respect to his acidosis    Seizure, new onset likely from alcohol withdrawal but could also be metabolic  History of fall and unsteady gaits  T12-L2 compression fracture  -Patient is a chronic alcoholic, he denies any prior history of seizures but as per report from EMS and his mother patient was found on the ground and was having tonic-clonic movements  -There was some concern the patient has prior history of withdrawal seizures, he is currently living with his mother  -Head CT.  Unremarkable except for left posterior scalp soft tissue contusion/hematoma without acute intracranial abnormality.  CT abdomen pelvis with mild age-indeterminate compression deformity at T12-L2 vertebral bodies probably chronic  -Patient has multiple bruises likely old on his back with suggest either previous falls or previous seizures  -continue neurochecks, seizure precautions  -Appreciate neurology input, EEG with no obvious epileptiform discharges or electrographic seizures/MRI pending  -IV Ativan as needed for seizures, correct his metabolic abnormalities  -Fall  precaution  -High-dose thiamine  -PT OT evaluation and treatment for safe disposition recommendation    Alcoholic hepatitis without ascites   Alcohol use disorder, severe, dependence (H)  Elevated lipase  -LFTs showed elevated AST, bilirubin was elevated on arrival but has now improved, AST also improving.  Lipase was minimally elevated, CT abdomen pelvis with no evidence of pancreatitis.  -Monitor with CIWA protocol.  Patient initially scoring 7-16 on CIWA and has needed multiple doses of IV Valium/IV Ativan.  Last dose of Valium was 1/14/2024 at 3:27 AM  CD consult and psychiatric consult ordered as the patient might need commitment and treatment since he is not acknowledging that he should quit drinking.  - gabapentin taper, multivitamin, high-dose thiamine as above    Acute anemia on chronic anemia  Severe thrombocytopenia  Severe esophagitis  -Patient denies any history of bleeding, no recent baseline but looking at the chart back in 2021 his hemoglobin was around 9.5-12.9.  -At presentation hemoglobin was 9.9, with hydration hemoglobin gradually decreasing and dropped down to 7.1 1/15.  Platelet during this admission has been low, presented with 43 this morning is 30  -Initial iron studies shows iron level of 132  however repeat iron level is? 12 iron sat 10 and iron binding capacity with 119 suggesting mixed picture, LDH normal, Hemoccult positive  -Patient underwent EGD earlier today, appreciate GI input  -Discussed with Dr Kumar, noted to have severe esophagitis but no stigmata of bleeding  -Continue Protonix 40 mg twice daily  -Status post 1 unit PRBC transfusion 1/15  -Appreciate hematology input    Hypomagnesemia  Hypophosphatemia  Hypokalemia  -Likely from ongoing alcohol use.  Replace per protocol  -Monitor in telemetry    COVID infection  -Patient currently with no symptoms.  -Continue to maintain special precaution  -Given severe thrombocytopenia we will hold off on pharmacological prophylaxis       Essential hypertension  --Continue PTA amlodipine and lisinopril with holding parameters    Generalized anxiety disorder  --Continue PTA Celexa    Hyponatremia, improved          Diet: NPO per Anesthesia Guidelines for Procedure/Surgery Except for: Meds    DVT Prophylaxis: Pneumatic Compression Devices  Heard Catheter: Not present  Lines: None     Cardiac Monitoring: ACTIVE order. Indication: GI bleed  Code Status: Full Code      Clinically Significant Risk Factors        # Hypokalemia: Lowest K = 3.1 mmol/L in last 2 days, will replace as needed     # Hypomagnesemia: Lowest Mg = 1.3 mg/dL in last 2 days, will replace as needed   # Hypoalbuminemia: Lowest albumin = 3.4 g/dL at 1/14/2024  7:47 AM, will monitor as appropriate   # Thrombocytopenia: Lowest platelets = 30 in last 2 days, will monitor for bleeding   # Hypertension: Noted on problem list                   Disposition Plan     Expected Discharge Date: 01/18/2024        Discharge Comments: New admit.  Pysch, Neurology and CD consults.            Lorena Hoyt MD  Hospitalist Service  [facility]  Securely message with "[employer] (more info)  Text page via [employer] Paging/Directory   ______________________________________________________________________    Interval History   Patient with no new complaints.  He is feels okay.  Was waiting for endoscopy earlier when I went to evaluate him.  Later discussed with Dr Kumar about plan of care post endoscopy.  Per nursing he has had few dark bowel movements and turned Hemoccult positive.  Patient denies any abdominal pain.  Physical Exam   Vital Signs: Temp: 98  F (36.7  C) Temp src: Oral BP: 121/79 Pulse: 78   Resp: 18 SpO2: 97 % O2 Device: None (Room air)    Weight: 149 lbs 0 oz    Exam:  Constitutional: Awake, alert and no distress. Appears comfortable  Head: Normocephalic. No masses, lesions, tenderness or abnormalities  ENMT: ENT exam normal, no neck nodes or sinus tenderness  Cardiovascular:  RRR.  no murmurs, no rubs or JVD  Respiratory:normal WOB,b/l equal air entry, no wheezes or crackles   Gastrointestinal: Abdomen soft, non-tender. BS normal. No masses, organomegaly  : Deferred  Extremities :no edema , no clubbing or cyanosis      Medical Decision Making       51 MINUTES SPENT BY ME on the date of service doing chart review, history, exam, documentation & further activities per the note.      Data     I have personally reviewed the following data over the past 24 hrs:    4.8  \   7.8 (L)   / 57 (L)     141 112 (H) 15.6 /  176 (H)   3.8; 3.8 21 (L) 0.93 \     Ferritin:  N/A % Retic:  2.0 LDH:  N/A       Imaging results reviewed over the past 24 hrs:   No results found for this or any previous visit (from the past 24 hour(s)).  Recent Labs   Lab 01/16/24  0722 01/15/24  2058 01/15/24  1533 01/15/24  0853 01/15/24  0648 01/14/24  0747 01/13/24  2326 01/13/24  1138   WBC 4.8  --  6.2 5.4  --  8.4   < > 9.8   HGB 7.8*  --  7.8* 7.1*  --  7.5*   < > 9.9*   MCV 97  --  99 96  --  98   < > 99   PLT 57*  --  40* 30*  --  29*   < > 43*   INR  --   --   --   --   --   --   --  1.02     --   --   --  135 135  --  134*   POTASSIUM 3.8  3.8 3.1* 3.2*  --  3.2*  3.1* 3.5  --  3.7   CHLORIDE 112*  --   --   --  102 98  --  87*   CO2 21*  --   --   --  21* 12*  --  10*   BUN 15.6  --   --   --  19.9 26.0*  --  25.2*   CR 0.93  --   --   --  0.93 1.09  --  1.16   ANIONGAP 8  --   --   --  12 25*  --  37*   HERNANDEZ 7.8*  --   --   --  8.0* 8.3*  --  8.3*   *  --   --   --  152* 97  --  148*   ALBUMIN  --   --   --   --   --  3.4*  --  4.0   PROTTOTAL  --   --   --   --   --  5.6*  --  6.7   BILITOTAL  --   --   --   --   --  0.8  --  1.9*   ALKPHOS  --   --   --   --   --  84  --  121   ALT  --   --   --   --   --  27  --  41   AST  --   --   --   --   --  104*  --  190*   LIPASE  --   --   --   --   --   --   --  203*    < > = values in this interval not displayed.

## 2024-01-16 NOTE — PROGRESS NOTES
.Transfer in/out    Transfer to [facility] from Short Stay  Report given to/received from Kimberly MACHADO RN    Brief note about patient status upon transfer    Pt admitted from home for seizures seen by mother. Admission for ETOH withdrawls, seizures, ETOH hepatitis and acute anemia. Pt last drink 2 days prior to admission. Pt was found to have T12-L2 compression fractures. Aox3, disorientated to time, mobility Ast 2 w/ GB and SeraSteady. On CIWA 1-4 this morning, mostly tremors and resltess. On high K/Phos/Mg replacement protocol, all replaced. VSS on RA ex BP low in AM 79/46, requireing a 1L bolus. Incontinent of B/B, had brown/black stool. Denies pain, on tele-SR, hgb dropped from admit 9.9 to most recent 7.8. Md ordered 1x PRBC.  Diet Regular, on q4 VS and q4 neuro, will need to get brain MRI, psych, neurology, PT and GI following, hem/onc, Chem dep and CC/SW consults placed    Code status: Full Code  Skin: scattered bruises on upper upper back and legs, d/t frequent falls, +2 edema R hand  Fall Risk: Yes- Department fall risk interventions implemented.  Isolation: Special Precautions  Patient belongings: remains w/ patient   If patient is a 72 hour hold/Commitment are belongings removed from room and locked up? NA  Medication drips upon transfer: n/a   Sign and held orders released? NA

## 2024-01-17 NOTE — CARE PLAN
Neurology care plan note.     Followed up on  MRI brain - changes in the temporal lobes due to prior seizures. No other concerns.     Please continue same treatments.   Will sign off, please call with questions.     Micheline Jensen MD

## 2024-01-17 NOTE — PLAN OF CARE
Goal Outcome Evaluation:      Plan of Care Reviewed With: patient    Overall Patient Progress: no changeOverall Patient Progress: no change         Summary: Jerome Flanagan is a 48 year old male with severe alcohol use disorder brought in by EMS following a fall at home and noted to have an episode of seizure.  Alcohol levels are 0 with concern of alcohol withdrawal seizure. History of fall and unsteady gaits,T12-L2 compression fracture, COVID+. FALL RISK. 72-hr hold started 1/17 at 1300.    DATE & TIME: 1/17/24   Cognitive Concerns/ Orientation : A&O x3, disoriented to time/situation. Impulsive and forgetful.  BEHAVIOR & AGGRESSION TOOL COLOR: Green  CIWA: 3,3 for tremors and disoriented to time/date.   ABNL VS/O2: VSS x HR tachy at times. On RA.   MOBILITY: Assist of 1 GB/W, unsteady gait, generalized weakness, BUE tremors. Amb to BR x2.  PAIN MANAGMENT: Denies.  DIET: Regular, good appetite.   BOWEL/BLADDER: Inc b/b. BM x2.   ABNL LAB: Mag 1.2 and Phos 2.4, both replaced, recheck at 1915. Hgb 8.2 improved.   DRAIN/DEVICES: L PIV x2 SL  TELEMETRY RHYTHM: NSR, no longer needed.   SKIN: Scattered bruises, scabs.  TESTS/PROCEDURES: None this shift.   D/C DATE: Sleeping or watching tv between cares. Chem dep plans to call on 1/18 for eval. Psych following. GI, Heme/onc signed off.

## 2024-01-17 NOTE — PROGRESS NOTES
Spoke with pt's RN prior to attempting CD Consult with pt.  Staff reported pt remains forgetful and that it would likely be more appropriate to meet with pt tomorrow, pending more time and stabilization.  This writer will attempt to see pt  tomorrow 1/18/24.    If pt discharges prior to being seen and has active insurance they may also schedule an assessment on an outpatient basis by calling [facility] at [phone].     LYUBOV Victoria, [facility]  Substance Use Disorder Evaluation Counselor  Email: [email or web address]

## 2024-01-17 NOTE — CONSULTS
"      Initial Psychiatric Consult   Consult date: January 17, 2024         Reason for Consult, requesting source:      Severe alcoholism with withdrawal and no intention to quit    Requesting source:     Labs and imaging reviewed. Patient seen and evaluated by Elida Vega MD          HPI:     This is a 48-year-old male with severe alcohol use disorder who fell at home and had a seizure.  Patient's blood alcohol level in the hospital was 0, raising concern that this was an alcohol withdrawal seizure.  Patient presented not only with seizure, T12-L2 compression fracture, starvation ketoacidosis and anion gap metabolic acidosis, he also had alcoholic hepatitis, severe thrombocytopenia, severe esophagitis, elevated lipase, hypomagnesemia, hypophosphatemia, and hypokalemia.  He has COVID.    I did see the patient today.  He reports that he \"feels like shit\".  Hand tremor noted.  Patient denies that he feels like he is in withdrawal.  He indicates that he has no plans to quit drinking \"because I do not have anything better to do\".  He reports that he has no social relationships and no job.  I reflected back to him that this may also be related to his alcoholism.  He acknowledged that that may be true.        Past Psychiatric History:     I could find no psychiatric records in [facility] or [facility] charting.  Patient's actual baseline psychological/psychiatric issues would be indistinguishable from alcohol induced issues at this point.  He will need a period of sobriety to ascertain.        Substance Use and History:   Patient has 2 ER visits in August 2023 with alcohol intoxication.  Patient blew a 0.4 on his second visit.  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    He has 2 DWI convictions found on public court records in 1998, and then in August 2021.  Patient is on supervised probation " per public court records through August 2025.        Past Medical History:   PAST MEDICAL HISTORY: No past medical history on file.    PAST SURGICAL HISTORY:   Past Surgical History:   Procedure Laterality Date    ESOPHAGOSCOPY, GASTROSCOPY, DUODENOSCOPY (EGD), COMBINED N/A 1/16/2024    Procedure: Esophagoscopy, gastroscopy, duodenoscopy (EGD), combined;  Surgeon: Yo Kumar MD;  Location: [facility]             Family History:   FAMILY HISTORY: No family history on file.    Family Psychiatric History:         Social History:   SOCIAL HISTORY:   Social History     Tobacco Use    Smoking status: Never    Smokeless tobacco: Never   Substance Use Topics    Alcohol use: Yes            Physical ROS:   The 10 point Review of Systems is negative other than noted in the HPI or here.           Medications:      FLUoxetine  40 mg Oral Daily    folic acid  1 mg Oral Daily    [START ON 1/18/2024] gabapentin  300 mg Oral Q8H    gabapentin  600 mg Oral Q8H    iron sucrose  300 mg Intravenous Daily    magnesium sulfate  4 g Intravenous Once    multivitamin w/minerals  1 tablet Oral Daily    pantoprazole  40 mg Oral BID AC    senna-docusate  1 tablet Oral BID    Or    senna-docusate  2 tablet Oral BID    sodium phosphate  15 mmol Intravenous Once    thiamine  250 mg Intravenous Daily    Followed by    [START ON 1/21/2024] thiamine  100 mg Oral Daily              Allergies:   No Known Allergies       Labs and Imaging:     Recent Results (from the past 48 hour(s))   Prepare red blood cells (unit)    Collection Time: 01/15/24  1:56 PM   Result Value Ref Range    Blood Component Type Red Blood Cells     Product Code A1976X57     Unit Status Transfused     Unit Number [id number]     CROSSMATCH Compatible     CODING SYSTEM [id number]     ISSUE DATE AND TIME [id number]     UNIT ABO/RH A-     UNIT TYPE ISBT 0600    Potassium    Collection Time: 01/15/24  3:33 PM   Result Value Ref Range    Potassium 3.2 (L) 3.4 - 5.3 mmol/L    Magnesium    Collection Time: 01/15/24  3:33 PM   Result Value Ref Range    Magnesium 2.4 (H) 1.7 - 2.3 mg/dL   Folate    Collection Time: 01/15/24  3:33 PM   Result Value Ref Range    Folic Acid 27.6 4.6 - 34.8 ng/mL   Adult Type and Screen    Collection Time: 01/15/24  3:33 PM   Result Value Ref Range    ABO/RH(D) A POS     Antibody Screen Negative Negative    SPECIMEN EXPIRATION DATE [id number]    Bld morphology pathology review    Collection Time: 01/15/24  3:33 PM   Result Value Ref Range    Final Diagnosis       Peripheral blood for morphology:  -Moderate-marked normochromic, normocytic anemia without evidence of red cell regeneration  -Slight lymphopenia, myeloid left shift  -Marked thrombocytopenia        Comment       The history of alcohol dependence is noted.  The patient is iron deficient and is to receive intravenous iron.  The gastroenterology service is involved in the patient's care, evaluating the GI tract as a sight for additional blood loss on top of the hypoproliferative effects of alcohol      Peripheral Smear       ERYTHROCYTES: Hemoglobin is moderately-markedly decreased and the cells are normochromic and normocytic.  Polychromasia is not evident.  There is mild variation in cell size.  A rare microcyte is noted    LEUKOCYTES: The white count is normal.  Neutrophils are normal in absolute number.  Left shift to myelocytes is noted.  Dysplastic change is not present and there are no blasts.  Lymphocytes are slightly decreased in absolute number.    PLATELETS: The platelet count is markedly decreased.  Morphology is normal.    For patients over 18 years old, anemia and quantitative abnormalities of WBC and platelets (if present) may be further stratified as follows:    Hemoglobin (g/dL) in (females)/males:    (9.7 - 11.6)/ 10.0 - 12.6: Mild anemia    (7.7 - 9.6)/ 7.7 - 9.6: Moderate anemia    Less than 7.7: Marked anemia    WBC (10^9/L):    Greater than 50.0: Marked leukocytosis    18.0 -  50.0: Moderate leukocytosis    11.1 - 17.9: Mild leukocytosis    3.0 - 3.9: Mild leukopenia    2.0 - 2.9: Moderate leukopenia    Less than 2.0: Marked leukopenia    Platelets (10^9/L):    Greater than 900: Marked thrombocytosis    601 - 900: Moderate thrombocytosis    451 - 600: Mild thrombocytosis    100 - 149: Mild thrombocytopenia    50 - 99: Moderate thrombocytopenia    Less than 50: Marked thrombocytopenia         Peripheral Hematologic Data       CBC with auto differential:    RESULT                             VALUE                            REF RANGE                      UNITS                              ABNORMALITY  WBC Count  6.2  4.0-11.0  10e3/uL  Normal      RBC Count  2.40  4.40-5.90  10e6/uL  Low      Hemoglobin  7.8  13.3-17.7  g/dL  Low      Hematocrit  23.7  40.0-53.0  %  Low      MCV  99    fL  Normal      MCH  32.5  26.5-33.0  pg  Normal      MCHC  32.9  31.5-36.5  g/dL  Normal      RDW  14.1  10.0-15.0  %  Normal      Platelet Count  40  150-450  10e3/uL  Low Critical      % Neutrophils  76   %       % Lymphocytes  13   %       % Monocytes  8   %       % Eosinophils  1   %       % Basophils  0   %       % Immature Granulocytes  2   %       NRBCs per 100 WBC  0  <1  /100  Normal      Absolute Neutrophils  4.7  1.6-8.3  10e3/uL  Normal      Absolute Lymphocytes  0.8  0.8-5.3  10e3/uL  Normal      Absolute Monocytes  0.5  0.0-1.3  10e3/uL  Normal      Absolute Eosinophils  0.1  0.0-0.7  10e3/uL  Normal      Absolute Basophils  0.0  0.0-0.2  10e3/uL  Normal      Absolute Immature Granulocytes  0.1  <=0.4  10e3/uL  Normal        Performing Labs       Anemia, thrombocytopenia  The technical component of this testing was completed at [facility], [facility] and [facility]     CBC with platelets and differential    Collection Time: 01/15/24  3:33 PM   Result Value Ref Range    WBC Count 6.2 4.0 - 11.0  10e3/uL    RBC Count 2.40 (L) 4.40 - 5.90 10e6/uL    Hemoglobin 7.8 (L) 13.3 - 17.7 g/dL    Hematocrit 23.7 (L) 40.0 - 53.0 %    MCV 99 78 - 100 fL    MCH 32.5 26.5 - 33.0 pg    MCHC 32.9 31.5 - 36.5 g/dL    RDW 14.1 10.0 - 15.0 %    Platelet Count 40 (LL) 150 - 450 10e3/uL    % Neutrophils 76 %    % Lymphocytes 13 %    % Monocytes 8 %    % Eosinophils 1 %    % Basophils 0 %    % Immature Granulocytes 2 %    NRBCs per 100 WBC 0 <1 /100    Absolute Neutrophils 4.7 1.6 - 8.3 10e3/uL    Absolute Lymphocytes 0.8 0.8 - 5.3 10e3/uL    Absolute Monocytes 0.5 0.0 - 1.3 10e3/uL    Absolute Eosinophils 0.1 0.0 - 0.7 10e3/uL    Absolute Basophils 0.0 0.0 - 0.2 10e3/uL    Absolute Immature Granulocytes 0.1 <=0.4 10e3/uL    Absolute NRBCs 0.0 10e3/uL   Reticulocyte count    Collection Time: 01/15/24  3:33 PM   Result Value Ref Range    % Reticulocyte 2.0 0.5 - 2.0 %    Absolute Reticulocyte 0.048 0.025 - 0.095 10e6/uL   Phosphorus    Collection Time: 01/15/24  8:58 PM   Result Value Ref Range    Phosphorus 1.0 (L) 2.5 - 4.5 mg/dL   Potassium    Collection Time: 01/15/24  8:58 PM   Result Value Ref Range    Potassium 3.1 (L) 3.4 - 5.3 mmol/L   Basic metabolic panel    Collection Time: 01/16/24  7:22 AM   Result Value Ref Range    Sodium 141 135 - 145 mmol/L    Potassium 3.8 3.4 - 5.3 mmol/L    Chloride 112 (H) 98 - 107 mmol/L    Carbon Dioxide (CO2) 21 (L) 22 - 29 mmol/L    Anion Gap 8 7 - 15 mmol/L    Urea Nitrogen 15.6 6.0 - 20.0 mg/dL    Creatinine 0.93 0.67 - 1.17 mg/dL    GFR Estimate >90 >60 mL/min/1.73m2    Calcium 7.8 (L) 8.6 - 10.0 mg/dL    Glucose 176 (H) 70 - 99 mg/dL   Ketone Beta-Hydroxybutyrate Quantitative    Collection Time: 01/16/24  7:22 AM   Result Value Ref Range    Ketone (Beta-Hydroxybutyrate) Quantitative <0.18 <=0.30 mmol/L   Potassium    Collection Time: 01/16/24  7:22 AM   Result Value Ref Range    Potassium 3.8 3.4 - 5.3 mmol/L   CBC with platelets and differential    Collection Time: 01/16/24  7:22 AM    Result Value Ref Range    WBC Count 4.8 4.0 - 11.0 10e3/uL    RBC Count 2.42 (L) 4.40 - 5.90 10e6/uL    Hemoglobin 7.8 (L) 13.3 - 17.7 g/dL    Hematocrit 23.5 (L) 40.0 - 53.0 %    MCV 97 78 - 100 fL    MCH 32.2 26.5 - 33.0 pg    MCHC 33.2 31.5 - 36.5 g/dL    RDW 14.7 10.0 - 15.0 %    Platelet Count 57 (L) 150 - 450 10e3/uL    % Neutrophils 69 %    % Lymphocytes 17 %    % Monocytes 11 %    % Eosinophils 1 %    % Basophils 0 %    % Immature Granulocytes 2 %    NRBCs per 100 WBC 0 <1 /100    Absolute Neutrophils 3.3 1.6 - 8.3 10e3/uL    Absolute Lymphocytes 0.8 0.8 - 5.3 10e3/uL    Absolute Monocytes 0.5 0.0 - 1.3 10e3/uL    Absolute Eosinophils 0.1 0.0 - 0.7 10e3/uL    Absolute Basophils 0.0 0.0 - 0.2 10e3/uL    Absolute Immature Granulocytes 0.1 <=0.4 10e3/uL    Absolute NRBCs 0.0 10e3/uL   Occult blood stool    Collection Time: 01/16/24  8:52 AM   Result Value Ref Range    Occult Blood Positive (A) Negative   Phosphorus    Collection Time: 01/16/24 10:31 AM   Result Value Ref Range    Phosphorus 2.4 (L) 2.5 - 4.5 mg/dL   Ferritin    Collection Time: 01/16/24 10:31 AM   Result Value Ref Range    Ferritin 987 (H) 31 - 409 ng/mL   Haptoglobin    Collection Time: 01/16/24 10:31 AM   Result Value Ref Range    Haptoglobin 65 30 - 200 mg/dL   Hepatic function panel    Collection Time: 01/16/24 10:31 AM   Result Value Ref Range    Protein Total 5.3 (L) 6.4 - 8.3 g/dL    Albumin 3.1 (L) 3.5 - 5.2 g/dL    Bilirubin Total 0.4 <=1.2 mg/dL    Alkaline Phosphatase 70 40 - 150 U/L    AST 34 0 - 45 U/L    ALT 19 0 - 70 U/L    Bilirubin Direct <0.20 0.00 - 0.30 mg/dL   Hepatitis B Surface Antibody    Collection Time: 01/16/24 10:31 AM   Result Value Ref Range    Hepatitis B Surface Antibody Nonreactive     Hepatitis B Surface Antibody Instrument Value <3.50 <8.5 m[IU]/mL   Hepatitis B surface antigen    Collection Time: 01/16/24 10:31 AM   Result Value Ref Range    Hepatitis B Surface Antigen Nonreactive Nonreactive   Hepatitis C  antibody    Collection Time: 01/16/24 10:31 AM   Result Value Ref Range    Hepatitis C Antibody Nonreactive Nonreactive   HIV Antigen Antibody Combo Cascade    Collection Time: 01/16/24 10:31 AM   Result Value Ref Range    HIV Antigen Antibody Combo Nonreactive Nonreactive   Iron & Iron Binding Capacity    Collection Time: 01/16/24 10:31 AM   Result Value Ref Range    Iron 12 (L) 61 - 157 ug/dL    Iron Binding Capacity 119 (L) 240 - 430 ug/dL    Iron Sat Index 10 (L) 15 - 46 %   INR    Collection Time: 01/16/24 10:31 AM   Result Value Ref Range    INR 0.91 0.85 - 1.15   Partial thromboplastin time    Collection Time: 01/16/24 10:31 AM   Result Value Ref Range    aPTT 28 22 - 38 Seconds   Fibrinogen activity    Collection Time: 01/16/24 10:31 AM   Result Value Ref Range    Fibrinogen Activity 417 170 - 490 mg/dL   Lactate Dehydrogenase    Collection Time: 01/16/24 10:31 AM   Result Value Ref Range    Lactate Dehydrogenase 215 0 - 250 U/L   Direct antiglobulin test, adult    Collection Time: 01/16/24 10:31 AM   Result Value Ref Range    EVA Anti-IgG,-C3d Negative     SPECIMEN EXPIRATION DATE [id number]    UPPER GI ENDOSCOPY    Collection Time: 01/16/24 11:55 AM   Result Value Ref Range    Upper GI Endoscopy       [address] [place]  [address]  _______________________________________________________________________________  Patient Name: Jerome Flanagan         Procedure Date: 1/16/2024 11:55 AM  MRN: [id number]                       Account Number: [id number]  YOB: 1975              Admit Type: Inpatient  Age: 48                               Room: [place] [address]  Note Status: Finalized                Attending MD: PAPA QUINN MD,   Instrument Name: 509 GIF  Gastroscope   _______________________________________________________________________________     Procedure:                Upper GI endoscopy  Indications:              Functional Dyspepsia, Heartburn,  Hematemesis  Providers:                PAPA QUINN MD, Marina Obrien RN  Referring MD:               Medicines:                Fentanyl 100 micrograms IV, Midazolam 2 mg IV,                             Cetacaine spray  Complications:            No immediate complications.  ___ ____________________________________________________________________________  Procedure:                Pre-Anesthesia Assessment:                            - Prior to the procedure, a History and Physical                             was performed, and patient medications and                             allergies were reviewed. The risks and benefits of                             the procedure and the sedation options and risks                             were discussed with the patient. All questions were                             answered and informed consent was obtained. Patient                             identification and proposed procedure were verified                             by the physician. Mental Status Examination:                             normal. Airway Examination: normal oropharyngeal                             airway and neck mobility. Respiratory Examination:                             clear to auscultation. Prophylactic Antibiotics:                              The patient does not require prophylactic                             antibiotics. Prior Anticoagulants: The patient has                             taken no anticoagulant or antiplatelet agents. ASA                             Grade Assessment: II - A patient with mild systemic                             disease. After reviewing the risks and benefits,                             the patient was deemed in satisfactory condition to                             undergo the procedure. The anesthesia plan was to                             use moderate sedation / analgesia (conscious                             sedation). Immediately prior to  administration of                             medications, the patient was re-assessed for                             adequacy to receive sedatives. The heart rate,                             respiratory rate, oxygen saturations, blood                             pressure, adequacy of pulmonary ventilation, and                             r esponse to care were monitored throughout the                             procedure. The physical status of the patient was                             re-assessed after the procedure.                            After obtaining informed consent, the endoscope was                             passed under direct vision. Throughout the                             procedure, the patient's blood pressure, pulse, and                             oxygen saturations were monitored continuously. The                             endoscope 509 was introduced through the mouth, and                             advanced to the second part of duodenum. The upper                             GI endoscopy was accomplished without difficulty.                             The patient tolerated the procedure well.                                                                                   Findings:       LA Grade D (one or more mucosal breaks involving at least 75% of        esophageal circumference) es ophagitis with no bleeding was found in the        lower third of the esophagus. Biopsies were taken with a cold forceps        for histology.       A few dispersed 5 mm erosions with no bleeding and no stigmata of recent        bleeding were found in the gastric body and in the gastric antrum.       The cardia and gastric fundus were normal on retroflexion.       The duodenal bulb, first portion of the duodenum and second portion of        the duodenum were normal.                                                                                   Impression:               - LA Grade D reflux  esophagitis with no bleeding.                             Biopsied.                            - Erosive gastropathy with no bleeding and no                             stigmata of recent bleeding.                            - Normal duodenal bulb, first portion of the                             duodenum and second portion of the duodenum.  Recommendation:           - Please continue to follow wit h Primary care                             Physician.                            - I will contact patient with Biopsy result in 1-2                             weeks. Please contact our Office at  at                             [facility] if ther is any questions,                            - Return patient to hospital [facility] for ongoing care.                            - Mechanical soft diet.                                                                                   Procedure Code(s):       --- Professional ---       12009, Esophagogastroduodenoscopy, flexible, transoral; with biopsy,        single or multiple  Diagnosis Code(s):       --- Professional ---       K21.00, Gastro-esophageal reflux disease with esophagitis, without        bleeding       K31.89, Other diseases of stomach and duodenum       K30, Functional dyspepsia       R12, Heartburn       K92.0, Hematemesis    CPT copyright 2022 [employer]. All rights reserved.    The code s documented in this report are preliminary and upon  review may   be revised to meet current compliance requirements.    Electronically Signed by Yo Roger  ________________________  YO QUINN MD  1/16/2024 12:43:15 PM  I was physically present for the entire viewing portion of the exam.  YO QUINN MD  Number of Addenda: 0    Note Initiated On: 1/16/2024 11:55 AM  Total Procedure Duration: 0 hours 3 minutes 7 seconds   Scope In: 12:27:19 PM  Scope Out: 12:30:26 PM     Phosphorus    Collection Time: 01/17/24 10:19  "AM   Result Value Ref Range    Phosphorus 1.7 (L) 2.5 - 4.5 mg/dL   Magnesium    Collection Time: 01/17/24 10:19 AM   Result Value Ref Range    Magnesium 1.2 (L) 1.7 - 2.3 mg/dL   Basic metabolic panel    Collection Time: 01/17/24 10:19 AM   Result Value Ref Range    Sodium 135 135 - 145 mmol/L    Potassium 3.8 3.4 - 5.3 mmol/L    Chloride 103 98 - 107 mmol/L    Carbon Dioxide (CO2) 22 22 - 29 mmol/L    Anion Gap 10 7 - 15 mmol/L    Urea Nitrogen 9.7 6.0 - 20.0 mg/dL    Creatinine 0.80 0.67 - 1.17 mg/dL    GFR Estimate >90 >60 mL/min/1.73m2    Calcium 8.1 (L) 8.6 - 10.0 mg/dL    Glucose 224 (H) 70 - 99 mg/dL   CBC with platelets and differential    Collection Time: 01/17/24 10:19 AM   Result Value Ref Range    WBC Count 5.0 4.0 - 11.0 10e3/uL    RBC Count 2.55 (L) 4.40 - 5.90 10e6/uL    Hemoglobin 8.2 (L) 13.3 - 17.7 g/dL    Hematocrit 24.9 (L) 40.0 - 53.0 %    MCV 98 78 - 100 fL    MCH 32.2 26.5 - 33.0 pg    MCHC 32.9 31.5 - 36.5 g/dL    RDW 15.4 (H) 10.0 - 15.0 %    Platelet Count 115 (L) 150 - 450 10e3/uL    % Neutrophils      % Lymphocytes      % Monocytes      % Eosinophils      % Basophils      % Immature Granulocytes      Absolute Neutrophils      Absolute Lymphocytes      Absolute Monocytes      Absolute Eosinophils      Absolute Basophils      Absolute Immature Granulocytes            Physical and Psychiatric Examination:     BP (!) 147/98 (BP Location: Right arm)   Pulse 90   Temp 98.6  F (37  C) (Oral)   Resp 16   Ht 1.778 m (5' 10\")   Wt 67.6 kg (149 lb)   SpO2 98%   BMI 21.38 kg/m    Weight is 149 lbs 0 oz  Body mass index is 21.38 kg/m .    Physical Exam:  I have reviewed the physical exam as documented by by the medical team and agree with findings and assessment and have no additional findings to add at this time.    Mental Status Exam:    Appearance: awake, alert and adequately groomed  Attitude:  cooperative  Eye Contact:  good  Mood:  better  Affect:  mood congruent  Speech:  clear, " coherent  Language: Fluent in english   Psychomotor Behavior:  tremor observed   Thought Process:  linear and goal oriented  Associations:  no loose associations  Thought Content:  no evidence of suicidal ideation or homicidal ideation, no evidence of psychotic thought, no auditory hallucinations present, and no visual hallucinations present  Insight:  limited  Judgement:  poor  Oriented to:  time, person, and place  Attention Span and Concentration:  fair  Recent and Remote Memory:  fair  Fund of Knowledge: Appropriate   Gait and Station:                DSM-5 Diagnosis:     Alcohol use disorder, severe, dependence    Alcohol withdrawal seizure    Severe thrombocytopenia    Acute on chronic anemia          Assessment:     This is a 48-year-old male with very severe alcohol dependence.  He presented with an alcohol withdrawal seizure, and has severe medical sequelae of his use.  He does not want to quit drinking because he does not know what else he would do at this time.  He has a history of Wernicke's encephalopathy treated in 2019 at [facility].  Patient also has temporal lobe changes on MRI brain neurology interprets as being related to prior seizures.  The seizures are alcohol withdrawal seizures.          Summary of Recommendations:     1.  Have initiated a petition for commitment as chemically dependent.  Filled out Examiner statement which I will give to social work.    2.  Will place patient on a 72-hour hold.  I did inform him that I was petitioning for commitment.    3.  Have held patient's Prozac.  He was placed on this in the spring, and it is a pretty high dose for a person who likely has baseline anxiety.  This medication has a very long half-life, and will self taper over the next month.      Elida Vega MD  Consult/Liaison Psychiatry and Addiction Medicine  [facility]

## 2024-01-17 NOTE — PROGRESS NOTES
[facility]    Medicine Progress Note - Hospitalist Service    Date of Admission:  1/13/2024    Assessment & Plan   Jerome Flanagan is a 48 year old male with severe alcohol use disorder brought in by EMS following a fall at home and noted to have an episode of seizure.  Alcohol levels are 0 with concern of alcohol withdrawal seizure.    Anion gap metabolic acidosis with respiratory compensation, resolved  Likely starvation ketoacidosis, resolved  -At presentation bicarb 10 with anion gap of 37.  Venous pH next day of admission 7.32 with pCO2 of 26 shows respiratory compensation while serum bicarb was 12.  Lactate on admission was normal  -Serum ketones next day of admission was 9.  Started improving with starting on D5 containing IV fluids and this morning is normal.  Bicarb is also 21 and anion gap is closed.  -Patient seems to be improving with respect to his acidosis    Seizure, new onset likely from alcohol withdrawal but could also be metabolic  History of fall and unsteady gaits  T12-L2 compression fracture  -Patient is a chronic alcoholic, he denies any prior history of seizures but as per report from EMS and his mother patient was found on the ground and was having tonic-clonic movements  -There was some concern the patient has prior history of withdrawal seizures, he is currently living with his mother  -Head CT.  Unremarkable except for left posterior scalp soft tissue contusion/hematoma without acute intracranial abnormality.  CT abdomen pelvis with mild age-indeterminate compression deformity at T12-L2 vertebral bodies probably chronic  -Patient has multiple bruises likely old on his back with suggest either previous falls or previous seizures  -continue neurochecks, seizure precautions  -Appreciate neurology input, EEG with no obvious epileptiform discharges or electrographic seizures  -MRI with slight asymmetric increased FLAIR signal and blurring of the history send in  the left hippocampal formation which could potentially relate to recent seizure activity or could reflect underlying mesial temporal sclerosis.  -Neurology signed off, per neurology changes in temporal lobe due to prior seizures and no other concerns.  Did not recommend treatment.  -IV Ativan as needed for seizures, patient has not had any further seizures since being in the hospital  -Fall precaution  -High-dose thiamine  -PT OT recommending TCU.    Alcoholic hepatitis without ascites   Alcohol use disorder, severe, dependence (H)  Elevated lipase  -LFTs showed elevated AST, bilirubin was elevated on arrival but has now improved, AST also improving.  Lipase was minimally elevated, CT abdomen pelvis with no evidence of pancreatitis.  -Monitor with CIWA protocol.  Patient initially scoring 7-16 on CIWA and has needed multiple doses of IV Valium/IV Ativan.  Last dose of Valium for high CIWA was 1/14/2024 at 3:27 AM.  For last 24 hours has scored minimal on CIWA but patient had also received Ativan for MRI yesterday  -Monitor for CIWA until tomorrow and if continues to remain stable likely can DC CIWA monitoring  -Psychiatry following, see consult note for details  -Psychiatry initiated a petition for commitment is chemically dependent and patient is now on 24-hour hold  -Patient's Prozac was held given possibility of baseline anxiety  - gabapentin taper, multivitamin, high-dose thiamine as above    Acute anemia on chronic anemia  Severe thrombocytopenia  Severe esophagitis  -Patient denies any history of bleeding, no recent baseline but looking at the chart back in 2021 his hemoglobin was around 9.5-12.9.  -At presentation hemoglobin was 9.9, with hydration hemoglobin gradually decreasing and dropped down to 7.1 1/15.  Platelet during this admission has been low, presented with 43 this morning is 30  -Initial iron studies shows iron level of 132  however repeat iron level is? 12 iron sat 10 and iron binding capacity  with 119 suggesting mixed picture, LDH normal, Hemoccult positive  -Patient underwent EGD earlier today, appreciate GI input  -Discussed with Dr Kumar, noted to have severe esophagitis but no stigmata of bleeding  -Continue Protonix 40 mg twice daily  -Status post 1 unit PRBC transfusion 1/15  -Appreciate hematology input, now signed off -Hemoglobin has remained stable since transfusion and platelet count improving    Hypomagnesemia  Hypophosphatemia  Hypokalemia  -Likely from ongoing alcohol use.  Replace per protocol  -Monitor in telemetry    Low-grade fever  -Noticed low-grade fever since last evening.  Patient has COVID infection incidentally noted on admission but fever is new.  Patient currently asymptomatic.  -No leukocytosis  -Continue to monitor, further workup if patient starts having high-grade fever or starts having objective signs of infection    COVID infection  -Patient currently with no symptoms.  -Continue to maintain special precaution  -Given severe thrombocytopenia we will hold off on pharmacological prophylaxis      Essential hypertension  --Continue PTA amlodipine and lisinopril with holding parameters    Generalized anxiety disorder  --Continue PTA Celexa    Hyponatremia, improved          Diet: Regular Diet Adult    DVT Prophylaxis: Pneumatic Compression Devices  Heard Catheter: Not present  Lines: None     Cardiac Monitoring: ACTIVE order. Indication: GI bleed  Code Status: Full Code      Clinically Significant Risk Factors        # Hypokalemia: Lowest K = 3.1 mmol/L in last 2 days, will replace as needed       # Hypoalbuminemia: Lowest albumin = 3.1 g/dL at 1/16/2024 10:31 AM, will monitor as appropriate   # Thrombocytopenia: Lowest platelets = 40 in last 2 days, will monitor for bleeding   # Hypertension: Noted on problem list            # Financial/Environmental Concerns: unemployed         Disposition Plan     Expected Discharge Date: 01/22/2024      Destination: inpatient  rehabilitation facility  Discharge Comments: New admit.  Pysch, Neurology and CD consults.            Lorena Hoyt MD  Hospitalist Service  [facility]  Securely message with eigital (more info)  Text page via [employer] Paging/Directory   ______________________________________________________________________    Interval History   patient with no new complaints.  No new nursing concerns.  Physical Exam   Vital Signs: Temp: 98.6  F (37  C) Temp src: Oral BP: (!) 147/98 Pulse: 90   Resp: 16 SpO2: 98 % O2 Device: None (Room air)    Weight: 149 lbs 0 oz    Exam:  Constitutional: Awake, alert and no distress. Appears comfortable  Head: Normocephalic. No masses, lesions, tenderness or abnormalities  ENMT: ENT exam normal, no neck nodes or sinus tenderness  Cardiovascular: RRR.  no murmurs, no rubs or JVD  Respiratory:normal WOB,b/l equal air entry, no wheezes or crackles   Gastrointestinal: Abdomen soft, non-tender. BS normal. No masses, organomegaly  : Deferred  Extremities :no edema , no clubbing or cyanosis      Medical Decision Making       45 MINUTES SPENT BY ME on the date of service doing chart review, history, exam, documentation & further activities per the note.      Data     I have personally reviewed the following data over the past 24 hrs:    ALT: 19 AST: 34 AP: 70 TBILI: 0.4   ALB: 3.1 (L) TOT PROTEIN: 5.3 (L) LIPASE: N/A     INR:  0.91 PTT:  28   D-dimer:  N/A Fibrinogen:  417     Ferritin:  987 (H) % Retic:  N/A LDH:  215       Imaging results reviewed over the past 24 hrs:   Recent Results (from the past 24 hour(s))   MR Brain w/o & w Contrast    Narrative    EXAM: MR BRAIN W/O and W CONTRAST  LOCATION: [facility]  DATE: 1/16/2024    INDICATION: Generalized tonoclonic seizure, with a history for alcohol use disorder.  COMPARISON: 1/13/2024 CT.  CONTRAST: 7 mL Gadavist.  TECHNIQUE: 1.5 Sharee multiplanar multisequence head MRI without and with intravenous  contrast according to the seizure protocol.    FINDINGS:  INTRACRANIAL CONTENTS: Volume loss in the mesiotemporal lobes is proportional to the volume loss elsewhere. There is mildly asymmetric increased FLAIR signal and some blurring of striations involving the left hippocampal formation compared to the right.   No acute or subacute infarct. No mass, acute hemorrhage, or extra-axial fluid collections. Scattered nonspecific T2/FLAIR hyperintensities within the cerebral white matter most consistent with mild chronic microvascular ischemic change. Moderate   generalized cerebral atrophy. No hydrocephalus.  Normal position of the cerebellar tonsils. No pathologic contrast enhancement.    SELLA: No abnormality accounting for technique.    OSSEOUS STRUCTURES/SOFT TISSUES: Normal marrow signal. The major intracranial vascular flow voids are maintained.     ORBITS: No abnormality accounting for technique.     SINUSES/MASTOIDS: No paranasal sinus mucosal disease. No middle ear or mastoid effusion.       Impression    IMPRESSION:  1.  Symmetric volume loss in the temporal lobes is proportional to volume loss elsewhere. There is slight asymmetric increased FLAIR signal and blurring of the striations in the left hippocampal formation. This could potentially relate to recent seizure   activity or could reflect underlying mesiotemporal sclerosis.  2.  Otherwise no acute intracranial pathology.  3.  Moderate volume loss for age.     Recent Labs   Lab 01/16/24  1031 01/16/24  0722 01/15/24  2058 01/15/24  1533 01/15/24  0853 01/15/24  0648 01/14/24  0747 01/13/24  2326 01/13/24  1138   WBC  --  4.8  --  6.2 5.4  --  8.4   < > 9.8   HGB  --  7.8*  --  7.8* 7.1*  --  7.5*   < > 9.9*   MCV  --  97  --  99 96  --  98   < > 99   PLT  --  57*  --  40* 30*  --  29*   < > 43*   INR 0.91  --   --   --   --   --   --   --  1.02   NA  --  141  --   --   --  135 135  --  134*   POTASSIUM  --  3.8  3.8 3.1* 3.2*  --  3.2*  3.1* 3.5  --  3.7    CHLORIDE  --  112*  --   --   --  102 98  --  87*   CO2  --  21*  --   --   --  21* 12*  --  10*   BUN  --  15.6  --   --   --  19.9 26.0*  --  25.2*   CR  --  0.93  --   --   --  0.93 1.09  --  1.16   ANIONGAP  --  8  --   --   --  12 25*  --  37*   HERNANDEZ  --  7.8*  --   --   --  8.0* 8.3*  --  8.3*   GLC  --  176*  --   --   --  152* 97  --  148*   ALBUMIN 3.1*  --   --   --   --   --  3.4*  --  4.0   PROTTOTAL 5.3*  --   --   --   --   --  5.6*  --  6.7   BILITOTAL 0.4  --   --   --   --   --  0.8  --  1.9*   ALKPHOS 70  --   --   --   --   --  84  --  121   ALT 19  --   --   --   --   --  27  --  41   AST 34  --   --   --   --   --  104*  --  190*   LIPASE  --   --   --   --   --   --   --   --  203*    < > = values in this interval not displayed.

## 2024-01-17 NOTE — PSYCH
[facility]- PROBATE/MENTAL HEALTH DIVISION  [address] ([place])    _________________________________________________________________________________________________________________________________________________________     In the Matter of the Civil Commitment of: Jerome Flanagan      EXAMINERS STATEMENT IN SUPPORT OF       PETITION FOR JUDICIAL COMMITMENT,       Respondent File No._________________________     I am a licensed physician, licensed psychologist, licensed mental health professional, licensed physician assistant or advanced practice registered nurse certified in mental health who is knowledgeable, trained and practicing in the diagnosis or assessment or in the treatment of the alleged impairment listed below:        X Chemically Dependent      I have examined the above-named person within the last fifteen days, on  January 17, 2024 and the results of the examination are stated below:        Behavioral evidence to support commitment:      This is a 48-year-old male with very severe alcohol use disorder, severe, dependence.  He presented with severe medical sequelae of his alcohol use, including alcohol withdrawal seizure, resultant compression fracture from falling, alcoholic hepatitis, severe anemia with a hemoglobin of 7.1.  Patient is also thrombocytopenic, with a platelet count during this hospitalization i of 30,000.  He has severe esophagitis and metabolic derangement when he presented.  He has a history of DUI, Warnicke's encephalopathy diagnosed in 2019 and treated during an inpatient admission.  He has no intention of quitting alcohol and is in danger of imminent harm and death related to his drinking.    Diagnostic Impression and Conclusion:      Alcohol use disorder, severe, dependence with very severe medical sequelae of his use    Recommendations:      Commitment as chemically dependent, and involuntary residential substance abuse  treatment    _________________________________________________________________________  Will this person need neuroleptic medications?  No       Does this person have sufficient awareness of their situation and understanding of treatment with neuroleptics to make this decision for themselves? Yes    **If you answered No, then you must provide either a [employer] petition or a Request for a Substitute Decision Maker form.      I am of the opinion that the above-named person is in need of treatment and should be committed to a treatment facility for Chemically Dependent          Reasons for this opinion:    Patient does not have a psychotic illness requiring neuroleptic medications     Dated:   January 17, 2024        Signature: ________________________________           Examiner s name: Elida Vega MD {Psychiatrist     [place] [address] [facility]  [address] [address]  [phone]  [phone]

## 2024-01-17 NOTE — PROGRESS NOTES
Chart check    Anemia thrombocytopenia  Platelets improved to 115 hemoglobin overall stable 8.2  1/16/24 EGD noted grade D esophagitis with no bleeding. Erosive gastropathy. Biopsied     Oncology will sign off  Please arrange CBC checks with primary care physician after discharge    ANA Powell CNP

## 2024-01-17 NOTE — PLAN OF CARE
Goal Outcome Evaluation:       Summary: Jerome Flanagan is a 48 year old male with severe alcohol use disorder brought in by EMS following a fall at home and noted to have an episode of seizure.  Alcohol levels are 0 with concern of alcohol withdrawal seizure. History of fall and unsteady gaits,T12-L2 compression fracture  DATE & TIME: 1/16/24 1900-0730  Cognitive Concerns/ Orientation : A&O x3, disoriented to time   BEHAVIOR & AGGRESSION TOOL COLOR: Green  CIWA SCORE: 4,3,3  ABNL VS/O2: VsS RA exc soft/low BP, bolus IVF given yesterday   MOBILITY: AX2 BG/W, unsteady gait, generalized weakness, BUE tremors, walk to BR X1  PAIN MANAGMENT: Denies  DIET: Regular  BOWEL/BLADDER: Continent, can be incontinent at times, BM X1  ABNL LAB/BG: K- 3.1, Phosp 2.4-replaced,  electrolyte redraws ordered for this morning Hgb 7.8, Hemoccult positive   DRAIN/DEVICES: PIV SL  TELEMETRY RHYTHM: NSR  SKIN: Scattered bruises, scab   TESTS/PROCEDURES: MRI done  D/C DATE: pending improvement

## 2024-01-17 NOTE — PLAN OF CARE
Goal Outcome Evaluation:      Plan of Care Reviewed With: patient    Summary: Jerome Flanagan is a 48 year old male with severe alcohol use disorder brought in by EMS following a fall at home and noted to have an episode of seizure.  Alcohol levels are 0 with concern of alcohol withdrawal seizure. History of fall and unsteady gaits,T12-L2 compression fracture  DATE & TIME: 1/16/24 [id number]   Cognitive Concerns/ Orientation : A&O x3, disoriented to time   BEHAVIOR & AGGRESSION TOOL COLOR: Green  CIWA SCORE: 4, 3, 3  ABNL VS/O2: VSS RA exc soft/low BP, bolus IVF given yesterday   MOBILITY: AX1 BG/W, not OOB this shift, generalized weakness, BUE tremors   PAIN MANAGMENT: Denies  DIET: Regular  BOWEL/BLADDER: Continent, can be incontinent at times  ABNL LAB/BG: K- 3.1, Phosp 2.4-partially replaced, needs 1 more packet tonight- electrolyte redraws ordered for tomorrow Hgb 7.8,  DRAIN/DEVICES: PIV SL  TELEMETRY RHYTHM: NSR  SKIN: Scattered bruises, scab   TESTS/PROCEDURES: MRI done  D/C DATE: pending improvement

## 2024-01-17 NOTE — PROGRESS NOTES
[facility]  Gastroenterology Progress Note     Jerome Flanagan MRN# [id number]   YOB: 1975 Age: 48 year old          Assessment and Plan:     Jerome Flanagan is a 48 year old male with severe alcohol use disorder, with fall at home with concern for alcohol withdrawal seizure and found to be COVID positive.     Alcohol withdrawal seizure with complication (H)  Alcoholic hepatitis without ascites (H28)  Alcohol use disorder, severe, dependence (H)  Thrombocytopenia (H24)  Anemia  Esophagitis  Platelets improved to 57k, Hemoglobin stable in 7-8 range.( No labs available today 1/17/24) However 9.9 on presentation on 1/13  INR 0.91, Tbili 0.8, LFTs normal  CT notes no evidence of liver cirrhosis but does have severe hepatic steatosis. Fluid within diffusely thickened distal esophagus and a small hiatal hernia  Patient has severe alcoholic hepatitis and concern for esophagitis with upper GI bleed due to alcoholic gastritis/esophagitis  1/16/24 EGD noted grade D esophagitis with no bleeding. Erosive gastropathy. Biopsied     - mechanical soft diet and advance to regular if tolerates  - pantoprazole 40 mg BID  - monitor daily hemoglobin  - alcohol cessation  - IF hemoglobin stable ok with GI to discharge and will sign off with plans for outpatient follow-up              Alcohol withdrawal seizure with complication (H)  Thrombocytopenia (H24)  Alcoholic hepatitis without ascites (H28)      Interval History:     no new complaints, doing well, and doing well; no cp, sob, n/v/d, or abd pain.              Review of Systems:     C: NEGATIVE for fever, chills, change in weight  E/M: NEGATIVE for ear, mouth and throat problems  R: NEGATIVE for significant cough or SOB  CV: NEGATIVE for chest pain, palpitations or peripheral edema             Medications:   I have reviewed this patient's current medications   FLUoxetine  40 mg Oral Daily    folic acid  1 mg Oral Daily    [START ON  1/18/2024] gabapentin  300 mg Oral Q8H    gabapentin  600 mg Oral Q8H    iron sucrose  300 mg Intravenous Daily    multivitamin w/minerals  1 tablet Oral Daily    pantoprazole  40 mg Oral BID AC    senna-docusate  1 tablet Oral BID    Or    senna-docusate  2 tablet Oral BID    thiamine  250 mg Intravenous Daily    Followed by    [START ON 1/21/2024] thiamine  100 mg Oral Daily                  Physical Exam:   Vitals were reviewed  Vital Signs with Ranges  Temp:  [98.6  F (37  C)-100.2  F (37.9  C)] 98.6  F (37  C)  Pulse:  [] 90  Resp:  [6-53] 16  BP: (101-151)/(64-99) 147/98  SpO2:  [53 %-100 %] 98 %  No intake/output data recorded.  Constitutional: healthy, alert, and no distress   Cardiovascular: negative, PMI normal. No lifts, heaves, or thrills. RRR. No murmurs, clicks gallops or rub  Respiratory: negative, Percussion normal. Good diaphragmatic excursion. Lungs clear  Abdomen: Abdomen soft, non-tender. BS normal. No masses, organomegaly             Data:   I reviewed the patient's new clinical lab test results.   Recent Labs   Lab Test 01/16/24  1031 01/16/24  0722 01/15/24  1533 01/15/24  0853 01/13/24  2326 01/13/24  1138   WBC  --  4.8 6.2 5.4   < > 9.8   HGB  --  7.8* 7.8* 7.1*   < > 9.9*   MCV  --  97 99 96   < > 99   PLT  --  57* 40* 30*   < > 43*   INR 0.91  --   --   --   --  1.02    < > = values in this interval not displayed.     Recent Labs   Lab Test 01/16/24  0722 01/15/24  2058 01/15/24  1533 01/15/24  0648 01/14/24  0747   POTASSIUM 3.8  3.8 3.1* 3.2* 3.2*  3.1* 3.5   CHLORIDE 112*  --   --  102 98   CO2 21*  --   --  21* 12*   BUN 15.6  --   --  19.9 26.0*   ANIONGAP 8  --   --  12 25*     Recent Labs   Lab Test 01/16/24  1031 01/14/24  0747 01/13/24  1138   ALBUMIN 3.1* 3.4* 4.0   BILITOTAL 0.4 0.8 1.9*   ALT 19 27 41   AST 34 104* 190*   LIPASE  --   --  203*       I reviewed the patient's new imaging results.    All laboratory data reviewed  All imaging studies reviewed by  me.    Latrice Bates PA-C,  1/17/2024  [facility]  Office : [phone]  Cell: [phone] (Dr. Kumar)  Cell: [phone] (Latrice Bates PA-C)

## 2024-01-18 NOTE — PLAN OF CARE
Summary: Jerome Flanagan is a 48 year old male with severe alcohol use disorder brought in by EMS following a fall at home and noted to have an episode of seizure.  Alcohol levels are 0 with concern of alcohol withdrawal seizure. History of fall and unsteady gaits,T12-L2 compression fracture, COVID+. FALL RISK. 72-hr hold started 1/17 at 1300.  DATE & TIME: 1/17-01/18/24 Night shift  Cognitive Concerns/ Orientation : A&Ox3, disoriented to time/situation. Impulsive and forgetful.  BEHAVIOR & AGGRESSION TOOL COLOR: Green  CIWA: 10, 1- Valium given x1  ABNL VS/O2: VSS on RA   MOBILITY: Assist of 1 GB/W, unsteady gait, generalized weakness, BUE tremors  PAIN MANAGMENT: Complained of L hand pain that came on suddenly. Stated it was very painful, tylenol and ice pack given with relief  DIET: Regular  BOWEL/BLADDER: Inc b/b. BM x1  ABNL LAB: NA   DRAIN/DEVICES: L PIV x2 SL  TELEMETRY RHYTHM: NA  SKIN: Scattered bruises, scabs.  TESTS/PROCEDURES: None this shift.   Slept most of shift. Chem dep plans to call on 1/18 for eval. Psych following. GI, Heme/onc signed off. . Placed on 72hr hold ok'd to have cell phone.

## 2024-01-18 NOTE — CONSULTS
Care Management Initial Consult    General Information  Assessment completed with: Parents, Tisha  Type of CM/SW Visit: Initial Care Transition Consult completed on 1/16. This consult is be involved with CD Petition Process initiated by Dr Elida Vega.       Primary Care Provider verified and updated as needed: Yes   Readmission within the last 30 days: no previous admission in last 30 days      Reason for Consult: discharge planning  Advance Care Planning: Advance Care Planning Reviewed: no concerns identified          Communication Assessment  Patient's communication style: spoken language (English or Bilingual)    Hearing Difficulty or Deaf: no   Wear Glasses or Blind: no    Cognitive  Cognitive/Neuro/Behavioral: .WDL except  Level of Consciousness: intermittent confusion, alert  Arousal Level: opens eyes spontaneously  Orientation: disoriented to, situation  Mood/Behavior: hyperactive (agitated, impulsive)  Best Language: 0 - No aphasia  Speech: clear    Living Environment:   People in home: parent(s)     Current living Arrangements: condominium      Able to return to prior arrangements: yes       Family/Social Support:  Care provided by: self  Provides care for: no one  Marital Status: Single  Parent(s)          Description of Support System: Supportive    Support Assessment: Adequate family and caregiver support    Current Resources:   Patient receiving home care services: No     Community Resources:    Equipment currently used at home: none  Supplies currently used at home:      Employment/Financial:  Employment Status: unemployed        Financial Concerns: unemployed           Does the patient's insurance plan have a 3 day qualifying hospital stay waiver?  No    Lifestyle & Psychosocial Needs:  Social Determinants of Health     Food Insecurity: Not on file   Depression: Not on file   Housing Stability: Not on file   Tobacco Use: Low Risk  (1/16/2024)    Patient History     Smoking Tobacco Use: Never      Smokeless Tobacco Use: Never     Passive Exposure: Not on file   Financial Resource Strain: Not on file   Alcohol Use: Not on file   Transportation Needs: Not on file   Physical Activity: Not on file   Interpersonal Safety: Not on file   Stress: Not on file   Social Connections: Not on file       Functional Status:  Prior to admission patient needed assistance:   Dependent ADLs:: Independent  Dependent IADLs:: Independent  Assesssment of Functional Status: Not at  functional baseline  Mental Health Status:  Mental Health Status: Current Concern       Chemical Dependency Status:  Chemical Dependency Status: Current Concern  Chemical Dependency Management: Previous treatment  Patient was placed on a 72 hour hold at 1300 on 1/17.   Dr Vega completed the Examiner Statement in Support of Petition for CD treatment for residential treatment.  Writer contacted [facility] at [phone] at left a voice with notification of hold and intent to petition.   Will wait to hear back from PPS and begin Exhibit A document.    Values/Beliefs:  Spiritual, Cultural Beliefs, Anabaptist Practices, Values that affect care: no               Additional Information:  CRISTIANO CuellarSW

## 2024-01-18 NOTE — PLAN OF CARE
Goal Outcome Evaluation:      Plan of Care Reviewed With: patient    Overall Patient Progress: improvingOverall Patient Progress: improving    Outcome Evaluation: Eating better than early this admission. Pt reports fair appetite. willing to try Ensure - send trial this AM, OK to order PRN.

## 2024-01-18 NOTE — CONSULTS
Met with pt for CD Consult and completed FLAKO Comprehensive Assessment recommedning residential FLAKO Tx.  Pt reports that he does not have any desire or intention to stop drinking and that he feels going to treatment would not be beneficial at this time.  Pt declined for this writer to place any referrals to treatment at this time.  This writer relayed to pt that if he changes his mind on this while waiting on commitment proceedings that he can alert unit staff who can assist in getting pt back in contact with this writer and/or CD team.  This writer will continue to follow to support as needed.    Recommendations:  1)  Complete a Residential CD Treatment Program  2)  Abstain from all mood-altering chemicals unless prescribed by a licensed provider.   3)  Attend, at minimum, 2 weekly support group meetings, such as 12 step based (AA/NA), SMART Recovery, Health Realizations, and/or Refuge Recovery meetings.     4)  Actively work with a male mentor/sponsor on a weekly basis.   5)  Follow all the recommendations of your treatment/medical providers.  6)  Remain law abiding and follow all recommendations of the Courts.  7)  Patient may benefit from obtaining a full mental health evaluation.    Clinical Substantiation:  Patient has been unable to maintain abstinence from alcohol while living at his current home environment, lacks a sober living environment, lacks long-term sober maintenance skills, lacks sober coping skills, lacks awareness regarding the disease model of addiction, lacks a sober peer support network, has medical issues which are exacerbated by substance abuse, and has mental health symptoms which are exacerbated by substance abuse.  Patient reports that he has no intention of stopping drinking at this time and that he feels FLAKO treatment would not be beneficial to him because of this.    Referrals/ Alternatives:  Pt declined to have any referrals made at time of assessment    ANDERSON Assessment ID: [id number]      FLAKO consult completed by:   LYUBOV Victoria, [facility] CenterJESSICA  Substance Use Disorder Evaluation Counselor  E-mail Address: [email or web address] & Liaison Department  [facility], [address] [place] [address]

## 2024-01-18 NOTE — PROGRESS NOTES
[facility]    Medicine Progress Note - Hospitalist Service        Date of Admission:  1/13/2024 11:11 AM    Assessment & Plan:   Jerome Flanagan is a 48 year old male with severe alcohol use disorder brought in by EMS following a fall at home and noted to have an episode of seizure.  He was admitted with concerns for acute alcohol withdrawal     Anion gap metabolic acidosis with respiratory compensation, resolved  Likely starvation ketoacidosis, resolved  -At presentation bicarb 10 with anion gap of 37.  Venous pH next day of admission 7.32 with pCO2 of 26 shows respiratory compensation while serum bicarb was 12. -Lactate on admission was normal  -Serum ketones next day of admission was 9.    -Acidosis has resolved.     Seizure, new onset likely from alcohol withdrawal but could also be metabolic  History of fall and unsteady gaits  T12-L2 compression fracture  -Patient is a chronic alcoholic, he denies any prior history of seizures but as per report from EMS and his mother patient was found on the ground and was having tonic-clonic movements  -There was some concern the patient has prior history of withdrawal seizures, he is currently living with his mother  -Head CT unremarkable except for left posterior scalp soft tissue contusion/hematoma without acute intracranial abnormality.  CT abdomen pelvis with mild age-indeterminate compression deformity at T12-L2 vertebral bodies probably chronic  -Patient has multiple bruises likely old on his back with suggest either previous falls or previous seizures  -Appreciate neurology input, EEG with no obvious epileptiform discharges or electrographic seizures  -MRI with slight asymmetric increased FLAIR signal and blurring of the history send in the left hippocampal formation which could potentially relate to recent seizure activity or could reflect underlying mesial temporal sclerosis.  -Neurology signed off, per neurology changes in temporal lobe due to  prior seizures and no other concerns.  Did not recommend treatment.  -IV Ativan as needed for seizures, patient has not had any further seizures since being in the hospital  -Fall precaution  -High-dose thiamine  -PT OT recommending TCU.     Alcoholic hepatitis without ascites   Alcohol use disorder, severe, dependence (H)  Elevated lipase  -LFTs showed elevated AST, bilirubin was elevated on arrival but has now improved, AST also improving.  Lipase was minimally elevated, CT abdomen pelvis with no evidence of pancreatitis.  -Patient initially scoring 7-16 on CIWA and has needed multiple doses of IV Valium/IV Ativan.   -Does not appear to be in active withdrawal at the moment  -Psychiatry following, see consult note for details  -Psychiatry initiated a petition for commitment for  CD treatment   -gabapentin taper, multivitamin, thiamine 100 mg daily     Acute anemia on chronic anemia  Severe thrombocytopenia  Severe esophagitis  -Patient denies any history of bleeding, no recent baseline but looking at the chart back in 2021 his hemoglobin was around 9.5-12.9.  -At presentation hemoglobin was 9.9, with hydration hemoglobin gradually decreasing and dropped down to 7.1 1/15.  Platelet during this admission has been low, presented with 43 this morning is 30  -Initial iron studies shows iron level of 132  however repeat iron level is? 12 iron sat 10 and iron binding capacity with 119 suggesting mixed picture, LDH normal, Hemoccult positive  -Patient underwent EGD with Dr. Kumar, and was noted to have severe esophagitis but no stigmata of bleeding.  -Continue Protonix 40 mg twice daily  -Status post 1 unit PRBC transfusion 1/15  -Appreciate hematology input, now signed off  -They recommended IV Venofer, patient received 2 doses altogether.  He lost his IV today, will start oral iron replacement.    COVID infection  -Tested positive on 1/14/2024  -Patient currently with no symptoms other than low-grade fever, unclear if  fever is due to COVID or other process  -No respiratory symptoms, not hypoxic, no indication for COVID-specific treatment  -Check UA and chest x-ray  -DVT prophylaxis held due to thrombocytopenia and anemia, (thrombocytoprnia improved)he will be high risk for bleeding due to esophagitis(positive occult blood), avoid pharmacological prophylaxis.  -Monitor fever curve     Hypomagnesemia  Hypophosphatemia  Hypokalemia  -Likely from ongoing alcohol use.  Replace per protocol  -Monitor on telemetry      Essential hypertension  -Blood pressure has been high, resume prior to admission amlodipine at 5 mg daily(reduced dose)  -Hold lisinopril, resume as clinically indicated    Generalized anxiety disorder  -Continue PTA Celexa      Hyponatremia, improved       Diet: Regular Diet Adult  Snacks/Supplements Adult: Other; OK for supplement as needed; With Meals     DVT Prophylaxis: Pneumatic Compression Devices   Heard Catheter: Not present  Code Status: Full Code     Disposition Plan       Expected Discharge Date: 01/20/2024      Destination: inpatient rehabilitation facility  Discharge Comments: New admit.  Pysch, Neurology and CD consults.      Entered: Norbert Ramirez MD 01/18/2024, 10:14 AM        Clinically Significant Risk Factors            # Hypomagnesemia: Lowest Mg = 1.2 mg/dL in last 2 days, will replace as needed   # Hypoalbuminemia: Lowest albumin = 3.1 g/dL at 1/16/2024 10:31 AM, will monitor as appropriate   # Thrombocytopenia: Lowest platelets = 115 in last 2 days, will monitor for bleeding   # Hypertension: Noted on problem list            # Financial/Environmental Concerns: unemployed                The patient's care was discussed with the Bedside Nurse and Patient.    Medical Decision Making       **CLEAR ALL SELECTIONS**        Labs/Imaging Reviewed:  See Information above and Data section below    Time SPENT BY ME on the date of service doing chart review, history, exam, documentation & further  "activities per the note:  35 MINUTES    Chart documentation was completed, in part, with [employer] voice-recognition software. Even though reviewed, some grammatical, spelling, and word errors may remain.    Norbert Ramirez MD  Hospitalist Service  [facility]  Text Page 7AM-6PM  Securely message with the Vocera Web Console (learn more here)  Text page via [employer] Paging/Directory    ______________________________________________________________________    Interval History   Patient had low-grade fever of 100.6 overnight.  Denies cough or dyspnea.  No dysuria.  No abdominal pain.  No evidence of active withdrawal    Data reviewed today: I reviewed all medications, new labs and imaging results over the last 24 hours. I personally reviewed no images or EKG's today.    Physical Exam   Vital signs:  Temp: 99.4  F (37.4  C) Temp src: Oral BP: (!) 143/99 Pulse: 103   Resp: 18 SpO2: 96 % O2 Device: None (Room air) Oxygen Delivery: 4 LPM Height: 177.8 cm (5' 10\") Weight: 67.6 kg (149 lb)  Estimated body mass index is 21.38 kg/m  as calculated from the following:    Height as of this encounter: 1.778 m (5' 10\").    Weight as of this encounter: 67.6 kg (149 lb).      Wt Readings from Last 2 Encounters:   01/13/24 67.6 kg (149 lb)   08/20/21 81.6 kg (180 lb)       Gen: AAOX3, NAD, comfortable  HEENT: Supple neck, moist oral mucosa, no pallor  Resp: CTA B/L, normal WOB, no crackles, no wheezes  CVS: RRR, no murmur  Abd/GI: Soft, non-tender. BS- normoactive.  No G/R/R  Skin: Warm, dry no rashes  MSK: No joint deformities, no pedal edema  Neuro- CN- intact. No focal deficits.  Minimal upper extremity tremors      Data   Recent Labs   Lab 01/17/24  1019 01/16/24  1031 01/16/24  0722 01/15/24  2058 01/15/24  1533 01/15/24  0853 01/15/24  0648 01/14/24  0747 01/13/24  2326 01/13/24  1138   WBC 5.0  --  4.8  --  6.2   < >  --  8.4   < > 9.8   HGB 8.2*  --  7.8*  --  7.8*   < >  --  7.5*   < > 9.9*   MCV 98  --  " 97  --  99   < >  --  98   < > 99   *  --  57*  --  40*   < >  --  29*   < > 43*   INR  --  0.91  --   --   --   --   --   --   --  1.02     --  141  --   --   --  135 135  --  134*   POTASSIUM 3.8  --  3.8  3.8 3.1* 3.2*  --  3.2*  3.1* 3.5  --  3.7   CHLORIDE 103  --  112*  --   --   --  102 98  --  87*   CO2 22  --  21*  --   --   --  21* 12*  --  10*   BUN 9.7  --  15.6  --   --   --  19.9 26.0*  --  25.2*   CR 0.80  --  0.93  --   --   --  0.93 1.09  --  1.16   ANIONGAP 10  --  8  --   --   --  12 25*  --  37*   HERNANDEZ 8.1*  --  7.8*  --   --   --  8.0* 8.3*  --  8.3*   *  --  176*  --   --   --  152* 97  --  148*   ALBUMIN  --  3.1*  --   --   --   --   --  3.4*  --  4.0   PROTTOTAL  --  5.3*  --   --   --   --   --  5.6*  --  6.7   BILITOTAL  --  0.4  --   --   --   --   --  0.8  --  1.9*   ALKPHOS  --  70  --   --   --   --   --  84  --  121   ALT  --  19  --   --   --   --   --  27  --  41   AST  --  34  --   --   --   --   --  104*  --  190*   LIPASE  --   --   --   --   --   --   --   --   --  203*    < > = values in this interval not displayed.       No results found for this or any previous visit (from the past 24 hour(s)).  Medications       folic acid  1 mg Oral Daily     gabapentin  300 mg Oral Q8H     gabapentin  600 mg Oral Q8H     iron sucrose  300 mg Intravenous Daily     multivitamin w/minerals  1 tablet Oral Daily     pantoprazole  40 mg Oral BID AC     senna-docusate  1 tablet Oral BID    Or     senna-docusate  2 tablet Oral BID     thiamine  250 mg Intravenous Daily    Followed by     [START ON 1/21/2024] thiamine  100 mg Oral Daily

## 2024-01-18 NOTE — PLAN OF CARE
Goal Outcome Evaluation:      Plan of Care Reviewed With: patient    Summary: Jerome Flanagan is a 48 year old male with severe alcohol use disorder brought in by EMS following a fall at home and noted to have an episode of seizure.  Alcohol levels are 0 with concern of alcohol withdrawal seizure. History of fall and unsteady gaits,T12-L2 compression fracture, COVID+. FALL RISK.     DATE & TIME: 01/18/24 5550-2042  Cognitive Concerns/ Orientation : A&Ox3, disoriented to time. forgetful.  BEHAVIOR & AGGRESSION TOOL COLOR: Green, flat affect, refused to get OOB to sit on chair,   CIWA: 7, 4, for tremors, mild confusion, sweats.   ABNL VS/O2: /99, tachy with , other VSS, O2 96% RA  MOBILITY: Assist of 1 GB/W, unsteady gait, generalized weakness, BUE tremors  PAIN MANAGMENT: Complained of Lt arm pain with movement, declined intervention.   DIET: Regular, only ate breakfast, declined lunch.   BOWEL/BLADDER: Incontinent at times, no BM, passing flatus.   ABNL LAB: NA   DRAIN/DEVICES: Rt PIV saline locked.   TELEMETRY RHYTHM: NA  SKIN: multiple scattered bruises (largest bruise on Rt groin area), scabs.  TESTS/PROCEDURES: CXR.    OTHER IMPORTANT INFO: Slept most of shift. Placed on 72-hr hold started 1/17 at 1300, okayed to have cell phone. Need UA/UC, pt has been incontinent of bladder, pt reported he can call for urinal next time. Neuros intact. Psych/CD/PT following.

## 2024-01-18 NOTE — PROGRESS NOTES
"CLINICAL NUTRITION SERVICES - REASSESSMENT NOTE    Recommendations Ordered by Registered Dietitian (RD):   OK to order supplements PRN. Will try a Vanilla Ensure this morning.   Continue micronutrients as ordered    Malnutrition:    % Weight Loss: No recent wts on file to assess, pt unsure  % Intake:  Decreased intake does not meet criteria for malnutrition   Subcutaneous Fat Loss:  Deferred - COVID iso  Muscle Loss:  Deferred - COVID iso  Fluid Retention:  None noted    Malnutrition Diagnosis: Unable to determine due to lack of information, pt in COVID iso     EVALUATION OF PROGRESS TOWARD GOALS   Diet: Regular   Intake/Tolerance:   - intake is recorded to be \"good\" with %. Orders 2-3 meals/day. Good appetite.   - Spoke to patient by phone this morning. He reports fair appetite. He's not sure about any weight changes, and couldn't recall if his clothes were fitting differently at home at all - at least he didn't notice any changes. Interested in trying a vanilla Ensure.     - Labs reviewed  Lytes improving from low levels on admission.   - Stoolin/17 - BM x5   - BM x1  1/15 - BM x3   - BM x1  - Weight trends: no new wt on file. Admit wt 149#.     Wt Readings from Last 5 Encounters:   24 67.6 kg (149 lb)   21 81.6 kg (180 lb)   21 81.6 kg (180 lb)   21 81.6 kg (180 lb)   21 81.6 kg (180 lb)     - Meds  Folic Acid 1 mg   Venofer IV  Thera vit M  Senokot BID  Thiamine     ASSESSED NUTRITION NEEDS:  Dosing Weight 67.6 kg  Estimated Energy Needs: 9730-5897 kcals (30-35 Kcal/Kg)  Justification: repletion  Estimated Protein Needs:  grams protein (1.2-1.5 g pro/Kg)  Justification: preservation of lean body mass  Estimated Fluid Needs: 1 mL/kcal or per provider pending fluid status    NEW FINDINGS:   - Placed on 72 hr hold on .   - Pt reportedly a/ox3, disoriented to time, situation. May be impulsive.     Previous Goals:   Patient to consume % of " nutritionally adequate meal trays BID-TID, or the equivalent with supplements/snacks.   Evaluation: Met    Previous Nutrition Diagnosis:   Predicted inadequate oral intake related to suspect alcohol replacing food consumption, only ordering 1 meal/day since admit   Evaluation: Improving    MALNUTRITION  % Weight Loss: No recent wts on file to assess   % Intake:  Decreased intake does not meet criteria for malnutrition   Subcutaneous Fat Loss:  Deferred - COVID iso  Muscle Loss:  Deferred - COVID iso  Fluid Retention:  None noted    Malnutrition Diagnosis: Unable to determine due to lack of information, pt in COVID iso    CURRENT NUTRITION DIAGNOSIS  Predicted inadequate nutrient intake (energy/protein) related to fluctuating appetite during withdrawal period.    INTERVENTIONS  Recommendations / Nutrition Prescription  Regular diet  OK to order supplements PRN  Continue micronutrients as ordered     Implementation  Medical Food Supplement    Goals  Intake of at least % meals BID-TID.     MONITORING AND EVALUATION:  Progress towards goals will be monitored and evaluated per protocol and Practice Guidelines    Abi Wharton RD, LD  Pager: [phone]  Weekend Pager: [phone]

## 2024-01-18 NOTE — PLAN OF CARE
DATE & TIME: 1/17/24 4752-8071  Cognitive Concerns/ Orientation : A&O x3, disoriented to time/situation. Impulsive and forgetful.  BEHAVIOR & AGGRESSION TOOL COLOR: Green  CIWA: 2 for tremors (possibly baseline).   ABNL VS/O2: VSS x on RA ex low grade fever, 100.6 max. Nursing staff will continue with monitoring.  MOBILITY: Assist of 1 GB/W, unsteady gait, generalized weakness, BUE tremors. Amb to BR x2.  PAIN MANAGMENT: Denies.  DIET: Regular, good appetite.   BOWEL/BLADDER: Inc b/b. BM x1- senna held.   ABNL LAB: Mag 1.7 and Phos 2.5 (recheck in AM)   DRAIN/DEVICES: L PIV x2 SL  TELEMETRY RHYTHM: n/a  SKIN: Scattered bruises, scabs.  TESTS/PROCEDURES: None this shift.   Sleeping or watching tv between cares. Chem dep plans to call on 1/18 for eval. Psych following. GI, Heme/onc signed off. Mother visited this shift, updated. Placed on 72hr hold during AM shift- ok'd to have cell phone.

## 2024-01-18 NOTE — PROGRESS NOTES
Care Management Follow Up    Length of Stay (days): 4    Expected Discharge Date: 01/20/2024     Concerns to be Addressed: adjustment to diagnosis/illness, cognitive/perceptual, compliance issue, discharge planning, mental health, substance/tobacco abuse/use     Patient plan of care discussed at interdisciplinary rounds: Yes    Anticipated Discharge Disposition: Inpatient Chemical Dependency     Anticipated Discharge Services: Mental Health Resources, Chemical Dependency Resources  Anticipated Discharge DME:      Patient/family educated on Medicare website which has current facility and service quality ratings:    Education Provided on the Discharge Plan:    Patient/Family in Agreement with the Plan:      Referrals Placed by CM/SW:    Private pay costs discussed: Not applicable    Additional Information:  Spoke with Jody this morning at PrePetition Screening (PPS).  Jody reports patient's 72 hour hold expires on 1/22. The screener assigned to this case is Sebastian Villarreal.  Because this is a CD petition patient's screening will include a DART member (Diversion and Recovery Team) involved in the screening.    The screening is either completed over the phone or in person.   Villarreal will call writer to state when the screening will occur.   Dr Elida Vega, during her visit on 1/17, told patient she is petitioning for CD commitment  CD consult has been ordered.  Their recommendation will be passed on to Mr Villarreal.      CRISTIANO CuellarSW

## 2024-01-18 NOTE — PROGRESS NOTES
Type Of Assessment: Inpatient Substance Use Comprehensive Assessment    Referral Source:  [facility] [address]  Unit Phone: [phone]  MRN: [id number]    DATE OF SERVICE: 2024  Date of previous FLAKO Assessment: Pt unsure  Patient confirmed identity through two factor verification: Full Legal Name and     PATIENT'S NAME: Jerome Flanagan  PREFERRED NAME: Marlon  PRONOUNS: he/him  Age: 48 year old  Last 4 SSN: [id number]  Sex: male   Gender Identity: male  Sexual Orientation: Heterosexual  Cultural Background: No, Denies any cultural influences or concerns that need to be considered for treatment  YOB: 1975  Current Address:   [address] [place] [id number]  Patient Phone Number:  [phone]   Patient's E-Mail Contact:  [email or web address]  Funding: [facility]  PMI: [id number]   Emergency Contact: Tisha Flanaagn (Mother) P: [phone] MMANES information was provided to patient and patient does not want a copy.     Telemedicine Visit: The patient's condition can be safely assessed and treated via synchronous audio and visual telemedicine encounter.    Reason for Telemedicine Visit: Services only offered telehealth  Originating Site (Patient Location): [facility] [place] [address]   Distant Site (Provider Location): Provider Remote Setting- Home Office  Consent:  The patient/guardian has verbally consented to: the potential risks and benefits of telemedicine (video visit) versus in person care; bill my insurance or make self-payment for services provided; and responsibility for payment of non-covered services.   Mode of Communication:  Video Conference via  [employer]    START TIME: 1500  END TIME: 1531    As the provider I attest to compliance with applicable laws and regulations related to telemedicine.   Jerome Flanagan was seen for a substance use disorder consult on 2024 by Zane Joe,  FRANCISCO.    Reason for Substance Use Disorder Consult:  Per H&P 1/13/24:  Jerome Flanagan is a 48 year old male with a history of severe alcohol use disorder presents to the emergency department brought in by EMS as patient's mother heard a loud thud and so patient on the floor having possible seizures.  His alcohol level was undetectable, his last drink was 48 hours ago, he usually orders online and receives alcohol by door Dash.  He was drinking up to 750 mL of vodka on a regular basis, not sure why he quit drinking 2 days ago.  He mentions that he has no intention to quit drinking and want to continue drinking, he understands the consequences but do not want to change.  He denies having any experience with seizures in the past, he had multiple admissions through the ED for alcohol withdrawal for alcohol intoxication in the past.  He is living with his mom.  Denies any tongue injury, frothing in the side of the mouth, EMS had put a c-collar on him which was removed by the ER physician after evaluation in the ED.  Patient was having poor oral intake for few days while he was consuming hard liquor up to a liter a day.     Are you currently having severe withdrawal symptoms that are putting yourself or others in danger? No  Are you currently having severe medical problems that require immediate attention? Yes, explain: Pt is currently admitted to the hospital  Are you currently having severe emotional or behavioral problems that are putting yourself or others at risk of harm? No    Have you participated in prior substance use disorder evaluations? Yes  Have you ever been to detox, inpatient or outpatient treatment for substance related use? List previous treatment: Yes. When, Where, and What circumstances: Pt reports previous Tx at [facility] and msot recently at [facility] in [place] around 2017  Have you ever had a gambling problem or had treatment for compulsive gambling? No  Have you ever felt the need to bet  "more and more money? No  Have you ever had to lie to people important to you about how much you gambled? No    Patient does not appear to be in severe withdrawal, an imminent safety risk to self or others, or requiring immediate medical attention and may proceed with the assessment interview.    Comprehensive Substance Use History   X X = Primary Drug Used Age of First Use    Pattern of Substance Use   (heaviest use in life and a use history within the past year if applicable) (DSM-5: Sx #3) Date /  Quantity of last use if within the past 30 days Withdrawal Potential?   Method of use  (Oral, smoked, snorted, IV, etc)   x Alcohol   7 Pt reports he has been drinking \"close to a liter\" of vodka daily for the last 6-7 months since losing his job 1/11/24 Yes Oral    Marijuana/Hashish   No use        Cocaine/Crack No use        Meth/Amphetamines   No use        Heroin   No use        Other Opiates/Synthetics   Unsp Pt denies Rx or recreational use, only as administered in the hospital by staff 1/16/24  1227  No IV    Inhalants  No use        Benzodiazepines   Unsp Pt denies Rx or recreational use, only as administered in the hospital by staff per withdrawal management protocol 1/18/24  0100  No Oral, IV    Hallucinogens   No use        Barbiturates/Sedatives/Hypnotics   No use        Over-the-Counter Drugs   No use        Other   No use        Nicotine   No use         Withdrawal symptoms: Have you had any of the following withdrawal symptoms?  Shaky / Jittery / Tremors  Headache  Fatigue / Extremely Tired  Sad / Depressed Feeling  Muscle Aches  Nausea / Vomiting  Seizures (pt reports this is his first time having a suspected withdrawal seizure)  Confused / Disrupted Speech    Have you experienced any cravings?  Yes    Have you had periods of abstinence?  Yes   What was your longest period? 28 days while in treatment    Any circumstances that lead to relapse? Pt reports he resumed drinking once getting out of " "treatment    What activities have you engaged in when using alcohol/other drugs that could be hazardous to you or others?  The patient reported having a history of driving while under the influence of alcohol or drugs.    A description of any risk-taking behavior, including behavior that puts the client at risk of exposure to blood-borne or sexually transmitted diseases: Pt denies    Arrests and legal interventions related to substance use: Pt reports \"I had a DUI a lifetime ago\" est in 1997, denies current probation    Per Care Management Initial Consult 1/17/24:  Patient was placed on a 72 hour hold at 1300 on 1/17.   Dr Vega completed the Examiner Statement in Support of Petition for  treatment for residential treatment.    A description of how the patient's use affected their ability to function appropriately in a work setting: The patient reported his substance use has negatively impacted his ability to function in a work setting.  The patient reported missing days of work, having decreased performance at work, being fired from a recent job, and being unable to obtain steady employment due to his substance use.       A description of how the patient's use affected their ability to function appropriately in an educational setting: The patient reported his substance use has not negatively impacted his ability to function in a school setting.       Leisure time activities that are associated with substance use: Pt denies    Do you think your substance use has become a problem for you? He agrees he has a substance abuse problem.    MEDICAL HISTORY  Physical or medical concerns or diagnoses:   No past medical history on file.     Patient Active Problem List   Diagnosis Code    Thrombocytopenia (H24) D69.6    Alcoholic hepatitis without ascites (H28) K70.10    Alcohol withdrawal seizure with complication (H) F10.939, R56.9    Alcohol use disorder, severe, dependence (H) F10.20    Essential hypertension I10    " "Generalized anxiety disorder F41.1    Hyponatremia E87.1      Do you have any current medical treatment needs not being addressed by inpatient treatment?  Pt denies    Do you need a referral for a medical provider? Pt denies current PCP and reports he is open to having a referral placed to established Primary Care    Current medications: Patient reports current meds as:   Outpatient Medications Marked as Taking for the 1/13/24 encounter (Hospital Encounter)   Medication Sig    allopurinol (ZYLOPRIM) 100 MG tablet Take 100 mg by mouth daily    amLODIPine (NORVASC) 10 MG tablet Take 10 mg by mouth daily    FLUoxetine (PROZAC) 40 MG capsule Take 40 mg by mouth daily    gabapentin (NEURONTIN) 300 MG capsule Take 600 mg by mouth 3 times daily    lisinopril (ZESTRIL) 20 MG tablet Take 20 mg by mouth daily    omeprazole (PRILOSEC) 20 MG DR capsule Take 20 mg by mouth daily as needed    simvastatin (ZOCOR) 20 MG tablet Take 20 mg by mouth at bedtime     Are you pregnant? NA, Male    Do you have any specific physical needs/accommodations? No    MENTAL HEALTH HISTORY:  Have you ever had  hospitalizations or treatment for mental health illness: No    Mental health history, including diagnosis and symptoms, and the effect on the client's ability to function: Pt reports Sx consistent with anxiety, grief (specifically related to the death of pt's father), and \"the depression from all the drinking\".  Pt did not supply specific Sx but his mental health does appear to have a significant effect on his ability to function at this time    Current mental health treatment including psychotropic medication needed to maintain stability: (Note: The assessment must utilize screening tools approved by the commissioner pursuant to section 245.4863 to identify whether the client screens positive for co-occurring disorders): None prior to current admission    GAIN-SS Tool:      1/18/2024     3:00 PM   When was the last time that you had " significant problems...   with feeling very trapped, lonely, sad, blue, depressed or hopeless about the future? Past month   with sleep trouble, such as bad dreams, sleeping restlessly, or falling asleep during the day? Past Month   with feeling very anxious, nervous, tense, scared, panicked or like something bad was going to happen? Past month   with becoming very distressed & upset when something reminded you of the past? Past month   with thinking about ending your life or committing suicide? Never         1/18/2024     3:00 PM   When was the last time that you did the following things 2 or more times?   Lied or conned to get things you wanted or to avoid having to do something? 1+ years ago   Had a hard time paying attention at school, work or home? 1+ years ago   Had a hard time listening to instructions at school, work or home? Never   Were a bully or threatened other people? Never   Started physical fights with other people? Never     Have you ever been verbally, emotionally, physically or sexually abused?   The patient denied having any history of being verbally, emotionally, physically or sexually abused.    Family history of substance use and misuse: Pt denies    The patient's desire for family involvement in the treatment program: TBD  Level of family support: pt reports his mother is supportive    Social network in relation to expected support for recovery: Pt denies support system other than his mother who he lives with  Pt denies Hx of AA attendance    Are you currently in a significant relationship? No    Do you have any children (include living arrangements/custody/contact)?:  Pt denies    What is your current living situation? Pt reports he lives with his mother in [place]    Are you employed/attending school? Pt reports he is unemployed, last worked [employer] 6-7 months ago as a , denies other income sources    SUMMARY:  Ability to understand written treatment materials: Yes  Ability to  understand patient rules and patient rights: Yes  Does the patient recognize needs related to substance use and is willing to follow treatment recommendations: No  Does the patient have an opioid use disorder:  does not have a history of opiate use.    ASAM Dimension Scale Ratings:  Dimension 1: 1 Client can tolerate and cope with withdrawal discomfort. The client displays mild to moderate intoxication or signs and symptoms interfering with daily functioning but does not immediately endanger self or others. Client poses minimal risk of severe withdrawal.  Dimension 2: 1 Client tolerates and stanley with physical discomfort and is able to get the services that the client needs.  Dimension 3: 2 Client has difficulty with impulse control and lacks coping skills. Client has thoughts of suicide or harm to others without means; however, the thoughts may interfere with participation in some treatment activities. Client has difficulty functioning in significant life areas. Client has moderate symptoms of emotional, behavioral, or cognitive problems. Client is able to participate in most treatment activities.  Dimension 4: 4 The client is: (A) non-compliant with treatment and has no awareness of addiction or mental disorder and does not want or is unwilling to explore change or is in total denial of the illness and its implications, or (B) dangerously oppositional to the extent that the client is a threat of imminent harm to self and others.  Dimension 5: 4 No awareness of the negative impact of mental health problems or substance abuse. No coping skills to arrest mental health or addiction illnesses, or prevent relapse.  Dimension 6: 3 Client is not engaged in structured, meaningful activity and the client's peers, family, significant other, and living environment are unsupportive, or there is significant criminal justice system involvement.    Category of Substance Severity (ICD-10 Code / DSM 5 Code)     Alcohol Use Disorder  Severe  (10.20) (303.90)   Cannabis Use Disorder The patient does not meet the criteria for a Cannabis use disorder.   Hallucinogen Use Disorder The patient does not meet the criteria for a Hallucinogen use disorder.   Inhalant Use Disorder The patient does not meet the criteria for an Inhalant use disorder.   Opioid Use Disorder The patient does not meet the criteria for an Opioid use disorder.   Sedative, Hypnotic, or Anxiolytic Use Disorder The patient does not meet the criteria for a Sedative/Hypnotic use disorder.   Stimulant Related Disorder The patient does not meet the criteria for a Stimulant use disorder.   Tobacco Use Disorder The patient does not meet the criteria for a Tobacco use disorder.   Other (or unknown) Substance Use Disorder The patient does not meet the criteria for a Other (or unknown) Substance use disorder.     A problematic pattern of alcohol/drug use leading to clinically significant impairment or distress, as manifested by at least two of the following, occurring within a 12-month period:    1.) Alcohol/drug is often taken in larger amounts or over a longer period than was intended.  3.) A great deal of time is spent in activities necessary to obtain alcohol, use alcohol, or recover from its effects.  4.) Craving, or a strong desire or urge to use alcohol/drug  5.) Recurrent alcohol/drug use resulting in a failure to fulfill major role obligations at work, school or home.  6.) Continued alcohol use despite having persistent or recurrent social or interpersonal problems caused or exacerbated by the effects of alcohol/drug.  7.) Important social, occupational, or recreational activities are given up or reduced because of alcohol/drug use.  9.) Alcohol/drug use is continued despite knowledge of having a persistent or recurrent physical or psychological problem that is likely to have been caused or exacerbated by alcohol.  10.) Tolerance, as defined by either of the following: A need for  markedly increased amounts of alcohol/drug to achieve intoxication or desired effect.  11.) Withdrawal, as manifested by either of the following: The characteristic withdrawal syndrome for alcohol/drug (refer to Criteria A and B of the criteria set for alcohol/drug withdrawal).    Specify if: In early remission:  After full criteria for alcohol/drug use disorder were previously met, none of the criteria for alcohol/drug use disorder have been met for at least 3 months but for less than 12 months (with the exception that Criterion A4,  Craving or a strong desire or urge to use alcohol/drug  may be met).     In sustained remission:   After full criteria for alcohol use disorder were previously met, non of the criteria for alcohol/drug use disorder have been met at any time during a period of 12 months or longer (with the exception that Criterion A4,  Craving or strong desire or urge to use alcohol/drug  may be met).     Specify if:   This additional specifier is used if the individual is in an environment where access to alcohol is restricted.    Mild: Presence of 2-3 symptoms  Moderate: Presence of 4-5 symptoms  Severe: Presence of 6 or more symptoms    Collateral information: FLAKO Collateral Info: Sufficient information is obtained from the patient to support diagnosis and recommendations. Contact with a collateral sources is not required.    Recommendations:  1)  Complete a Residential CD Treatment Program  2)  Abstain from all mood-altering chemicals unless prescribed by a licensed provider.   3)  Attend, at minimum, 2 weekly support group meetings, such as 12 step based (AA/NA), SMART Recovery, Health Realizations, and/or Refuge Recovery meetings.     4)  Actively work with a male mentor/sponsor on a weekly basis.   5)  Follow all the recommendations of your treatment/medical providers.  6)  Remain law abiding and follow all recommendations of the Courts.  7)  Patient may benefit from obtaining a full mental  health evaluation.    Clinical Substantiation:  Patient has been unable to maintain abstinence from alcohol while living at his current home environment, lacks a sober living environment, lacks long-term sober maintenance skills, lacks sober coping skills, lacks awareness regarding the disease model of addiction, lacks a sober peer support network, has medical issues which are exacerbated by substance abuse, and has mental health symptoms which are exacerbated by substance abuse.  Patient reports that he has no intention of stopping drinking at this time and that he feels FLAKO treatment would not be beneficial to him because of this.    Referrals/ Alternatives:  Pt declined to have any referrals made at time of assessment    [facility] Assessment ID: [id number]     FLAKO consult completed by:   LYUBOV Victoria, [facility]  Substance Use Disorder Evaluation Counselor  E-mail Address: [email or web address] and [email or web address] Consult & Liaison Department  [facility], [address] [place] [address]     *Due to regulation of Title 42 of the Code of Federal Regulations (CFR) Part 2: Confidentiality laws apply to this note and the information wherein.  Thus, this note cannot be copy and pasted into any other health care staff's note nor can it be included in general medical records sent to ANY outside agency without the patient's written consent.

## 2024-01-19 NOTE — PROGRESS NOTES
[facility]    Medicine Progress Note - Hospitalist Service        Date of Admission:  1/13/2024 11:11 AM    Assessment & Plan:   Jerome Flanagan is a 48 year old male with severe alcohol use disorder brought in by EMS following a fall at home and noted to have an episode of seizure.  He was admitted with concerns for acute alcohol withdrawal     Anion gap metabolic acidosis with respiratory compensation, resolved  Likely starvation ketoacidosis, resolved  -At presentation bicarb 10 with anion gap of 37.  Venous pH next day of admission 7.32 with pCO2 of 26 shows respiratory compensation while serum bicarb was 12. -Lactate on admission was normal  -Serum ketones next day of admission was 9.    -Acidosis has resolved.     Seizure, new onset likely from alcohol withdrawal but could also be metabolic  History of fall and unsteady gaits  T12-L2 compression fracture  -Patient is a chronic alcoholic, he denies any prior history of seizures but as per report from EMS and his mother patient was found on the ground and was having tonic-clonic movements  -There was some concern the patient has prior history of withdrawal seizures, he is currently living with his mother  -Head CT unremarkable except for left posterior scalp soft tissue contusion/hematoma without acute intracranial abnormality.  CT abdomen pelvis with mild age-indeterminate compression deformity at T12-L2 vertebral bodies probably chronic  -Patient has multiple bruises likely old on his back with suggest either previous falls or previous seizures  -Appreciate neurology input, EEG with no obvious epileptiform discharges or electrographic seizures  -MRI with slight asymmetric increased FLAIR signal and blurring of the history send in the left hippocampal formation which could potentially relate to recent seizure activity or could reflect underlying mesial temporal sclerosis.  -Neurology signed off, per neurology changes in temporal lobe due to  prior seizures and no other concerns.  Did not recommend treatment.  -IV Ativan as needed for seizures, patient has not had any further seizures since being in the hospital  -Fall precaution  -Initially on high-dose thiamine, continue at 100 mg daily  -PT OT recommending TCU.     Alcoholic hepatitis without ascites   Alcohol use disorder, severe, dependence (H)  Elevated lipase  -LFTs showed elevated AST, bilirubin was elevated on arrival but has now improved, AST also improving.  Lipase was minimally elevated, CT abdomen pelvis with no evidence of pancreatitis.  -Patient initially scoring 7-16 on CIWA and has needed multiple doses of IV Valium/IV Ativan.   -Psychiatry following, see consult note for details  -Psychiatry initiated a petition for commitment for  CD treatment   -Continue gabapentin, multivitamin, thiamine 100 mg daily  -No evidence of withdrawal at the moment     Acute anemia on chronic anemia  Severe thrombocytopenia  Severe esophagitis  -Patient denies any history of bleeding, no recent baseline but looking at the chart back in 2021 his hemoglobin was around 9.5-12.9.  -At presentation hemoglobin was 9.9, with hydration hemoglobin gradually decreasing and dropped down to 7.1 1/15.  Platelet during this admission has been low, presented with 43 this morning is 30  -Initial iron studies shows iron level of 132  however repeat iron level is? 12 iron sat 10 and iron binding capacity with 119 suggesting mixed picture, LDH normal, Hemoccult positive  -Patient underwent EGD with Dr. Kumar, and was noted to have severe esophagitis but no stigmata of bleeding.  -Continue Protonix 40 mg twice daily  -Status post 1 unit PRBC transfusion 1/15  -Appreciate hematology input, now signed off  -Received 2 days of IV Venofer, continue oral iron replacement    COVID-19 infection  Fever  -Tested positive on 1/14/2024  -Low-grade fever since 1/17, with temperature spike 102  F overnight  -No respiratory symptoms  -2  episodes of loose bowel movement earlier today  -UA and chest x-ray done yesterday was unremarkable  -Blood culture x 2  -Procalcitonin is 0.83, will trend  -Most likely fever is related to his COVID-19 infection, monitor fever curve  -If he has another temperature spike again, will start empiric antibiotics and consult ID  -DVT prophylaxis held due to thrombocytopenia and anemia, (thrombocytoprnia improved) but he still will be high risk for bleeding due to esophagitis(positive occult blood) and anemia, avoid pharmacological prophylaxis.     Hypomagnesemia  Hypophosphatemia  Hypokalemia  -Likely from ongoing alcohol use.  Replace per protocol  -Monitor on telemetry      Essential hypertension  -Continue prior to admission amlodipine, will increase to 10 mg daily which is his PTA dose  -Hold lisinopril, resume as clinically indicated    Generalized anxiety disorder  -Continue PTA Celexa      Hyponatremia, improved       Diet: Regular Diet Adult  Snacks/Supplements Adult: Other; OK for supplement as needed; With Meals     DVT Prophylaxis: Pneumatic Compression Devices   Heard Catheter: Not present  Code Status: Full Code     Disposition Plan      Expected Discharge Date: 01/23/2024      Destination: inpatient rehabilitation facility  Discharge Comments: New admit.  Pysch, Neurology and CD consults.      Entered: Norbert Ramirez MD 01/19/2024, 9:12 AM        Clinically Significant Risk Factors        # Hypokalemia: Lowest K = 3.1 mmol/L in last 2 days, will replace as needed     # Hypomagnesemia: Lowest Mg = 1.1 mg/dL in last 2 days, will replace as needed   # Hypoalbuminemia: Lowest albumin = 3.1 g/dL at 1/16/2024 10:31 AM, will monitor as appropriate   # Thrombocytopenia: Lowest platelets = 115 in last 2 days, will monitor for bleeding   # Hypertension: Noted on problem list              # Financial/Environmental Concerns: unemployed                The patient's care was discussed with the Bedside Nurse and  "Patient.    Medical Decision Making       **CLEAR ALL SELECTIONS**      Labs/Imaging Reviewed:  See Information above and Data section below  Time SPENT BY ME on the date of service doing chart review, history, exam, documentation & further activities per the note:  35 MINUTES    Chart documentation was completed, in part, with [facility] voice-recognition software. Even though reviewed, some grammatical, spelling, and word errors may remain.    Norbert Ramirez MD  Hospitalist Service  [facility]  Text Page 7AM-6PM  Securely message with the Vocera Web Console (learn more here)  Text page via [facility] Paging/Directory    ______________________________________________________________________    Interval History   Fever of 102 overnight.  Patient denies new cough.  No abdominal pain.  He does report 2 episodes of loose bowel movement.  No dysuria.    Data reviewed today: I reviewed all medications, new labs and imaging results over the last 24 hours. I personally reviewed no images or EKG's today.    Physical Exam   Vital signs:  Temp: 99.8  F (37.7  C) Temp src: Oral BP: (!) 124/92 Pulse: 93   Resp: 18 SpO2: 96 % O2 Device: None (Room air) Oxygen Delivery: 4 LPM Height: 177.8 cm (5' 10\") Weight: 67.6 kg (149 lb)  Estimated body mass index is 21.38 kg/m  as calculated from the following:    Height as of this encounter: 1.778 m (5' 10\").    Weight as of this encounter: 67.6 kg (149 lb).      Wt Readings from Last 2 Encounters:   01/13/24 67.6 kg (149 lb)   08/20/21 81.6 kg (180 lb)       Gen: AAOX3, NAD, comfortable  HEENT: Supple neck, moist oral mucosa, no pallor  Resp: CTA B/L, normal WOB, no crackles/wheeze  CVS: RRR, no murmur  Abd/GI: Soft, non-tender. BS- normoactive.  No G/R/R  Skin: Warm, dry no rashes  MSK: No joint deformities, no pedal edema  Neuro- CN- intact. No focal deficits.       Data   Recent Labs   Lab 01/19/24  0707 01/18/24  1112 01/17/24  1019 01/16/24  1031 01/16/24  0722 " 01/15/24  0648 01/14/24  0747 01/13/24  2326 01/13/24  1138   WBC  --  6.6 5.0  --  4.8   < > 8.4   < > 9.8   HGB  --  8.4* 8.2*  --  7.8*   < > 7.5*   < > 9.9*   MCV  --  97 98  --  97   < > 98   < > 99   PLT  --  182 115*  --  57*   < > 29*   < > 43*   INR  --   --   --  0.91  --   --   --   --  1.02   NA  --  135 135  --  141   < > 135  --  134*   POTASSIUM 3.1* 3.5 3.8  --  3.8  3.8   < > 3.5  --  3.7   CHLORIDE  --  101 103  --  112*   < > 98  --  87*   CO2  --  25 22  --  21*   < > 12*  --  10*   BUN  --  10.6 9.7  --  15.6   < > 26.0*  --  25.2*   CR  --  0.84 0.80  --  0.93   < > 1.09  --  1.16   ANIONGAP  --  9 10  --  8   < > 25*  --  37*   HERNANDEZ  --  8.2* 8.1*  --  7.8*   < > 8.3*  --  8.3*   GLC  --  160* 224*  --  176*   < > 97  --  148*   ALBUMIN  --   --   --  3.1*  --   --  3.4*  --  4.0   PROTTOTAL  --   --   --  5.3*  --   --  5.6*  --  6.7   BILITOTAL  --   --   --  0.4  --   --  0.8  --  1.9*   ALKPHOS  --   --   --  70  --   --  84  --  121   ALT  --   --   --  19  --   --  27  --  41   AST  --   --   --  34  --   --  104*  --  190*   LIPASE  --   --   --   --   --   --   --   --  203*    < > = values in this interval not displayed.       Recent Results (from the past 24 hour(s))   XR Chest Port 1 View    Narrative    CHEST ONE VIEW  1/18/2024 3:13 PM     HISTORY: covid; fever    COMPARISON: None.      Impression    IMPRESSION: Cardiac silhouette appears within normal limits for  portable technique. Mild linear opacity at the right lung base is  favored atelectasis. No pleural effusion or pneumothorax.    VIJI MAGDALENO MD         SYSTEM ID:  [id number]     Medications      amLODIPine  5 mg Oral Daily    ferrous sulfate  325 mg Oral BID w/meals    folic acid  1 mg Oral Daily    gabapentin  300 mg Oral Q8H    magnesium sulfate  4 g Intravenous Once    multivitamin w/minerals  1 tablet Oral Daily    pantoprazole  40 mg Oral BID AC    senna-docusate  1 tablet Oral BID    Or    senna-docusate  2  tablet Oral BID    thiamine  100 mg Oral Daily

## 2024-01-19 NOTE — PLAN OF CARE
Summary: Jerome Flanagan is a 48 year old male with severe alcohol use disorder brought in by EMS following a fall at home and noted to have an episode of seizure.  Alcohol levels are 0 with concern of alcohol withdrawal seizure. History of fall and unsteady gaits,T12-L2 compression fracture, COVID+. FALL RISK.     DATE & TIME: 01/18/24, 4771 - 1403   Cognitive Concerns/ Orientation : A&O x 3, disoriented to time, forgetful  Flat affect.   BEHAVIOR & AGGRESSION TOOL COLOR: Green  CIWA:  7/6  ABNL VS/O2: BP remained elevated, tachycardic. Tmax 102.9. Prn Tylenol given again this AM  MOBILITY: Assist x 1 GB and walker. Needs assistance with boosting up in bed. Turns self independently  PAIN MANAGMENT: Prn Tylenol given for pain to left hand/wrist  DIET: Regular. Cold fluids encouraged  BOWEL/BLADDER: Incontinent of urine this shift. No BM this shift. Patient needed much encouragement for incontinence care  ABNL LAB:AM labs pending. UC pending   DRAIN/DEVICES: PIV saline locked.   TELEMETRY RHYTHM: NA  SKIN: Multiple scattered bruises (largest bruise on Rt groin area), scabs. Skin pale and clammy.   TESTS/PROCEDURES: AM labs   OTHER IMPORTANT INFO: Special Precautions maintained. Bed alarm on for safety. Placed on 72-hr hold started 1/17/24 at 1300. Neuros intact.    Goal Outcome Evaluation:      Plan of Care Reviewed With: patient    Overall Patient Progress: no changeOverall Patient Progress: no change

## 2024-01-19 NOTE — PLAN OF CARE
Summary: Jerome Flanagan is a 48 year old male with severe alcohol use disorder brought in by EMS following a fall at home and noted to have an episode of seizure.  Alcohol levels are 0 with concern of alcohol withdrawal seizure. History of fall and unsteady gaits,T12-L2 compression fracture, COVID+. FALL RISK.     DATE & TIME: 01/18/24 Evenings   Cognitive Concerns/ Orientation : A&Ox2-3, disoriented to time and situations. Forgetful and unorganized. Flat affect.   BEHAVIOR & AGGRESSION TOOL COLOR: Green  CIWA:  4,7 for tremors, mild confusion and anxiety, sweats.   ABNL VS/O2: VSS with BP in the 150's, tachycardia, on RA. Mild Temp of 99.6. Tylenol given. Max temp this evening increased to 102.7, , /110s. On call paged and updated.   MOBILITY: 1PA, GB, and walker. Pt refusing to ambulate this shift. Needs assistance with repositioning at times. Generalized weakness.   PAIN MANAGMENT: Complained of tingling and numbness in hands, left wrist pains. PRN Tylenol given for mild temp and discomforts.   DIET: Regular. Needs reminders to order. Good appetite, eats slow. Drinking fluids.   BOWEL/BLADDER: Incontinent at times, using urinal. No BM this shift, passing flatus.   ABNL LAB: Hgb 8.4, UC pending   DRAIN/DEVICES: RFA PIV saline locked.   TELEMETRY RHYTHM: NA  SKIN: Multiple scattered bruises (largest bruise on Rt groin area), scabs. Pale and clammy.   TESTS/PROCEDURES: AM labs   OTHER IMPORTANT INFO: Special Precautions maintained. Bed alarm on for safety. Rounded on freq. Placed on 72-hr hold started 1/17 at 1300. Neuros intact. Psych/CD/PT following. CD met with pt via Ipad. Refusing interventions at this time. Discharge to inpt treatment recommended.

## 2024-01-19 NOTE — PROVIDER NOTIFICATION
On Call Hospitalists Beckie paged for elevated VS.   Temp of 102.7    /110's     Gave PRN Tylenol.   Will check labs in AM and monitor for now.

## 2024-01-20 NOTE — PROGRESS NOTES
Care Management Follow Up    Length of Stay (days): 6    Expected Discharge Date: 01/23/2024     Concerns to be Addressed: adjustment to diagnosis/illness, cognitive/perceptual, compliance issue, discharge planning, mental health, substance/tobacco abuse/use     Patient plan of care discussed at interdisciplinary rounds: Yes    Anticipated Discharge Disposition: Inpatient Chemical Dependency     Anticipated Discharge Services: Mental Health Resources, Chemical Dependency Resources  Anticipated Discharge DME:      Patient/family educated on Medicare website which has current facility and service quality ratings:    Education Provided on the Discharge Plan:    Patient/Family in Agreement with the Plan:      Referrals Placed by CM/SW:    Private pay costs discussed: Not applicable    Additional Information:  Petition Form and Exhibit A was faxed to [facility] PrePetition Screener Sebastian Villarreal late afternoon on 1/19.  The documents are filed in patient's paper chart under legal index.  Patient's 72 hour hold expires 1/22 at 1300. Thus by 1/22 at 1300 PPS staff will notify writer if they support the petition being forward to [facility] Attorney's office.     LUCI Cuellar

## 2024-01-20 NOTE — PLAN OF CARE
Goal Outcome Evaluation:      Plan of Care Reviewed With: patient, family    Summary: Jerome Flanagan is a 48 year old male with severe alcohol use disorder brought in by EMS following a fall at home and noted to have an episode of seizure.  Alcohol levels are 0 with concern of alcohol withdrawal seizure. History of fall and unsteady gaits,T12-L2 compression fracture, COVID+. FALL RISK.     DATE & TIME: 01/19/24 [id number]   Cognitive Concerns/ Orientation : A&O x 3, disoriented to time, forgetful  Flat affect.   BEHAVIOR & AGGRESSION TOOL COLOR: Green  CIWA: 3, 4, 4  ABNL VS/O2: /92, low grade fever 99.8, 99.2, other VSS, O2 96% RA   MOBILITY: Assist x 1 GB and walker. Needs assistance with boosting up in bed. Turns/repo independently. Walked to the bathroom several times but refuses other OOB activities, refuses to sit on chair for meals.   PAIN MANAGMENT: denies   DIET: Regular, fair appetite  BOWEL/BLADDER: Incontinent of bowel and bladder, Soft BM x2.   ABNL LAB: K 3.1, Mg 1.1, replaced both, rechecks, K 3.6 and Mg 2.2, procal 0.83, Albumin 2.8,   DRAIN/DEVICES: PIV saline locked.   TELEMETRY RHYTHM: NA  SKIN: Multiple scattered bruises (largest bruise on Rt groin area), scabs. Skin pale and clammy, Lt middle finger swelling and tender.   TESTS/PROCEDURES: none  OTHER IMPORTANT INFO: Special Precautions and seizure precaution maintained. Placed on 72-hr hold started 1/17/24 at 1300. Neuros intact. Psych/CD/PT following.

## 2024-01-20 NOTE — PROGRESS NOTES
[facility]    Medicine Progress Note - Hospitalist Service        Date of Admission:  1/13/2024 11:11 AM    Assessment & Plan:   Jerome Flanagan is a 48 year old male with severe alcohol use disorder brought in by EMS following a fall at home and noted to have an episode of seizure.  He was admitted with concerns for acute alcohol withdrawal     Anion gap metabolic acidosis with respiratory compensation, resolved  Likely starvation ketoacidosis, resolved  -At presentation bicarb 10 with anion gap of 37.  Venous pH next day of admission 7.32 with pCO2 of 26 shows respiratory compensation while serum bicarb was 12. -Lactate on admission was normal  -Serum ketones next day of admission was 9.    -Acidosis has resolved.     Seizure, new onset likely from alcohol withdrawal but could also be metabolic  History of fall and unsteady gaits  T12-L2 compression fracture  -Patient is a chronic alcoholic, he denies any prior history of seizures but as per report from EMS and his mother patient was found on the ground and was having tonic-clonic movements  -There was some concern the patient has prior history of withdrawal seizures, he is currently living with his mother  -Head CT unremarkable except for left posterior scalp soft tissue contusion/hematoma without acute intracranial abnormality.  CT abdomen pelvis with mild age-indeterminate compression deformity at T12-L2 vertebral bodies probably chronic  -Patient has multiple bruises likely old on his back with suggest either previous falls or previous seizures  -Appreciate neurology input, EEG with no obvious epileptiform discharges or electrographic seizures  -MRI with slight asymmetric increased FLAIR signal and blurring of the history send in the left hippocampal formation which could potentially relate to recent seizure activity or could reflect underlying mesial temporal sclerosis.  -Neurology signed off, per neurology changes in temporal lobe due to  prior seizures and no other concerns.  Did not recommend treatment.  -IV Ativan as needed for seizures, patient has not had any further seizures since being in the hospital  -Fall precaution  -Initially on high-dose thiamine, continue at 100 mg daily  -PT OT recommending TCU.     Alcoholic hepatitis without ascites   Alcohol use disorder, severe, dependence (H)  Elevated lipase  -LFTs showed elevated AST, bilirubin was elevated on arrival but has now improved, AST also improving.  Lipase was minimally elevated, CT abdomen pelvis with no evidence of pancreatitis.  -Patient initially scoring 7-16 on CIWA and has needed multiple doses of IV Valium/IV Ativan.   -Psychiatry following, see consult note for details  -Psychiatry initiated a petition for commitment for  CD treatment   -Continue gabapentin, multivitamin, thiamine 100 mg daily  -No evidence of withdrawal at the moment      COVID-19 infection  Fever  -Tested positive on 1/14/2024  -Low-grade fever since 1/17, with temperature spike 102  F overnight  -No respiratory symptoms  -2 episodes of loose bowel movement earlier today  -UA and chest x-ray done yesterday was unremarkable  -Blood culture x 2  -Procalcitonin is 0.83, with stable trend  -Most likely fever is related to his COVID-19 infection, with possible role of acute gouty flare.  Please see below.  Monitor fever curve  -DVT prophylaxis held due to thrombocytopenia and anemia, (thrombocytoprnia improved) but he still will be high risk for bleeding due to esophagitis(positive occult blood) and anemia, avoid pharmacological prophylaxis.    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  -At presentation hemoglobin was 9.9, with hydration hemoglobin gradually decreasing and dropped down to 7.1 1/15.  Platelet during this admission has been low, presented with 43 this morning is 30  -Initial iron studies shows iron level of 132  however repeat iron level is? 12 iron sat 10 and iron binding capacity with 119 suggesting mixed picture, LDH normal, Hemoccult positive  -Patient underwent EGD with Dr. Kumar, and was noted to have severe esophagitis but no stigmata of bleeding.  -Continue Protonix 40 mg twice daily  -Status post 1 unit PRBC transfusion 1/15  -Appreciate hematology input, now signed off  -Received 2 days of IV Venofer, continue oral iron replacement     Hypomagnesemia  Hypophosphatemia  Hypokalemia  -Likely from ongoing alcohol use.  Replace per protocol  -Monitor on telemetry      Essential hypertension  -Continue prior to admission amlodipine, will increase to 10 mg daily which is his PTA dose  -Hold lisinopril, resume as clinically indicated    Generalized anxiety disorder  -Continue PTA Celexa      Hyponatremia, improved       Diet: Regular Diet Adult  Snacks/Supplements Adult: Other; OK for supplement as needed; With Meals     DVT Prophylaxis: Pneumatic Compression Devices   Heard Catheter: Not present  Code Status: Full Code     Disposition Plan       Expected Discharge Date: 01/23/2024      Destination: inpatient rehabilitation facility  Discharge Comments: 72 hour hold until 1/22 1300, pursing committment      Entered: Norbert Ramirez MD 01/20/2024, 9:35 AM        Clinically Significant Risk Factors        # Hypokalemia: Lowest K = 3.1 mmol/L in last 2 days, will replace as needed     # Hypomagnesemia: Lowest Mg = 1.1 mg/dL in last 2 days, will replace as needed   # Hypoalbuminemia: Lowest  "albumin = 2.8 g/dL at 1/19/2024  7:07 AM, will monitor as appropriate     # Hypertension: Noted on problem list              # Financial/Environmental Concerns: unemployed                The patient's care was discussed with the Bedside Nurse and Patient.    Medical Decision Making       **CLEAR ALL SELECTIONS**      Labs/Imaging Reviewed:  See Information above and Data section below  Time SPENT BY ME on the date of service doing chart review, history, exam, documentation & further activities per the note:  35 MINUTES    Chart documentation was completed, in part, with [employer] voice-recognition software. Even though reviewed, some grammatical, spelling, and word errors may remain.    Norbert Ramirez MD  Hospitalist Service  [facility]  Text Page 7AM-6PM  Securely message with the Vocera Web Console (learn more here)  Text page via [employer] Paging/Directory    ______________________________________________________________________    Interval History   Patient had a Tmax of 101.4.  Patient complaining of bilateral wrist pain and also pain and swelling of small hand joints.  He does have a history of tophaceous gout and this feels like a gouty flare to him.  Blood culture negative thus far.  Denies significant cough or dyspnea.  No abdominal pain.  No diarrhea today.    Data reviewed today: I reviewed all medications, new labs and imaging results over the last 24 hours. I personally reviewed no images or EKG's today.    Physical Exam   Vital signs:  Temp: 99.4  F (37.4  C) Temp src: Oral BP: (!) 132/90 Pulse: 103   Resp: 20 SpO2: 96 % O2 Device: None (Room air) Oxygen Delivery: 4 LPM Height: 177.8 cm (5' 10\") Weight: 67.6 kg (149 lb)  Estimated body mass index is 21.38 kg/m  as calculated from the following:    Height as of this encounter: 1.778 m (5' 10\").    Weight as of this encounter: 67.6 kg (149 lb).      Wt Readings from Last 2 Encounters:   01/13/24 67.6 kg (149 lb)   08/20/21 81.6 kg " (180 lb)       Gen: AAOX3, NAD, comfortable  Resp: CTA B/L, normal WOB  CVS: RRR, no murmur  Abd/GI: Soft, non-tender. BS- normoactive.  R  Skin: Warm, dry no rashes  MSK: Bilateral wrist with restricted range of motion and slightly tender to palpation and mild swelling.  Significant swelling of the third IPJ  Neuro- CN- intact. No focal deficits.       Data   Recent Labs   Lab 01/19/24  1610 01/19/24  0707 01/18/24  1112 01/17/24  1019 01/16/24  1031 01/16/24  0722 01/13/24  2326 01/13/24  1138   WBC  --   --  6.6 5.0  --  4.8   < > 9.8   HGB  --   --  8.4* 8.2*  --  7.8*   < > 9.9*   MCV  --   --  97 98  --  97   < > 99   PLT  --   --  182 115*  --  57*   < > 43*   INR  --   --   --   --  0.91  --   --  1.02   NA  --   --  135 135  --  141   < > 134*   POTASSIUM 3.6 3.1* 3.5 3.8  --  3.8  3.8   < > 3.7   CHLORIDE  --   --  101 103  --  112*   < > 87*   CO2  --   --  25 22  --  21*   < > 10*   BUN  --   --  10.6 9.7  --  15.6   < > 25.2*   CR  --   --  0.84 0.80  --  0.93   < > 1.16   ANIONGAP  --   --  9 10  --  8   < > 37*   HERNANDEZ  --   --  8.2* 8.1*  --  7.8*   < > 8.3*   GLC  --   --  160* 224*  --  176*   < > 148*   ALBUMIN  --  2.8*  --   --  3.1*  --    < > 4.0   PROTTOTAL  --  5.7*  --   --  5.3*  --    < > 6.7   BILITOTAL  --  0.6  --   --  0.4  --    < > 1.9*   ALKPHOS  --  69  --   --  70  --    < > 121   ALT  --  15  --   --  19  --    < > 41   AST  --  24  --   --  34  --    < > 190*   LIPASE  --   --   --   --   --   --   --  203*    < > = values in this interval not displayed.       No results found for this or any previous visit (from the past 24 hour(s)).    Medications      allopurinol  100 mg Oral Daily    amLODIPine  10 mg Oral Daily    ferrous sulfate  325 mg Oral Daily    folic acid  1 mg Oral Daily    gabapentin  600 mg Oral TID    multivitamin w/minerals  1 tablet Oral Daily    pantoprazole  40 mg Oral BID AC    senna-docusate  1 tablet Oral BID    Or    senna-docusate  2 tablet Oral BID     thiamine  100 mg Oral Daily

## 2024-01-20 NOTE — PROVIDER NOTIFICATION
MD Notification    Notified Person: MD    Notified Person Name: Nick King MD    Notification Date/Time: 1/19/24, 2208    Notification Interaction: Paged via [employer]    Purpose of Notification: Patient is having pain to both hands and Tylenol is not helping. Patient is requesting something stronger for pain  Thank you  GUERO Calvert [phone]    Orders Received: Prn Tramadol x 1 dose ordered    Comments:

## 2024-01-20 NOTE — PLAN OF CARE
Summary: Jerome Flanagan is a 48 year old male with severe alcohol use disorder brought in by EMS following a fall at home and noted to have an episode of seizure.  Alcohol levels are 0 with concern of alcohol withdrawal seizure. History of fall and unsteady gaits,T12-L2 compression fracture, COVID+. FALL RISK.     DATE & TIME: 01/19/24, 1930 - 0730  Cognitive Concerns/ Orientation : A&O x 3, disoriented to time, forgetful  Flat affect.   BEHAVIOR & AGGRESSION TOOL COLOR: Green  CIWA: 5  ABNL VS/O2: Tmax 100.4,hypertensive at times, other VSS on  RA   MOBILITY: Assist x 1 GB and walker. Needs assistance with boosting up in bed. Turns/repo self independently. Up to bathroom x 1.  PAIN MANAGMENT: Prn Tramadol and Tylenol given for pain to left arm/hand and abdomen  DIET: Regular, fair appetite  BOWEL/BLADDER: Incontinent of bowel and bladder,    ABNL LAB: NA  DRAIN/DEVICES: PIV saline locked.   TELEMETRY RHYTHM: NA  SKIN: Multiple scattered bruises (largest bruise on Rt groin area), scabs. Skin pale but less clammy overnight.  Left middle finger swelling and tender.   TESTS/PROCEDURES: None  OTHER IMPORTANT INFO: Special Precautions and seizure precaution maintained. Placed on 72-hr hold started 1/17/24 at 1300. Neuros intact. Psych/CD/PT following.     Goal Outcome Evaluation:      Plan of Care Reviewed With: patient    Overall Patient Progress: no changeOverall Patient Progress: no change

## 2024-01-21 NOTE — PLAN OF CARE
Goal Outcome Evaluation:      Plan of Care Reviewed With: patient    Summary: Jerome Flanagan is a 48 year old male with severe alcohol use disorder brought in by EMS following a fall at home and noted to have an episode of seizure.  Alcohol levels are 0 with concern of alcohol withdrawal seizure. History of fall and unsteady gaits,T12-L2 compression fracture, COVID+. FALL RISK.     DATE & TIME: 01/20/24, 3187-1980  Cognitive Concerns/ Orientation : A&O x 3, disoriented to time, forgetful  Flat affect.   BEHAVIOR & AGGRESSION TOOL COLOR: Green  CIWA: 4, 4  ABNL VS/O2: Tmax 99.4, 101.6, received prn tylenol x1, /90, 153/102, tachy with -104, other VSS, O2 96% RA  MOBILITY: Assist x 1 GB and walker. Needs assistance with boosting up in bed. Turns/repo self independently. Needs lots of encouragement to get OOB. Sat on chair x1.   PAIN MANAGMENT: c/o bilat wrist and finger joint pain, (Lt middle finger with redness and swelling), MD feels it's gout related, increased gabapentin dose from 300 mg TID to PTA dose 600 mg TID, also restarted PTA allopurinol and started on colchicine BID.   DIET: Regular, fair appetite  BOWEL/BLADDER: Incontinent of bowel and bladder, no BM this shift.     ABNL LAB: Mg 1.6, recheck tomorrow, Phosphorus 1.9, currently being replaced, recheck at 0030 and tomorrow. Hgb 8.7, hematocrit 26.0, procal 0.85. BC x2 NGTD   DRAIN/DEVICES: PIV saline locked.   TELEMETRY RHYTHM: NA  SKIN: Multiple scattered bruises (large bruising on Rt groin area), scabs. Skin pale but less clammy overnight. Left middle finger swelling and tender.   TESTS/PROCEDURES: None  OTHER IMPORTANT INFO: Special Precautions and seizure precaution maintained. Placed on 72-hr hold from 1/17/24 at 1300 -1/22/23 1300, Neuros intact. Sepsis alert fired, LA 1.2 Psych/CD/PT following.

## 2024-01-21 NOTE — PROGRESS NOTES
[facility]    Medicine Progress Note - Hospitalist Service        Date of Admission:  1/13/2024 11:11 AM    Assessment & Plan:   Jerome Flanagan is a 48 year old male with severe alcohol use disorder brought in by EMS following a fall at home and noted to have an episode of seizure.  He was admitted with concerns for acute alcohol withdrawal.  Patient was evaluated by psychiatry and committed for CD treatment.  Hospital course complicated by COVID and ongoing fever.     Anion gap metabolic acidosis with respiratory compensation, resolved  Likely starvation ketoacidosis, resolved  -At presentation bicarb 10 with anion gap of 37.  Venous pH next day of admission 7.32 with pCO2 of 26 shows respiratory compensation while serum bicarb was 12. -Lactate on admission was normal  -Serum ketones next day of admission was 9.    -Acidosis has resolved.     Seizure, new onset likely from alcohol withdrawal but could also be metabolic  History of fall and unsteady gaits  T12-L2 compression fracture  -Patient is a chronic alcoholic, he denies any prior history of seizures but as per report from EMS and his mother patient was found on the ground and was having tonic-clonic movements  -There was some concern the patient has prior history of withdrawal seizures, he is currently living with his mother  -Head CT unremarkable except for left posterior scalp soft tissue contusion/hematoma without acute intracranial abnormality.  CT abdomen pelvis with mild age-indeterminate compression deformity at T12-L2 vertebral bodies probably chronic  -Patient has multiple bruises likely old on his back with suggest either previous falls or previous seizures  -Appreciate neurology input, EEG with no obvious epileptiform discharges or electrographic seizures  -MRI with slight asymmetric increased FLAIR signal and blurring of the history send in the left hippocampal formation which could potentially relate to recent seizure activity  or could reflect underlying mesial temporal sclerosis.  -Neurology signed off, per neurology changes in temporal lobe due to prior seizures and no other concerns.  Did not recommend treatment.  -Patient has not had any further seizures in the hospital  -Fall precaution  -Initially on high-dose thiamine, continue at 100 mg daily  -PT OT recommending TCU.     Alcoholic hepatitis without ascites   Alcohol use disorder, severe, dependence (H)  Elevated lipase  -LFTs showed elevated AST, bilirubin was elevated on arrival but has now improved, AST also improving.  Lipase was minimally elevated, CT abdomen pelvis with no evidence of pancreatitis.  -Patient initially scoring 7-16 on CIWA and has needed multiple doses of IV Valium/IV Ativan.   -Psychiatry following, see consult note for details  -Psychiatry initiated a petition for commitment for  CD treatment   -Continue gabapentin, multivitamin, thiamine 100 mg daily  -No evidence of withdrawal at the moment    COVID-19 infection  Fever  -Tested positive on 1/14/2024  -Low-grade fever since 1/17  -No respiratory symptoms  -UA and chest x-ray done yesterday was unremarkable  -Blood culture x 2-negative thus far  -Please see discussion below regarding acute gouty flareup/infectious arthritis  -Procalcitonin is 0.83, with stable trend  -Initially fever suspected due to COVID however patient is 1 week out from his positive test and he does not have much respiratory symptoms at all  -Please see discussion below regarding left third visit acute gouty flare/possible infection.  If no clear-cut etiology established by tomorrow will consult ID.  -DVT prophylaxis held due to thrombocytopenia and anemia, (thrombocytoprnia improved) but he still will be high risk for bleeding due to esophagitis(positive occult blood) and anemia, avoid pharmacological prophylaxis.    Suspected acute gouty flareup  Rule out infectious arthritis involving left third PIP joint  -Patient does have a  history of tophaceous gout  -Initially prior to admission allopurinol was held, patient does have pain and inflammation involving both wrist and small hand joints with significant inflammation of the third digit middle IPJ  -Started on colchicine yesterday however he continues to have fever and does have swelling and inflammation involving left third PIP joint.  -Continue colchicine, will get second opinion from orthopedics today.  -Continue allopurinol     Acute anemia on chronic anemia  Severe thrombocytopenia  Severe esophagitis  -Patient denies any history of bleeding, no recent baseline but looking at the chart back in 2021 his hemoglobin was around 9.5-12.9.  -At presentation hemoglobin was 9.9, with hydration hemoglobin gradually decreasing and dropped down to 7.1 1/15.  Platelet during this admission has been low, presented with 43 this morning is 30  -Initial iron studies shows iron level of 132  however repeat iron level is? 12 iron sat 10 and iron binding capacity with 119 suggesting mixed picture, LDH normal, Hemoccult positive  -Patient underwent EGD with Dr. Kumar, and was noted to have severe esophagitis but no stigmata of bleeding.  -Continue Protonix 40 mg twice daily  -Status post 1 unit PRBC transfusion 1/15  -Appreciate hematology input, now signed off  -Received 2 days of IV Venofer, continue oral iron replacement     Hypomagnesemia  Hypophosphatemia  Hypokalemia  -Likely from ongoing alcohol use.  Replace per protocol  -Monitor on telemetry      Essential hypertension  -Continue prior to admission amlodipine, will increase to 10 mg daily which is his PTA dose  -Hold lisinopril, resume as clinically indicated    Generalized anxiety disorder  -Continue PTA Celexa      Hyponatremia, improved       Diet: Regular Diet Adult  Snacks/Supplements Adult: Other; OK for supplement as needed; With Meals     DVT Prophylaxis: Pneumatic Compression Devices   Heard Catheter: Not present  Code Status: Full  Code     Disposition Plan       Expected Discharge Date: 01/23/2024, 12:00 PM    Destination: inpatient rehabilitation facility  Discharge Comments: 72 hour hold until 1/22 1300, pursing committment      Entered: Norbert Ramirez MD 01/21/2024, 9:15 AM        Clinically Significant Risk Factors            # Hypomagnesemia: Lowest Mg = 1.4 mg/dL in last 2 days, will replace as needed   # Hypoalbuminemia: Lowest albumin = 2.8 g/dL at 1/19/2024  7:07 AM, will monitor as appropriate     # Hypertension: Noted on problem list              # Financial/Environmental Concerns: unemployed                The patient's care was discussed with the Bedside Nurse and Patient.    Medical Decision Making       **CLEAR ALL SELECTIONS**      Labs/Imaging Reviewed:  See Information above and Data section below  Time SPENT BY ME on the date of service doing chart review, history, exam, documentation & further activities per the note:  35 MINUTES    Chart documentation was completed, in part, with [employer] voice-recognition software. Even though reviewed, some grammatical, spelling, and word errors may remain.    Norbert Ramirez MD  Hospitalist Service  [facility]  Text Page 7AM-6PM  Securely message with the [employer]oc[employer] Web Console (learn more here)  Text page via [employer] Paging/Directory    ______________________________________________________________________    Interval History   Patient still febrile with a Tmax of 101.6.  Continues to have pain and swelling of PIP joint of the left third digit.  Bilateral wrist joint mobility is improved.  Denies abdominal pain.  No nausea, vomiting or diarrhea.  Denies respiratory symptoms like dyspnea or cough.    Data reviewed today: I reviewed all medications, new labs and imaging results over the last 24 hours. I personally reviewed no images or EKG's today.    Physical Exam   Vital signs:  Temp: 98.9  F (37.2  C) Temp src: Oral BP: (!) 143/90 Pulse: 90   Resp: 18 SpO2: 93  "% O2 Device: None (Room air) Oxygen Delivery: 4 LPM Height: 177.8 cm (5' 10\") Weight: 72.9 kg (160 lb 12.8 oz)  Estimated body mass index is 23.07 kg/m  as calculated from the following:    Height as of this encounter: 1.778 m (5' 10\").    Weight as of this encounter: 72.9 kg (160 lb 12.8 oz).      Wt Readings from Last 2 Encounters:   01/20/24 72.9 kg (160 lb 12.8 oz)   08/20/21 81.6 kg (180 lb)       Gen: AAOX3, NAD, comfortable  Resp: CTA B/L, normal WOB  CVS: RRR, no murmur  Abd/GI: Soft, non-tender. BS- normoactive.    Skin: Warm, dry no rashes  MSK: Bilateral wrist range of movement is better, significant swelling of the third left PIP joint  Neuro- CN- intact. No focal deficits.       Data   Recent Labs   Lab 01/21/24  0820 01/20/24  1133 01/19/24  1610 01/19/24  0707 01/18/24  1112 01/17/24  1019 01/16/24  1031 01/16/24  0722   WBC  --  9.7  --   --  6.6 5.0  --  4.8   HGB  --  8.7*  --   --  8.4* 8.2*  --  7.8*   MCV  --  98  --   --  97 98  --  97   PLT  --  448  --   --  182 115*  --  57*   INR  --   --   --   --   --   --  0.91  --    NA  --   --   --   --  135 135  --  141   POTASSIUM 3.8 4.0 3.6 3.1* 3.5 3.8  --  3.8  3.8   CHLORIDE  --   --   --   --  101 103  --  112*   CO2  --   --   --   --  25 22  --  21*   BUN  --   --   --   --  10.6 9.7  --  15.6   CR  --   --   --   --  0.84 0.80  --  0.93   ANIONGAP  --   --   --   --  9 10  --  8   HERNANDEZ  --   --   --   --  8.2* 8.1*  --  7.8*   GLC  --   --   --   --  160* 224*  --  176*   ALBUMIN  --   --   --  2.8*  --   --  3.1*  --    PROTTOTAL  --   --   --  5.7*  --   --  5.3*  --    BILITOTAL  --   --   --  0.6  --   --  0.4  --    ALKPHOS  --   --   --  69  --   --  70  --    ALT  --   --   --  15  --   --  19  --    AST  --   --   --  24  --   --  34  --        No results found for this or any previous visit (from the past 24 hour(s)).    Medications      allopurinol  100 mg Oral Daily    amLODIPine  10 mg Oral Daily    colchicine  0.6 mg Oral BID "    ferrous sulfate  325 mg Oral Daily    folic acid  1 mg Oral Daily    gabapentin  600 mg Oral TID    magnesium sulfate  4 g Intravenous Once    multivitamin w/minerals  1 tablet Oral Daily    pantoprazole  40 mg Oral BID AC    senna-docusate  1 tablet Oral BID    Or    senna-docusate  2 tablet Oral BID    thiamine  100 mg Oral Daily

## 2024-01-21 NOTE — PLAN OF CARE
Summary: Jerome Flanagan is a 48 year old male with severe alcohol use disorder brought in by EMS following a fall at home and noted to have an episode of seizure.  Alcohol levels are 0 with concern of alcohol withdrawal seizure. History of fall and unsteady gaits,T12-L2 compression fracture, COVID+. FALL RISK.     DATE & TIME: 01/20/24,1930 - 0730  Cognitive Concerns/ Orientation : A&O x , forgetful,  flat affect.   BEHAVIOR & AGGRESSION TOOL COLOR: Green  CIWA: 3/2  ABNL VS/O2: Tmax 100.4. Other VSS on room air  MOBILITY: Assist x 1 GB and walker. Up to the bathroom. Needs assistance with boosting up in bed. Turns/repo self independently.    PAIN MANAGMENT: Prn Tylenol given for pain to hands and wrist as well as left elbow. Denied pain at rest but endorses pain with movement of arms  DIET: Regular  BOWEL/BLADDER: Incontinent of bowel and bladder at times     ABNL LAB: AM labs pending  DRAIN/DEVICES: PIV saline locked.   TELEMETRY RHYTHM: NA  SKIN: Multiple scattered bruises (large bruising on Rt groin area), scabs. Skin pale. Left middle finger swelling and tender.   TESTS/PROCEDURES: None  OTHER IMPORTANT INFO: Special Precautions and seizure precaution maintained. Placed on 72-hr hold from 1/17/24 at 1300 -1/22/23 1300. Door alarm in place. Psych/CD/PT following.     Goal Outcome Evaluation:      Plan of Care Reviewed With: patient    Overall Patient Progress: improvingOverall Patient Progress: improving

## 2024-01-21 NOTE — CONSULTS
History: The  patient is a 48-year-old ambidextrous male who states he has had left long finger swelling for quite some time without a history of previous injury.  He has noted that it has been swollen and red in the past but presently it is not tender.  He did not recall not taking his allopurinol in the past week.  He was admitted for an episode of seizure following acute alcohol withdrawal.  He has had gout in the past.    PE; left long finger with swelling at the PIP joint localized to a dorsal radial mass.  The mass is presently nontender and there is no erythema.  He has near full flexion of the PIP joint.  The remainder of the finger is normal at the DIP and MP joint.  He has full active extension at the PIP joint.    Impression: Gout, he does not have an acute flare at this present time so there is no need for treatment of the finger itself.  He should continue with his allopurinol.  I did advise him that alcohol can give him repeated attacks of gout and dehydration can also cause acute attacks.  I stated there is no reason for surgical excision of the gouty tophi, because this usually stretches out the extensor mechanism.  If the gout becomes so tense that it breaks open then we would do surgery but not until that point.

## 2024-01-22 NOTE — PLAN OF CARE
Goal Outcome Evaluation:  1/21/24  2300-0730H    Orientation: A/O4  Activity: SBA with GB  Diet/BS Checks: Reg  Tele:  none  IV Access/Drains: R PIV SL  Pain Management: denies pain  Abnormal VS/Results: Lactic 1.3, VSS exc Tmax 101.3  Bowel/Bladder: inc at times  Skin/Wounds: scattered bruising  Consults: Neuro/PT/OT/  D/C Disposition: pending  Other Info: On K-Mg-Phos Protocols with repeat draws this morning. Tylenol given 1x for fever. T97.6 when rechecked. Neuros intact. CIWA 2. Special Precautions maintained. Placed on 72-hr hold from 1/17/24 at 1300 -1/22/23 1300. Door alarm in place.   ID consulted for febrile episodes.

## 2024-01-22 NOTE — PROGRESS NOTES
01/22/24 1452   Appointment Info   Signing Clinician's Name / Credentials (OT) Alisha Crane OTR/L   Living Environment   People in Home parent(s)   Current Living Arrangements condominium   Home Accessibility stairs to enter home   Number of Stairs, Main Entrance greater than 10 stairs   Stair Railings, Main Entrance railings on both sides of stairs   Transportation Anticipated family or friend will provide   Living Environment Comments Lives w/mother in 2-story condo, full flight stairs to enter condo with B rails, uses tub/shower combo.   Self-Care   Usual Activity Tolerance good   Current Activity Tolerance fair   Regular Exercise No   Equipment Currently Used at Home none   Fall history within last six months yes   Number of times patient has fallen within last six months   (several)   Activity/Exercise/Self-Care Comment Per pt report: baseline indep with ADLs, mobility without AD, driving, laundry, meal prep,  med mgmt (only takes 2), not currently employed   General Information   Onset of Illness/Injury or Date of Surgery 01/13/24   Referring Physician Norbert Ramirez MD   Patient/Family Therapy Goal Statement (OT) would not state   Additional Occupational Profile Info/Pertinent History of Current Problem 48 year old male with severe alcohol use disorder brought in by EMS following a fall at home and noted to have an episode of seizure.  He was admitted with concerns for acute alcohol withdrawal.  Patient was evaluated by psychiatry and committed for CD treatment.  Hospital course complicated by COVID and ongoing fever. See H&P for  full PMH.   Existing Precautions/Restrictions fall  (door alarm on pt's door, Covid precautions)   Limitations/Impairments safety/cognitive   Cognitive Status Examination   Orientation Status orientation to person, place and time   Behavioral Issues other (see comments)  (uncooperative when frustrated)   Follows Commands follows one-step commands;over 90%  "accuracy;delayed response/completion;repetition of directions required   Cognitive Status Comments See [facility] assessment   Cognitive Screens/Assessments   Cognitive Assessments Completed [facility] Mental Status Exam (UMS):  Total Score out of /30 16   [facility] Norms 1-20 equals dementia   [facility] Domains assessed: orientation, memory, attention, executive functions   Visual Perception   Impact of Vision Impairment on Function (Vision) Pt reports he \"sometimes\" wears glasses for distance such as when driving. States he can see the TV clearly without glasses.   Pain Assessment   Patient Currently in Pain No   Range of Motion Comprehensive   Comment, General Range of Motion R florentino flex approx 120o actively and tremulous; L florentino approx 170o   Strength Comprehensive (MMT)   Comment, General Manual Muscle Testing (MMT) Assessment B florentino 3-/5   Coordination   Coordination Comments B tremoring in UE's likely d/t withdrawal affects ease of simple task performance such as self-feeding, handwriting   Activities of Daily Living   BADL Assessment/Intervention lower body dressing;upper body dressing;grooming;feeding;toileting   Upper Body Dressing Assessment/Training   [place] Level (Upper Body Dressing) not tested  (pt declined - noted tremoring in BUE's, worse distally)   Lower Body Dressing Assessment/Training   [place] Level (Lower Body Dressing) not tested  (pt declined - noted tremoring in BUE's, worse distally)   Grooming Assessment/Training   Position (Grooming) sitting up in bed;other (see comments)  (i)   [place] Level (Grooming) set up;hair care, combing/brushing   Eating/Self Feeding   Position (Feeding) sitting up in bed  (ncreased time and effort due to B hand tremoring/weakness)   [place] Level (Feeding) set up   Toileting   [place] Level (Toileting) not tested  (pt declined out of bed activity)   Clinical Impression   Criteria for Skilled Therapeutic Interventions Met (OT) Yes, " treatment indicated   OT Diagnosis decline in ADL/IADL performance   OT Problem List-Impairments impacting ADL problems related to;activity tolerance impaired;balance;cognition;coordination;strength;range of motion (ROM);pain   Assessment of Occupational Performance 5 or more Performance Deficits   Identified Performance Deficits functional mob, grooming, toileting, bathing, HH management, dressing, med mgmt   Planned Therapy Interventions (OT) ADL retraining;IADL retraining;cognition;strengthening;transfer training   Clinical Decision Making Complexity (OT) detailed assessment/moderate complexity   Risk & Benefits of therapy have been explained evaluation/treatment results reviewed;care plan/treatment goals reviewed;risks/benefits reviewed;current/potential barriers reviewed;participants voiced agreement with care plan;participants included;spouse/significant other   OT Total Evaluation Time   OT Eval, Moderate Complexity Minutes (12266) 28   OT Goals   Therapy Frequency (OT) Daily   OT Predicted Duration/Target Date for Goal Attainment 01/26/24   OT Goals Hygiene/Grooming;Upper Body Dressing;Lower Body Dressing;Transfers;Toilet Transfer/Toileting;Cognition   OT: Hygiene/Grooming modified independent;while standing   OT: Upper Body Dressing Modified independent;including set-up/clothing retrieval   OT: Lower Body Dressing Modified independent;including set-up/clothing retrieval   OT: Transfer Modified independent;with assistive device  (as needed)   OT: Toilet Transfer/Toileting Modified independent;toilet transfer;cleaning and garment management   OT: Cognitive Patient/caregiver will verbalize understanding of cognitive assessment results/recommendations as needed for safe discharge planning   Interventions   Interventions Quick Adds Self-Care/Home Management;Therapeutic Activity;Therapeutic Procedures/Exercise   Self-Care/Home Management   Self-Care/Home Mgmt/ADL, Compensatory, Meal Prep Minutes (64506) 17    Treatment Detail/Skilled Intervention OT: pt oriented to self, , month, year and followed 1-step directives consistently. SLUMS given as part of eval and results shared with patient who denied any difficulties with daily ADL/IADL tasks at baseline and expressing disconcern with resuming those tasks given this score. Lacks insight. Pt requested refill of water pitcher and unable to manage pouring water himself or manipulating straw due to B hand tremoring (w/WD) and he becomes easily frustrated. Requires A with set-up of all supplies and A to wipe up spills. Pt unable to name the 1-2 meds he states he takes at home. He presents with flat affect and declined to participate in any out of bed activity.   Therapeutic Procedures/Exercise   Therapeutic Procedure: strength, endurance, ROM, flexibillity minutes (73015) 10   Treatment Detail/Skilled Intervention Instructed patient in B florentino AROM ex's he could do on own to improve strength for functional transfers and ADL/IADL tasks and was able to demonstrate when therapist modelling, however, when asked to return demonstrate required re-demonstration. Did not appear motivated to follow through.   OT Discharge Planning   OT Plan Out of bed activity: G/H sink, toileting, dressing,  BUE strengthening   OT Discharge Recommendation (DC Rec) other (see comments)  (CD inpatient treatment)   OT Brief overview of current status SLUMS  which falls in dementia range of moderate impairment range of 1-20. Presents with BUE weakness and tremoring affecting indep with self-cares.   Total Session Time   Timed Code Treatment Minutes 24   Total Session Time (sum of timed and untimed services) 52

## 2024-01-22 NOTE — CONSULTS
[facility]    Infectious Disease Consultation     Date of Admission:  1/13/2024  Date of Consult (When I saw the patient): 01/22/24    Assessment & Plan   Jerome Flanagan is a 48 year old who was admitted on 1/13/2024.     Impression:  49 yo severe alcohol use disorder  Brought in by EMS following a fall at home and noted to have an episode of seizure.    He was admitted with concerns for acute alcohol withdrawal. ? Cause of fever    Patient was evaluated by psychiatry and committed for CD treatment.    Hospital course complicated by COVID and ongoing fever.   Gout flare possible reason for the fever   Covid positive but no infiltrates, no hypoxia diagnosed more than 7 days ago out of antiviral treatment window   Blood cultures negative     Recommendations:   Treat gout and alcohol withdrawal   No Covid related therapy as explained above   Follow cultures     Josué Travis MD    Reason for Consult   Reason for consult: I was asked to evaluate this patient for fever .    Primary Care Physician   Barrie Philip    Chief Complaint   Left long finger swelling     History is obtained from the patient and medical records    History of Present Illness   Jerome Flanagan is a 48 year old male who presents with seizures, alcohol withdrawal tested positive for COVID but no resp symptoms      Past Medical History   I have reviewed this patient's medical history and updated it with pertinent information if needed.   No past medical history on file.    Past Surgical History   I have reviewed this patient's surgical history and updated it with pertinent information if needed.  Past Surgical History:   Procedure Laterality Date    ESOPHAGOSCOPY, GASTROSCOPY, DUODENOSCOPY (EGD), COMBINED N/A 1/16/2024    Procedure: Esophagoscopy, gastroscopy, duodenoscopy (EGD), combined;  Surgeon: Yo Kumar MD;  Location:  GI       Prior to Admission Medications   Prior to Admission Medications    Prescriptions Last Dose Informant Patient Reported? Taking?   FLUoxetine (PROZAC) 40 MG capsule   Yes Yes   Sig: Take 40 mg by mouth daily   allopurinol (ZYLOPRIM) 100 MG tablet Past Week  Yes Yes   Sig: Take 100 mg by mouth daily   amLODIPine (NORVASC) 10 MG tablet Past Week  Yes Yes   Sig: Take 10 mg by mouth daily   gabapentin (NEURONTIN) 300 MG capsule Past Week  Yes Yes   Sig: Take 600 mg by mouth 3 times daily   lisinopril (ZESTRIL) 20 MG tablet Past Week  Yes Yes   Sig: Take 20 mg by mouth daily   omeprazole (PRILOSEC) 20 MG DR capsule  at prn  Yes Yes   Sig: Take 20 mg by mouth daily as needed   simvastatin (ZOCOR) 20 MG tablet Past Week  Yes Yes   Sig: Take 20 mg by mouth at bedtime      Facility-Administered Medications: None     Allergies   No Known Allergies    Immunization History   Immunization History   Administered Date(s) Administered    COVID-19 Monovalent 18+ (Moderna) 05/07/2021, 06/04/2021       Social History   I have reviewed this patient's social history and updated it with pertinent information if needed. Jerome Flanagan  reports that he has never smoked. He has never used smokeless tobacco. He reports current alcohol use.    Family History   I have reviewed this patient's family history and updated it with pertinent information if needed.   No family history on file.    Review of Systems   The 10 point Review of Systems is negative    Physical Exam   Temp: 97.8  F (36.6  C) Temp src: Oral BP: 124/86 Pulse: 85   Resp: 18 SpO2: 97 % O2 Device: None (Room air)    Vital Signs with Ranges  Temp:  [97.6  F (36.4  C)-101.8  F (38.8  C)] 97.8  F (36.6  C)  Pulse:  [85-99] 85  Resp:  [18] 18  BP: (117-132)/(82-86) 124/86  SpO2:  [94 %-97 %] 97 %  160 lbs 12.8 oz  Body mass index is 23.07 kg/m .    GENERAL APPEARANCE:  awake  EYES: Eyes grossly normal to inspection  NECK: no adenopathy  RESP: lungs clear   CV: regular rates and rhythm  LYMPHATICS: normal ant/post cervical and supraclavicular  "nodes  ABDOMEN: soft, nontender  MS: ft long finger with swelling at the PIP joint localized to a dorsal radial mass   SKIN: no suspicious lesions or rashes        Data   All laboratory and imaging data in the past 24 hours reviewed  No results for input(s): \"CULT\" in the last 168 hours.  No lab results found.    Invalid input(s): \"UC\"       All cultures:  Recent Labs   Lab 01/19/24  0707 01/19/24  0705   CULTURE No growth after 2 days No growth after 2 days      Blood culture:  Results for orders placed or performed during the hospital encounter of 01/13/24   Blood Culture Hand, Right    Specimen: Hand, Right; Blood   Result Value Ref Range    Culture No growth after 2 days    Blood Culture Arm, Left    Specimen: Arm, Left; Blood   Result Value Ref Range    Culture No growth after 2 days       Urine culture:  No results found for this or any previous visit.          "

## 2024-01-22 NOTE — PROGRESS NOTES
[facility] as of 12:45 pm on 1/22/2024

## 2024-01-22 NOTE — PLAN OF CARE
Summary: Jerome Flanagan is a 48 year old male with severe alcohol use disorder brought in by EMS following a fall at home and noted to have an episode of seizure.  Alcohol levels are 0 with concern of alcohol withdrawal seizure. History of fall and unsteady gaits,T12-L2 compression fracture, COVID+. FALL RISK.     DATE & TIME: 01/21/24,1930 - 2330  Cognitive Concerns/ Orientation : A&O x 4, forgetful,  flat affect.   BEHAVIOR & AGGRESSION TOOL COLOR: Green  CIWA: 2  ABNL VS/O2: Tmax 101.8. Other VSS on room air  MOBILITY: SBA with gait belt. Turns/repo independently.    PAIN MANAGMENT: Denied  DIET: Regular  BOWEL/BLADDER: Incontinent of bowel and bladder at times  ABNL LAB: AM labs pending  DRAIN/DEVICES: PIV saline locked.   TELEMETRY RHYTHM: NA  SKIN: Multiple scattered bruises (large bruising on Rt groin area), scabs. Skin pale. Left middle finger swelling and tender.   TESTS/PROCEDURES: XR Lt hand   OTHER IMPORTANT INFO: Special Precautions maintained. Placed on 72-hr hold from 1/17/24 at 1300 -1/22/23 1300. Door alarm in place. Orthopedic surgery consulted for Lt 3rd digit swelling. Neuro intact. Psych/CD/PT/OT following.   Goal Outcome Evaluation:      Plan of Care Reviewed With: patient    Overall Patient Progress: improvingOverall Patient Progress: improving

## 2024-01-22 NOTE — PROGRESS NOTES
[facility]    Medicine Progress Note - Hospitalist Service        Date of Admission:  1/13/2024 11:11 AM    Assessment & Plan:   Jerome Flanagan is a 48 year old male with severe alcohol use disorder brought in by EMS following a fall at home and noted to have an episode of seizure.  He was admitted with concerns for acute alcohol withdrawal.  Patient was evaluated by psychiatry and committed for CD treatment.  Hospital course complicated by COVID and ongoing fever.    COVID-19 infection  Fever  -Admitted initially for for alcohol withdrawal seizures  -Tested positive for COVID-19 on 1/14/2024, initially completely asymptomatic  -Low-grade fever since 1/17  -No respiratory symptoms  -UA and chest x-ray done few days ago was unremarkable  -Blood culture x 2-negative after 3 days  -Please see discussion below regarding acute gouty flareup/infectious arthritis  -Procalcitonin is 0.83, with stable trend  -Initially fever suspected due to COVID however patient is more than 1 week out from his positive test and he does not have much respiratory symptoms at all, having said that etiology of fever unclear.  -Please see discussion below regarding possible left third PIP joint acute gouty flare/possible infection.    -DVT prophylaxis initially held due to thrombocytopenia and anemia, (thrombocytoprnia improved) but he still was considered to be high risk for bleeding due to esophagitis(positive occult blood) and anemia, avoid pharmacological prophylaxis.  -ID consult due to ongoing fever     Anion gap metabolic acidosis with respiratory compensation, resolved  Likely starvation ketoacidosis, resolved  -At presentation bicarb 10 with anion gap of 37.  Venous pH next day of admission 7.32 with pCO2 of 26 shows respiratory compensation while serum bicarb was 12. -Lactate on admission was normal  -Serum ketones next day of admission was 9.    -Acidosis has resolved.     Seizure, new onset likely from alcohol  withdrawal but could also be metabolic  History of fall and unsteady gaits  T12-L2 compression fracture  -Patient is a chronic alcoholic, he denies any prior history of seizures but as per report from EMS and his mother patient was found on the ground and was having tonic-clonic movements  -There was some concern the patient has prior history of withdrawal seizures, he is currently living with his mother  -Head CT unremarkable except for left posterior scalp soft tissue contusion/hematoma without acute intracranial abnormality.  CT abdomen pelvis with mild age-indeterminate compression deformity at T12-L2 vertebral bodies probably chronic  -Patient has multiple bruises likely old on his back with suggest either previous falls or previous seizures  -Appreciate neurology input, EEG with no obvious epileptiform discharges or electrographic seizures  -MRI with slight asymmetric increased FLAIR signal and blurring of the history send in the left hippocampal formation which could potentially relate to recent seizure activity or could reflect underlying mesial temporal sclerosis.  -Neurology signed off, per neurology changes in temporal lobe due to prior seizures and no other concerns.  Did not recommend treatment.  -Patient has not had any further seizures in the hospital  -Fall precaution  -Initially on high-dose thiamine, continue at 100 mg daily  -PT OT recommending TCU.     Alcoholic hepatitis without ascites   Alcohol use disorder, severe, dependence (H)  Elevated lipase  -LFTs showed elevated AST, bilirubin was elevated on arrival but has now improved, AST also improving.  Lipase was minimally elevated, CT abdomen pelvis with no evidence of pancreatitis.  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  Please see report for details  -Patient was seen by Dr. Tabares for orthopedic surgery did not think that patient had significant acute flare and findings consistent with gout.  -Overall inflammation of the third PIP joint appears to be somewhat better, will decrease colchicine to once a day.  -CRP trending down after initiation of colchicine, recheck tomorrow morning.  -Continue allopurinol     Acute anemia on chronic anemia  Severe thrombocytopenia  Severe esophagitis  -Patient denies any history of bleeding, no recent baseline but looking at the chart back in 2021 his hemoglobin was around 9.5-12.9.  -At presentation hemoglobin was 9.9, with hydration hemoglobin gradually decreasing and dropped down to 7.1 1/15.  Platelet during this admission has been low, presented with 43 this morning is 30  -Initial iron studies shows iron level of 132  however repeat iron level is? 12 iron sat 10 and iron binding capacity with 119 suggesting mixed picture, LDH normal, Hemoccult positive  -Patient underwent EGD with Dr. Kumar, and was noted to have severe esophagitis but no stigmata of bleeding.  -Continue Protonix 40 mg twice daily  -Status post 1 unit PRBC transfusion 1/15  -Appreciate hematology input, now signed off  -Received 2 days of IV Venofer earlier this hospitalization, continue oral iron replacement     Hypomagnesemia  Hypophosphatemia  Hypokalemia  -Likely from ongoing alcohol  use.  Replace per protocol      Essential hypertension  -Continue prior to admission amlodipine 10 mg daily.  -Hold lisinopril, resume as clinically indicated    Generalized anxiety disorder  -Continue PTA Celexa      Hyponatremia, improved       Diet: Regular Diet Adult  Snacks/Supplements Adult: Other; OK for supplement as needed; With Meals     DVT Prophylaxis: Pneumatic Compression Devices   Heard Catheter: Not present  Code Status: Full Code     Disposition Plan      Expected Discharge Date: Patient currently on 72-hour hold which will  today, initiated on CT commitment by psychiatry.  Date of discharge unclear.  Destination: TCU  Discharge Comments: 72 hour hold until  1300, pursing committment      Entered: Norbert Ramirez MD 2024, 9:18 AM        Clinically Significant Risk Factors            # Hypomagnesemia: Lowest Mg = 1.4 mg/dL in last 2 days, will replace as needed   # Hypoalbuminemia: Lowest albumin = 2.8 g/dL at 2024  7:07 AM, will monitor as appropriate     # Hypertension: Noted on problem list              # Financial/Environmental Concerns: unemployed                The patient's care was discussed with the Bedside Nurse and Patient.    Medical Decision Making       **CLEAR ALL SELECTIONS**      Labs/Imaging Reviewed:  See Information above and Data section below  Time SPENT BY ME on the date of service doing chart review, history, exam, documentation & further activities per the note:  35 MINUTES    Chart documentation was completed, in part, with [employer] voice-recognition software. Even though reviewed, some grammatical, spelling, and word errors may remain.    Norbert Ramirez MD  Hospitalist Service  [facility]  Text Page 7AM-6PM  Securely message with the Vocera Web Console (learn more here)  Text page via [employer] Paging/Directory    ______________________________________________________________________    Interval History   Still having daily  "fever with Tmax 101.8.  Per patient left third finger pain is slightly better, still has swelling.  Denies significant respiratory symptoms.  No abdominal pain.    Data reviewed today: I reviewed all medications, new labs and imaging results over the last 24 hours. I personally reviewed no images or EKG's today.    Physical Exam   Vital signs:  Temp: 97.8  F (36.6  C) Temp src: Oral BP: 124/86 Pulse: 85   Resp: 18 SpO2: 97 % O2 Device: None (Room air) Oxygen Delivery: 4 LPM Height: 177.8 cm (5' 10\") Weight: 72.9 kg (160 lb 12.8 oz)  Estimated body mass index is 23.07 kg/m  as calculated from the following:    Height as of this encounter: 1.778 m (5' 10\").    Weight as of this encounter: 72.9 kg (160 lb 12.8 oz).      Wt Readings from Last 2 Encounters:   01/20/24 72.9 kg (160 lb 12.8 oz)   08/20/21 81.6 kg (180 lb)       Gen: AAOX3, NAD, comfortable  Resp: CTA B/L, normal WOB  CVS: RRR, no murmur  Abd/GI: Soft, non-tender. BS- normoactive.    Skin: Warm, dry no rashes  MSK: Bilateral wrist range of movement is better,  swelling of the third left PIP joint with mild erythema and ongoing tenderness.  Neuro- CN- intact. No focal deficits.       Data   Recent Labs   Lab 01/21/24  0820 01/20/24  1133 01/19/24  1610 01/19/24  0707 01/18/24  1112 01/17/24  1019 01/16/24  1031 01/16/24  0722   WBC  --  9.7  --   --  6.6 5.0  --  4.8   HGB  --  8.7*  --   --  8.4* 8.2*  --  7.8*   MCV  --  98  --   --  97 98  --  97   PLT  --  448  --   --  182 115*  --  57*   INR  --   --   --   --   --   --  0.91  --    NA  --   --   --   --  135 135  --  141   POTASSIUM 3.8 4.0 3.6 3.1* 3.5 3.8  --  3.8  3.8   CHLORIDE  --   --   --   --  101 103  --  112*   CO2  --   --   --   --  25 22  --  21*   BUN  --   --   --   --  10.6 9.7  --  15.6   CR 0.89  --   --   --  0.84 0.80  --  0.93   ANIONGAP  --   --   --   --  9 10  --  8   HERNANDEZ  --   --   --   --  8.2* 8.1*  --  7.8*   GLC  --   --   --   --  160* 224*  --  176*   ALBUMIN  --   --   " --  2.8*  --   --  3.1*  --    PROTTOTAL  --   --   --  5.7*  --   --  5.3*  --    BILITOTAL  --   --   --  0.6  --   --  0.4  --    ALKPHOS  --   --   --  69  --   --  70  --    ALT  --   --   --  15  --   --  19  --    AST  --   --   --  24  --   --  34  --        Recent Results (from the past 24 hour(s))   XR Hand Left G/E 3 Views    Narrative    EXAM: XR HAND LEFT G/E 3 VIEWS  LOCATION: [facility]  DATE: 01/21/2024    INDICATION: Swelling, history of tophaceous gout.  COMPARISON: None.      Impression    IMPRESSION: Marked soft tissue thickening/swelling about the PIP joint of the middle finger can be seen with an inflammatory arthropathy although infection should be excluded. No erosive changes in the left hand or joint space narrowing. No evidence of   an acute fracture.         Medications      allopurinol  100 mg Oral Daily    amLODIPine  10 mg Oral Daily    colchicine  0.6 mg Oral BID    ferrous sulfate  325 mg Oral Daily    folic acid  1 mg Oral Daily    gabapentin  600 mg Oral TID    multivitamin w/minerals  1 tablet Oral Daily    pantoprazole  40 mg Oral BID AC    senna-docusate  1 tablet Oral BID    Or    senna-docusate  2 tablet Oral BID    thiamine  100 mg Oral Daily

## 2024-01-22 NOTE — PLAN OF CARE
Goal Outcome Evaluation:  Summary: Fall, ETOH    DATE & TIME: 01/22/24 [id number]  Cognitive Concerns/ Orientation : A&O x 4, irritable.  BEHAVIOR & AGGRESSION TOOL COLOR: Green  CIWA: 4, 4, 3  ABNL VS/O2: VSS on room air  MOBILITY: SBA with gait belt, refused PT today  PAIN MANAGMENT: 4/10 pain with movement in RUE forearm. Declines any pain interventions.  DIET: Regular  BOWEL/BLADDER: Continent this shift. Needs assistance with wiping.  LAB: K 3.5, Mg 1.9, Phos 2.8, .86, lactic 1.3  DRAIN/DEVICES: PIV saline locked.   TELEMETRY RHYTHM: NA  SKIN: Multiple scattered bruises (large bruising on Rt groin area), scabs. Skin pale. Left middle finger swelling and tender. R forearm red, warm, tender.   TESTS/PROCEDURES: XR Lt hand - see results. Orthopedics consulted, no surgical intervention needed.   Discharge disposition: Pending to inpatient rehab facility  OTHER IMPORTANT INFO: Special Precautions maintained. Placed on court hold @ 1245. Door alarm in place. Neuro q8 hr - intact. Psych/CD/PT/OT following.

## 2024-01-22 NOTE — PLAN OF CARE
Goal Outcome Evaluation:      Plan of Care Reviewed With: patient    Overall Patient Progress: improvingOverall Patient Progress: improving    Summary: Jerome Flanagan is a 48 year old male with severe alcohol use disorder brought in by EMS following a fall at home and noted to have an episode of seizure.  Alcohol levels are 0 with concern of alcohol withdrawal seizure. History of fall and unsteady gaits,T12-L2 compression fracture, COVID+. FALL RISK.     DATE & TIME: 01/21/24, [id number]  Cognitive Concerns/ Orientation : A&O x , forgetful,  flat affect.   BEHAVIOR & AGGRESSION TOOL COLOR: Green  CIWA: 3, 3, 3  ABNL VS/O2: Tmax 100.1, /91, other VSS, O2 93% RA.   MOBILITY: SBA with gait belt, sat on the chair for meals, has been more active today. Turns/repo independently.    PAIN MANAGMENT: c/o Lt 3rd digit and Rt wrist pain with movement.   DIET: Regular, ordered outside food for break fast, good appetite  BOWEL/BLADDER: Incontinent of bowel and bladder at times, BM x2 this shift.   ABNL LAB: Mg 1.4, replaced, recheck was 2.4.    DRAIN/DEVICES: PIV saline locked.   TELEMETRY RHYTHM: NA  SKIN: Multiple scattered bruises (large bruising on Rt groin area), scabs. Skin pale. Left middle finger swelling and tender.   TESTS/PROCEDURES: XR Lt hand   OTHER IMPORTANT INFO: Special Precautions maintained. Pt didn't like seizure pads, MD okayed to remove it. Placed on 72-hr hold from 1/17/24 at 1300 -1/22/23 1300. Door alarm in place. Neuro intact. Orthopedic surgery consulted for Lt 3rd digit swelling. Psych/CD/PT/OT following.

## 2024-01-22 NOTE — CONSULTS
"      Initial Psychiatric Consult   Consult date: January 17, 2024         Reason for Consult, requesting source:      Severe alcoholism with withdrawal and no intention to quit    Requesting source:     Labs and imaging reviewed. Patient seen and evaluated by Elida Vega MD          HPI:     This is a 48-year-old male with severe alcohol use disorder who fell at home and had a seizure.  Patient's blood alcohol level in the hospital was 0, raising concern that this was an alcohol withdrawal seizure.  Patient presented not only with seizure, T12-L2 compression fracture, starvation ketoacidosis and anion gap metabolic acidosis, he also had alcoholic hepatitis, severe thrombocytopenia, severe esophagitis, elevated lipase, hypomagnesemia, hypophosphatemia, and hypokalemia.  He has COVID.    I did see the patient today.  He reports that he \"feels like shit\".  Hand tremor noted.  Patient denies that he feels like he is in withdrawal.  He indicates that he has no plans to quit drinking \"because I do not have anything better to do\".  He reports that he has no social relationships and no job.  I reflected back to him that this may also be related to his alcoholism.  He acknowledged that that may be true.    1/22/2024: Patient seen today for follow-up at the request of the treatment team.  Prepetition screening has supported the patient's CD commitment petition, and he is now on a court hold.  I discussed this with him.  He indicated that he is not in agreement with treatment, and that he is not planning on going.  He says has been there 6 times before, and \"I could teach those classes now\".  He was not unpleasant about it, but indicates that it is not helpful for him.  He denied need for any of my services today.        Past Psychiatric History:     I could find no psychiatric records in Care Everywhere or [facility] charting.  Patient's actual baseline psychological/psychiatric issues would be indistinguishable from alcohol " induced issues at this point.  He will need a period of sobriety to ascertain.        Substance Use and History:   Patient has 2 ER visits in August 2023 with alcohol intoxication.  Patient blew a 0.4 on his second visit.  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    He has 2 DWI convictions found on public court records in 1998, and then in August 2021.  Patient is on supervised probation per public court records through August 2025.        Past Medical History:   PAST MEDICAL HISTORY: No past medical history on file.    PAST SURGICAL HISTORY:   Past Surgical History:   Procedure Laterality Date    ESOPHAGOSCOPY, GASTROSCOPY, DUODENOSCOPY (EGD), COMBINED N/A 1/16/2024    Procedure: Esophagoscopy, gastroscopy, duodenoscopy (EGD), combined;  Surgeon: Yo Kumar MD;  Location: [facility] [place]             Family History:   FAMILY HISTORY: No family history on file.    Family Psychiatric History:         Social History:   SOCIAL HISTORY:   Social History     Tobacco Use    Smoking status: Never    Smokeless tobacco: Never   Substance Use Topics    Alcohol use: Yes            Physical ROS:   The 10 point Review of Systems is negative other than noted in the HPI or here.           Medications:      allopurinol  100 mg Oral Daily    amLODIPine  10 mg Oral Daily    [START ON 1/23/2024] colchicine  0.6 mg Oral Daily    ferrous sulfate  325 mg Oral Daily    folic acid  1 mg Oral Daily    gabapentin  600 mg Oral TID    multivitamin w/minerals  1 tablet Oral Daily    pantoprazole  40 mg Oral BID AC    senna-docusate  1 tablet Oral BID    Or    senna-docusate  2 tablet Oral BID    thiamine  100 mg Oral Daily              Allergies:   No Known Allergies       Labs and Imaging:     Recent Results (from the past 48 hour(s))   Phosphorus    Collection Time: 01/20/24  6:10 PM   Result Value Ref Range     "Phosphorus 2.4 (L) 2.5 - 4.5 mg/dL   Lactic Acid STAT    Collection Time: 01/20/24  6:10 PM   Result Value Ref Range    Lactic Acid 1.2 0.7 - 2.0 mmol/L   Phosphorus    Collection Time: 01/21/24  1:12 AM   Result Value Ref Range    Phosphorus 3.0 2.5 - 4.5 mg/dL   Magnesium    Collection Time: 01/21/24  8:20 AM   Result Value Ref Range    Magnesium 1.4 (L) 1.7 - 2.3 mg/dL   Phosphorus    Collection Time: 01/21/24  8:20 AM   Result Value Ref Range    Phosphorus 3.3 2.5 - 4.5 mg/dL   Potassium    Collection Time: 01/21/24  8:20 AM   Result Value Ref Range    Potassium 3.8 3.4 - 5.3 mmol/L   Procalcitonin    Collection Time: 01/21/24  8:20 AM   Result Value Ref Range    Procalcitonin 0.83 (H) <0.50 ng/mL   Creatinine    Collection Time: 01/21/24  8:20 AM   Result Value Ref Range    Creatinine 0.89 0.67 - 1.17 mg/dL    GFR Estimate >90 >60 mL/min/1.73m2   CRP inflammation    Collection Time: 01/21/24  8:20 AM   Result Value Ref Range    CRP Inflammation 283.53 (H) <5.00 mg/L   Magnesium    Collection Time: 01/21/24  3:56 PM   Result Value Ref Range    Magnesium 2.4 (H) 1.7 - 2.3 mg/dL   Lactic Acid STAT    Collection Time: 01/22/24  2:25 AM   Result Value Ref Range    Lactic Acid 1.3 0.7 - 2.0 mmol/L   CRP inflammation    Collection Time: 01/22/24  8:47 AM   Result Value Ref Range    CRP Inflammation 210.86 (H) <5.00 mg/L   Potassium    Collection Time: 01/22/24  8:47 AM   Result Value Ref Range    Potassium 3.5 3.4 - 5.3 mmol/L   Magnesium    Collection Time: 01/22/24  8:47 AM   Result Value Ref Range    Magnesium 1.9 1.7 - 2.3 mg/dL   Phosphorus    Collection Time: 01/22/24  8:47 AM   Result Value Ref Range    Phosphorus 2.8 2.5 - 4.5 mg/dL          Physical and Psychiatric Examination:     /77 (BP Location: Right arm)   Pulse 100   Temp 99.4  F (37.4  C) (Oral)   Resp 18   Ht 1.778 m (5' 10\")   Wt 72.9 kg (160 lb 12.8 oz)   SpO2 97%   BMI 23.07 kg/m    Weight is 160 lbs 12.8 oz  Body mass index is 23.07 " kg/m .    Physical Exam:  I have reviewed the physical exam as documented by by the medical team and agree with findings and assessment and have no additional findings to add at this time.    Mental Status Exam:    Appearance: awake, alert and adequately groomed  Attitude:  cooperative  Eye Contact:  good  Mood:  better  Affect:  mood congruent  Speech:  clear, coherent  Language: Fluent in english   Psychomotor Behavior:  tremor observed   Thought Process:  linear and goal oriented  Associations:  no loose associations  Thought Content:  no evidence of suicidal ideation or homicidal ideation, no evidence of psychotic thought, no auditory hallucinations present, and no visual hallucinations present  Insight:  limited  Judgement:  poor  Oriented to:  time, person, and place  Attention Span and Concentration:  fair  Recent and Remote Memory:  fair  Fund of Knowledge: Appropriate   Gait and Station:                DSM-5 Diagnosis:     Alcohol use disorder, severe, dependence    Alcohol withdrawal seizure    Severe thrombocytopenia    Acute on chronic anemia          Assessment:     This is a 48-year-old male with very severe alcohol dependence.  He presented with an alcohol withdrawal seizure, and has severe medical sequelae of his use.  He does not want to quit drinking because he does not know what else he would do at this time.  He has a history of Wernicke's encephalopathy treated in 2019 at [facility].  Patient also has temporal lobe changes on MRI brain neurology interprets as being related to prior seizures.  The seizures are alcohol withdrawal seizures.    1/22/2024: Patient is now on a court hold due to prepetition screening supporting the petition for CD commitment.  He indicates he does not plan to go to treatment.  He apparently was requesting to leave the hospital, but cannot do so because of the commitment process.  I explained this to him.  He indicates that he does not need my help with  anything.          Summary of Recommendations:     1.  Have initiated a petition for commitment as chemically dependent.  Filled out Examiner statement which I will give to social work.  The petition for commitment as chemically dependent was supported, and the patient is now on the court process.  He cannot leave the hospital.    2.  Will place patient on a 72-hour hold.  I did inform him that I was petitioning for commitment.  Patient is now on a court hold.    3.  Have held patient's Prozac.  He was placed on this in the spring, and it is a pretty high dose for a person who likely has baseline anxiety.  This medication has a very long half-life, and will self taper over the next month.      Elida Vega MD  Consult/Liaison Psychiatry and Addiction Medicine  [facility]

## 2024-01-23 NOTE — PROGRESS NOTES
[facility]    Hospitalist Progress Note    Assessment & Plan   Jerome Flanagan is a 48 year old male with PMhx of severe alcohol use disorder, hypertension and anxiety who was admitted on 1/13/2024 for evaluation after a suspected seizure with suspected EtOH withdrawal.     Hospital stay has been prolonged due to need for chem dep treatment. Additionally, he has been found to have COVID19 infection with ongoing fever      Alcohol dependence   Seizure, new onset likely from alcohol withdrawal   History of fall and unsteady gaits  T12-L2 compression fracture  *Patient is a chronic alcoholic, he denies any prior history of seizures but as per report from EMS and his mother patient was found on the ground and was having tonic-clonic movements.   *In ED, he was noted to have a left posterior scalp soft tissue contusion/hematoma. Also noted to have multiple bruises likely old on his back with suggest either previous falls or previous seizure. Head CT neg for intracranial injury. CT abd/pelvis with mild age-indeterminate compression deformity at T12-L2 vertebral bodies probably chronic Was admitted with suspected acute alcohol withdrawal.   *Seen by general neurology this stay, EEG with no obvious epileptiform discharges or electrographic seizures. MRI brain showed changes of possible recent seizure activity vs underlying mesial temporal sclerosis. Neurology felt the changes seen in the temporal lobe due to prior seizures and no other concerns. Did not recommend treatment.  *No seizures noted during hospital stay  -- conts on thiamine/folate  -- PT/OT recommending TCU stay    Alcohol use disorder, severe, dependence   Alcoholic hepatitis without ascites   *LFTs on admission with AST//41, total bili 1.9. CT abd/pelvis was neg for acute pathology including pancreatitis.   *Labs improved during stay.  *No issues with overt EtOH withdrawals this stay  *Psych following, initiated a petition  for commitment for  CD treatment   -- cont gabapentin (600mg TID as per prior to admission), MVI and thiamine    Anion gap metabolic acidosis with respiratory compensation: Resolved  Likely starvation ketoacidosis: Resolved  *Initial labs notable for bicarb 10, AG 37. Lactate nl. VBG the morning following showed pH 7.3, pCO2 26 and bicarb 12, indicative of respiratory compensation. Serum ketones notably elevated.   *Labs normalized with supportive cares and oral intake.    COVID-19 infection  *Tested positive for COVID19 on 1/4/24. Was initially asymptomatic but then developed a low grade fever on 1/17. No respiratory symptoms. CXR neg for infiltrate.   *Fever was initially suspected to be due to COVID, though fevers persisted >1wk after COVID diagnosis so unclear if other etiology contributing. No other s/sx of infection elsewhere -- UA neg, blood cultures neg. Procal 0.83 with stable trend.   *See discussion below regarding possible left third PIP joint acute gouty flare/infectious arthritis.  *Chemical DVT ppx was initially held dt anemia/thrombocytopenia, continued to defer given ongoing high risk for bleeding dt esophagitis and   *ID consulted, no new recommendations. Advised to treat gout and alcohol use. No specific treatment indicated for COVID.     Gout, with possible flare  Rule out infectious arthritis involving left third PIP joint  *Has hx of tophaceous gout. Allopurinol held on admission.   *As hospital stay progressed, patient reported worsening pain and inflammation involving both wrist and small hand joints with  inflammation of the third digit middle IPJ  *Started on colchicine on 1/20.  *Xray of hands showed marked soft tissue thickening/swelling about the PIP joint of the middle finger.    *Due to ongoing fever, orthopedic surgery consulted to evaluate for infectious arthritis. Patient was seen by Dr. Tabares for orthopedic surgery on 1/21, acknowledged he has gout but did not have findings  suggestive of an acute flare. Advised to continue allopurinol. No indication for surgery.   *Condition improved with allopurinol and colchicine  -- cont colchicine daily, allopurinol  -- no need to trend CRPs    RUE erythema/mild edema  *Noted on 1/23/24 near site of IV. Reportedly painful  -- check RUE US to evaluate for DVT, though my suspicion for this is low at this time, more suggestive of superficial thrombophlebitis  -- supportive mgmt with heat compress, prns  -- remove IV     Acute anemia on chronic anemia  Severe thrombocytopenia  Severe esophagitis  *No reported hx of bleeding, baseline hgb seems to be 9-12 over the preceding 2 yrs.   *Hgb 9.9 on admission this stay. Decreased to 7 after initial IV hydration.   *Platelets remain low this stay.   *Initial iron studies showed iron level of 132  however repeat iron level is? 12 iron sat 10 and iron binding capacity with 119 suggesting mixed picture, LDH normal, Hemoccult positive.  *Transfused 1U PRBC on 11/5 for hgb  7.1.  *Seen by Stacy LEIJA. Underwent EGD on 1/16 which showed severe esophagitis but no stigmata of bleeding. Cont PPI BID.   *Seen by heme/onc this stay -- given IV Venofer x2 and continued on oral replacement.   -- hgb now stable at 8    Hypomagnesemia  Hypophosphatemia  Hypokalemia  Hyponatremia: Improved  *Likely from ongoing alcohol use. Replace per protocol.      Essential hypertension  *Chronic and stable on amlodipine  -- lisinopril held on admission, can resume as needed    Generalized anxiety disorder  *Chronic and stable on Celexa     FEN: no IVFs, lytes stable, regular diet  DVT Prophylaxis: PCDs  Code Status: Full Code    Clinically Significant Risk Factors            # Hypomagnesemia: Lowest Mg = 1.6 mg/dL in last 2 days, will replace as needed   # Hypoalbuminemia: Lowest albumin = 2.8 g/dL at 1/19/2024  7:07 AM, will monitor as appropriate     # Hypertension: Noted on problem list            # Financial/Environmental Concerns:  unemployed         Disposition: Discharge pending commitment process, patient is now on a court hold. SW following    Cristela Larry, DO    Medical Decision Making       Please see A&P for additional details of medical decision making.       Interval History   Chart reviewed. Seen this afternoon. Notes new onset erythema/edema and pain in his RUE near site of IV. Developed within the preceding 24 hrs. Reports it is very painful. No cp/sob/cough, abd pain/n/v.     -Data reviewed today: I reviewed all new labs and imaging results over the last 24 hours. I personally reviewed no images or EKG's today.    Physical Exam   Temp: 98.6  F (37  C) Temp src: Oral BP: (!) 147/96 Pulse: 75   Resp: 18 SpO2: 98 % O2 Device: None (Room air)    Vitals:    01/13/24 1112 01/20/24 1300   Weight: 67.6 kg (149 lb) 72.9 kg (160 lb 12.8 oz)     Vital Signs with Ranges  Temp:  [98.1  F (36.7  C)-99.4  F (37.4  C)] 98.6  F (37  C)  Pulse:  [] 75  Resp:  [18] 18  BP: (118-147)/(77-96) 147/96  SpO2:  [97 %-98 %] 98 %  No intake/output data recorded.    Constitutional: Resting comfortably, alert and conversing appropriately, NAD  Respiratory: CTAB, no wheeze, no increased work of breathing  Cardiovascular: HRRR with +SM, no LE edema  GI: S, NT, ND, +BS  Skin/Integumen: +faint erythema in RUE in distal forearm that tracks proximally to antecubital fossa  Other:  +tophi in L 3rd PIP w/o significant erythema    Medications      allopurinol  100 mg Oral Daily    amLODIPine  10 mg Oral Daily    colchicine  0.6 mg Oral Daily    ferrous sulfate  325 mg Oral Daily    folic acid  1 mg Oral Daily    gabapentin  600 mg Oral TID    multivitamin w/minerals  1 tablet Oral Daily    pantoprazole  40 mg Oral BID AC    senna-docusate  1 tablet Oral BID    Or    senna-docusate  2 tablet Oral BID    thiamine  100 mg Oral Daily       Data   Recent Labs   Lab 01/23/24  0909 01/22/24  0847 01/21/24  0820 01/20/24  1133 01/19/24  1610 01/19/24  0707  01/18/24  1112 01/17/24  1019   WBC  --   --   --  9.7  --   --  6.6 5.0   HGB  --   --   --  8.7*  --   --  8.4* 8.2*   MCV  --   --   --  98  --   --  97 98   PLT  --   --   --  448  --   --  182 115*   NA  --   --   --   --   --   --  135 135   POTASSIUM 3.7 3.5 3.8 4.0   < > 3.1* 3.5 3.8   CHLORIDE  --   --   --   --   --   --  101 103   CO2  --   --   --   --   --   --  25 22   BUN  --   --   --   --   --   --  10.6 9.7   CR  --   --  0.89  --   --   --  0.84 0.80   ANIONGAP  --   --   --   --   --   --  9 10   HERNANDEZ  --   --   --   --   --   --  8.2* 8.1*   GLC  --   --   --   --   --   --  160* 224*   ALBUMIN  --   --   --   --   --  2.8*  --   --    PROTTOTAL  --   --   --   --   --  5.7*  --   --    BILITOTAL  --   --   --   --   --  0.6  --   --    ALKPHOS  --   --   --   --   --  69  --   --    ALT  --   --   --   --   --  15  --   --    AST  --   --   --   --   --  24  --   --     < > = values in this interval not displayed.       No results found for this or any previous visit (from the past 24 hour(s)).

## 2024-01-23 NOTE — PLAN OF CARE
Summary: Found on ground by mom, fall/ETOH seizure. Now with gout flare.   DATE & TIME: 1/23/2024 8405-5403  Cognitive Concerns/ Orientation : A&O x 4,   BEHAVIOR & AGGRESSION TOOL COLOR: Green, but irritable and frustrated he has to be here. Does not want treatment.   CIWA: 2- for mild tremors. Otherwise no signs of withdrawal  ABNL VS/O2: VSS on RA  MOBILITY: SBA with gait belt, refuses to walk or get up in chair. Brushes it off as something he doesn't care to do. PT/OT following, no longer recommend TCU/ARU  PAIN MANAGMENT: 8/10 pain in RUE forearm/left hand pain. Tylenol x1 not effective. MD aware. Encouraged elevation of arm  DIET: Regular- tolerating. Good appetite  BOWEL/BLADDER: Incontinent, often does not call that he needs to be changed. BMx2. Needs assistance with wiping due to pain.   LAB: Electrolytes WNL, rechecks in AM. CRP improving, 109.  DRAIN/DEVICES: PIV removed per MD request, possible IV infiltration though may not be. No IV needed.   TELEMETRY RHYTHM: NA  SKIN: Multiple scattered bruises (large bruising on Rt groin area), scabs. Skin pale. Left middle finger swelling and tender. R forearm red, warm, tender with +2 edema- MD notified as pt says this is new last couple of days.   TESTS/PROCEDURES: RUE US for swelling, no DVT but + for superficial venous thrombus  Discharge disposition: Pending, court assessing patient for inpatient CD treatment  OTHER IMPORTANT INFO: Special Precautions maintained. Court hold. Door alarm in place. Neuro q8 hr - intact. Refuses seizure pads on bed. ID following, no new recommendations. Neurology, GI, ortho surgery signed off. His Virtual Exam and Preliminary Hearing is scheduled for 1/25 at 0900.

## 2024-01-23 NOTE — PLAN OF CARE
Goal Outcome Evaluation:      Summary: Rich, ETOH    DATE & TIME: 01/22/24 to 1/23/24 8214-2302  Cognitive Concerns/ Orientation : A&O x 4,   BEHAVIOR & AGGRESSION TOOL COLOR: Green  CIWA: 3,3, 3   ABNL VS/O2: VSS on room air  MOBILITY: SBA with gait belt,   PAIN MANAGMENT: 5/10 pain in RUE forearm. Tylenol x1  DIET: Regular  BOWEL/BLADDER: Incontinent this shift. Needs assistance with wiping.  LAB: K 3.5, Mg 1.9, Phos 2.8, .86, lactic 1.6  DRAIN/DEVICES: PIV saline locked.   TELEMETRY RHYTHM: NA  SKIN: Multiple scattered bruises (large bruising on Rt groin area), scabs. Skin pale. Left middle finger swelling and tender. R forearm red, warm, tender.   TESTS/PROCEDURES: NA  Discharge disposition: Pending to inpatient rehab facility  OTHER IMPORTANT INFO: Special Precautions maintained. Placed on court hold @ 9055. Door alarm in place. Neuro q8 hr - intact. Psych/CD/PT/OT following.

## 2024-01-23 NOTE — PROGRESS NOTES
Care Management Follow Up    Length of Stay (days): 9    Expected Discharge Date: 01/30/2024     Concerns to be Addressed: adjustment to diagnosis/illness, cognitive/perceptual, compliance issue, discharge planning, mental health, substance/tobacco abuse/use     Patient plan of care discussed at interdisciplinary rounds:     Anticipated Discharge Disposition: Inpatient Chemical Dependency     Anticipated Discharge Services: Mental Health Resources, Chemical Dependency Resources  Anticipated Discharge DME:      Patient/family educated on Medicare website which has current facility and service quality ratings:    Education Provided on the Discharge Plan:    Patient/Family in Agreement with the Plan:      Referrals Placed by CM/MOI:    Private pay costs discussed:     Additional Information:  Patient received his schedule for his upcoming Examination and Hearing along with the PPS report and his court appointed 's name and phone number.  His Virtual Exam and Preliminary Hearing is scheduled for 1/25 at 900.    LUCI Cuellar

## 2024-01-23 NOTE — PROGRESS NOTES
No new recommendations see my note from yesterday   ID is following peripherally call if ques     Josué Travis MD  Infectious Disease

## 2024-01-24 NOTE — PROGRESS NOTES
Pt not interested in PT services at this time. Note: pt requiring SBA-CGA for mobility for mild imbalance and safety support. PT will plan to follow more peripherally to ensure adequate mobility gains to facilitate discharge while honoring pt wishes. Plan discussed with nursin daily walks with team.

## 2024-01-24 NOTE — PROVIDER NOTIFICATION
MD Notification    Notified Person: MD    Notified Person Name: Beckie Lizarraga MD    Notification Date/Time:1/23/24, 2030    Notification Interaction:Vocera    Purpose of Notification:   Patient complaining of pain 8/10 to right arm. Tylenol not helpful per patient. Requesting a stronger painkiller. Please see result of US for right upper extremity. Thank you     Orders Received: Prn Oxycodone ordered    Comments:

## 2024-01-24 NOTE — PROGRESS NOTES
[facility]    Hospitalist Progress Note    Assessment & Plan   Jerome Flanagan is a 48 year old male with PMhx of severe alcohol use disorder, hypertension and anxiety who was admitted on 1/13/2024 for evaluation after a suspected seizure with suspected EtOH withdrawal.     Hospital stay has been prolonged due to need for chem dep treatment. Additionally, he has been found to have COVID19 infection with ongoing fever      Alcohol dependence   Seizure, new onset likely from alcohol withdrawal   History of fall and unsteady gaits  T12-L2 compression fracture  *Patient is a chronic alcoholic, he denies any prior history of seizures but as per report from EMS and his mother patient was found on the ground and was having tonic-clonic movements.   *In ED, he was noted to have a left posterior scalp soft tissue contusion/hematoma. Also noted to have multiple bruises likely old on his back with suggest either previous falls or previous seizure. Head CT neg for intracranial injury. CT abd/pelvis with mild age-indeterminate compression deformity at T12-L2 vertebral bodies probably chronic Was admitted with suspected acute alcohol withdrawal.   *Seen by general neurology this stay, EEG with no obvious epileptiform discharges or electrographic seizures. MRI brain showed changes of possible recent seizure activity vs underlying mesial temporal sclerosis. Neurology felt the changes seen in the temporal lobe due to prior seizures and no other concerns. Did not recommend treatment.  *No seizures noted during hospital stay  -- conts on thiamine/folate  -- PT/OT recommending TCU stay    Alcohol use disorder, severe, dependence   Alcoholic hepatitis without ascites   *LFTs on admission with AST//41, total bili 1.9. CT abd/pelvis was neg for acute pathology including pancreatitis.   *Labs improved during stay.  *No issues with overt EtOH withdrawals this stay  *Psych following, initiated a petition  for commitment for  CD treatment   -- cont gabapentin (600mg TID as per prior to admission), MVI and thiamine    Anion gap metabolic acidosis with respiratory compensation: Resolved  Likely starvation ketoacidosis: Resolved  *Initial labs notable for bicarb 10, AG 37. Lactate nl. VBG the morning following showed pH 7.3, pCO2 26 and bicarb 12, indicative of respiratory compensation. Serum ketones notably elevated.   *Labs normalized with supportive cares and oral intake.    COVID-19 infection  *Tested positive for COVID19 on 1/4/24. Was initially asymptomatic but then developed a low grade fever on 1/17. No respiratory symptoms. CXR neg for infiltrate.   *Fever was initially suspected to be due to COVID, though fevers persisted >1wk after COVID diagnosis so unclear if other etiology contributing. No other s/sx of infection elsewhere -- UA neg, blood cultures neg. Procal 0.83 with stable trend.   *See discussion below regarding possible left third PIP joint acute gouty flare/infectious arthritis.  *Chemical DVT ppx was initially held dt anemia/thrombocytopenia, continued to defer given ongoing high risk for bleeding dt esophagitis and   *ID consulted, no new recommendations. Advised to treat gout and alcohol use. No specific treatment indicated for COVID.  -- will be considered COVID recovered and can come out of isolation on 1/25/24     Gout, with possible flare  Rule out infectious arthritis involving left third PIP joint  *Has hx of tophaceous gout. Allopurinol held on admission.   *As hospital stay progressed, patient reported worsening pain and inflammation involving both wrist and small hand joints with  inflammation of the third digit middle IPJ  *Started on colchicine on 1/20.  *Xray of hands showed marked soft tissue thickening/swelling about the PIP joint of the middle finger.    *Due to ongoing fever, orthopedic surgery consulted to evaluate for infectious arthritis. Patient was seen by Dr. Tabares for  orthopedic surgery on 1/21, acknowledged he has gout but did not have findings suggestive of an acute flare. Advised to continue allopurinol. No indication for surgery.   *Condition improved with allopurinol and colchicine  -- cont colchicine daily, allopurinol  -- no need to trend CRPs    RUE superficial thrombophlebitis  *Noted to have developed erythema, edema and discomfort in right upper extremity on 1/23/24 near site of IV.  Ultrasound obtained, negative for DVT but did show a superficial venous thrombus in the right cephalic vein.  Right upper extremity peripheral IV was removed on 1/23 as patient was no longer needing IV medications  -- supportive mgmt with heat compress, prns     Acute anemia on chronic anemia  Severe thrombocytopenia  Severe esophagitis  *No reported hx of bleeding, baseline hgb seems to be 9-12 over the preceding 2 yrs.   *Hgb 9.9 on admission this stay. Decreased to 7 after initial IV hydration.   *Platelets remain low this stay.   *Initial iron studies showed iron level of 132  however repeat iron level is? 12 iron sat 10 and iron binding capacity with 119 suggesting mixed picture, LDH normal, Hemoccult positive.  *Transfused 1U PRBC on 11/5 for hgb  7.1.  *Seen by Stacy LEIJA. Underwent EGD on 1/16 which showed severe esophagitis but no stigmata of bleeding. Cont PPI BID.   *Seen by heme/onc this stay -- given IV Venofer x2 and continued on oral replacement.   -- hgb now stable at 8    Hypomagnesemia  Hypophosphatemia  Hypokalemia  Hyponatremia: Improved  *Likely from ongoing alcohol use. Replace per protocol.      Essential hypertension  *Chronic and stable on amlodipine  *Lisinopril held on admission, resumed on 1/24/24    Generalized anxiety disorder  *Chronic and stable on Celexa     FEN: no IVFs, lytes stable, regular diet  DVT Prophylaxis: PCDs  Code Status: Full Code    Clinically Significant Risk Factors            # Hypomagnesemia: Lowest Mg = 1.6 mg/dL in last 2 days, will  replace as needed   # Hypoalbuminemia: Lowest albumin = 2.8 g/dL at 1/19/2024  7:07 AM, will monitor as appropriate     # Hypertension: Noted on problem list              # Financial/Environmental Concerns: unemployed         Disposition: Discharge pending commitment process, patient is now on a court hold. MOI following    Cristela Larry, DO    Medical Decision Making       Please see A&P for additional details of medical decision making.       Interval History   Chart reviewed. Overnight events noted -- requested oxycodone for RUE pain dt superficial thrombophlebitis. When I saw patient this AM, he was sleeping quietly. No other complaints. No cp/sob/cough, abd pain/n/v.     -Data reviewed today: I reviewed all new labs and imaging results over the last 24 hours. I personally reviewed no images or EKG's today.    Physical Exam   Temp: 98.7  F (37.1  C) Temp src: Oral BP: 134/84 Pulse: 108   Resp: 18 SpO2: 97 % O2 Device: None (Room air)    Vitals:    01/13/24 1112 01/20/24 1300   Weight: 67.6 kg (149 lb) 72.9 kg (160 lb 12.8 oz)     Vital Signs with Ranges  Temp:  [98.5  F (36.9  C)-98.7  F (37.1  C)] 98.7  F (37.1  C)  Pulse:  [108] 108  Resp:  [18] 18  BP: (132-134)/(84-95) 134/84  SpO2:  [96 %-97 %] 97 %  I/O last 3 completed shifts:  In: 1280 [P.O.:1280]  Out: -     Constitutional: Resting comfortably, alert and conversing appropriately, NAD  Respiratory: CTAB, no wheeze, no increased work of breathing  Cardiovascular: HRRR with +SM, no LE edema  GI: S, NT, ND, +BS  Skin/Integumen: minimal RUE erythema in distal forearm, some ropiness/tenderness  Other:  +tophi in L 3rd PIP w/o significant erythema    Medications      allopurinol  100 mg Oral Daily    amLODIPine  10 mg Oral Daily    colchicine  0.6 mg Oral Daily    ferrous sulfate  325 mg Oral Daily    folic acid  1 mg Oral Daily    gabapentin  600 mg Oral TID    multivitamin w/minerals  1 tablet Oral Daily    pantoprazole  40 mg Oral BID AC     senna-docusate  1 tablet Oral BID    Or    senna-docusate  2 tablet Oral BID    thiamine  100 mg Oral Daily       Data   Recent Labs   Lab 01/23/24  0909 01/22/24  0847 01/21/24  0820 01/20/24  1133 01/19/24  1610 01/19/24  0707 01/18/24  1112 01/17/24  1019   WBC  --   --   --  9.7  --   --  6.6 5.0   HGB  --   --   --  8.7*  --   --  8.4* 8.2*   MCV  --   --   --  98  --   --  97 98   PLT  --   --   --  448  --   --  182 115*   NA  --   --   --   --   --   --  135 135   POTASSIUM 3.7 3.5 3.8 4.0   < > 3.1* 3.5 3.8   CHLORIDE  --   --   --   --   --   --  101 103   CO2  --   --   --   --   --   --  25 22   BUN  --   --   --   --   --   --  10.6 9.7   CR  --   --  0.89  --   --   --  0.84 0.80   ANIONGAP  --   --   --   --   --   --  9 10   HERNANDEZ  --   --   --   --   --   --  8.2* 8.1*   GLC  --   --   --   --   --   --  160* 224*   ALBUMIN  --   --   --   --   --  2.8*  --   --    PROTTOTAL  --   --   --   --   --  5.7*  --   --    BILITOTAL  --   --   --   --   --  0.6  --   --    ALKPHOS  --   --   --   --   --  69  --   --    ALT  --   --   --   --   --  15  --   --    AST  --   --   --   --   --  24  --   --     < > = values in this interval not displayed.       Recent Results (from the past 24 hour(s))   US Upper Extremity Venous Duplex Right    Narrative    EXAM: US UPPER EXTREMITY VENOUS DUPLEX RIGHT  LOCATION: [facility]  DATE: 1/23/2024    INDICATION: new onset erythema, please evaluate for DVT  COMPARISON: None.  TECHNIQUE: Venous Duplex ultrasound of the right upper extremity with (when possible) and without compression, augmentation, and duplex. Color flow and spectral Doppler with waveform analysis performed.    FINDINGS: Ultrasound includes evaluation of the internal jugular vein, innominate vein, subclavian vein, axillary vein, and brachial vein. The superficial cephalic and basilic veins were also evaluated where seen.     RIGHT: No deep venous thrombosis. There is  superficial venous thrombus in the right cephalic vein along the entire length of the forearm.       Impression    IMPRESSION:  1.  No deep venous thrombosis in the right upper extremity.  2.  Superficial venous thrombus in the right cephalic vein in the forearm.

## 2024-01-24 NOTE — PLAN OF CARE
lisa: Found on ground by mom, fall/ETOH seizure. Now with gout flare.   DATE & TIME: 1/23/2024, 9740 - 5063  Cognitive Concerns/ Orientation : A&O x 4,   BEHAVIOR & AGGRESSION TOOL COLOR: Green, but irritable and frustrated at times. States he is not upset with the nurses but with the situation.  Does not want treatment.   CIWA: Discontinued  ABNL VS/O2: VSS on RA  MOBILITY: SBA with gait belt, up to bathroom  PAIN MANAGMENT: 8/10 pain in. Refused Tylenol, not effective per patient. Prn Oxycodone x 1 dose given for pain. Arm elevated on pillow  DIET: Regular  BOWEL/BLADDER: Incontinent, often does not call that he needs to be changed. Needs assistance with louann care due to pain.   LAB: AM labs pending  DRAIN/DEVICES: No IV. MD aware  TELEMETRY RHYTHM: NA  SKIN: Multiple scattered bruises (large bruising on Rt groin area), scabs. Skin pale. Left middle finger swelling and tender. Right forearm red, warm, tender with +2 edema  TESTS/PROCEDURES: RUE US for swelling. Superficial venous thrombus in right cephalic vein in the forearm  Discharge disposition: Pending, court assessing patient for inpatient CD treatment  OTHER IMPORTANT INFO: Special Precautions maintained. Court hold. Door alarm in place. Refuses seizure pads on bed. ID following. Neurology, GI, ortho surgery signed off. Virtual Exam and Preliminary Hearing scheduled for 1/25/24 at 0900.      Goal Outcome Evaluation:      Plan of Care Reviewed With: patient    Overall Patient Progress: improvingOverall Patient Progress: improving

## 2024-01-25 NOTE — PLAN OF CARE
Goal Outcome Evaluation:  Summary: Found on ground by mom, fall/ETOH seizure. Now with gout flare. Court hold.   DATE & TIME: 1/24/2024, 3942-9693  Cognitive Concerns/ Orientation : A&O x 4,   BEHAVIOR & AGGRESSION TOOL COLOR: Green. Has been pleasant and cooperative this shift. States he is willing to hear out the recommendations at the hearing tomorrow.   CIWA: Discontinued  ABNL VS/O2: VSS on RA  MOBILITY: SBA. up to bathroom several times. Refusing PT and refusing to walk. Wants to just lay in bed.   PAIN MANAGMENT: c/o 9/10 pain in AM  in R arm. Spoke with hospitalist, OK to treat w/ oxy sparingly, tylenol and Ice/warm packs. Gave oxyx1, tylenol x1, offered ice and warm packs, and elevated more. Effective per patient. States minimal pain the rest of shift.   DIET: Regular  BOWEL/BLADDER: Has been continent this shift.   LAB: Electrolyte protocol discontinued. K 3.8, Ph 3.1, Mg 1.4 (replaced prior to them being discontinued)   DRAIN/DEVICES: No IV. MD aware  TELEMETRY RHYTHM: NA  SKIN: Multiple scattered bruises (large bruising on Rt groin area), scabs. Skin pale. Left middle finger swelling and tender. Right forearm red & blotchy, warm, tender with +1 edema.   TESTS/PROCEDURES: RUE US for swelling. Superficial venous thrombus in right cephalic vein in the forearm  Discharge disposition: Pending, court assessing patient for inpatient CD treatment  OTHER IMPORTANT INFO: Special Precautions maintained, can be taken off tomorrow 1/25. Court hold. Door alarm in place. Refuses seizure pads on bed. ID following. Neurology, GI, ortho surgery signed off. Virtual Exam and Preliminary

## 2024-01-25 NOTE — PROGRESS NOTES
Pt is open to referrals to the following programs per conversation with CTC:    [facility]  [address] [place] [address]  [facility] Waitlist: [phone]  [facility] Admissions: [phone]    [facility]  [address] [place] [address]  P: [phone]  Office: [phone]  F: [phone]    [address] [address]  P: [phone]  F:  [phone]    Sending ROIs to [facility] for pt signature.  Sending LP referral via Epic, faxing other referrals per pt consent    LYUBOV Victoria, [facility]  Substance Use Disorder Evaluation Counselor  Email: [email or web address]

## 2024-01-25 NOTE — PROGRESS NOTES
Care Management Follow Up    Length of Stay (days): 11    Expected Discharge Date: 01/29/2024     Concerns to be Addressed: adjustment to diagnosis/illness, cognitive/perceptual, compliance issue, discharge planning, mental health, substance/tobacco abuse/use     Patient plan of care discussed at interdisciplinary rounds:     Anticipated Discharge Disposition: Inpatient Chemical Dependency     Anticipated Discharge Services: Mental Health Resources, Chemical Dependency Resources  Anticipated Discharge DME:      Patient/family educated on Medicare website which has current facility and service quality ratings:    Education Provided on the Discharge Plan:    Patient/Family in Agreement with the Plan:      Referrals Placed by CM/SW:    Private pay costs discussed:     Additional Information:  Patient is agreeing to Residential Treatment however his hearing is still scheduled next Tuesday 1/30 to finalize the process.  This afternoon patient given papers to review.  Writer can return tomorrow to answer questions.   Patient remains under a COURT HOLD until the  hears the hearing next Tuesday and signs the Court Order.     CRISTIANO CuellarSW

## 2024-01-25 NOTE — PROGRESS NOTES
Care Management Follow Up    Length of Stay (days): 11    Expected Discharge Date: 01/29/2024     Concerns to be Addressed: adjustment to diagnosis/illness, cognitive/perceptual, compliance issue, discharge planning, mental health, substance/tobacco abuse/use     Patient plan of care discussed at interdisciplinary rounds: Yes    Anticipated Discharge Disposition: Inpatient Chemical Dependency     Anticipated Discharge Services: Mental Health Resources, Chemical Dependency Resources  Anticipated Discharge DME:      Patient/family educated on Medicare website which has current facility and service quality ratings:    Education Provided on the Discharge Plan:    Patient/Family in Agreement with the Plan:      Referrals Placed by CM/SW:    Private pay costs discussed:     Additional Information:  Patient participated in his virtual Exam and Preliminary Hearing today.  The  is asking if patient agrees to residential treatment with a direct transfer from the hospital to the treatment program will the hospital team agree to a Stay of Commitment.   Dr Vega completed the Examiner Statement.  Writer will try to reach Dr Elida Vega for her opinion.    Update: Dr Vega is in agreement with a Stayed CD Commimtment.  Papers will be given to patient to sign.  Referrals will need to be made to residential programs.  Have asked nursing to have patient begin walking in hallway to ensure he has endurance to be participating in group programming at the CD program.  LUCI Cuellar

## 2024-01-25 NOTE — PLAN OF CARE
Goal Outcome Evaluation:      Plan of Care Reviewed With: patient    Overall Patient Progress: improvingOverall Patient Progress: improving    Outcome Evaluation: Pt reports good appetite, no issues. ordering 1-3 meals/day

## 2024-01-25 NOTE — PROGRESS NOTES
[facility]    Hospitalist Progress Note    Assessment & Plan   Jerome Flanagan is a 48 year old male with PMhx of severe alcohol use disorder, hypertension and anxiety who was admitted on 1/13/2024 for evaluation after a suspected seizure with suspected EtOH withdrawal.     Hospital stay has been prolonged due to need for chem dep treatment. Additionally, he has been found to have COVID19 infection with ongoing fever      Alcohol dependence   Seizure, new onset likely from alcohol withdrawal   History of fall and unsteady gaits  T12-L2 compression fracture  *Patient is a chronic alcoholic, he denies any prior history of seizures but as per report from EMS and his mother patient was found on the ground and was having tonic-clonic movements.   *In ED, he was noted to have a left posterior scalp soft tissue contusion/hematoma. Also noted to have multiple bruises likely old on his back with suggest either previous falls or previous seizure. Head CT neg for intracranial injury. CT abd/pelvis with mild age-indeterminate compression deformity at T12-L2 vertebral bodies probably chronic Was admitted with suspected acute alcohol withdrawal.   *Seen by general neurology this stay, EEG with no obvious epileptiform discharges or electrographic seizures. MRI brain showed changes of possible recent seizure activity vs underlying mesial temporal sclerosis. Neurology felt the changes seen in the temporal lobe due to prior seizures and no other concerns. Did not recommend treatment.  *No seizures noted during hospital stay  -- conts on thiamine/folate  -- PT/OT had been recommending TCU stay, encourage ambulation while hospitalized -- therpay is now hopeful patient will be strong enough to go straight to IP chem dep program    Alcohol use disorder, severe, dependence   Alcoholic hepatitis without ascites   *LFTs on admission with AST//41, total bili 1.9. CT abd/pelvis was neg for acute pathology  including pancreatitis.   *Labs improved during stay.  *No issues with overt EtOH withdrawals this stay  *Psych following, initiated a petition for commitment for  CD treatment   -- cont gabapentin (600mg TID as per prior to admission), MVI and thiamine    Anion gap metabolic acidosis with respiratory compensation: Resolved  Likely starvation ketoacidosis: Resolved  *Initial labs notable for bicarb 10, AG 37. Lactate nl. VBG the morning following showed pH 7.3, pCO2 26 and bicarb 12, indicative of respiratory compensation. Serum ketones notably elevated.   *Labs normalized with supportive cares and oral intake.    COVID-19 infection: Recovered  *Tested positive for COVID19 on 1/4/24. Was initially asymptomatic but then developed a low grade fever on 1/17. No respiratory symptoms. CXR neg for infiltrate.   *Fever was initially suspected to be due to COVID, though fevers persisted >1wk after COVID diagnosis so unclear if other etiology contributing. No other s/sx of infection elsewhere -- UA neg, blood cultures neg. Procal 0.83 with stable trend.   *See discussion below regarding possible left third PIP joint acute gouty flare/infectious arthritis.  *Chemical DVT ppx was initially held dt anemia/thrombocytopenia, continued to defer given ongoing high risk for bleeding dt esophagitis and   *ID consulted, no new recommendations. Advised to treat gout and alcohol use. No specific treatment indicated for COVID.  *Considered COVID recovered on 1/25/24 and taken out of isolation.      Gout, with possible flare  Rule out infectious arthritis involving left third PIP joint  *Has hx of tophaceous gout. Allopurinol held on admission.   *As hospital stay progressed, patient reported worsening pain and inflammation involving both wrist and small hand joints with  inflammation of the third digit middle IPJ  *Started on colchicine on 1/20.  *Xray of hands showed marked soft tissue thickening/swelling about the PIP joint of the  middle finger.    *Due to ongoing fever, orthopedic surgery consulted to evaluate for infectious arthritis. Patient was seen by Dr. Tabares for orthopedic surgery on 1/21, acknowledged he has gout but did not have findings suggestive of an acute flare. Advised to continue allopurinol. No indication for surgery.   *Condition improved with allopurinol and colchicine  -- cont colchicine daily, allopurinol  -- no need to trend CRPs at this point    RUE superficial thrombophlebitis  *Noted to have developed erythema, edema and discomfort in right upper extremity on 1/23/24 near site of IV.  Ultrasound obtained, negative for DVT but did show a superficial venous thrombus in the right cephalic vein.  Right upper extremity peripheral IV was removed on 1/23 as patient was no longer needing IV medications  -- supportive mgmt with heat compress, prns     Acute anemia on chronic anemia  Severe thrombocytopenia  Severe esophagitis  *No reported hx of bleeding, baseline hgb seems to be 9-12 over the preceding 2 yrs.   *Hgb 9.9 on admission this stay. Decreased to 7 after initial IV hydration.   *Platelets remain low this stay.   *Initial iron studies showed iron level of 132  however repeat iron level is? 12 iron sat 10 and iron binding capacity with 119 suggesting mixed picture, LDH normal, Hemoccult positive.  *Transfused 1U PRBC on 11/5 for hgb  7.1.  *Seen by Stacy LEIJA. Underwent EGD on 1/16 which showed severe esophagitis but no stigmata of bleeding. Cont PPI BID.   *Seen by heme/onc this stay -- given IV Venofer x2 and continued on oral replacement.   -- hgb now stable at 8    Hypomagnesemia  Hypophosphatemia  Hypokalemia  Hyponatremia: Improved  *Likely from ongoing alcohol use. Replace per protocol.      Essential hypertension  *Chronic and stable on amlodipine  *Lisinopril held on admission, resumed on 1/24/24    Generalized anxiety disorder  *Chronic and stable on Celexa     FEN: no IVFs, lytes stable, regular  diet  DVT Prophylaxis: PCDs  Code Status: Full Code    Clinically Significant Risk Factors            # Hypomagnesemia: Lowest Mg = 1.4 mg/dL in last 2 days, will replace as needed   # Hypoalbuminemia: Lowest albumin = 2.8 g/dL at 1/19/2024  7:07 AM, will monitor as appropriate     # Hypertension: Noted on problem list              # Financial/Environmental Concerns: unemployed         Disposition: Discharge pending commitment process, patient is now on a court hold. SW following    Cristela Larry, DO    Medical Decision Making       Please see A&P for additional details of medical decision making.       Interval History   Chart reviewed. Uneventful night. Per RN, patient has declined shower and ambulation today. I saw patient this morning. Flat affect. Reports ongoing R forearm pain. No cp/sob/cough, abd pain/n/v. Appetite okay.     -Data reviewed today: I reviewed all new labs and imaging results over the last 24 hours. I personally reviewed no images or EKG's today.    Physical Exam   Temp: 98.8  F (37.1  C) Temp src: Oral BP: 97/61 Pulse: 92   Resp: 18 SpO2: 97 % O2 Device: None (Room air)    Vitals:    01/13/24 1112 01/20/24 1300   Weight: 67.6 kg (149 lb) 72.9 kg (160 lb 12.8 oz)     Vital Signs with Ranges  Temp:  [98.3  F (36.8  C)-99.7  F (37.6  C)] 98.8  F (37.1  C)  Pulse:  [85-94] 92  Resp:  [18] 18  BP: ()/(61-82) 97/61  SpO2:  [95 %-99 %] 97 %  No intake/output data recorded.    Constitutional: Resting comfortably, alert and conversing appropriately, NAD  Respiratory: CTAB, no wheeze, no increased work of breathing  Cardiovascular: HRRR with +SM, no LE edema  GI: S, NT, ND, +BS  Skin/Integumen: minimal RUE erythema in distal forearm, some ropiness/tenderness, no proximal extension  Other:  +tophi in L 3rd PIP w/o significant erythema    Medications      allopurinol  100 mg Oral Daily    amLODIPine  10 mg Oral Daily    colchicine  0.6 mg Oral Daily    ferrous sulfate  325 mg Oral Daily     folic acid  1 mg Oral Daily    gabapentin  600 mg Oral TID    lisinopril  20 mg Oral Daily    magnesium oxide  400 mg Oral Daily    multivitamin w/minerals  1 tablet Oral Daily    pantoprazole  40 mg Oral BID AC    senna-docusate  1 tablet Oral BID    Or    senna-docusate  2 tablet Oral BID    thiamine  100 mg Oral Daily       Data   Recent Labs   Lab 01/24/24  1047 01/23/24  0909 01/22/24  0847 01/21/24  0820 01/20/24  1133 01/19/24  1610 01/19/24  0707   WBC  --   --   --   --  9.7  --   --    HGB  --   --   --   --  8.7*  --   --    MCV  --   --   --   --  98  --   --    PLT  --   --   --   --  448  --   --    POTASSIUM 3.8 3.7 3.5 3.8 4.0   < > 3.1*   CR  --   --   --  0.89  --   --   --    ALBUMIN  --   --   --   --   --   --  2.8*   PROTTOTAL  --   --   --   --   --   --  5.7*   BILITOTAL  --   --   --   --   --   --  0.6   ALKPHOS  --   --   --   --   --   --  69   ALT  --   --   --   --   --   --  15   AST  --   --   --   --   --   --  24    < > = values in this interval not displayed.       No results found for this or any previous visit (from the past 24 hour(s)).

## 2024-01-25 NOTE — PROGRESS NOTES
CLINICAL NUTRITION SERVICES - REASSESSMENT NOTE    Malnutrition:   % Weight Loss:  Unable to fully assess  % Intake:  Decreased intake does not meet criteria for malnutrition   Subcutaneous Fat Loss:  None observed  Muscle Loss:  None observed  Fluid Retention:  Does not meet criteria     Malnutrition Diagnosis: Patient does not meet two of the above criteria necessary for diagnosing malnutrition     EVALUATION OF PROGRESS TOWARD GOALS   Diet: Regular diet  PRN supplements as needed  Intake/Tolerance:   - Pt seen in room. He reports great appetite, no concerns with diet/ordering/appetite.     - Receives adequate meals most days, though some days only orders 1-2 meals.     - Labs:   Lytes reviewed - Mg low 1/23-1/24 despite replacement. K/Phos NL.   - Stooling:    Stooling daily, 1-4x.   - Weight: No recent wts on file to compare to current. Pt unable to give history. Now 97% of ideal body weight.   Date/Time Weight   01/20/24 1300 72.9 kg (160 lb 12.8 oz)   01/13/24 1112 67.6 kg (149 lb)     Wt Readings from Last 5 Encounters:   01/20/24 72.9 kg (160 lb 12.8 oz)   08/20/21 81.6 kg (180 lb)   08/12/21 81.6 kg (180 lb)   06/09/21 81.6 kg (180 lb)   01/24/21 81.6 kg (180 lb)     - Meds:   Iron 65 mg elemental  Folic Acid 1 mg  Mag-ox  Thera vit M  Senokot BID  Thiamine 100 mg    ASSESSED NUTRITION NEEDS:  Dosing Weight 72.9 kg - 1/20  Estimated Energy Needs: 5005-7807 kcals (25-30 Kcal/Kg)  Justification: maintenance   Estimated Protein Needs:  grams protein (1.2-1.5 g pro/Kg)  Justification: preservation of lean body mass  Estimated Fluid Needs: 1 mL/kcal or per provider pending fluid status    NEW FINDINGS:   - Discharge pending commitment. On court hold. Door alarm in place, now out of COVID iso.     Previous Goals:   Patient to consume % of nutritionally adequate meal trays BID-TID, or the equivalent with supplements/snacks.   Evaluation: Met on average    Previous Nutrition Diagnosis:   Predicted  inadequate oral intake related to suspect alcohol replacing food consumption, only ordering 1 meal/day since admit   Evaluation: Improving    MALNUTRITION  % Weight Loss:  Unable to fully assess  % Intake:  Decreased intake does not meet criteria for malnutrition   Subcutaneous Fat Loss:  None observed  Muscle Loss:  None observed  Fluid Retention:  Does not meet criteria     Malnutrition Diagnosis: Patient does not meet two of the above criteria necessary for diagnosing malnutrition    CURRENT NUTRITION DIAGNOSIS  Predicted inadequate nutrient intake (energy/protein) related to prolonged hospitalization with risk of menu fatigue and fluctuating appetite especially in the setting of chronic alcohol misuse.      INTERVENTIONS  Recommendations / Nutrition Prescription  Regular   Supplements as needed.     Implementation  No change. Provided general encouragement for adequate PO.     Goals  Intake of at least 75% meals BID-TID.     MONITORING AND EVALUATION:  Progress towards goals will be monitored and evaluated per protocol and Practice Guidelines    Abi Wharton RD, LD  Pager: [phone]  Weekend Pager: [phone]

## 2024-01-25 NOTE — PLAN OF CARE
DATE & TIME: 1/25/2024, AM shift  Cognitive Concerns/ Orientation : A&O x 4,   BEHAVIOR & AGGRESSION TOOL COLOR: Green.   ABNL VS/O2: VSS on RA, except BP soft. Denies dizziness or lightheadedness. PA noted. Denies SOB. BP meds held this morning per parameters.   MOBILITY: Independent in the room. SBA in the peralta. Walked peralta x 1, did well. Dyspnea noted at the end of the walk.  PT is following.   PAIN MANAGMENT: c/o R arm discomfort, offered heat. Pt is on colchicine.   DIET: Regular; orders food from outside as well. Eating well.   BOWEL/BLADDER:incontinent of urine. No BM this shift.   LAB: n/a  DRAIN/DEVICES: No IV. MD aware  TELEMETRY RHYTHM: NA  SKIN: Multiple scattered bruises (large bruising on Rt groin area), scabs. Skin pale. Left middle finger swelling and tender. Right forearm red & blotchy, warm, tender with +1 edema.   TESTS/PROCEDURES: RUE with superficial venous thrombus in right cephalic vein in the forearm  Discharge: Pending, court hearing completed today. SW is following, needs chem dep treatment facility.   OTHER IMPORTANT INFO: Special Precautions discontinued. Door alarm in place.

## 2024-01-25 NOTE — PLAN OF CARE
Goal Outcome Evaluation:       Summary: Found on ground by mom, fall/ETOH seizure. Now with gout flare. Court hold.   DATE & TIME: 1/24/2024, 5515-3246  Cognitive Concerns/ Orientation : A&O x 4,   BEHAVIOR & AGGRESSION TOOL COLOR: Green.   CIWA: NA  ABNL VS/O2: VSS on RA  MOBILITY: SBA,   PAIN MANAGMENT: oxy right arm see mar  DIET: Regular  BOWEL/BLADDER: Incontinent of urine & stool  LAB: Electrolyte protocol discontinued.   DRAIN/DEVICES: No IV. MD aware  TELEMETRY RHYTHM: NA  SKIN: Multiple scattered bruises (large bruising on Rt groin area), scabs. Skin pale. Left middle finger swelling and tender. Right forearm red & blotchy, warm, tender with +1 edema.   TESTS/PROCEDURES: RUE US for swelling. Superficial venous thrombus in right cephalic vein in the forearm  Discharge disposition: Pending, court assessing patient for inpatient CD treatment  OTHER IMPORTANT INFO: Special Precautions maintained, can be taken off tomorrow 1/25. Court hold. Door alarm in place. Refuses seizure pads on bed. ID following. Neurology, GI, ortho surgery signed off. Virtual Exam and Preliminary Hearing scheduled for 1/25/24 at 0900.

## 2024-01-26 NOTE — PLAN OF CARE
DATE & TIME: 1/26/2024, AM shift  Cognitive Concerns/ Orientation : A&O x 4,   BEHAVIOR & AGGRESSION TOOL COLOR: Green.   ABNL VS/O2: VSS on RA, Denies SOB/ chest pain  MOBILITY: Independent in the room. SBA in the peralta. PT is following.   PAIN MANAGMENT: c/o R arm discomfort, offered tylenol, pt declined. Pt is on colchicine.   DIET: Regular; orders food from outside as well. Eating well.   BOWEL/BLADDER:incontinent of urine. Reports one BM this shift  LAB: n/a  DRAIN/DEVICES: No IV. MD aware  TELEMETRY RHYTHM: NA  SKIN: Multiple scattered bruises (large bruising on Rt groin area), scabs. Skin pale. Right arm tender with +1 edema.   TESTS/PROCEDURES: RUE with superficial venous thrombus in right cephalic vein in the forearm  Discharge: Pending, continues on court hold, next court hearing 1/30. SW is following, needs chem dep treatment facility.   OTHER IMPORTANT INFO: Special Precautions discontinued. Door alarm in place.

## 2024-01-26 NOTE — PLAN OF CARE
Summary: Found on ground by mom, fall/ETOH seizure. Now with gout flare. Court hold.   DATE & TIME: 1/25/2024, 1939 - 1530  Cognitive Concerns/ Orientation : A&O x 4,   BEHAVIOR & AGGRESSION TOOL COLOR: Green.   CIWA: NA  ABNL VS/O2: VSS on RA  MOBILITY: Independent in room. SBA outside room.   PAIN MANAGMENT: Denied  DIET: Regular  BOWEL/BLADDER: Incontinent of urine at times. Voided in bathroom overnight. No BMs reported this shift  LAB: None  DRAIN/DEVICES: None  TELEMETRY RHYTHM: NA  SKIN: Multiple scattered bruises. Skin pale. Left middle finger swollen. Right forearm still slightly red, warm, tender with +1 edema.   TESTS/PROCEDURES: RUE US for swelling. Superficial venous thrombus in right cephalic vein in the forearm  Discharge disposition: Discharge to inpatient CD treatment pending next court hearing on Tuesday, 1/30/24  OTHER IMPORTANT INFO:  COVID recovered. Remains on court hold until the  hears the hearing next Tuesday ( 1/30/24) and signs the court order per SW notes. Door alarm in place. Virtual Exam and Preliminary Hearing done yesterday morning.     Goal Outcome Evaluation:      Plan of Care Reviewed With: patient    Overall Patient Progress: improvingOverall Patient Progress: improving

## 2024-01-26 NOTE — PLAN OF CARE
Goal Outcome Evaluation:                      Summary: Found on ground by mom, fall/ETOH seizure. Now with gout flare. Court hold.   DATE & TIME: 1/25/2024, pm shift  Cognitive Concerns/ Orientation : A&O x 4,   BEHAVIOR & AGGRESSION TOOL COLOR: Green.   CIWA: NA  ABNL VS/O2: VSS on RA  MOBILITY: IND in room. SBA outside room. Patient refused to ambulate this shift.  PAIN MANAGMENT: Gave PRN Oxycodone for c/o right arm pain. Refusing use of cold or heat pack.  DIET: Regular  BOWEL/BLADDER: Incontinent of urine. No bm. Refused schedule Senna.  LAB: None  DRAIN/DEVICES: No IV. MD aware  TELEMETRY RHYTHM: NA  SKIN: Multiple scattered bruises. Skin pale. Left middle finger swollen. Right forearm slightly red, warm, tender with +1 edema.   TESTS/PROCEDURES: RUE US for swelling. Superficial venous thrombus in right cephalic vein in the forearm  Discharge disposition: discharge to inpatient CD treatment pending next court hearing.  OTHER IMPORTANT INFO:  Remains court hold until the  hears the hearing next Tuesday ( 1/30/24) and signs the court order. Door alarm in place. Virtual Exam and Preliminary Hearing done today in the morning.

## 2024-01-27 NOTE — PLAN OF CARE
Goal Outcome Evaluation:      Plan of Care Reviewed With: patient    Summary: Found on ground by mom, fall/ETOH seizure. RUE Superficial thrombophlebitis, Gout Flare, on Court hold.   DATE & TIME: 1/27/2024, 4518-3045  Cognitive Concerns/ Orientation : A&Ox4  BEHAVIOR & AGGRESSION TOOL COLOR: Green.   CIWA: NA  ABNL VS/O2: VSS on RA  MOBILITY: Independent in room. SBA outside room.-needs encouragement to perform activities and get out of bed  PAIN MANAGMENT: Denied  DIET: Regular  BOWEL/BLADDER: Incontinent of urine.  incontinent of bowel at times- needs encouragement to get up and use restroom.   LAB: None  DRAIN/DEVICES: None  TELEMETRY RHYTHM: NA  SKIN: Multiple scattered bruises. Skin pale. Left middle finger swollen. Right forearm slightly red improving, warm, tender with +1 edema ( US done 1/23/24 shows superficial venous thrombus)   TESTS/PROCEDURES: None  Discharge disposition: Discharge to inpatient CD treatment pending next court hearing on Tuesday, 1/30/24  OTHER IMPORTANT INFO:  Remains on court hold until the  hears the hearing next Tuesday ( 1/30/24) and signs the court order per  notes. Door alarm in place.

## 2024-01-27 NOTE — PLAN OF CARE
Goal Outcome Evaluation:    Summary: Found on ground by mom, fall/ETOH seizure. Now with gout flare. Court hold.   DATE & TIME: 1/26-1/27/2024, 7488-1111   Cognitive Concerns/ Orientation : A&O x 4,   BEHAVIOR & AGGRESSION TOOL COLOR: Green.   CIWA: NA  ABNL VS/O2: VSS/RA  MOBILITY: Independent in room. SBA outside room.  Door alarm in place.   PAIN MANAGMENT: Denied  DIET: Regular  BOWEL/BLADDER: Incontinent of urine.  Continent of bowel. Had BM.  LAB: None  DRAIN/DEVICES: None  TELEMETRY RHYTHM: NA  SKIN: scattered bruising. Skin pale. Left middle finger swollen. Right forearm slightly red improving, warm, tender with +1 edema   TESTS/PROCEDURES: None  Discharge disposition: Discharge to inpatient CD treatment pending next court hearing on Tuesday, 1/30/24  OTHER IMPORTANT INFO: on court hold next hearing Tuesday ( 1/30/24) and signs the court order per  notes.        123.4

## 2024-01-27 NOTE — PROGRESS NOTES
[facility]    Medicine Progress Note - Hospitalist Service       Date of Admission:  1/13/2024    Assessment & Plan   Jerome Flanagan is a 48 year old male with PMhx of severe alcohol use disorder, hypertension, and anxiety who was admitted on 1/13/2024 for evaluation after a suspected alcohol withdrawal seizure. Diagnosed with COVID-19 this admission, but is now recovered. He is otherwise medically stable, and currently awaiting commitment for CD treatment         Alcohol withdrawal seizure  Hx of recurrent falls, unsteady gait  T12-L2 compression fracture  *Per EMS and patient's mother, he was found on the ground with GTC movements. Denies prior history of seizures, but consumes alcohol chronically.    *In ED, he was noted to have a left posterior scalp soft tissue contusion/hematoma. Also noted to have multiple bruises likely old on his back with suggest either previous falls or previous seizure. Head CT neg for intracranial injury. CT abd/pelvis with mild age-indeterminate compression deformity at T12-L2 vertebral bodies probably chronic   *General Neurology consulted this admission. EEG with no obvious epileptiform discharges or electrographic seizures. Brain  MRI showed changes of possible recent seizure activity vs underlying mesial temporal sclerosis. Neurology felt the changes seen in the temporal lobe due to prior seizures and no other concerns. Did not recommend treatment.  *No seizures noted during this admission  -- PT/OT initially recommended TCU, but strength/mobility improved this admission; now hopeful that patient can go straight to IP chem dep program. Has next court hearing on 1/30     Alcohol use disorder, severe   Alcoholic hepatitis without ascites   *LFTs elevated on admission. CT abd/pelvis neg for acute pathology .   *Labs gradually improved with continued alcohol abstinence  *Psych consulted this admission and initiated a petition for commitment for CD treatment    -- on thiamine/folate/MVI  -- cont PTA gabapentin 600 mg TID  -- next court hearin24      COVID-19 infection: Recovered  *Tested positive for COVID19 on 24. Was initially asymptomatic but then developed a low grade fever on . No respiratory symptoms. CXR neg for infiltrate.   *Fever was initially suspected to be due to COVID, though fevers persisted >1wk after COVID diagnosis so unclear if other etiology contributing. No other s/sx of infection elsewhere -- UA neg, blood cultures neg. Procal 0.83 with stable trend.   *ID consulted, no new recommendations. Advised to treat gout and alcohol use. No specific treatment indicated for COVID.  *Chemical DVT ppx was initially held dt anemia/thrombocytopenia, continued to defer given ongoing high risk for bleeding dt esophagitis  *Considered COVID recovered on 24 and taken out of isolation.      Possible gout flare  *Has hx of tophaceous gout. Allopurinol held on admission.   *Pt subsequently reported worsening pain and inflammation involving both wrist and small hand joints with  inflammation of the third digit middle IPJ  *Started on colchicine on . XR of hands showed marked soft tissue thickening/swelling about the PIP joint of the middle finger.    *Due to ongoing fever, Ortho was consulted to evaluate for infectious arthritis. Advised to continue allopurinol. No indication for surgery.   *Condition improved with allopurinol and colchicine  -- conts on daily colchicine and allopurinol     RUE superficial thrombophlebitis  *Developed RUE erythema, edema and discomfort on 24 near site of IV. Doppler US negative for DVT. IV removed.      Acute on chronic anemia  Severe esophagitis  Severe thrombocytopenia: Resolved  *No reported hx of bleeding, baseline hgb seems to be 9-12 over the preceding 2 yrs.   *Hgb 9.9 on this admission. Decreased to 7 after initial IV hydration.   *Platelets initially low, but gradually normalized this admission.    *Transfused 1U PRBC on 1/15 for hgb  7.1.  *[facility] consulted during admission. EGD (1/16) showed severe esophagitis but no stigmata of bleeding  *Heme/Onc also consulted this admission. Given IV iron x2 and then continued on oral supplement  - Hgb now stable mid-8 range  - He has been initiated on PPI BID and iron tab daily    Anion gap metabolic acidosis with respiratory compensation: Resolved  Likely starvation ketoacidosis: Resolved  *Initial labs notable for bicarb 10, AG 37. Lactate nl. VBG the morning following showed pH 7.3, pCO2 26 and bicarb 12, indicative of respiratory compensation. Serum ketones notably elevated.   *Labs normalized with supportive cares and oral intake.    Hypomagnesemia  Hypophosphatemia  Hypokalemia  Hyponatremia:Resolved  - Monitor and replace periodically      Essential hypertension  *Chronic and stable on amlodipine and lisinopril     Generalized anxiety disorder  *Chronic and stable on citalopram     Diet: Regular Diet Adult  Snacks/Supplements Adult: Other; OK for supplement as needed; With Meals    DVT Prophylaxis: Pneumatic Compression Devices  Heard Catheter: Not present  Code Status: Full Code         Disposition: Expected discharge pending commitment    The patient's care was discussed with the Patient.    Carmencita Carbajal MD  Hospitalist Service  [facility]  Contact information available via [facility] Paging/Directory    ______________________________________________________________________    Interval History   No acute events overnight. Offers no complaints - denies cp/sob, aches/pains. Awaiting next court hearting for commitment    Data reviewed today: I reviewed all medications, new labs and imaging results over the last 24 hours. I personally reviewed no images or EKG's today.    Physical Exam   Vital Signs: Temp: 99.1  F (37.3  C) Temp src: Oral BP: 124/85 Pulse: 107   Resp: 18 SpO2: 97 % O2 Device: None (Room air)    Weight: 160 lbs 12.8  oz  Constitutional: Resting comfortably, NAD  HEENT: Sclera white, MMM  Respiratory: Breathing non-labored. Lungs CTAB - no wheezes, crackles, or rhonchi  Cardiovascular: Heart RRR, no m/r/g. No pedal edema  GI: +BS, abd soft/NT  Skin/Integument: No rash  Musculoskeletal: Normal muscle bulk and tone  Neuro: Alert and appropriate, MEIER  Psych: Calm and cooperative    Data   Recent Labs   Lab 01/24/24  1047 01/23/24  0909 01/22/24  0847 01/21/24  0820   POTASSIUM 3.8 3.7 3.5 3.8   CR  --   --   --  0.89         No results found for this or any previous visit (from the past 24 hour(s)).    Medications      allopurinol  100 mg Oral Daily    amLODIPine  10 mg Oral Daily    colchicine  0.6 mg Oral Daily    ferrous sulfate  325 mg Oral Daily    folic acid  1 mg Oral Daily    gabapentin  600 mg Oral TID    lisinopril  20 mg Oral Daily    magnesium oxide  400 mg Oral Daily    multivitamin w/minerals  1 tablet Oral Daily    pantoprazole  40 mg Oral BID AC    senna-docusate  1 tablet Oral BID    Or    senna-docusate  2 tablet Oral BID    thiamine  100 mg Oral Daily

## 2024-01-27 NOTE — PLAN OF CARE
Goal Outcome Evaluation:                      Summary: Found on ground by mom, fall/ETOH seizure. Now with gout flare. Court hold.   DATE & TIME: 1/26/2024, pm shift  Cognitive Concerns/ Orientation : A&O x 4,   BEHAVIOR & AGGRESSION TOOL COLOR: Green.   CIWA: NA  ABNL VS/O2: VSS on RA  MOBILITY: Independent in room. SBA outside room.   PAIN MANAGMENT: Denied  DIET: Regular  BOWEL/BLADDER: Incontinent of urine.  Continent of bowel. Had BM.  LAB: None  DRAIN/DEVICES: None  TELEMETRY RHYTHM: NA  SKIN: Multiple scattered bruises. Skin pale. Left middle finger swollen. Right forearm slightly red improving, warm, tender with +1 edema ( US done 1/23/24 shows superficial venous thrombus)   TESTS/PROCEDURES: None  Discharge disposition: Discharge to inpatient CD treatment pending next court hearing on Tuesday, 1/30/24  OTHER IMPORTANT INFO:  Remains on court hold until the  hears the hearing next Tuesday ( 1/30/24) and signs the court order per  notes. Door alarm in place. Patient mom visited and took patient wallet home.

## 2024-01-28 NOTE — PLAN OF CARE
Summary: Found on ground by mom, fall/ETOH seizure. RUE Superficial thrombophlebitis, Gout Flare, on Court hold.   DATE & TIME: 1/27-28/24, [id number]  Cognitive Concerns/ Orientation : A&Ox4  BEHAVIOR & AGGRESSION TOOL COLOR: Green.   ABNL VS/O2: VSS on RA  MOBILITY: Independent in room. SBA outside room.-needs encouragement to perform activities and get out of bed  PAIN MANAGMENT: Denied  DIET: Regular  BOWEL/BLADDER: Incontinent of urine and bowel at times- needs encouragement to get up and use restroom.   LAB: None  DRAIN/DEVICES: None  SKIN: Multiple scattered bruises. Skin pale. Left middle finger swollen. Right forearm slightly red improving, warm, tender with +1 edema ( US done 1/23/24 shows superficial venous thrombus)   TESTS/PROCEDURES: None  Discharge disposition: Discharge to inpatient CD treatment pending next court hearing on Tuesday, 1/30/24  OTHER IMPORTANT INFO:  Remains on court hold until the  hears the hearing next Tuesday ( 1/30/24) and signs the court order per  notes. Door alarm in place.

## 2024-01-28 NOTE — PROGRESS NOTES
[facility]    Medicine Progress Note - Hospitalist Service       Date of Admission:  1/13/2024    Assessment & Plan   Jerome Flanagan is a 48 year old male with PMhx of severe alcohol use disorder, hypertension, and anxiety who was admitted on 1/13/2024 for evaluation after a suspected alcohol withdrawal seizure. Diagnosed with COVID-19 this admission, but is now recovered. He is currently awaiting commitment for CD treatment         Alcohol withdrawal seizure  Hx of recurrent falls, unsteady gait  T12-L2 compression fracture  *Per EMS and patient's mother, he was found on the ground with GTC movements. Denies prior history of seizures, but consumes alcohol chronically.    *In ED, he was noted to have a left posterior scalp soft tissue contusion/hematoma. Also noted to have multiple bruises likely old on his back with suggest either previous falls or previous seizure. Head CT neg for intracranial injury. CT abd/pelvis with mild age-indeterminate compression deformity at T12-L2 vertebral bodies probably chronic   *General Neurology consulted this admission. EEG with no obvious epileptiform discharges or electrographic seizures. Brain  MRI showed changes of possible recent seizure activity vs underlying mesial temporal sclerosis. Neurology felt the changes seen in the temporal lobe due to prior seizures and no other concerns. Did not recommend treatment.  *No seizures noted during this admission  -- PT/OT initially recommended TCU, but strength/mobility improved this admission; now hopeful that patient can go straight to IP chem dep program. Has next court hearing on 1/30     Alcohol use disorder, severe   Alcoholic hepatitis without ascites   *LFTs elevated on admission. CT abd/pelvis neg for acute pathology .   *Labs gradually improved with continued alcohol abstinence  *Psych consulted this admission and initiated a petition for commitment for CD treatment   -- on thiamine/folate/MVI  --  cont PTA gabapentin 600 mg TID  -- next court hearin24    Acute on chronic anemia  Severe esophagitis  Severe thrombocytopenia: Resolved  *No reported hx of bleeding, baseline hgb seems to be 9-12 over the preceding 2 yrs.   *Hgb 9.9 on this admission. Decreased to 7 after initial IV hydration.   *Platelets initially low, but gradually normalized this admission.   *Transfused 1U PRBC on 1/15 for hgb  7.1.  *[facility] consulted during admission. EGD () showed severe esophagitis but no stigmata of bleeding  *Heme/Onc also consulted this admission. Given IV iron x2 and then continued on oral supplement. Hemolysis labs negative.   - Hgb had been stable in mid-8 range; Hgb 7.2 on routine check, . He is hemodynamically stable without s/sx of bleeding. Will monitor for now and consider reconsulting GI if Hgb continues to drop  - He has been initiated on PPI BID and iron tab daily      COVID-19 infection: Recovered  *Tested positive for COVID19 on 24. Was initially asymptomatic but then developed a low grade fever on . No respiratory symptoms. CXR neg for infiltrate.   *Fever was initially suspected to be due to COVID, though fevers persisted >1wk after COVID diagnosis so unclear if other etiology contributing. No other s/sx of infection elsewhere -- UA neg, blood cultures neg. Procal 0.83 with stable trend.   *ID consulted, no new recommendations. Advised to treat gout and alcohol use. No specific treatment indicated for COVID.  *Chemical DVT ppx was initially held dt anemia/thrombocytopenia, continued to defer given ongoing high risk for bleeding dt esophagitis  *Considered COVID recovered on 24 and taken out of isolation.      Possible gout flare: Resolved  *Has hx of tophaceous gout. Allopurinol held on admission.   *Pt subsequently reported worsening pain and inflammation involving both wrist and small hand joints with  inflammation of the third digit middle IPJ  *Started on colchicine on  1/20. XR of hands showed marked soft tissue thickening/swelling about the PIP joint of the middle finger.    *Due to ongoing fever, Ortho was consulted to evaluate for infectious arthritis. Advised to continue allopurinol. No indication for surgery.   *Condition improved with allopurinol and colchicine  -- conts on daily allopurinol     RUE superficial thrombophlebitis  *Developed RUE erythema, edema and discomfort on 1/23/24 near site of IV. Doppler US negative for DVT. IV removed.      Anion gap metabolic acidosis with respiratory compensation: Resolved  Likely starvation ketoacidosis: Resolved  *Initial labs notable for bicarb 10, AG 37. Lactate nl. VBG the morning following showed pH 7.3, pCO2 26 and bicarb 12, indicative of respiratory compensation. Serum ketones notably elevated.   *Labs normalized with supportive cares and oral intake.    Hypomagnesemia  Hypophosphatemia  Hypokalemia  Hyponatremia:Resolved  - Monitor and replace periodically      Essential hypertension  *Chronic and stable on amlodipine and lisinopril     Generalized anxiety disorder  *Chronic and stable on citalopram     Diet: Regular Diet Adult  Snacks/Supplements Adult: Other; OK for supplement as needed; With Meals    DVT Prophylaxis: Pneumatic Compression Devices  Heard Catheter: Not present  Code Status: Full Code         Disposition: Expected discharge pending commitment    The patient's care was discussed with the Bedside Nurse and Patient.    Carmencita Carbajal MD  Hospitalist Service  [facility]  Contact information available via [facility] Paging/Directory    ______________________________________________________________________    Interval History   No acute events overnight. Offers no complaints - denies cp/sob, aches/pains. Routine labs today returned with Hgb 7.2, but patient otherwise asymptomatic without obvious source of bleeding.   - Monitor for now with repeat Hgb tonight and tomorrow    Data  reviewed today: I reviewed all medications, new labs and imaging results over the last 24 hours. I personally reviewed no images or EKG's today.    Physical Exam   Vital Signs: Temp: 98.4  F (36.9  C) Temp src: Oral BP: 117/75 Pulse: 91   Resp: 16 SpO2: 97 % O2 Device: None (Room air)    Weight: 160 lbs 12.8 oz  Constitutional: Resting comfortably, NAD  HEENT: Sclera white, MMM  Respiratory: Breathing non-labored.   Skin/Integument: No rash  Musculoskeletal: Normal muscle bulk and tone  Neuro: Alert and appropriate, MEIER  Psych: Calm and cooperative    Data   Recent Labs   Lab 01/28/24  0830 01/24/24  1047 01/23/24  0909   WBC 9.3  --   --    HGB 7.2*  --   --      --   --    *  --   --      --   --    POTASSIUM 3.7 3.8 3.7   CHLORIDE 99  --   --    CO2 30*  --   --    BUN 14.4  --   --    CR 0.87  --   --    ANIONGAP 9  --   --    HERNANDEZ 9.3  --   --    *  --   --    ALBUMIN 3.2*  --   --    PROTTOTAL 6.6  --   --    BILITOTAL 0.2  --   --    ALKPHOS 90  --   --    ALT 14  --   --    AST 17  --   --          No results found for this or any previous visit (from the past 24 hour(s)).    Medications      allopurinol  100 mg Oral Daily    amLODIPine  10 mg Oral Daily    colchicine  0.6 mg Oral Daily    ferrous sulfate  325 mg Oral Daily    folic acid  1 mg Oral Daily    gabapentin  600 mg Oral TID    lisinopril  20 mg Oral Daily    magnesium oxide  400 mg Oral Daily    multivitamin w/minerals  1 tablet Oral Daily    pantoprazole  40 mg Oral BID AC    senna-docusate  1 tablet Oral BID    Or    senna-docusate  2 tablet Oral BID    thiamine  100 mg Oral Daily

## 2024-01-28 NOTE — PLAN OF CARE
Goal Outcome Evaluation:                      Summary: Found on ground by mom, fall/ETOH seizure. RUE Superficial thrombophlebitis, Gout Flare, on Court hold.   DATE & TIME: 1/27/2024, pm shift  Cognitive Concerns/ Orientation : A&Ox4  BEHAVIOR & AGGRESSION TOOL COLOR: Green.   CIWA: NA  ABNL VS/O2: VSS on RA  MOBILITY: Independent in room. SBA outside room.-needs encouragement to perform activities and get out of bed  PAIN MANAGMENT: Denied  DIET: Regular  BOWEL/BLADDER: Incontinent of urine and bowel at times- needs encouragement to get up and use restroom.   LAB: None  DRAIN/DEVICES: None  TELEMETRY RHYTHM: NA  SKIN: Multiple scattered bruises. Skin pale. Left middle finger swollen. Right forearm slightly red improving, warm, tender with +1 edema ( US done 1/23/24 shows superficial venous thrombus)   TESTS/PROCEDURES: None  Discharge disposition: Discharge to inpatient CD treatment pending next court hearing on Tuesday, 1/30/24  OTHER IMPORTANT INFO:  Remains on court hold until the  hears the hearing next Tuesday ( 1/30/24) and signs the court order per  notes. Door alarm in place.

## 2024-01-28 NOTE — PROVIDER NOTIFICATION
MD Notification    Notified Person: MD    Notified Person Name: Dr. Carmencita Carbajal    Notification Date/Time:  1/28 @ 1132    Notification Interaction: [employer] messaging    Purpose of Notification: FYI- Pt's hemoglobin 7.2 this morning     Orders Received: Continue to monitor, no immediate interventions needed, will review further    Comments:

## 2024-01-29 NOTE — PROGRESS NOTES
Care Management Follow Up    Length of Stay (days): 15    Expected Discharge Date: 01/31/2024     Concerns to be Addressed: adjustment to diagnosis/illness, cognitive/perceptual, compliance issue, discharge planning, mental health, substance/tobacco abuse/use     Patient plan of care discussed at interdisciplinary rounds: yes    Anticipated Discharge Disposition: Inpatient Chemical Dependency     Anticipated Discharge Services: Mental Health Resources, Chemical Dependency Resources  Anticipated Discharge DME:      Patient/family educated on Medicare website which has current facility and service quality ratings:    Education Provided on the Discharge Plan:    Patient/Family in Agreement with the Plan:      Referrals Placed by CM/SW:    Private pay costs discussed: Not applicable    Additional Information:  Patient has his Hearing tomorrow vitually at 930.  Patient has been offered admission a [facility] which he is aware of.  The other programs which referrals were sent to were [facility] and [facility].  [facility] Nurse declined for medical reasons.  Last week met with patient to update him about [facility].  He wanted to review their web site.  Met with patient this morning to again review [facility] availability.    He will accept this option however would like to know if there are other programs available thru [facility].   Writer called [facility] this morning to ask if they had received the FLAKO's assessment sent last week and they report they hadn't.  Assessment sent again today  via email which their intake staff report is more efficient.   Dr Thompson updates writer that patient's hemoglobin has been dropping and this may delay patient's discharge to a residential program.     CRISTIANO CuellarSW

## 2024-01-29 NOTE — PROGRESS NOTES
Care Management Follow Up    Length of Stay (days): 15    Expected Discharge Date: 01/31/2024     Concerns to be Addressed: adjustment to diagnosis/illness, cognitive/perceptual, compliance issue, discharge planning, mental health, substance/tobacco abuse/use     Patient plan of care discussed at interdisciplinary rounds: yes    Anticipated Discharge Disposition: Inpatient Chemical Dependency     Anticipated Discharge Services: Mental Health Resources, Chemical Dependency Resources  Anticipated Discharge DME:      Patient/family educated on Medicare website which has current facility and service quality ratings:    Education Provided on the Discharge Plan:    Patient/Family in Agreement with the Plan:      Referrals Placed by CM/SW:    Private pay costs discussed: Not applicable    Additional Information:  Today, hospital received from [facility] signed Stayed Commitment for Chemically Dependent Person.  Under the orders, patient voluntarily agrees to stay here till he transfers to a CD program.  Writer gave patient a copy of the document.  He did not wish to review the document at this time. Writer volunteered to return at a later date after he has reviewed the document and answer any questions he may have. Writer did review with him the one court order relating to patient being a voluntary patient as long as he stays here till he transfers to treatment.  He expressed understanding.     Each time writer has met with patient today, he asks how his arm pain will be addressed. This pain seems to overshadow any other subject.   Patient acknowledges he knows he cannot have any narcotic pain medication when he transfers to a CD program.  He has been accepted at [facility] as soon as tomorrow if he is medically stable.   Writer updated GUERO Mariano,  at [facility] regarding drop in hemoglobin. She was already aware of this as they follow EPIC.  She stated their medical director is still accepting patient but wants  admission orders to include orders to check hemoglobin one week after admission.    Patient's mother was here today while patient had an OT session.  She asked about how to apply for disability for patient.    Printed off the SM[employer] application and information for Social Security Disability.    Mother had already left the room. Explained each document to patient but can also contact mother tomorrow to explain the process.      Marcia Portillo, CRISTIANOSW

## 2024-01-29 NOTE — PROVIDER NOTIFICATION
"Dr Thompson notified for     Patient c/o bilateral arm pain. Pain is mostly at inner AC site, no redness/bruising noted. Denies other joints pain. Right arm pain has been there for a month, left arm pain felt/new this morning. Tylenol \"not helping at all\".      Response:  MD aware and already assessed this morning during MD rounding, doesn't think its gout flare up.     Continue with tylenol and heat packs      Comments:  Offered heat pack.     "

## 2024-01-29 NOTE — PLAN OF CARE
Summary: Found on ground by mom, fall/ETOH seizure. RUE Superficial thrombophlebitis, Gout Flare, on Court hold.     DATE & TIME: 1/29/24/29/24/0700-1930  Cognitive Concerns/ Orientation : Alert and oriented x4  BEHAVIOR & AGGRESSION TOOL COLOR: Green.   ABNL VS/O2: VS stable on room air  MOBILITY: Independent in room. SBA outside room.  PAIN MANAGMENT: bilateral arm pain, managed with prn Tylenol. Declined Heat packs  DIET: Regular  BOWEL/BLADDER: Continent of both this shift  LAB: Hgb 8.5.   DRAIN/DEVICES: None  SKIN: Multiple scattered bruises. Skin pale. Left middle finger swollen.   TESTS/PROCEDURES: None  Discharge disposition: Discharge to inpatient CD treatment. Patient accepted at [facility] as early as tomorrow per MOI note.     OTHER IMPORTANT INFO:  Had code hearing today. Patient is no longer on hold and is voluntary patient at this time. Personal clothing from locker#4 returned to patient. Door alarm removed. Plan is transfer to CD treatment center tomorrow pending AM hemoglobin level.

## 2024-01-29 NOTE — PLAN OF CARE
Goal Outcome Evaluation:  DATE & TIME: 1/28/24-1/29/24 [id number]    Cognitive Concerns/ Orientation : A&O x 4   BEHAVIOR & AGGRESSION TOOL COLOR: Green  CIWA SCORE: NA   ABNL VS/O2: VSS on RA  MOBILITY: Ind  PAIN MANAGMENT: C/o of R arm pain but declined pain intervention  DIET: Regular  BOWEL/BLADDER: Incontinent of B/B  ABNL LAB/BG:   DRAIN/DEVICES: PIV  TELEMETRY RHYTHM: NA  SKIN: Scattered bruises  TESTS/PROCEDURES:   D/C DATE: Pending improvement  OTHER IMPORTANT INFO: Pt hemoglobin was 7, 1 unit of blood transfused.  Hemoglobin recheck this AM

## 2024-01-29 NOTE — SIGNIFICANT EVENT
Significant Event Note    Time of event: 11:44 PM January 28, 2024    Description of event:  Hgb: 7.0    Plan:  Ordered 1 unit of PRBC.    Discussed with: bedside nurse    Fernando Romero MD

## 2024-01-29 NOTE — PLAN OF CARE
Orientation: alert and oriented x4    Vitals/Tele: vitals stable on room air, complains of pain in R arm but declined interventions     IV Access/drains: No IV access- provider acknowledged     Diet: regular w/ snacks    Mobility: independent in his room, SBA in the halls- door alarm for elopement prevention    GI/: incontinent of bowel and bladder, needs encouragement to use the bathroom and clean himself- Pt reports 3 voids and 1 BM this shift    Wound/Skin: scattered bruising and scabbing, L middle finger swollen- chronic, R arm superficial thrombophlebitis from IV- negative for DVT    Consults: Neurology, OT, PT, SW    Discharge Plan: accepted to [facility] for a Tuesday admission  - Pt is wondering about items he can bring to the facility and how he should best prepare.     See Flow sheets for assessment

## 2024-01-30 NOTE — PROGRESS NOTES
Return visit the patient's room this afternoon rounds after earlier visit this morning.  Bilateral upper extremity venous ultrasound results reviewed showing no evidence of DVT.  Discussed with patient.  Superficial thrombophlebitis noted in the right cephalic vein, isolated, perhaps overall improved study compared to prior study after discussing further with Dr. Albarado.  Will hold off on prophylactic anticoagulation given radiographic improvement and increased risk of bleeding with concerns of acute blood loss and recent red blood cell transfusions.  Significant pain persists in the elbows bilaterally and query acute gout in addition to superficial phlebitis.  Colchicine restarted today, twice daily and will await response.  Scheduled Tylenol 3 times daily with meals.  Avoid NSAIDs due to increased risk of bleeding and recent esophagitis on endoscopy.  Low-dose oxycodone ordered for severe, breakthrough pain with no plans for opiate prescription on hospital discharge, hopefully in the next 24 to 48 hours.  Care plan otherwise as outlined in progress note of earlier today.  Care plan discussed with nursing staff.

## 2024-01-30 NOTE — PLAN OF CARE
Summary: Found on ground by mom, fall/ETOH seizure. RUE Superficial thrombophlebitis, Gout Flare, on Court hold.    DATE & TIME: 1/30/24 AM shift  Cognitive Concerns/ Orientation : Alert and oriented x4, flat effect.   BEHAVIOR & AGGRESSION TOOL COLOR: Green.   ABNL VS/O2: Febrile this shift, temp 100.2. Hypertensive.   MOBILITY: Independent in room. Declined to walk in the peralta.   PAIN MANAGMENT: bilateral arm pain, managed with scheduled tylenol. Declined Heat/cold packs. Started on colchicine.   DIET: Regular  BOWEL/BLADDER: can be incontinent at times   LAB: Hgb 8.9. WBC 13.4. Platelets 467  DRAIN/DEVICES: None  SKIN: Multiple scattered bruises. Skin pale. Left middle finger swollen.   TESTS/PROCEDURES: robby UE US pending.   Discharge disposition: Discharge to inpatient CD treatment when medically stable. Patient accepted at [facility]   OTHER IMPORTANT INFO: Pt is voluntary at this time.

## 2024-01-30 NOTE — PLAN OF CARE
"Physical Therapy Discharge Summary    Reason for therapy discharge:    All goals and outcomes met, no further needs identified.    Progress towards therapy goal(s). See goals on Care Plan in [facility] electronic health record for goal details.  Goals met    Therapy recommendation(s):    Continued therapy is recommended.  Rationale/Recommendations:  per most recent PT note: \"Pt is improving with mobility. Pt has weakness in UEs, noted possible gout, and is struggling with ADLs due to UE pain. Pt able to demonstrate bed mob, transfers, and ambulation IND on this date, no AD for gait and no overt LOB. IP PT goals met. Recommend ongoing OT for ADLs and UE work, pt would likely benefit from OP PT going forward to progressing LE strength and higher level balance.\".      "

## 2024-01-30 NOTE — PROGRESS NOTES
[facility]    Medicine Progress Note - Hospitalist Service    Date of Admission:  1/13/2024    Assessment & Plan   Jerome Flanagan is a 48 year old male with PMH of severe alcohol use disorder, hypertension, and anxiety who was admitted on 1/13/2024 for evaluation after a suspected alcohol withdrawal seizure. Diagnosed with COVID-19 this admission, but is now recovered. Recently awaiting commitment for CD treatment with court date 1/29 and signed Stayed Commitment for Chemically Dependent Person.  Hemoglobin also lower recently requiring RBC transfusion and monitoring.     Alcohol withdrawal seizure, resolved  History of recurrent falls, unsteady gait  T12-L2 compression fracture  *Per EMS and patient's mother, he was found on the ground with GTC movements. Denies prior history of seizures, but consumes alcohol chronically.    *In ED, he was noted to have a left posterior scalp soft tissue contusion/hematoma. Also noted to have multiple bruises likely old on his back with suggest either previous falls or previous seizure. Head CT neg for intracranial injury. CT abd/pelvis with mild age-indeterminate compression deformity at T12-L2 vertebral bodies probably chronic   *General Neurology consulted this admission. EEG with no obvious epileptiform discharges or electrographic seizures. Brain  MRI showed changes of possible recent seizure activity vs underlying mesial temporal sclerosis. Neurology felt the changes seen in the temporal lobe due to prior seizures and no other concerns. Did not recommend treatment.  *No seizures noted during this admission  -- PT/OT initially recommended TCU, but strength/mobility improved this admission; now hopeful that patient can go straight to IP chemical dependency (CD) program.   - court hearing 1/30/24 and [facility] signed Stayed Commitment for Chemically Dependent Person, see social work note 1/29  - voluntary patient in hospital until medically  stable for discharge; tentative transition to [facility] CD program on discharge, possibly 1/31     Alcohol use disorder, severe   Alcoholic hepatitis without ascites   *LFTs elevated on admission. CT abd/pelvis neg for acute pathology .   *Labs gradually improved with continued alcohol abstinence  *Psych consulted this admission and initiated a petition for commitment for CD treatment   -- on thiamine/folate/MVI  -- continue PTA gabapentin 600 mg TID  -- [facility] signed Stayed Commitment for Chemically Dependent Person, see social work note 1/29 as above     Acute on chronic anemia  Severe esophagitis  Severe thrombocytopenia: Resolved  *No reported hx of bleeding, baseline hgb seems to be 9-12 over the preceding 2 yrs.   *Hgb 9.9 on this admission. Decreased to 7 after initial IV hydration.   *Platelets initially low, but gradually normalized this admission.   *Transfused 1U PRBC on 1/15 for hgb  7.1, and again on 1/28 for hgb 7.0  *[facility] consulted during admission. EGD (1/16) showed severe esophagitis but no stigmata of bleeding  *Heme/Onc also consulted this admission. Given IV iron x2 and then continued on oral supplement. Hemolysis labs negative.   - Hgb had been recently stable in mid-8 range; Hgb 7.2 on routine check, 1/28, rechecked 7.0.  One unit RBC transfusion 1/29  - monitor hemoglobin, transfuse as needed   - continue pantoprazole (Protonix) bid   - Hemodynamically stable without signs or symptoms of bleeding. Will monitor for now and consider reconsulting GI if Hgb continues to drop  Recent Labs   Lab 01/30/24  0858 01/29/24  1057 01/28/24 2006 01/28/24  0830   HGB 8.9* 8.5* 7.0* 7.2*       Possible gout flare, resolved  *Has hx of tophaceous gout. Allopurinol held on admission.   *Pt subsequently reported worsening pain and inflammation involving both wrist and small hand joints with inflammation of the third digit middle IPJ  *Started on colchicine on 1/20, through 1/28. XR of hands  showed marked soft tissue thickening/swelling about the PIP joint of the middle finger.    *Due to ongoing fever, Ortho was consulted to evaluate for infectious arthritis. Advised to continue allopurinol. No indication for surgery.   *Condition improved with allopurinol and colchicine  -- continue daily allopurinol  -- colchicine restarted 0.6 mg daily on 1/30, reassess symptoms daily, follow-up wit primary clinic provider      RUE superficial thrombophlebitis  *Developed RUE erythema, edema and discomfort on 1/23/24 near site of IV. Doppler US negative for DVT. IV removed.   -- on/off pain bilateral arms during hospital stay, increased 1/29 and 1/30, right arm > left arm, focused in elbow and just proximal; no significant erythema/warmth/swelling appreciated; mild/moderately tender on exam  -- pain control, see hospital orders; scheduled acetaminophen (Tylenol) TID with meals; heat or ice packs as needed; avoid non-steroidal anti-inflammatory drugs (NSAIDs) due to risk of GI bleeding and anemia; avoid narcotics due to chemical dependency and plans for CD treatment on discharge soon  -- venous ultrasound of arms bilaterally 1/30, rule DVT, reassess recent right upper extremity superficial thrombophlebitis    COVID-19 infection, recovered  *Tested positive for COVID19 on 1/4/24. Was initially asymptomatic but then developed a low grade fever on 1/17. No respiratory symptoms. CXR neg for infiltrate.   *Fever was initially suspected to be due to COVID, though fevers persisted >1wk after COVID diagnosis so unclear if other etiology contributing. No other s/sx of infection elsewhere -- UA neg, blood cultures neg. Procal 0.83 with stable trend.   *ID consulted, no new recommendations. Advised to treat gout and alcohol use. No specific treatment indicated for COVID.  *Chemical DVT ppx was initially held due to anemia/thrombocytopenia, continued to defer given ongoing high risk for bleeding due to esophagitis, risk of  bleeding, and recent drops in hemoglobin   *Considered COVID recovered on 1/25/24 and taken out of isolation.     Anion gap metabolic acidosis with respiratory compensation, resolved  Likely starvation ketoacidosis, resolved  *Initial labs notable for bicarb 10, AG 37. Lactate nl. VBG the morning following showed pH 7.3, pCO2 26 and bicarb 12, indicative of respiratory compensation. Serum ketones notably elevated.   *Labs normalized with supportive cares and oral intake.  - monitor labs periodically as outpatient     Hypomagnesemia  Hypophosphatemia  Hypokalemia  Hyponatremia:Resolved  - Monitor and replace as needed per protocol      Essential hypertension  *Chronic and stable on amlodipine and lisinopril  - monitor blood pressure      Generalized anxiety disorder  *Chronic and stable on citalopram  - comfort and support offered    Disposition  - Estimated length of stay 24 to 48 hours pending stable hemoglobin, upper extremity ultrasound results, pain control, and accepting CD treatment program  - Anticipated discharge to CD treatment  - Social work consulted          Diet: Regular Diet Adult  Snacks/Supplements Adult: Other; OK for supplement as needed; With Meals    DVT Prophylaxis: Pneumatic Compression Devices  Heard Catheter: Not present  Lines: None     Cardiac Monitoring: None  Code Status: Full Code      Clinically Significant Risk Factors              # Hypoalbuminemia: Lowest albumin = 2.8 g/dL at 1/19/2024  7:07 AM, will monitor as appropriate     # Hypertension: Noted on problem list              # Financial/Environmental Concerns: unemployed         Disposition Plan      Expected Discharge Date: 01/31/2024, 12:00 PM    Destination: inpatient rehabilitation facility  Discharge Comments: Preliminary hearing 1/25 at 0900, CD committment process. COVID recovered 1/25            Braden Thompson MD  Hospitalist Service  [facility]  Securely message with castaclip (more info)  Text  page via [facility] Paging/Directory   ______________________________________________________________________    Interval History   First visit with patient yesterday.  Hemoglobin improved after RBC transfusion recently.  No report of GI blood loss.  Pain involving arms bilaterally, R>L, mainly in elbow and more proximal area to it medially.  Low grade fever noted.  Blood pressure elevated.      Physical Exam   Vital Signs: Temp: 99.1  F (37.3  C) Temp src: Oral BP: (!) 155/106 Pulse: 100   Resp: 18 SpO2: 97 % O2 Device: None (Room air)    Weight: 160 lbs 12.8 oz    GENERAL awake and alert, lying in bed, nursing staff at the bedside  LUNGS no wheezes or crackles  HEART S1, S2 with RRR  ABDOMEN soft, nontender  MUSCULOSKELETAL extremities without edema; mild/moderate tenderness in medial elbow area and proximally without swelling/warmth/erythema  SKIN warm and dry  NEURO moves upper and lower extremities spontaneously and to command  MENTAL STATUS answering questions and following simple commands    Medical Decision Making             Data     I have personally reviewed the following data over the past 24 hrs:    13.4 (H)  \   8.9 (L)   / 467 (H)     N/A N/A N/A /  N/A   N/A N/A N/A \       Imaging results reviewed over the past 24 hrs:   No results found for this or any previous visit (from the past 24 hour(s)).

## 2024-01-30 NOTE — PLAN OF CARE
Goal Outcome Evaluation:       DATE & TIME: 1/29-01/30 [id number]      Cognitive Concerns/ Orientation : A&O x 4.Flat affect.   BEHAVIOR & AGGRESSION TOOL COLOR: Green  CIWA SCORE: NA   ABNL VS/O2: VSS on RA except elevated BP.  MOBILITY: Ind  PAIN MANAGMENT: C/o of robby elbow pain but declined pain intervention  DIET: Regular  BOWEL/BLADDER: Incontinent of B/B  ABNL LAB/BG: see chart  DRAIN/DEVICES: PIV SL  TELEMETRY RHYTHM: NA  SKIN: Scattered bruises  TESTS/PROCEDURES:   D/C DATE: Pending improvement  OTHER IMPORTANT INFO:

## 2024-01-31 NOTE — PLAN OF CARE
Goal Outcome Evaluation:                      DATE & TIME: 1/30/24, pm shift      Cognitive Concerns/ Orientation : A&O x 4.Flat affect.   BEHAVIOR & AGGRESSION TOOL COLOR: Green  CIWA SCORE: NA   ABNL VS/O2: VSS on RA  MOBILITY: Ind  PAIN MANAGMENT: C/o of robby elbow pain. MD ordered PRN Oxycodone 5mg q 4 hrs. Gave PRN Oxycodone x2 and schedule Tylenol. Patient refusing use of heat pack and cold pack.  DIET: Regular  BOWEL/BLADDER: Incontinent of B/B at times. Ambulating to bathroom this shift.  ABNL LAB/BG: WBC 13.4, Hgb 8.9, Platelet ct 467  DRAIN/DEVICES: PIV SL  TELEMETRY RHYTHM: NA  SKIN: Pale. Scattered bruises. L middle finger swollen. +1 edema on left hand  TESTS/PROCEDURES: US BUE done today shows superficial thrombus in the R cephalic vein.  D/C DATE: [facility] if he is medically stable. SW following.  OTHER IMPORTANT INFO: Colchicine ( gout med) restarted. [facility] signed stayed committment for CD person. Patient voluntarily agrees to stay till transfer to a CD program. Door alarm off.

## 2024-01-31 NOTE — PROGRESS NOTES
[facility]    Medicine Progress Note - Hospitalist Service    Date of Admission:  1/13/2024    Assessment & Plan   Jerome Flanagan is a 48 year old male with PMH of severe alcohol use disorder, hypertension, and anxiety who was admitted on 1/13/2024 for evaluation after a suspected alcohol withdrawal seizure. Diagnosed with COVID-19 this admission, but is now recovered. Recently awaiting commitment for CD treatment with court date 1/29 and signed Stayed Commitment for Chemically Dependent Person.  Hemoglobin also lower recently requiring RBC transfusion and monitoring.     Alcohol withdrawal seizure, resolved  History of recurrent falls, unsteady gait  T12-L2 compression fracture  *Per EMS and patient's mother, he was found on the ground with GTC movements. Denies prior history of seizures, but consumes alcohol chronically.    *In ED, he was noted to have a left posterior scalp soft tissue contusion/hematoma. Also noted to have multiple bruises likely old on his back with suggest either previous falls or previous seizure. Head CT neg for intracranial injury. CT abd/pelvis with mild age-indeterminate compression deformity at T12-L2 vertebral bodies probably chronic   *General Neurology consulted this admission. EEG with no obvious epileptiform discharges or electrographic seizures. Brain  MRI showed changes of possible recent seizure activity vs underlying mesial temporal sclerosis. Neurology felt the changes seen in the temporal lobe due to prior seizures and no other concerns. Did not recommend treatment.  *No seizures noted during this admission  -- PT/OT initially recommended TCU, but strength/mobility improved this admission; now hopeful that patient can go straight to IP chemical dependency (CD) program.   - court hearing 1/30/24 and [facility] signed Stayed Commitment for Chemically Dependent Person, see social work note 1/29  - voluntary patient in hospital until medically  stable for discharge; tentative transition to [facility] CD program on discharge, possibly in the next 24 to 48 hours     Alcohol use disorder, severe   Alcoholic hepatitis without ascites   *LFTs elevated on admission. CT abd/pelvis neg for acute pathology .   *Labs gradually improved with continued alcohol abstinence  *Psych consulted this admission and initiated a petition for commitment for CD treatment   -- thiamine/folate/MVI  -- continue PTA gabapentin 600 mg TID  -- [facility] signed Stayed Commitment for Chemically Dependent Person, see social work note 1/29 as above  --Follow-up labs with outpatient primary provider     Acute on chronic anemia  Severe esophagitis  Severe thrombocytopenia: Resolved  *No reported hx of bleeding, baseline hgb seems to be 9-12 over the preceding 2 yrs.   *Hgb 9.9 on this admission. Decreased to 7 after initial IV hydration.   *Platelets initially low, but gradually normalized this admission.   *Transfused 1U PRBC on 1/15 for hgb  7.1, and again on 1/28 for hgb 7.0  *[facility] consulted during admission. EGD (1/16) showed severe esophagitis but no stigmata of bleeding  *Heme/Onc also consulted this admission. Given IV iron x2 and then continued on oral supplement. Hemolysis labs negative.   - Hgb had been recently stable in mid-8 range; Hgb 7.2 on routine check, 1/28, rechecked 7.0.  One unit RBC transfusion 1/29  - monitor hemoglobin, transfuse as needed   - continue pantoprazole (Protonix) bid   - Hemodynamically stable without signs or symptoms of bleeding.  - Monitor hemoglobin periodically, recently stable, follow-up with primary clinic provider     Recent Labs   Lab 01/31/24  0844 01/30/24  0858 01/29/24  1057 01/28/24 2006 01/28/24  0830   HGB 8.5* 8.9* 8.5* 7.0* 7.2*       Probable acute gout flare, recurrent  History of gout, on allopurinol  *Has hx of tophaceous gout. Allopurinol initially held on admission, later restarted   *Pt subsequently reported  worsening pain and inflammation involving both wrist and small hand joints with inflammation of the third digit middle IPJ  *Started on colchicine on 1/20, through 1/28. XR of hands showed marked soft tissue thickening/swelling about the PIP joint of the middle finger.    *Due to ongoing fever, Ortho was consulted to evaluate for infectious arthritis. Advised to continue allopurinol. No indication for surgery or clear evidence of infection.  *Condition initially improved with allopurinol and colchicine  *Progressive pain involving the arms bilaterally leading to follow-up ultrasound on 1/30/2024 showing limited superficial thrombophlebitis right arm, see below, stable/improved from prior study  *Clinical suspicion of acute gout explaining previous wrist and hand pain as well as more recent bilateral arm pain in the elbow and surrounding fossa medially with limited range of motion  -- continue daily allopurinol  -- colchicine restarted 0.6 mg daily on 1/30, reassess symptoms daily  --Scheduled Tylenol 3 times daily with meals  --Heat or ice packs for local comfort and relief of symptoms  -- Oxycodone available for severe, breakthrough pain, in the hospital only, with no plans for prescription on discharge in the setting of chemical dependency and upcoming treatment  --Prednisone, 20 mg daily started 1/31/2024 with plans for future taper as added therapy for acute gout     RUE superficial thrombophlebitis  Superficial venous thrombus right cephalic vein on ultrasound 1/30/2024  *Developed RUE erythema, edema and discomfort on 1/23/24 near site of IV. Doppler US negative for DVT. IV removed.   -- on/off pain bilateral arms during hospital stay, increased 1/29 and 1/30, right arm > left arm, focused in elbow and just proximal; no significant erythema/warmth/swelling appreciated; mild/moderately tender on exam  -- pain control, see hospital orders; scheduled acetaminophen (Tylenol) TID with meals; heat or ice packs as  needed; avoid non-steroidal anti-inflammatory drugs (NSAIDs) due to risk of GI bleeding and anemia; avoid narcotics due to chemical dependency and plans for CD treatment on discharge soon  -- Recheck venous ultrasound 1/30/2024 showed superficial thrombus involving right cephalic vein, overall stable/improved from prior study after review with radiologist  -- Follow-up venous ultrasound right arm is recommended in the next 1 to 2 weeks to rule out superficial thrombophlebitis extension or progression or development of DVT    COVID-19 infection, recovered  *Tested positive for COVID19 on 1/4/24. Was initially asymptomatic but then developed a low grade fever on 1/17. No respiratory symptoms. CXR neg for infiltrate.   *Fever was initially suspected to be due to COVID, though fevers persisted >1wk after COVID diagnosis so unclear if other etiology contributing. No other s/sx of infection elsewhere -- UA neg, blood cultures neg. Procal 0.83 with stable trend.   *ID consulted, no new recommendations. Advised to treat gout and alcohol use. No specific treatment indicated for COVID.  *Chemical DVT ppx was initially held due to anemia/thrombocytopenia, continued to defer given ongoing high risk for bleeding due to esophagitis, risk of bleeding, and recent drops in hemoglobin   *Considered COVID recovered on 1/25/24 and taken out of isolation.     Anion gap metabolic acidosis with respiratory compensation, resolved  Likely starvation ketoacidosis, resolved  Hypoalbuminemia  *Initial labs notable for bicarb 10, AG 37. Lactate nl. VBG the morning following showed pH 7.3, pCO2 26 and bicarb 12, indicative of respiratory compensation. Serum ketones notably elevated.   *Labs normalized with supportive cares and oral intake.  Low albumin, 3.2 on 1/28/2024  - monitor labs periodically as outpatient     Hypomagnesemia  Hypophosphatemia  Hypokalemia  Hyponatremia:Resolved  - Monitor and replace as needed per protocol  -Recent labs  1/20/2024 satisfactory      Essential hypertension  *Chronic and stable on amlodipine and lisinopril  - monitor blood pressure      Generalized anxiety disorder  *On fluoxetine (Prozac) prior to admission, 40 mg daily by report  - comfort and support offered daily  - fluoxetine restarted 1/31 at lower dose of 20 mg/d with plans to titrate upward to preadmission dose in the near future    Disposition  - Estimated length of stay 24 to 48 hours  - Anticipated discharge to  treatment  - Social work consulted          Diet: Regular Diet Adult  Snacks/Supplements Adult: Other; OK for supplement as needed; With Meals    DVT Prophylaxis: Pneumatic Compression Devices  Heard Catheter: Not present  Lines: None     Cardiac Monitoring: None  Code Status: Full Code      Clinically Significant Risk Factors              # Hypoalbuminemia: Lowest albumin = 2.8 g/dL at 1/19/2024  7:07 AM, will monitor as appropriate     # Hypertension: Noted on problem list              # Financial/Environmental Concerns: unemployed         Disposition Plan      Expected Discharge Date: 02/02/2024, 12:00 PM    Destination: inpatient rehabilitation facility  Discharge Comments: Preliminary hearing 1/25 at 0900,  committment process. COVID recovered 1/25            Braden Thompson MD  Hospitalist Service  [facility]  Securely message with [employer] (more info)  Text page via [facility] Paging/Directory   ______________________________________________________________________    Interval History   Lying in bed, pleasant and interactive, eating breakfast.  Moderate bilateral arm pain persists.  Yesterday, bilateral upper extremity venous ultrasound showed stable superficial thrombophlebitis right arm, no evidence of DVT.  Patient convinced acute gout playing a role in his symptoms and increased pain.  Recent mild leukocytosis, resolved, with slight elevation in temperature.  Colchicine restarted yesterday twice daily with  addition of prednisone today for acute gout.  No clear evidence of acute infection.  Transition to chemical dependency treatment center currently on hold pending improvement in pain control and clinical course, hopefully in the next 24 to 48 hours.    Physical Exam   Vital Signs: Temp: 98.4  F (36.9  C) Temp src: Oral BP: (!) 135/92 Pulse: 87   Resp: 16 SpO2: 98 % O2 Device: None (Room air)    Weight: 160 lbs 12.8 oz    GENERAL awake and alert, pleasant and interactive, lying in bed  LUNGS no wheezes or crackles  HEART S1, S2 with RRR  ABDOMEN soft, nontender  MUSCULOSKELETAL extremities without edema; mild to moderate tenderness persisting in the upper forearms bilaterally medial aspect of the elbow with limited range of motion, stable and perhaps mildly improved from yesterday, left arm symptoms greater than right  SKIN warm and dry  NEURO moves upper and lower extremities spontaneously and to command  MENTAL STATUS answering questions and following simple commands    Medical Decision Making             Data     I have personally reviewed the following data over the past 24 hrs:    10.3  \   8.5 (L)   / 383     N/A N/A N/A /  N/A   N/A N/A N/A \       Imaging results reviewed over the past 24 hrs:   No results found for this or any previous visit (from the past 24 hour(s)).

## 2024-01-31 NOTE — PLAN OF CARE
DATE & TIME: 1/30/24, 3725 - 1681   Cognitive Concerns/ Orientation : A&O x 4.Flat affect.   BEHAVIOR & AGGRESSION TOOL COLOR: Green  CIWA SCORE: NA   ABNL VS/O2: VSS on RA  MOBILITY: Independent  PAIN MANAGMENT: Prn Tylenol and Oxycodone given for bilateral elbow pain  DIET: Regular  BOWEL/BLADDER: Incontinent of B/B at times. Up to bathroom  ABNL LAB/BG: AM labs pending  DRAIN/DEVICES: PIV SL  TELEMETRY RHYTHM: NA  SKIN: Pale. Scattered bruises. Left middle finger swollen. +1 edema on left hand  TESTS/PROCEDURES: None  D/C DATE: Patient accepted at [facility] today if he is medically stable. SW following.  OTHER IMPORTANT INFO: [facility] signed stayed committment for CD person. Patient voluntarily agrees to stay till transfer to a CD program. Door alarm off.         Goal Outcome Evaluation:      Plan of Care Reviewed With: patient    Overall Patient Progress: improvingOverall Patient Progress: improving

## 2024-01-31 NOTE — PROGRESS NOTES
Care Management Follow Up    Length of Stay (days): 17    Expected Discharge Date: 01/31/2024     Concerns to be Addressed: adjustment to diagnosis/illness, cognitive/perceptual, compliance issue, discharge planning, mental health, substance/tobacco abuse/use     Patient plan of care discussed at interdisciplinary rounds: Yes    Anticipated Discharge Disposition: Inpatient Chemical Dependency     Anticipated Discharge Services: Mental Health Resources, Chemical Dependency Resources  Anticipated Discharge DME:      Patient/family educated on Medicare website which has current facility and service quality ratings:    Education Provided on the Discharge Plan:    Patient/Family in Agreement with the Plan:      Referrals Placed by CM/SW:    Private pay costs discussed: Not applicable    Additional Information:  [facility] RN's continue to follow patient's via EPIC.  Today writer spoke with Montserrat JACK at [phone].  She is aware patient is currently on Oxycodone for acute pain and that the goal is his arm pain is lessen so he doesn't require Oxycodone when discharge.  [facility] was also sent a FLAKO's assessment and they are now asking for clinical information.  Patient accepts [facility] as an option for CD treatment but also asks if [facility] has any programs to offer.  He was at [facility] in the past    In order to ensure we have a program with bed availability when patient is ready for discharge and to respect patient's question, writer has asked [facility] to continue to their assesment  and have sent the clinical information they requested.  Dr Thompson has given anticipated discharge for Thursday or Friday pending improvement.    PT note reviewed.  Patient needs to be independent with all transfers, mobility and self care when he enters a residential program.  Plan  Will continue to follow.       LUCI Cuellar

## 2024-01-31 NOTE — PLAN OF CARE
Summary: Found on ground by mom, fall/ETOH seizure. RUE Superficial thrombophlebitis, Gout Flare, on Court hold.    DATE & TIME: 1/31/24 AM shift  Cognitive Concerns/ Orientation : Alert and oriented x4, flat effect.   BEHAVIOR & AGGRESSION TOOL COLOR: Green.   ABNL VS/O2: Febrile this morning again, on recheck temp 98.4. Hypertensive.   MOBILITY: Independent in room. Declined to walk in the peralta. up to the bathroom. Does his own ADLs. Declines shower.   PAIN MANAGMENT: bilateral arm pain, managed with scheduled tylenol. Declined Heat/cold packs. Continues on colchicine, and prednisone started.   DIET: Regular  BOWEL/BLADDER: can be incontinent at times   LAB: Hgb 8.5. WBC 10.3.  DRAIN/DEVICES: None  SKIN: Multiple scattered bruises. Skin pale. Left middle finger swollen.   TESTS/PROCEDURES: n/a  Discharge disposition: Discharge to inpatient CD treatment when medically stable. Patient accepted at [facility]   OTHER IMPORTANT INFO: Pt is voluntary at this time.

## 2024-02-01 NOTE — PROGRESS NOTES
Care Management Discharge Note    Discharge Date: 02/01/2024       Discharge Disposition: Inpatient Chemical Dependency    Discharge Services: Mental Health Resources, Chemical Dependency Resources    Discharge DME:      Discharge Transportation: family or friend will provide    Private pay costs discussed: Not applicable    Does the patient's insurance plan have a 3 day qualifying hospital stay waiver?  No    PAS Confirmation Code:  n/a    Patient/family educated on Medicare website which has current facility and service quality ratings:      Education Provided on the Discharge Plan:  yes  Persons Notified of Discharge Plans: patient and [facility] [email or web address]  Patient/Family in Agreement with the Plan:      Handoff Referral Completed: Yes    Additional Information:  Dr Thompson reports patient is stable for discharge today. Arrangements made for admission today to [facility] with their nurse Montserrat [phone].  [facility] can accept him at 1400.  Writer is arranged transportation.  Patient accepting the plan.   Sent email to his Commitment CM team see above.   Patient has been in communication with his mother regarding his discharge plan today    LUCI Cuellar

## 2024-02-01 NOTE — PROGRESS NOTES
Initial Services Plan    Before your first treatment group, please do the following    Immediate health & safety concerns: Look for sober housing and a supportive social network.  Look for a support network (such as AA, NA, DBT group, a Jew group, etc.)    Suggestions for client during the time between intake & completion of treatment plan:  Tour your treatment center (unit or outpatient clinic).  Introduce yourself to the treatment group.  Spend time getting to know your peers.  Complete the problem list for your treatment plan.  Start drug and alcohol use history.  Review your patient or client handbook.    Client issues to be addressed in the first treatment sessions:  Identify motivations(s) for coming to treatment, i.e. legal, family, job, self  Identify concerns about going to group, i.e. fear of talking in group  Identify outside concerns that may interfere with treatment, i.e. bills not getting paid, homesick for children    Mei Courtney, [facility]  2/1/2024

## 2024-02-01 NOTE — PLAN OF CARE
Discharge    Patient discharged to [facility] IP treatment via cab    Care plan note: Patient was A&Ox4. VSS on RA. Up independently, ambulates. Tolerating diet. Still with bilateral elbow pain, given scheduled tylenol. Swelling/redness in arms improved. Left middle finger still swollen, improving. IV removed. Discharge AVS reviewed, educated pt on mediation changes. Pt verbalized understanding and had no further questions. All 17 discharge medications sent with patient. Pt states he has all his belongings, nothing in locker and states nothing was sent to security/pharmacy. Attempted to call Montserrat JACK at facility to give report, though no answer    Listed belongings gathered and given to patient (including from security/pharmacy). Yes  Care Plan and Patient education resolved: Yes  Prescriptions if needed, hard copies sent with patient  Yes  Medication Bin checked and emptied on discharge Yes  SW/care coordinator/charge RN aware of discharge: Yes

## 2024-02-01 NOTE — GROUP NOTE
Group Therapy Documentation    PATIENT'S NAME: Jerome Flanagan  MRN:   [id number]  :   1975  ACCT. NUMBER: [id number]  DATE OF SERVICE: 24  START TIME: 12:30 PM  END TIME:  2:30 PM  FACILITATOR(S): Martin Wolf LADC  TOPIC: BEH Group Therapy  Number of patients attending the group:  11  Group Length:  2 Hours    Group Therapy Type: Recovery strategies and Daily living/independence skills    Summary of Group / Topics Discussed:    Recovery Principles, Spiritual Care, and Sober coping skills      Group Attendance:  Excused from group session      Client specific details:  Pt excused due to being in admission at time of group.

## 2024-02-01 NOTE — PROGRESS NOTES
Orientation: Aox4, forgetful    Vitals: VSS, on RA    Tele: NA    IV Access/drains: PIV SL    Diet: Regular    Mobility: Independent    GI/: Continent B&B    Wound/Skin: Scattered bruises. Left middle finger swollen.     Discharge Plan: discharge to [facility] IP CD, pending improvement      See Flow sheets for assessment

## 2024-02-01 NOTE — PROGRESS NOTES
Progress Note    This patient had a IP Substance Use Disorder assessment on 01/18/2024 completed by Zane SINGH.  This patient was seen for a face to face update of the IP Substance Use Disorder assessment on 2/1/2024 by FRANCISCO Ratliff.  INSIDE: The patient's IP Substance Use Disorder assessment completed on 01/18/2024 is in the patient's electronic medical record in Epic in the Chart Review section under the Notes/Trans Tab.    Alcohol/Drug use since the last CD evaluation (include date of last use):     No additional substances use since the last CD evaluation     Please note any other clinical changes since the last CD evaluation (such as medication changes, additional legal charges, detoxification admissions, overdoses, etc.)     No significant changes since the last CD evaluation       ASAM Dimensions Original scores Current Scores   I.) Intoxication and Withdrawal: 1 0   II.) Biomedical:  1 1   III.) Emotional and Behavioral:  2 2   IV.) Readiness to Change:  4 4   V.) Relapse Potential: 4 4   VI.) Recovery Environmental: 3 3     Please list clinical justifications for the above ASAM score changes since the original comprehensive assessment:     The patient's score on Dimension I changed from a 1 to a 0.  The patient's withdrawal symptoms had been addressed by his physicians while he had been on [facility] in [place], [place].         Current CRYSTAL: Current UA:     0.000     Positive for Benzodiazepines and Oxy andnegative for all other screened drugs.       PHQ-9, CAROLINE-7   PHQ-9 on 2/1/2024 CAROLINE-7 on 2/1/2024   The patient's PHQ-9 score was 10 out of 27, indicating moderate depression.   The patient's CAROLINE-7 score was 8 out of 21, indicating mild anxiety.       [place]-Suicide Severity Rating Scale Reassessment   Have you ever wished you were dead or that you could go to sleep and not wake up?  Past Month:  No     Have you actually had any thoughts of killing  yourself?  Past Month:  No     Have you been thinking about how you might do this?     Past Month:  No   Lifetime:  No   Have you had these thoughts and had some intention of acting on them?     Past Month:  No   Lifetime:  No   Have you started to work out the details of how to kill yourself?   Past Month:  No   Lifetime:  No   Do you intend to carry out this plan?   No     When you have the thoughts how long do they last?  The patient denied ever having any suicidal thoughts in life.     Are there things - anyone or anything (i.e. family, Sabianist, pain of death) that stopped you from wanting to die or acting on thoughts of suicide?  Does not apply       2008  [facility]  Used with permission by Yesika Finley, PhD.       Guide to C-SSRS Risk Ratings   NO IDEATION:  with no active thoughts IDEATION: with a wish to die. IDEATION: with active thoughts. Risk Ratings   If Yes No No 0 - Very Low Risk   If NA Yes No 1 - Low Risk   If NA Yes Yes 2 - Low/moderate risk   IDEATION: associated thoughts of methods without intent or plan INTENT: Intent to follow through on suicide PLAN: Plan to follow through on suicide Risk Ratings cont...   If Yes No No 3 - Moderate Risk   If Yes Yes No 4 - High Risk   If Yes Yes Yes 5 - High Risk   The patient's ADDITIONAL RISK FACTORS and lack of PROTECTIVE FACTORS may increase their overall suicide risk ratings.     Additional Risk Factors:   Significant history of having untreated or poorly treated mental health symptoms    Significant history of untreated or poorly treated chronic pain issues    Tendency to be socially isolated and/or cut off from the support of others    A recent death of someone close to the patient and/or unresolved grief and loss issues    A recent loss that was significant to the patient, i.e. loss of job, loss of home, divorce, break-up, etc.   Protective Factors:   Having people in his/her life that would prevent the patient  "from considering a suicide attempt (i.e. young children, spouse, parents, etc.)    An absence of chronic health problems or stable and well treated chronic health issues    Having easy access to supportive family members    Having restricted access to highly lethal means of suicide     Risk Status   0. - Very Low Risk:  Evaluation Counselors:  Document in Epic / SBAR to counselor \"Very Low Risk\".      Treatment Counselors:  Reassess upon admission as applicable, assess weekly in progress notes under Dimension 3 and summarize in Discharge / Treatment summary under Dimension 3.     Additional information to support suicide risk rating: There was no additional information to provide at this time.       "

## 2024-02-01 NOTE — DISCHARGE SUMMARY
[facility]  Hospitalist Discharge Summary      Date of Admission:  1/13/2024  Date of Discharge:  2/1/2024  Discharging Provider: Braden Thompson MD  Discharge Service: Hospitalist Service    Discharge Diagnoses   Alcohol withdrawal seizure, resolved  History of recurrent falls, unsteady gait  T12-L2 compression fracture  Alcohol use disorder, severe   Alcoholic hepatitis without ascites, hepatic steatosis  Acute on chronic anemia  Severe esophagitis  Severe thrombocytopenia, resolved  Probable acute gout flare, recurrent  History of gout, on allopurinol  RUE superficial thrombophlebitis  Superficial venous thrombus right cephalic vein on ultrasound 1/30/2024, recommend follow-up ultrasound, see below  COVID-19 infection, recovered  Anion gap metabolic acidosis with respiratory compensation, resolved  Likely starvation ketoacidosis, resolved  Hypoalbuminemia  Hypomagnesemia  Hypophosphatemia  Hypokalemia  Hyponatremia  Essential hypertension  Generalized anxiety disorder    Clinically Significant Risk Factors          Follow-ups Needed After Discharge   Follow-up Appointments     Follow Up and recommended labs and tests      Follow up with a primary clinic provider in 1 to 2 week for hospital   follow-up, recheck labs, medication review, follow-up ultrasound or right   arm.  The following labs/tests are recommended: CBC, basic profile labs in   one week, ordered and reviewed by primary clinic provider or provider at    treatment facility.  Recommended recheck right upper extremity venous   ultrasound in 1 to 2 weeks for follow-up of superficial thrombophlebitis   noted on prior studies, ordered and reviewed by primary clinic provider or   provider at  treatment facility.            Unresulted Labs Ordered in the Past 30 Days of this Admission       No orders found from 12/14/2023 to 1/14/2024.        These results will be followed up by primary clinic provider     Discharge  Disposition   Discharged to rehabilitation facility, CD   Condition at discharge: Stable    Hospital Course   Jerome Flanagan is a 48 year old male with PMH of severe alcohol use disorder, hypertension, and anxiety who was admitted on 1/13/2024 for evaluation after a suspected alcohol withdrawal seizure.  For details, see admission note.  Diagnosed with COVID-19 this admission, but is now recovered. Recently awaiting commitment for CD treatment with court date 1/29 and signed Stayed Commitment for Chemically Dependent Person.  Discharge to inpatient CD treatment facility on 2/1/2024.     Alcohol withdrawal seizure, resolved  History of recurrent falls, unsteady gait  T12-L2 compression fracture  *Per EMS and patient's mother, he was found on the ground with GTC movements. Denies prior history of seizures, but consumes alcohol chronically.    *In ED, he was noted to have a left posterior scalp soft tissue contusion/hematoma. Also noted to have multiple bruises likely old on his back with suggest either previous falls or previous seizure. Head CT neg for intracranial injury. CT abd/pelvis with mild age-indeterminate compression deformity at T12-L2 vertebral bodies probably chronic   *General Neurology consulted this admission. EEG with no obvious epileptiform discharges or electrographic seizures. Brain  MRI showed changes of possible recent seizure activity vs underlying mesial temporal sclerosis. Neurology felt the changes seen in the temporal lobe due to prior seizures and no other concerns. Did not recommend treatment.  *No seizures noted during this admission  -- PT/OT initially recommended TCU, but strength/mobility improved this admission; now hopeful that patient can go straight to IP chemical dependency (CD) program.   - court hearing 1/30/24 and [facility] signed Stayed Commitment for Chemically Dependent Person, see social work note 1/29  - voluntary patient in hospital after recent court date,  medically stable for discharge 2/1; transition to [facility] program on discharge     Alcohol use disorder, severe   Alcoholic hepatitis without ascites   *LFTs elevated on admission. CT abd/pelvis neg for acute pathology .   *Labs gradually improved with continued alcohol abstinence  *Psych consulted this admission and initiated a petition for commitment for CD treatment   -- thiamine/folate/MVI in hospital, continued on discharge  -- continue PTA gabapentin 600 mg TID, continued on discharge  -- [facility] signed Stayed Commitment for Chemically Dependent Person, see social work note 1/29 as above  --Follow-up labs with outpatient primary provider     Acute on chronic anemia  Severe esophagitis  Severe thrombocytopenia: Resolved  *No reported hx of bleeding, baseline hgb seems to be 9-12 over the preceding 2 yrs.   *Hgb 9.9 on this admission. Decreased to 7 after initial IV hydration.   *Platelets initially low, but gradually normalized this admission.   *Transfused 1U PRBC on 1/15 for hgb  7.1, and again on 1/28 for hgb 7.0  *[facility] consulted during admission. EGD (1/16) showed severe esophagitis but no stigmata of bleeding  *Heme/Onc also consulted this admission. Given IV iron x2 and then continued on oral supplement. Hemolysis labs negative.   - Hgb had been recently stable in mid-8 range; Hgb 7.2 on routine check, 1/28, rechecked 7.0.  One unit RBC transfusion 1/29  - continue pantoprazole (Protonix) bid, continued on discharge  - Hemodynamically stable without signs or symptoms of bleeding prior to discharge  - recommend to monitor hemoglobin periodically, recently stable, follow-up with primary clinic provider, recheck in 1 week and as needed   - avoid non-steroidal anti-inflammatory drugs (NSAIDs) or blood thinners due to increased risk of bleeding    Recent Labs   Lab 01/31/24  0844 01/30/24  0858 01/29/24  1057 01/28/24 2006 01/28/24  0830   HGB 8.5* 8.9* 8.5* 7.0* 7.2*       Probable acute  gout flare, recurrent  History of gout, on allopurinol  *Has hx of tophaceous gout. Allopurinol initially held on admission, later restarted   *Pt subsequently reported worsening pain and inflammation involving both wrist and small hand joints with inflammation of the third digit middle IPJ  *Started on colchicine on 1/20, through 1/28. XR of hands showed marked soft tissue thickening/swelling about the PIP joint of the middle finger.    *Due to ongoing fever, Ortho was consulted to evaluate for infectious arthritis. Advised to continue allopurinol. No indication for surgery or clear evidence of infection.  *Condition initially improved with allopurinol and colchicine  *Progressive pain involving the arms bilaterally leading to follow-up ultrasound on 1/30/2024 showing limited superficial thrombophlebitis right arm, see below, stable/improved from prior study  *Clinical suspicion of acute gout explaining previous wrist and hand pain as well as more recent bilateral arm pain in the elbow and surrounding fossa medially with limited range of motion  -- continue daily allopurinol on discharge  -- colchicine restarted 0.6 mg daily on 1/30, later increased to twice daily with plans to taper on discharge, see medication section  --Scheduled Tylenol 3 times daily with meals in hospital, as needed on discharge  --Heat or ice packs for local comfort and relief of symptoms  --Prednisone, 20 mg daily started 1/31/2024 with future taper as ordered on discharge for acute gout flare  --Close follow-up with primary clinic provider is recommended      RUE superficial thrombophlebitis  Superficial venous thrombus right cephalic vein on ultrasound 1/30/2024  *Developed RUE erythema, edema and discomfort on 1/23/24 near site of IV. Doppler US negative for DVT. IV removed.   -- on/off pain bilateral arms during hospital stay, increased 1/29 and 1/30, right arm > left arm, focused in elbow and just proximal; no significant  erythema/warmth/swelling appreciated; mild/moderately tender on exam  -- pain control, see hospital orders; scheduled acetaminophen (Tylenol) TID with meals; heat or ice packs as needed; avoid non-steroidal anti-inflammatory drugs (NSAIDs) due to risk of GI bleeding and anemia; avoid narcotics due to chemical dependency and plans for CD treatment on discharge soon  -- Recheck venous ultrasound 1/30/2024 showed superficial thrombus involving right cephalic vein, overall stable/improved from prior study after review with radiologist  -- Follow-up venous ultrasound right arm is recommended in the next 1 to 2 weeks to rule out superficial thrombophlebitis extension or progression or development of DVT; primary clinic provider to order and review    COVID-19 infection, recovered  *Tested positive for COVID19 on 1/4/24. Was initially asymptomatic but then developed a low grade fever on 1/17. No respiratory symptoms. CXR neg for infiltrate.   *Fever was initially suspected to be due to COVID, though fevers persisted >1wk after COVID diagnosis so unclear if other etiology contributing. No other s/sx of infection elsewhere -- UA neg, blood cultures neg. Procal 0.83 with stable trend.   *ID consulted, no new recommendations. Advised to treat gout and alcohol use. No specific treatment indicated for COVID.  *Chemical DVT ppx was initially held due to anemia/thrombocytopenia, continued to defer given ongoing high risk for bleeding due to esophagitis, risk of bleeding, and recent drops in hemoglobin   *Considered COVID recovered on 1/25/24 and taken out of isolation.     Anion gap metabolic acidosis with respiratory compensation, resolved  Likely starvation ketoacidosis, resolved  Hypoalbuminemia  *Initial labs notable for bicarb 10, AG 37. Lactate nl. VBG the morning following showed pH 7.3, pCO2 26 and bicarb 12, indicative of respiratory compensation. Serum ketones notably elevated.   *Labs normalized with supportive cares  and oral intake.  Low albumin, 3.2 on 1/28/2024  - monitor labs periodically as outpatient with primary clinic provider      Hypomagnesemia  Hypophosphatemia  Hypokalemia  Hyponatremia:Resolved  - Monitor and replace as needed per protocol in hospital  - Recent labs 1/20/2024 satisfactory  - Recheck labs with primary clinic provider recommended, review at upcoming visit      Essential hypertension  *Chronic and stable on amlodipine and lisinopril  - monitor blood pressure, follow-up with primary clinic provider      Generalized anxiety disorder  *On fluoxetine (Prozac) prior to admission, 40 mg daily by report  - comfort and support offered daily  - fluoxetine restarted 1/31 at lower dose of 20 mg/d, increasing to preadmission dose of 40 mg/d on discharge; follow-up with primary clinic provider     Patient will call or seek medical attention if any worrisome signs or symptoms develop after discharge.  Patient agrees with plan as outlined and will follow up as recommended and outlined in the dismissal summary.    Consultations This Hospital Stay   PSYCHIATRY IP CONSULT  CHEMICAL DEPENDENCY IP CONSULT  CARE MANAGEMENT / SOCIAL WORK IP CONSULT  PHYSICAL THERAPY ADULT IP CONSULT  NUTRITION SERVICES ADULT IP CONSULT  NEUROLOGY IP CONSULT  SURGERY GENERAL IP CONSULT  GASTROENTEROLOGY IP CONSULT  HEMATOLOGY & ONCOLOGY IP CONSULT  ORTHOPEDIC SURGERY IP CONSULT  OCCUPATIONAL THERAPY ADULT IP CONSULT  ORTHOPEDIC SURGERY IP CONSULT  INFECTIOUS DISEASES IP CONSULT  PSYCHIATRY IP CONSULT  ORTHOPEDIC SURGERY IP CONSULT  ORTHOPEDIC SURGERY IP CONSULT    Code Status   Full Code    Time Spent on this Encounter   I, Braden Thompson MD, personally saw the patient today and spent greater than 30 minutes discharging this patient.       Braden Thompson MD  [place] [address] [facility]  [facility] [place] [address]  Phone:  [phone]  ______________________________________________________________________    Physical Exam   Vital Signs: Temp: 97.8  F (36.6  C) Temp src: Oral BP: 132/88 Pulse: 76   Resp: 18 SpO2: 98 % O2 Device: None (Room air)    Weight: 160 lbs 12.8 oz    GENERAL awake and alert, lying in bed, feeling better overall and ready for discharge to CD treatment facility today  LUNGS no wheezes or crackles  HEART S1, S2 with RRR  ABDOMEN soft, nontender  MUSCULOSKELETAL extremities without edema; improved range of motion of arms bilaterally with less pain than previously reported  SKIN warm and dry  NEURO moves upper and lower extremities spontaneously and to command  MENTAL STATUS answering questions and following simple commands       Primary Care Physician   Barrie Philip    Discharge Orders      General info for SNF    Length of Stay Estimate: Short Term Care: Estimated # of Days <30  Condition at Discharge: Improving  Level of care:board and care  Rehabilitation Potential: Excellent  Admission H&P remains valid and up-to-date: Yes  Recent Chemotherapy: N/A  Use Nursing Home Standing Orders: Yes     Follow Up and recommended labs and tests    Follow up with a primary clinic provider in 1 to 2 week for hospital follow-up, recheck labs, medication review, follow-up ultrasound or right arm.  The following labs/tests are recommended: CBC, basic profile labs in one week, ordered and reviewed by primary clinic provider or provider at CD treatment facility.  Recommended recheck right upper extremity venous ultrasound in 1 to 2 weeks for follow-up of superficial thrombophlebitis noted on prior studies, ordered and reviewed by primary clinic provider or provider at CD treatment facility.     Activity - Up ad annie     Reason for your hospital stay    Admission for complications of alcoholism with weakness, anemia.  Discharge to chemical dependency treatment center on 2/1/24 following court order.  For further details of recent  hospitalization see dismissal summary of discharge date.    Alcohol withdrawal seizure, resolved  History of recurrent falls, unsteady gait  T12-L2 compression fracture  Alcohol use disorder, severe   Alcoholic hepatitis without ascites   Acute on chronic anemia  Severe esophagitis  Severe thrombocytopenia, resolved  Probable acute gout flare, recurrent  History of gout, on allopurinol  RUE superficial thrombophlebitis  Superficial venous thrombus right cephalic vein on ultrasound 1/30/2024, recommend follow-up ultrasound, see below  COVID-19 infection, recovered  Anion gap metabolic acidosis with respiratory compensation, resolved  Likely starvation ketoacidosis, resolved  Hypoalbuminemia  Hypomagnesemia  Hypophosphatemia  Hypokalemia  Hyponatremia  Essential hypertension  Generalized anxiety disorder     Diet    Follow this diet upon discharge: Orders Placed This Encounter      Snacks/Supplements Adult: Other; OK for supplement as needed; With Meals      Regular Diet Adult       Significant Results and Procedures   Most Recent 3 CBC's:  Recent Labs   Lab Test 01/31/24  0844 01/30/24  0858 01/29/24  1057   WBC 10.3 13.4* 10.3   HGB 8.5* 8.9* 8.5*   MCV 95 96 94    467* 438     Most Recent 3 BMP's:  Recent Labs   Lab Test 01/28/24  0830 01/24/24  1047 01/23/24  0909 01/22/24  0847 01/21/24  0820 01/19/24  0707 01/18/24  1112 01/17/24  1019     --   --   --   --   --  135 135   POTASSIUM 3.7 3.8 3.7   < > 3.8   < > 3.5 3.8   CHLORIDE 99  --   --   --   --   --  101 103   CO2 30*  --   --   --   --   --  25 22   BUN 14.4  --   --   --   --   --  10.6 9.7   CR 0.87  --   --   --  0.89  --  0.84 0.80   ANIONGAP 9  --   --   --   --   --  9 10   HERNANDEZ 9.3  --   --   --   --   --  8.2* 8.1*   *  --   --   --   --   --  160* 224*    < > = values in this interval not displayed.     Most Recent 2 LFT's:  Recent Labs   Lab Test 01/28/24  0830 01/19/24  0707   AST 17 24   ALT 14 15   ALKPHOS 90 69   BILITOTAL  0.2 0.6   ,   Results for orders placed or performed during the hospital encounter of 01/13/24   CT Head w/o Contrast    Narrative    EXAM: CT HEAD W/O CONTRAST  LOCATION: [facility]  DATE/TIME: 1/13/2024 2:22 PM CST    INDICATION: Headache after trauma  COMPARISON: CT head dated 08/08/2023  TECHNIQUE: Routine CT Head without IV contrast. Multiplanar reformats. Dose reduction techniques were used.    FINDINGS:   INTRACRANIAL CONTENTS: No acute intracranial hemorrhage. No CT evidence of acute infarct. Sequelae of mild chronic microangiopathy. Mild cerebral volume loss without hydrocephalus. No extra-axial fluid collections.  Patent basal cisterns.     VISUALIZED ORBITS/SINUSES/MASTOIDS: The orbits are unremarkable. Mild paranasal sinus mucosal thickening. The temporal bone structures are well-aerated.     BONES/SOFT TISSUES: Left posterior scalp soft tissue contusion/hematoma. The calvarium and skull base are unremarkable.       Impression    IMPRESSION:     1. Left posterior scalp soft tissue contusion/hematoma without acute intracranial abnormality.   CT Abdomen Pelvis w Contrast    Narrative    EXAM: CT ABDOMEN PELVIS W CONTRAST  LOCATION: [facility]  DATE: 1/13/2024    INDICATION: Trauma. Alcoholic. Bilateral bruising.  COMPARISON: None.  TECHNIQUE: CT scan of the abdomen and pelvis was performed following injection of IV contrast. Multiplanar reformats were obtained. Dose reduction techniques were used.  CONTRAST: 75mL Isovue 370    FINDINGS:   LOWER CHEST: Fluid within the diffusely thickened distal esophagus. Small hiatal hernia.    HEPATOBILIARY: Severe diffuse hepatic steatosis. No morphologic features of cirrhosis. No calcified gallstones or biliary ductal dilation.    PANCREAS: No peripancreatic inflammation or fluid collections.    SPLEEN: Normal.    ADRENAL GLANDS: Normal.    KIDNEYS/BLADDER: Normal.    BOWEL: Stomach is distended with fluid. No bowel  obstruction or inflammation. Normal appendix.    LYMPH NODES: Normal.    VASCULATURE: Patent portal, splenic, and superior mesenteric veins. No abdominal aortic aneurysm.    PELVIC ORGANS: Normal.    MUSCULOSKELETAL: Small fat-containing right inguinal hernia. Tiny fat-containing periumbilical hernia. No displaced lower rib fractures. Mild age-indeterminate compression deformities of the the T12-L2 vertebral bodies. Multilevel degenerative changes   spine. No displaced pelvic fractures.      Impression    IMPRESSION:     1.  No definite acute abnormality in the abdomen or pelvis.    2.  Mild age indeterminant compression deformities of the T12-L2 vertebral bodies, probably chronic. Correlate with symptoms.    3.  Small hiatal hernia with retained or refluxed fluid within the distal esophagus. The patient may be at risk for aspiration.    4.  Distal esophagitis.    5.  Severe hepatic steatosis. No specific features of cirrhosis or portal hypertension.   MR Brain w/o & w Contrast    Narrative    EXAM: MR BRAIN W/O and W CONTRAST  LOCATION: [facility]  DATE: 1/16/2024    INDICATION: Generalized tonoclonic seizure, with a history for alcohol use disorder.  COMPARISON: 1/13/2024 CT.  CONTRAST: 7 mL Gadavist.  TECHNIQUE: 1.5 Sharee multiplanar multisequence head MRI without and with intravenous contrast according to the seizure protocol.    FINDINGS:  INTRACRANIAL CONTENTS: Volume loss in the mesiotemporal lobes is proportional to the volume loss elsewhere. There is mildly asymmetric increased FLAIR signal and some blurring of striations involving the left hippocampal formation compared to the right.   No acute or subacute infarct. No mass, acute hemorrhage, or extra-axial fluid collections. Scattered nonspecific T2/FLAIR hyperintensities within the cerebral white matter most consistent with mild chronic microvascular ischemic change. Moderate   generalized cerebral atrophy. No hydrocephalus.   Normal position of the cerebellar tonsils. No pathologic contrast enhancement.    SELLA: No abnormality accounting for technique.    OSSEOUS STRUCTURES/SOFT TISSUES: Normal marrow signal. The major intracranial vascular flow voids are maintained.     ORBITS: No abnormality accounting for technique.     SINUSES/MASTOIDS: No paranasal sinus mucosal disease. No middle ear or mastoid effusion.       Impression    IMPRESSION:  1.  Symmetric volume loss in the temporal lobes is proportional to volume loss elsewhere. There is slight asymmetric increased FLAIR signal and blurring of the striations in the left hippocampal formation. This could potentially relate to recent seizure   activity or could reflect underlying mesiotemporal sclerosis.  2.  Otherwise no acute intracranial pathology.  3.  Moderate volume loss for age.   XR Chest Port 1 View    Narrative    CHEST ONE VIEW  1/18/2024 3:13 PM     HISTORY: covid; fever    COMPARISON: None.      Impression    IMPRESSION: Cardiac silhouette appears within normal limits for  portable technique. Mild linear opacity at the right lung base is  favored atelectasis. No pleural effusion or pneumothorax.    VIJI MAGDALENO MD         SYSTEM ID:  [id number]   XR Hand Left G/E 3 Views    Narrative    EXAM: XR HAND LEFT G/E 3 VIEWS  LOCATION: [facility]  DATE: 01/21/2024    INDICATION: Swelling, history of tophaceous gout.  COMPARISON: None.      Impression    IMPRESSION: Marked soft tissue thickening/swelling about the PIP joint of the middle finger can be seen with an inflammatory arthropathy although infection should be excluded. No erosive changes in the left hand or joint space narrowing. No evidence of   an acute fracture.     US Upper Extremity Venous Duplex Right    Narrative    EXAM: US UPPER EXTREMITY VENOUS DUPLEX RIGHT  LOCATION: [facility]  DATE: 1/23/2024    INDICATION: new onset erythema, please evaluate for  DVT  COMPARISON: None.  TECHNIQUE: Venous Duplex ultrasound of the right upper extremity with (when possible) and without compression, augmentation, and duplex. Color flow and spectral Doppler with waveform analysis performed.    FINDINGS: Ultrasound includes evaluation of the internal jugular vein, innominate vein, subclavian vein, axillary vein, and brachial vein. The superficial cephalic and basilic veins were also evaluated where seen.     RIGHT: No deep venous thrombosis. There is superficial venous thrombus in the right cephalic vein along the entire length of the forearm.       Impression    IMPRESSION:  1.  No deep venous thrombosis in the right upper extremity.  2.  Superficial venous thrombus in the right cephalic vein in the forearm.   US Upper Extremity Venous Duplex Bilat    Narrative    US UPPER EXTREMITY VENOUS DUPLEX BILATERAL  1/30/2024 2:58 PM     HISTORY: bilateral arm pain, right greater than left, rule out DVT    COMPARISON: None.    TECHNIQUE: Examination of the upper extremity veins was performed with  graded compression and 2-D ultrasound and color doppler spectral  waveform analysis.     FINDINGS:  There is occlusive superficial venous thrombus in the right  cephalic vein extending from the proximal mid forearm to the wrist to  a branch on the back of the hand. There is no evidence for DVT in the  right or left lower extremities.      Impression    IMPRESSION: Superficial venous thrombus in the right cephalic vein.    ABDIFATAH AMOS MD         SYSTEM ID:  [id number]       Discharge Medications   Current Discharge Medication List        START taking these medications    Details   acetaminophen (TYLENOL) 325 MG tablet Take 3 tablets (975 mg) by mouth 3 times daily as needed for mild pain  Qty: 100 tablet, Refills: 0    Associated Diagnoses: Pain      colchicine (COLCRYS) 0.6 MG tablet Take 1 tablet (0.6 mg) by mouth See Admin Instructions Take one twice daily for one week, then once daily for  one week, then stop.  Future refills and dose adjustments directed to primary clinic provider, review at upcoming visit.  For acute gout.  Qty: 21 tablet, Refills: 0    Associated Diagnoses: Gout, unspecified cause, unspecified chronicity, unspecified site      ferrous sulfate (FEROSUL) 325 (65 Fe) MG tablet Take 1 tablet (325 mg) by mouth daily Future refills and dose adjustments directed to primary clinic provider, review at upcoming visit  Qty: 30 tablet, Refills: 0    Associated Diagnoses: Iron deficiency anemia, unspecified iron deficiency anemia type      folic acid (FOLVITE) 1 MG tablet Take 1 tablet (1 mg) by mouth daily Future refills and dose adjustments directed to primary clinic provider, review at upcoming visit  Qty: 30 tablet, Refills: 0    Associated Diagnoses: Nutritional deficiency      magnesium oxide (MAG-OX) 400 MG tablet Take 1 tablet (400 mg) by mouth daily Future refills and dose adjustments directed to primary clinic provider, review at upcoming visit  Qty: 30 tablet, Refills: 0    Associated Diagnoses: Nutritional deficiency      melatonin 5 MG tablet Take 1 tablet (5 mg) by mouth every evening as needed for sleep Future refills and dose adjustments directed to primary clinic provider, review at upcoming visit  Qty: 30 tablet, Refills: 0    Associated Diagnoses: Insomnia, unspecified type      multivitamin w/minerals (THERA-VIT-M) tablet Take 1 tablet by mouth daily Future refills and dose adjustments directed to primary clinic provider, review at upcoming visit  Qty: 30 tablet, Refills: 0    Associated Diagnoses: Nutritional deficiency      pantoprazole (PROTONIX) 40 MG EC tablet Take 1 tablet (40 mg) by mouth 2 times daily (before meals) Future refills and dose adjustments directed to primary clinic provider, review at upcoming visit  Qty: 60 tablet, Refills: 0    Associated Diagnoses: Gastroesophageal reflux disease with esophagitis, unspecified whether hemorrhage      predniSONE  (DELTASONE) 5 MG tablet Take 1 tablet (5 mg) by mouth See Admin Instructions 20 mg daily for 3 days, then 15 mg daily for 3 days, then 10 mg daily for 3 days, then 5 mg daily for 3 days, then stop.  For acute gout.  Qty: 30 tablet, Refills: 0    Associated Diagnoses: Gout, unspecified cause, unspecified chronicity, unspecified site      senna-docusate (SENOKOT-S/PERICOLACE) 8.6-50 MG tablet Take 1 tablet by mouth 2 times daily Future refills and dose adjustments directed to primary clinic provider, review at upcoming visit.  Hold if loose stools, for constipation  Qty: 60 tablet, Refills: 0    Associated Diagnoses: Constipation, unspecified constipation type      thiamine (B-1) 100 MG tablet Take 1 tablet (100 mg) by mouth daily Future refills and dose adjustments directed to primary clinic provider, review at upcoming visit  Qty: 30 tablet, Refills: 0    Associated Diagnoses: Nutritional deficiency           CONTINUE these medications which have CHANGED    Details   allopurinol (ZYLOPRIM) 100 MG tablet Take 1 tablet (100 mg) by mouth daily Future refills and dose adjustments directed to primary clinic provider, review at upcoming visit  Qty: 30 tablet, Refills: 0    Associated Diagnoses: Gout, unspecified cause, unspecified chronicity, unspecified site      amLODIPine (NORVASC) 10 MG tablet Take 1 tablet (10 mg) by mouth daily Future refills and dose adjustments directed to primary clinic provider, review at upcoming visit  Qty: 30 tablet, Refills: 0    Associated Diagnoses: Essential hypertension      FLUoxetine (PROZAC) 40 MG capsule Take 1 capsule (40 mg) by mouth daily Future refills and dose adjustments directed to primary clinic provider, review at upcoming visit  Qty: 30 capsule, Refills: 0    Associated Diagnoses: Generalized anxiety disorder      gabapentin (NEURONTIN) 300 MG capsule Take 2 capsules (600 mg) by mouth 3 times daily Future refills and dose adjustments directed to primary clinic provider,  review at upcoming visit  Qty: 90 capsule, Refills: 0    Associated Diagnoses: Pain; Generalized anxiety disorder      lisinopril (ZESTRIL) 20 MG tablet Take 1 tablet (20 mg) by mouth daily Future refills and dose adjustments directed to primary clinic provider, review at upcoming visit  Qty: 30 tablet, Refills: 0    Associated Diagnoses: Essential hypertension      simvastatin (ZOCOR) 20 MG tablet Take 1 tablet (20 mg) by mouth at bedtime Future refills and dose adjustments directed to primary clinic provider, review at upcoming visit  Qty: 30 tablet, Refills: 0    Associated Diagnoses: Hyperlipidemia LDL goal <100           STOP taking these medications       omeprazole (PRILOSEC) 20 MG DR capsule Comments:   Reason for Stopping:             Allergies   No Known Allergies

## 2024-02-01 NOTE — PROGRESS NOTES
[facility] Assessment      Vital signs:     Wt 83.9 kg (185 lb)   BMI 26.54 kg/m        Transfer from [facility]    Counselor: Shorty  Drug of Choice: Alcohol  Last use: 1/13/24  Home clinic/MD:   Jo-Ann Xiao DO (Attending)  NPI: [id number]  [phone] (Work)  [phone] (Fax)  [address] [place] [address]  [facility]  Pt admits he does not have established care with provider.  LPRN will help pt to get PCP    Psychiatrist/therapist: none    Medical history/current conditions:    Hospital Course  Jerome Flanagan is a 48 year old male with PMH of severe alcohol use disorder, hypertension, and anxiety who was admitted on 1/13/2024 for evaluation after a suspected alcohol withdrawal seizure.  For details, see admission note.  Diagnosed with COVID-19 this admission, but is now recovered. Recently awaiting commitment for CD treatment with court date 1/29 and signed Stayed Commitment for Chemically Dependent Person.  Discharge to inpatient CD treatment facility on 2/1/2024.         Discharge Diagnoses  Alcohol withdrawal seizure, resolved  History of recurrent falls, unsteady gait  T12-L2 compression fracture  Alcohol use disorder, severe   Alcoholic hepatitis without ascites, hepatic steatosis  Acute on chronic anemia  Severe esophagitis  Severe thrombocytopenia, resolved  Probable acute gout flare, recurrent  History of gout, on allopurinol  RUE superficial thrombophlebitis  Superficial venous thrombus right cephalic vein on ultrasound 1/30/2024, recommend follow-up ultrasound, see below  COVID-19 infection, recovered  Anion gap metabolic acidosis with respiratory compensation, resolved  Likely starvation ketoacidosis, resolved  Hypoalbuminemia  Hypomagnesemia  Hypophosphatemia  Hypokalemia  Hyponatremia  Essential hypertension  Generalized anxiety disorder    Alcohol withdrawal seizure, resolved  History of recurrent falls, unsteady gait  T12-L2  compression fracture  *Per EMS and patient's mother, he was found on the ground with GTC movements. Denies prior history of seizures, but consumes alcohol chronically.    *In ED, he was noted to have a left posterior scalp soft tissue contusion/hematoma. Also noted to have multiple bruises likely old on his back with suggest either previous falls or previous seizure. Head CT neg for intracranial injury. CT abd/pelvis with mild age-indeterminate compression deformity at T12-L2 vertebral bodies probably chronic   *General Neurology consulted this admission. EEG with no obvious epileptiform discharges or electrographic seizures. Brain  MRI showed changes of possible recent seizure activity vs underlying mesial temporal sclerosis. Neurology felt the changes seen in the temporal lobe due to prior seizures and no other concerns. Did not recommend treatment.  *No seizures noted during this admission  -- PT/OT initially recommended TCU, but strength/mobility improved this admission; now hopeful that patient can go straight to IP chemical dependency (CD) program.   - court hearing 1/30/24 and [facility] signed Stayed Commitment for Chemically Dependent Person, see social work note 1/29  - voluntary patient in hospital after recent court date, medically stable for discharge 2/1; transition to [facility] CD program on discharge     Alcohol use disorder, severe   Alcoholic hepatitis without ascites   *LFTs elevated on admission. CT abd/pelvis neg for acute pathology .   *Labs gradually improved with continued alcohol abstinence  *Psych consulted this admission and initiated a petition for commitment for CD treatment   -- thiamine/folate/MVI in hospital, continued on discharge  -- continue PTA gabapentin 600 mg TID, continued on discharge  -- [facility] signed Stayed Commitment for Chemically Dependent Person, see social work note 1/29 as above  --Follow-up labs with outpatient primary provider     Acute on chronic  anemia  Severe esophagitis  Severe thrombocytopenia: Resolved  *No reported hx of bleeding, baseline hgb seems to be 9-12 over the preceding 2 yrs.   *Hgb 9.9 on this admission. Decreased to 7 after initial IV hydration.   *Platelets initially low, but gradually normalized this admission.   *Transfused 1U PRBC on 1/15 for hgb  7.1, and again on 1/28 for hgb 7.0  *[facility] consulted during admission. EGD (1/16) showed severe esophagitis but no stigmata of bleeding  *Heme/Onc also consulted this admission. Given IV iron x2 and then continued on oral supplement. Hemolysis labs negative.   - Hgb had been recently stable in mid-8 range; Hgb 7.2 on routine check, 1/28, rechecked 7.0.  One unit RBC transfusion 1/29  - continue pantoprazole (Protonix) bid, continued on discharge  - Hemodynamically stable without signs or symptoms of bleeding prior to discharge  - recommend to monitor hemoglobin periodically, recently stable, follow-up with primary clinic provider, recheck in 1 week and as needed   - avoid non-steroidal anti-inflammatory drugs (NSAIDs) or blood thinners due to increased risk of bleeding    Probable acute gout flare, recurrent  History of gout, on allopurinol  *Has hx of tophaceous gout. Allopurinol initially held on admission, later restarted   *Pt subsequently reported worsening pain and inflammation involving both wrist and small hand joints with inflammation of the third digit middle IPJ  *Started on colchicine on 1/20, through 1/28. XR of hands showed marked soft tissue thickening/swelling about the PIP joint of the middle finger.    *Due to ongoing fever, Ortho was consulted to evaluate for infectious arthritis. Advised to continue allopurinol. No indication for surgery or clear evidence of infection.  *Condition initially improved with allopurinol and colchicine  *Progressive pain involving the arms bilaterally leading to follow-up ultrasound on 1/30/2024 showing limited superficial thrombophlebitis  right arm, see below, stable/improved from prior study  *Clinical suspicion of acute gout explaining previous wrist and hand pain as well as more recent bilateral arm pain in the elbow and surrounding fossa medially with limited range of motion  -- continue daily allopurinol on discharge  -- colchicine restarted 0.6 mg daily on 1/30, later increased to twice daily with plans to taper on discharge, see medication section  --Scheduled Tylenol 3 times daily with meals in hospital, as needed on discharge  --Heat or ice packs for local comfort and relief of symptoms  --Prednisone, 20 mg daily started 1/31/2024 with future taper as ordered on discharge for acute gout flare  --Close follow-up with primary clinic provider is recommended      RUE superficial thrombophlebitis  Superficial venous thrombus right cephalic vein on ultrasound 1/30/2024  *Developed RUE erythema, edema and discomfort on 1/23/24 near site of IV. Doppler US negative for DVT. IV removed.   -- on/off pain bilateral arms during hospital stay, increased 1/29 and 1/30, right arm > left arm, focused in elbow and just proximal; no significant erythema/warmth/swelling appreciated; mild/moderately tender on exam  -- pain control, see hospital orders; scheduled acetaminophen (Tylenol) TID with meals; heat or ice packs as needed; avoid non-steroidal anti-inflammatory drugs (NSAIDs) due to risk of GI bleeding and anemia; avoid narcotics due to chemical dependency and plans for CD treatment on discharge soon  -- Recheck venous ultrasound 1/30/2024 showed superficial thrombus involving right cephalic vein, overall stable/improved from prior study after review with radiologist  -- Follow-up venous ultrasound right arm is recommended in the next 1 to 2 weeks to rule out superficial thrombophlebitis extension or progression or development of DVT; primary clinic provider to order and review     COVID-19 infection, recovered  *Tested positive for COVID19 on 1/4/24. Was  initially asymptomatic but then developed a low grade fever on 1/17. No respiratory symptoms. CXR neg for infiltrate.   *Fever was initially suspected to be due to COVID, though fevers persisted >1wk after COVID diagnosis so unclear if other etiology contributing. No other s/sx of infection elsewhere -- UA neg, blood cultures neg. Procal 0.83 with stable trend.   *ID consulted, no new recommendations. Advised to treat gout and alcohol use. No specific treatment indicated for COVID.  *Chemical DVT ppx was initially held due to anemia/thrombocytopenia, continued to defer given ongoing high risk for bleeding due to esophagitis, risk of bleeding, and recent drops in hemoglobin   *Considered COVID recovered on 1/25/24 and taken out of isolation.      Anion gap metabolic acidosis with respiratory compensation, resolved  Likely starvation ketoacidosis, resolved  Hypoalbuminemia  *Initial labs notable for bicarb 10, AG 37. Lactate nl. VBG the morning following showed pH 7.3, pCO2 26 and bicarb 12, indicative of respiratory compensation. Serum ketones notably elevated.   *Labs normalized with supportive cares and oral intake.  Low albumin, 3.2 on 1/28/2024  - monitor labs periodically as outpatient with primary clinic provider      Hypomagnesemia  Hypophosphatemia  Hypokalemia  Hyponatremia:Resolved  - Monitor and replace as needed per protocol in hospital  - Recent labs 1/20/2024 satisfactory  - Recheck labs with primary clinic provider recommended, review at upcoming visit      Essential hypertension  *Chronic and stable on amlodipine and lisinopril  - monitor blood pressure, follow-up with primary clinic provider      Generalized anxiety disorder  *On fluoxetine (Prozac) prior to admission, 40 mg daily by report  - comfort and support offered daily  - fluoxetine restarted 1/31 at lower dose of 20 mg/d, increasing to preadmission dose of 40 mg/d on discharge; follow-up with primary clinic provider      Patient will call or  seek medical attention if any worrisome signs or symptoms develop after discharge.  Patient agrees with plan as outlined and will follow up as recommended and outlined in the dismissal summary.        H&P Screen:  H&P within the last 90 days: Yes.  Date: 2/1/24 Location: [facility] diagnosis: CAROLINE  Medication compliant?: yes  Recent sucidal thoughts? no     When? na  Current thought of self-harm? no    Plan? na    Pain assessment:   Pt. Experiencing pain at this time?  Yes.  Rating on 0-10 scale: (1-10 scale): bilateral elbows, wrist.  Location: toes     Result of: gout.       LP Falls assessment    Has patient had a fall(s) in the last 6 months?  Yes, had a fall at home and then had to go to the hospital .  Pt states he had a seizure  If yes, what were the circumstances surrounding the fall(s)? Seizure / withdrawal  Does patient have gait dysfunctions? (limping, dragging of toes, shuffling feet, unsteadiness, difficulty standing /walking)  No  Does patient appear to have deconditioning/muscle loss/malnutrition/fatigue? (due to inactivity, bedrest (long hospitalization), medical condition(s) No  Does patient experience confusion, dizziness or vertigo? No  Is patient visually impaired? No   Does patient have any adaptive equipment (hearing aids, wheelchair, walker, prosthesis, crutches, cane, etc..) No  Is patient taking 2 or more of the following medications?  anticonvulsants, anti-hypertensives, diuretics, laxatives, sedatives, and psychotropics (single-select) Yes  Is patient taking medication (s) that would cause urinary or bowel urgency (ex: lactulose/Furosemide)? No  Does patient have a medical condition (ex: Diabetes, Liver Disease, Respiratory Diseases, Chronic Pain, Heart Disease, Neuropathy, Seizure like Disorder, etc...) that could affect balance/gait or risk for falls? No    If yes to any of the above educate patient on fall prevention while at [facility].           Floors  clutter/obstacle free           Proper footwear/[place] shoes          Adjust walking speed accordingly          Recommend caution going on longer walks. Try shorter walks and see how you do first.  Use elevators when appropriate.          Notify staff if you are experiencing fatigue, weakness, drowsiness/ dizziness or have had a fall.           Use adaptive equipment as recommended          Wheelchairs must be managed and propelled independently without staff assistance           Expectation of complete independence with all cares and compliance.  Report to staff if having difficulty     LP patient who poses a potential falls risk will be advised of the following:  Please follow the recommendations as listed above or it may affect your treatment plan.  Your treatment team would have to evaluate if this level of care is appropriate for you.    Patient acknowledges and verbalizes understanding of the above criteria? Yes  Support staff / counselors notified of pt Fall Screen and plan of prevention. LPRN to re-assess as appropriate. White board and SBAR updated  low  ____________________________________________________________________________________________________________________________     Community Medical Screen for COVID-19    Do you have any of the following NEW or worsening symptoms NOT attributed to pre-existing conditions?     COVID RECOVERED 1/25/24    Fever of 100.0  F (37.8 C) or over  Chills  Cough  Shortness of Breath  Loss of taste or smell  Generalized body aches  Persistent headache  Sore throat (or trouble eating or drinking in young children?)  Nausea, Vomiting, or diarrhea (loose stools)    ______________________________________________________________________________________________________________________    COVID-19 Test completed by LPRN ? No    COVID-19 - Pt informed of the following while at LP:    1) If pt has any of the symptoms below, notify staff immediately.    Fever   Cough    Shortness of breath or difficulty breathing   Chills   Repeated shaking with chills  Muscle pain     RN Assessment of Infectious Disease concerns:    Infectious disease concerns None    RN Assessment of Patient's Ability to Safely Manage and Self-Administer Respiratory Treatments    Has experience in the management of Respiratory (If NA, indicate and move to Integrative Therapies): NA    Integrative Therapies: Essential Oils    Patient requesting essential oil inhaler to manage (Mood/Mental Health/Physical/Spiritual symptoms).     Discussed appropriate use of essential oil inhalers and instructed patient not to leave labeled product out on unit.     Patient was screened for kidney disease, asthma/reactive airway disease and rashes and wounds or 1st trimester of pregnancy    List Essential Oils requested by pt citrus and lavender     Patient verbalized and demonstrated understanding of how to use essential oil inhaler correctly and will notify LP RN with any concerns or side effects. Patient agrees not to share their essential oil inhaler with other clients.  Continue to support the patient in safely utilizing integrative therapies as able to manage symptoms during treatment.     Patient tobacco use:    Do you use tobacco? no     Nutritional Assessment:    Have you ever purged, binged or restricted yourself as a way to control your weight?   No     Are you on a special diet?   No     Do you have any concerns regarding your nutritional status?   No     Have you had any appetite changes in the last 3 months?   No   Have you had weight loss or weight gain of more than 10 lbs in the last 3 months?   If patient gained or lost more than 10 lbs, then refer to program RN / attending Physician for assessment.   No   Was the patient informed of BMI?    Above,  General nutrition education   Yes   Have you engaged in any risk-taking behavior that would put you at risk for exposure to blood-borne or sexually transmitted  diseases?   No   Do you have any dental problems?   No       LPRN reviewed with pt the following information:  Yes  Pt informed if leaving AMA, they will be directed to take medications with them.  Should pt's choose to leave medications at LP, ALL medications will be packaged and delivered to inpatient pharmacy for temporary storage.  Inpt pharmacy will follow protocol to reach out to pt.  If pharmacy unable to reach pt and/or pt does not retrieve medications, they will be destroyed per inpt pharmacy protocol.   In regards to 'medical concerns/medication refill expectations' while at LP:  Pt understands LP has no rounding/managing provider to assess medical issues or to refill medications.  Pt's instructed to make virtual/phone appt/s with community provider/s and notify LPRN of date and time  Pt's understand they may not leave LP to attend any medical appt's.   Pt's understand they are responsible for having a plan to refill medications and to allow time to troubleshoot.      Pt verbalizes understanding of the above criteria yes    Nursing Assessment Summary:   Follow Up and recommended labs and tests      Follow up with a primary clinic provider in 1 to 2 week for hospital   follow-up, recheck labs, medication review, follow-up ultrasound or right   arm.  The following labs/tests are recommended: CBC, basic profile labs in   one week, ordered and reviewed by primary clinic provider or provider at    treatment facility.  Recommended recheck right upper extremity venous   ultrasound in 1 to 2 weeks for follow-up of superficial thrombophlebitis   noted on prior studies, ordered and reviewed by primary clinic provider or   provider at  treatment facility.      The above passed on to LPRN colleagues.  Dr Boswell will pace order for CBC in 1 week    On-going nursing intervention required?   No    Acute care visit recommended: no     [facility]  Nurse Liaison / CD [facility]  O:  [phone]  Fax:[phone]  [facility] [id number]  M-F: 7AM to 5:30PM   Sat-Sun: 7AM to 3PM  After hours: [phone]

## 2024-02-01 NOTE — PLAN OF CARE
Goal Outcome Evaluation:                    DATE & TIME: 1/31/24, pm shift   Cognitive Concerns/ Orientation : A&O x 4. Forgetful.  BEHAVIOR & AGGRESSION TOOL COLOR: Green  CIWA SCORE: NA   ABNL VS/O2: VSS except temp 99.9. On RA  MOBILITY: Independent  PAIN MANAGMENT: Gave schedule Tylenol for c/o bilateral elbow pains.  DIET: Regular  BOWEL/BLADDER: Incontinent of B/B at times. Up to bathroom this shift. Patient refused schedule Senna. Patient reported he had bm today in the morning.  ABNL LAB/BG: Hgb 8.5, Hematocrit 26.4, RBC ct 2.78  DRAIN/DEVICES: PIV SL  TELEMETRY RHYTHM: NA  SKIN: Pale. Scattered bruises. Left middle finger swollen. +1 edema on left hand  TESTS/PROCEDURES: None  D/C DATE: Anticipate discharge to [facility] ( inpatient CD)  tomorrow or next if medically stable. SW following.  OTHER IMPORTANT INFO: [facility] signed stayed committment for CD person. Patient voluntarily agrees to stay till transfer to a CD program. Door alarm off. Gave one time dose of Prednisone ordered. Also on daily Prednisone.

## 2024-02-02 NOTE — PROGRESS NOTES
[facility]: [facility]    Background: Transitional Care Management program identified per system criteria and reviewed by [facility] [place] team for possible outreach.    Assessment: Upon chart review, [facility] Team member will not proceed with patient outreach related to this episode of Transitional Care Management program due to reason below:    Patient discharged to Inpatient Chemical Dependency.    Plan: Transitional Care Management episode addressed appropriately per reason noted above.      SHERINE Villalpando  [facility]    *Connected Care Resource Team does NOT follow patient ongoing. Referrals are identified based on internal discharge reports and the outreach is to ensure patient has an understanding of their discharge instructions.

## 2024-02-02 NOTE — PROGRESS NOTES
"Virtual Visit Details    Type of service:  Telephone Visit   Phone call duration: 45 minutes           SUBJECTIVE                                                      Jerome Flanagan is a 48 year old male who presents for  initial visit for addiction consultation and management referred by [facility] due to concerns for Alcohol Use Disorder (AUD)    Visit performed Virtual, via telephone    HPI:   Jerome Flanagan is a 48 year old male with history of essential hypertension, generalized anxiety, hyponatremia, thrombocytopenia,  use who presents for further evaluation of possible substance use disorder and management options.    Recently admitted to [facility] 1/13/24 after having a suspected alcohol withdrawal seizure.  During hospital stay a CD stay of commitment was signed 1/29/24.  Marlon was discharged from hospital to CD treatment through [facility].      Marlon started drinking in his teenage years and reports drinking on a regular basis when he was 25-25.  Marlon notes his drinking being problematic with his first DUI in 1998. He has been to several treatment programs, outpatient six times, and previous inpatient and now [facility].  Marlon has tried naltrexone in the past but notes \"I drank right through it\"         Substance Use History:   \"Have you ever had any history with [...] use?\" And \"When was your last use?  ALCOHOL - Last use Aime 11   CANNABIS - experimented as teen  PRESCRIPTION STIMULANTS (includes Ritalin, Adderall, Vyvanse) - denies  COCAINE/CRACK - couple months, had an episode \"Did too much\" had panick attacks,  mid 90's   METH/AMPHETAMINES (includes ecstacy, MDMA/jigar) - denies  OPIATES - denies  BENZODIAZEPINES (includes Ativan, Klonopin, Xanax) - prescribed  KRATOM (mild opioid and stimulant effects) - denies  KETAMINE - denies  HALLUCINOGENS (includes DXM) -   BEHAVIORAL (Gambling, Eating d/o, Compulsivity) - denies  History of treatment - outpatient 6 times, inpatient 1 " "prior    NICOTINE  Cigarettes: denies  Chew/snus: denies  Vaping: denies  Past NRT/medication use: denies      Previous withdrawal treatment episodes (e.g. detox): none  Previous [facility] treatment programs: outpatient 6 times, inpatient 1 prior    Hospitalizations or overdose: 1/13,   Medical complications from substance use: alcohol withdrawal seizure. Hyponatremia  IV Drug use?:   Previous Medication for Addiction Tx: tried naltrexone.   (Drank through it)   Longest period of full abstinence: 2-3 days outside the treatment setting   Activities that have previously supported abstinence: treatment or \"being too hungover to drink\"   Current Recovery Activities: lodging plus      Infectious disease screening  Hep C:    Hepatitis C Antibody   Date Value Ref Range Status   01/16/2024 Nonreactive Nonreactive Final     Comment:     A nonreactive screening test result does not exclude the possibility of exposure to or infection with HCV. Nonreactive screening test results in individuals with prior exposure to HCV may be due to antibody levels below the limit of detection of this assay or lack of reactivity to the HCV antigens used in this assay. Patients with recent HCV infections (<3 months from time of exposure) may have false-negative HCV antibody results due to the time needed for seroconversion (average of 8 to 9 weeks).       HIV:    HIV Antigen Antibody Combo   Date Value Ref Range Status   01/16/2024 Nonreactive Nonreactive Final     Comment:     Negative HIV-1/-2 antigen and antibody screening test results usually indicate the absence of HIV-1 and HIV-2 infection. However, such negative results do not rule-out acute HIV infection.  If acute HIV-1 or HIV-2 infection is suspected, detection of HIV-1 or HIV-2 RNA  is recommended.              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   Relationship status: Single  Children: none  Legal concerns related to use: none  Contact information up to date? Lost cell phone    3rd Party Involvement  (please obtain CASTILLO if pt would like to include)      Medical History:    Patient Active Problem List    Diagnosis Date Noted    Thrombocytopenia (H24) 01/13/2024     Priority: Medium    Alcoholic hepatitis without ascites (H28) 01/13/2024     Priority: Medium    Alcohol withdrawal seizure with complication (H) 01/13/2024     Priority: Medium    Hyponatremia 01/13/2024     Priority: Medium    Alcohol use disorder, severe, dependence (H) 02/17/2021     Priority: Medium    Generalized anxiety disorder 02/17/2021     Priority: Medium    Essential hypertension 03/22/2019     Priority: Medium       No past medical history on file.    Past Surgical History:   Procedure Laterality Date    ESOPHAGOSCOPY, GASTROSCOPY, DUODENOSCOPY (EGD), COMBINED N/A 1/16/2024    Procedure: Esophagoscopy, gastroscopy, duodenoscopy (EGD), combined;  Surgeon: Yo Kumar MD;  Location:  GI         No family history on file.      Current Outpatient Medications   Medication Sig Dispense Refill    acetaminophen (TYLENOL) 325 MG tablet Take 3 tablets (975 mg) by mouth 3 times daily as needed for mild pain 100 tablet 0    allopurinol (ZYLOPRIM) 100 MG tablet Take 1 tablet (100 mg) by mouth daily Future refills and dose adjustments directed to primary clinic provider, review at upcoming visit 30 tablet 0    alum & mag  hydroxide-simethicone (MAALOX) 200-200-20 MG/5ML SUSP suspension Take 30 mLs by mouth every 6 hours as needed for indigestion      amLODIPine (NORVASC) 10 MG tablet Take 1 tablet (10 mg) by mouth daily Future refills and dose adjustments directed to primary clinic provider, review at upcoming visit 30 tablet 0    benzocaine-menthol (CEPACOL) 15-3.6 MG lozenge Place 1 lozenge inside cheek every 2 hours as needed for sore throat      colchicine (COLCRYS) 0.6 MG tablet Take 1 tablet (0.6 mg) by mouth See Admin Instructions Take one twice daily for one week, then once daily for one week, then stop.  Future refills and dose adjustments directed to primary clinic provider, review at upcoming visit.  For acute gout. 21 tablet 0    ferrous sulfate (FEROSUL) 325 (65 Fe) MG tablet Take 1 tablet (325 mg) by mouth daily Future refills and dose adjustments directed to primary clinic provider, review at upcoming visit 30 tablet 0    FLUoxetine (PROZAC) 40 MG capsule Take 1 capsule (40 mg) by mouth daily Future refills and dose adjustments directed to primary clinic provider, review at upcoming visit 30 capsule 0    folic acid (FOLVITE) 1 MG tablet Take 1 tablet (1 mg) by mouth daily Future refills and dose adjustments directed to primary clinic provider, review at upcoming visit 30 tablet 0    gabapentin (NEURONTIN) 300 MG capsule Take 2 capsules (600 mg) by mouth 3 times daily Future refills and dose adjustments directed to primary clinic provider, review at upcoming visit 90 capsule 0    guaiFENesin-dextromethorphan (ROBITUSSIN DM) 100-10 MG/5ML syrup Take 10 mLs by mouth every 4 hours as needed for cough      lisinopril (ZESTRIL) 20 MG tablet Take 1 tablet (20 mg) by mouth daily Future refills and dose adjustments directed to primary clinic provider, review at upcoming visit 30 tablet 0    loratadine (CLARITIN) 10 MG tablet Take 10 mg by mouth daily as needed for allergies      magnesium oxide (MAG-OX) 400 MG tablet Take 1  tablet (400 mg) by mouth daily Future refills and dose adjustments directed to primary clinic provider, review at upcoming visit 30 tablet 0    melatonin 5 MG tablet Take 1 tablet (5 mg) by mouth every evening as needed for sleep Future refills and dose adjustments directed to primary clinic provider, review at upcoming visit 30 tablet 0    multivitamin w/minerals (THERA-VIT-M) tablet Take 1 tablet by mouth daily Future refills and dose adjustments directed to primary clinic provider, review at upcoming visit 30 tablet 0    pantoprazole (PROTONIX) 40 MG EC tablet Take 1 tablet (40 mg) by mouth 2 times daily (before meals) Future refills and dose adjustments directed to primary clinic provider, review at upcoming visit 60 tablet 0    predniSONE (DELTASONE) 5 MG tablet Take 1 tablet (5 mg) by mouth See Admin Instructions 20 mg daily for 3 days, then 15 mg daily for 3 days, then 10 mg daily for 3 days, then 5 mg daily for 3 days, then stop.  For acute gout. 30 tablet 0    senna-docusate (SENOKOT-S/PERICOLACE) 8.6-50 MG tablet Take 1 tablet by mouth 2 times daily Future refills and dose adjustments directed to primary clinic provider, review at upcoming visit.  Hold if loose stools, for constipation 60 tablet 0    simvastatin (ZOCOR) 20 MG tablet Take 1 tablet (20 mg) by mouth at bedtime Future refills and dose adjustments directed to primary clinic provider, review at upcoming visit 30 tablet 0    thiamine (B-1) 100 MG tablet Take 1 tablet (100 mg) by mouth daily Future refills and dose adjustments directed to primary clinic provider, review at upcoming visit 30 tablet 0         No Known Allergies        OBJECTIVE                                                      EXAM    There were no vitals taken for this visit.    GENERAL: healthy, alert and no distress  EYES: Eyes grossly normal to inspection, PERRL and conjunctivae and sclerae normal  RESP: No respiratory distress  MENTAL STATUS EXAM  Appearance/Behavior: No  "appearant distress  Speech: Normal  Mood/Affect: normal affect  Insight: Fair      LAB  Recent UDS Labs (may not contain today's lab data)  No results found for: \"BUP\", \"BZO\", \"BAR\", \"LEONILA\", \"MAMP\", \"AMP\", \"MDMA\", \"MTD\", \"XED397\", \"OXY\", \"PCP\", \"THC\", \"TEMP\", \"SGPOCT\"        Hepatic Function  AST   Date Value Ref Range Status   01/28/2024 17 0 - 45 U/L Final     Comment:     Reference intervals for this test were updated on 6/12/2023 to more accurately reflect our healthy population. There may be differences in the flagging of prior results with similar values performed with this method. Interpretation of those prior results can be made in the context of the updated reference intervals.   01/24/2021 80 (H) 0 - 45 U/L Final     ALT   Date Value Ref Range Status   01/28/2024 14 0 - 70 U/L Final     Comment:     Reference intervals for this test were updated on 6/12/2023 to more accurately reflect our healthy population. There may be differences in the flagging of prior results with similar values performed with this method. Interpretation of those prior results can be made in the context of the updated reference intervals.     01/24/2021 112 (H) 0 - 70 U/L Final     Bilirubin Total   Date Value Ref Range Status   01/28/2024 0.2 <=1.2 mg/dL Final   01/24/2021 0.2 0.2 - 1.3 mg/dL Final     Albumin   Date Value Ref Range Status   01/28/2024 3.2 (L) 3.5 - 5.2 g/dL Final   08/12/2021 3.8 3.4 - 5.0 g/dL Final   01/24/2021 4.0 3.4 - 5.0 g/dL Final     INR   Date Value Ref Range Status   01/16/2024 0.91 0.85 - 1.15 Final       CBC  WBC   Date Value Ref Range Status   01/24/2021 3.2 (L) 4.0 - 11.0 10e9/L Final     WBC Count   Date Value Ref Range Status   01/31/2024 10.3 4.0 - 11.0 10e3/uL Final     RBC Count   Date Value Ref Range Status   01/31/2024 2.78 (L) 4.40 - 5.90 10e6/uL Final   01/24/2021 4.12 (L) 4.4 - 5.9 10e12/L Final     Hemoglobin   Date Value Ref Range Status   01/31/2024 8.5 (L) 13.3 - 17.7 g/dL Final "   01/24/2021 12.9 (L) 13.3 - 17.7 g/dL Final     Hematocrit   Date Value Ref Range Status   01/31/2024 26.4 (L) 40.0 - 53.0 % Final   01/24/2021 39.5 (L) 40.0 - 53.0 % Final     MCV   Date Value Ref Range Status   01/31/2024 95 78 - 100 fL Final   01/24/2021 96 78 - 100 fl Final     MCH   Date Value Ref Range Status   01/31/2024 30.6 26.5 - 33.0 pg Final   01/24/2021 31.3 26.5 - 33.0 pg Final     MCHC   Date Value Ref Range Status   01/31/2024 32.2 31.5 - 36.5 g/dL Final   01/24/2021 32.7 31.5 - 36.5 g/dL Final     Platelet Count   Date Value Ref Range Status   01/31/2024 383 150 - 450 10e3/uL Final   01/24/2021 225 150 - 450 10e9/L Final     RDW   Date Value Ref Range Status   01/31/2024 15.9 (H) 10.0 - 15.0 % Final   01/24/2021 13.0 10.0 - 15.0 % Final       Today's lab data  No results found for any visits on 02/02/24.        [facility] Board of Pharmacy Data Base Reviewed;    Consistent with patient reports and Epic records.           A/P                                                      ASSESSMENT/PLAN:  1. Alcohol use disorder, severe, dependence (H)  -needs improvement  - naltrexone (DEPADE/REVIA) 50 MG tablet; Take 1 tablet (50 mg) by mouth daily Take 1/2 tablet for 3-5 days to avoid side-effects (most common are nausea, headache)  Dispense: 30 tablet; Refill: 1  -vivitrol injection (anticipate to receive after Feb 9 visit)  if tolerating oral naltrexone.   -continue with lodging plus programming and follow recommendations  -follow up 1 week    2. Generalized anxiety disorder  -needs improvement  -continue with alcohol abstinence      3. Alcohol withdrawal seizure with complication (H)  -needs improvement  -counseled on need for medically managed alcohol detox due to withdrawal seizure  -continue with maintained abstinence      ENCOUNTER FOR LONG TERM USE OF HIGH RISK MEDICATION   High Risk Drug Monitoring?  YES   Drug being monitored: Naltrexone   Reason for drug: alcohol Use Disorder   What is being  monitored?: Dosage, Cravings, Triggers and side effects       Counseled the patient on the importance of having a recovery program in addition to medication to manage recovery.  Components include avoiding isolating, having willingness to change, avoiding triggers and managing cravings. Encouraged having some type of sober network and practicing honesty with trusted support person(s). Encouraged other services such as counseling, 12 step or other self-help organizations.        RTC   1 week      Setlla Pabon NP  [facility]  [phone]              Answers submitted by the patient for this visit:  Patient Health Questionnaire (Submitted on 2/2/2024)  If you checked off any problems, how difficult have these problems made it for you to do your work, take care of things at home, or get along with other people?: Not difficult at all  PHQ9 TOTAL SCORE: 9

## 2024-02-02 NOTE — PROGRESS NOTES
"Comprehensive Assessment Summary Update     Based on client interview, review of previous assessments and   comprehensive assessment interview the following diagnosis and recommendations are:     Patient: Jerome Flanagan  MRN: [id number]  : 1975  Age: 48 year old Sex: male      Client meets criteria for:       Category of Substance Severity (ICD-10 Code / DSM 5 Code)   Alcohol Use Disorder Severe  (10.20) (303.90)   Cannabis Use Disorder NA   Hallucinogen Use Disorder NA   Inhalant Use Disorder NA   Opioid Use Disorder NA   Sedative, Hypnotic, or Anxiolytic Use Disorder NA   Stimulant Related Disorder NA   Tobacco Use Disorder NA   Other (or unknown) Substance Use Disorder NA        Dimension One: Acute Intoxication/Withdrawal Potential     Ratin     (Consider the client's ability to cope with withdrawal symptoms and current state of intoxication)     Pt reports last use as 24. Pt reports no withdrawal symptoms. Pt was at [facility] prior to admission to  for medical detox.    Dimension Two: Biomedical Condition and Complications    Ratin   (Consider the degree to which any physical disorder would interfere with treatment for substance abuse, and the client's ability to tolerate any related discomfort; determine the impact of continued chemical use on the unborn child if the client is pregnant)     Pt reports the following medical conditions: Alcoholic hepatitis without ascites. Pt denies that these medical conditions would interfere with treatment.     Dimension Three: Emotional/Behavioral/Cognitive Conditions & Complications   Ratin   (Determine the degree to which any condition or complications are likely to interfere with treatment for substance abuse or with functioning in significant life areas and the likelihood of risk of harm to self or others)     Pt reports a hx of generalized anxiety disorder. Pt's suicide risk rating on admission was \"Very Low Risk.\" Pt " "currently denies thoughts of self-harm or suicide ideation. Pt will monitored while in LP for change in symptoms. During intake patient's PHQ-9 score was 10 out of 27, indicating moderate depression., and his CAROLINE-7 score was 8 out of 21, indicating mild anxiety. Initial Clinical Global Impression 6/5. Pt denies verbal, emotional, physical or sexual abuse. Pt reports experiencing trauma from his nephew's son's sudden death. Pt has grief and loss about his nephew's son's death (he choked to death at day care in 2023) and his father's death. Pt reports low self-esteem (he rates it a 5 out of 10) and some loss of sense of self: \"I want to be sober so I can figure that out\"    Dimension Four: Treatment Acceptance/Resistance     Ratin   (Consider the amount of support and encouragement necessary to keep the client involved in treatment)     Pt has consequences to himself and others due to use: significant financial concerns, job loss, possible health concerns, loneliness/social consequences. Pt has external motivation from his family. Pt denies cross-addiction. Pt appears to be in the precontemplation stage of change according to the Stages of Change model.    Dimension Five: Continued Use/Relapse Prevention     Ratin    (Consider the degree to which the client's recognizes relapse issues and has the skills to prevent relapse of either substance use or mental health problems)     Pt has limited insight about personal relapse process, triggers, cravings, warning signs and coping skills to avoid relapse. Pt reports having guilt and shame regarding his drinking, because \"it's out of control, burden on my mom, and my entire family.\" Pt reports little difficulty expressing feelings. Pt denies anger, resentments, abuse, codependency, perfection tendencies and the desire to be in control. Pt reports spirituality is unimportant to him (\"I'm not a fan\"). Pt reports having stress from the direction his life " taken.    Dimension Six: Recovery Environment     Rating: 3    (Consider the degree to which key areas of the client's life are supportive of or antagonistic to treatment participation and recovery)     Pt has strained relationships with mother. Pt's living environment is not optimally conducive to recovery due to the ability to isolate, lack of accountability and lack of structure. Pt is unemployed and has lots of unstructured free time. Pt denies legals in. Pt does not currently have a sober support network in recovery or a sponsor. Pt has not been attending sober support groups.    I have reviewed the information on the assessment, psychosocial and medical history and checklist:        It is accurate

## 2024-02-02 NOTE — NURSING NOTE
Is the patient currently in the state of [place]? YES    Visit mode:TELEPHONE    If the visit is dropped, the patient can be reconnected by: TELEPHONE VISIT: Phone number: [phone] or [facility] at [phone].    Will anyone else be joining the visit? No  (If patient encounters technical issues they should call [phone])    How would you like to obtain your AVS? MyChart    Are changes needed to the allergy or medication list? Yes Pt unsure what medications he's taking and Pt stated no changes to allergies    Rooming Documentation: Attendance Guidelines - Care team has reviewed attendance agreement with patient. Patient advised that two failed appointments within 6 months may lead to transfer of current episode of care.      Unable to complete qnrs due to time.    Provider approved for visit to be switched to telephone, due to pt not having a device to join video.    Reason for visit: Consult     KRYS Landin

## 2024-02-02 NOTE — PROGRESS NOTES
"Writer met with pt and discussed his medications, pt expressed frustration on being on so many medications. Pt was insistent that he does not want to take folic acid, protonix, thiamine, iron, multivitamin magnesium or prozac.     Pt pt report he has not been on the prozac for a long time and wasn't taking it while hospitalized. Pt was at [facility] from 1/13-2/1  Per discharge note from [facility]   \"fluoxetine restarted 1/31 at lower dose of 20 mg/d, increasing to preadmission dose of 40 mg/d on discharge; follow-up with primary clinic provider \"  The rest of the meds pt does not want to take because he doesn't thik he needs them and is \"overwhelmed\" by the number of meds he is needing to take post hospitalization    Pt has a hospital follow up on 2/14 he has signed and CASTILLO for   02/14/2024 8:00 AM CST Telemedicine [facility]   [address], [place] [id number] [phone]  Jo-Ann Xiao DO   [address] [facility]    [place] [address]   [phone] (Work)   [phone] (Fax)      "

## 2024-02-02 NOTE — GROUP NOTE
Group Therapy Documentation    PATIENT'S NAME: Jerome Flanagan  MRN:   [id number]  :   1975  ACCT. NUMBER: [id number]  DATE OF SERVICE: 24  START TIME:  9:00 AM  END TIME: 11:00 AM  FACILITATOR(S): Quinten Glaser LADC  TOPIC: BEH Group Therapy  Number of patients attending the group:  11  Group Length:  2 Hours    Group Therapy Type: Recovery strategies    Summary of Group / Topics Discussed:    Recovery Principles, Mindfulness/Relaxation, Coping/DBT informed care, Trauma informed care, Disease of addiction, and Emotions/expression      Group Attendance:  Attended group session    Patient's response to the group topic/interactions:  cooperative with task    Patient appeared to be Attentive and Engaged.        Client specific details:  Jerome participated in morning group. He checked in and took part in the reading and discussion. For the second half of group he listened to and gave positive feedback on his peer's assignment.

## 2024-02-02 NOTE — PLAN OF CARE
Occupational Therapy Discharge Summary    Reason for therapy discharge:    Discharged to Inpatient Chemical Dependency    Progress towards therapy goal(s). See goals on Care Plan in [facility] electronic health record for goal details.  Goals partially met.  Barriers to achieving goals:   discharge from facility.    Therapy recommendation(s):    pt presenting slightly below baseline in self care, ADL and IADL. largley limited by arm pain at this time. is able to complete basic ADL for self care but still would benefit from continued therapy for maximizing function in ADL/ IADL. pt with improved cognition this date but does endorse baseline STM impairments. pt appears appropiate for IP CD when medically ready

## 2024-02-02 NOTE — GROUP NOTE
Group Therapy Documentation    PATIENT'S NAME: Jerome Flanagan  MRN:   [id number]  [facility]   1975  [id number]  DATE OF SERVICE: 24  START TIME: 12:30 PM  END TIME:  2:30 PM  FACILITATOR(S): Maribeth Rojas LADC; Quinten Glaser LADC  TOPIC: BEH Group Therapy  Number of patients attending the group:  11  Group Length:  2 Hours    Group Therapy Type: Daily living/independence skills    Summary of Group / Topics Discussed:    Emotions/expression, Leisure explorations/use of leisure time, and Self-care activities    Group Attendance:  Attended group session    Patient's response to the group topic/interactions:  cooperative with task    Patient appeared to be Actively participating, Attentive, and Engaged.        Client specific details: Pt watched a recovery-related film with peers and took part in the subsequent discussion.

## 2024-02-03 NOTE — PROGRESS NOTES
Name: Jerome Flanagan  Date: 2/3/2024  Medical Record: [id number]  Envelope Number: [id number]  List of Contents (List each item separately in new row):     Certavite/Antioxidants Tabs, Folic Acid 1 mg Tabs, Fluoxetine HCL 40 mg Caps, Stimulant Laxative 8.6-50 mg Tabs.    Admission:  I am responsible for any personal items that are not sent to the safe or pharmacy.  [facility] is not responsible for loss, theft or damage of any property in my possession.    Patient Signature:  ___________________________________________       Date/Time:__________________________    Staff Signature: __________________________________       Date/Time:__________________________    [facility] Staff person, if patient is unable/unwilling to sign:    __________________________________________________________       Date/Time: __________________________    **All medications are packaged by [facility] staff and securely stored on [employer] plus. Medications left by patients at discharge will be packaged by [facility] staff and transported by [facility] staff to inpatient pharmacy for storage.**    Discharge:  [facility] has returned all of my personal belongings:    Patient Signature: ________________________________________     Date/Time: ____________________________________    Staff Signature: ______________________________________     Date/Time:_____________________________________

## 2024-02-03 NOTE — GROUP NOTE
Group Therapy Documentation    PATIENT'S NAME: Jerome Flanagan  MRN:   [id number]  [facility]   1975  [id number]  DATE OF SERVICE: 24  START TIME: 12:30 PM  END TIME:  2:30 PM  FACILITATOR(S): Katya Canseco LADC; Traivs Davies LADC  TOPIC: BEH Group Therapy  Number of patients attending the group:  32  Group Length:  2 Hours    Group Therapy Type: Recovery strategies, Emotion processing, and Daily living/independence skills    Summary of Group / Topics Discussed:    Sober coping skills, Disease of addiction, and Relapse prevention    Group lecture/activity was presented by counseling staff. Feedback was provided by participants throughout group session.       Group Attendance:  Attended group session    Patient's response to the group topic/interactions:  cooperative with task    Patient appeared to be Actively participating, Attentive, and Engaged.        Client specific details:  Patient actively engaged in group discussion/activity.

## 2024-02-03 NOTE — GROUP NOTE
Group Therapy Documentation    PATIENT'S NAME: Jerome Flanagan  MRN:   [id number]  [facility]   1975  [id number]  DATE OF SERVICE: 24  START TIME:  9:00 AM  END TIME: 11:00 AM  FACILITATOR(S): Travis Davies LADC; Katya Canseco LADC; Martin Wolf LADC  TOPIC: BEH Group Therapy  Number of patients attending the group:  32  Group Length:  2 Hours    Group Therapy Type: Recovery strategies, Emotion processing, and Daily living/independence skills    Summary of Group / Topics Discussed:    Recovery Principles, Sober coping skills, and Disease of addiction    Group began with an alumni speaker sharing his story of recovery, followed by lecture from counseling staff. Feedback was provided by participants throughout group session.        Group Attendance:  Attended group session    Patient's response to the group topic/interactions:  cooperative with task    Patient appeared to be Actively participating, Attentive, and Engaged.        Client specific details:  Patient actively engaged in group discussion.

## 2024-02-03 NOTE — PROGRESS NOTES
@1700 Pt came into med room asking about pain medication. Writer explained she isn't a nurse and directed pt to talk to RNs tmrw about the symptoms his medications treat. Patient seemed confused and fixated on pain meds but refused prn ibuprofen and acetaminophen. Writer went over the medications that were available to him, and patient seemed brusque and bothered. Pt did take a medication, but writer needed to explain cap-to-cup twice, and pt seemed annoyed and spoke sharply. Writer showed patient the med room schedule, and recommended that he come in closer to 2200 so we would have ample time to get through his medications accurately. Writer's suggestion seemed not to land, and patient walked off.

## 2024-02-04 PROBLEM — F19.20 CHEMICAL DEPENDENCY (H): Status: ACTIVE | Noted: 2024-01-01

## 2024-02-04 NOTE — GROUP NOTE
Group Therapy Documentation    PATIENT'S NAME: Jerome Flanagan  MRN:   [id number]  [facility]   1975  [id number]  DATE OF SERVICE: 24  START TIME: 12:30 PM  END TIME:  1:30 PM  FACILITATOR(S): Katya Canseco LADC  TOPIC: BEH Group Therapy  Number of patients attending the group:  32    Group Length:  1 Hour    Group Therapy Type: Recovery strategies, Emotion processing, and Daily living/independence skills    Summary of Group / Topics Discussed:    Cognitive behavioral therapy skills, Mindfulness/Relaxation, Disease of addiction, and Emotions/expression      Group Attendance:  Attended group session    Patient's response to the group topic/interactions:  cooperative with task    Patient appeared to be Actively participating, Attentive, and Engaged.        Client specific details:  Patient attended group lecture and was attentive and participative.

## 2024-02-04 NOTE — PROGRESS NOTES
Writer provided pt with a med schedule and information on thiamine, iron, protonix and magnesium as it relates to his current health conditions. Pt still does not want to take prozac, folic acid, multivitamin. Provider made aware. Pt showed improvement in med compliance after this conversation

## 2024-02-04 NOTE — GROUP NOTE
Group Therapy Documentation    PATIENT'S NAME: Jerome Flanagan  MRN:   [id number]  [facility]   1975  [id number]  DATE OF SERVICE: 24  START TIME:  9:00 AM  END TIME: 11:00 AM  FACILITATOR(S): Travis Davies LADC  TOPIC: BEH Group Therapy  Number of patients attending the group:  32  Group Length:  2 Hours    Group Therapy Type: Recovery strategies, Emotion processing, and Daily living/independence skills    Summary of Group / Topics Discussed:    Balanced lifestyle and Self-care activities    Group lecture was facilitated by nursing staff regarding diet, nutrition, eating disorders, and substance use disorders. Participants provided feedback throughout group session.       Group Attendance:  Attended group session    Patient's response to the group topic/interactions:  cooperative with task    Patient appeared to be Actively participating, Attentive, and Engaged.        Client specific details:  Patient actively engaged in group discussion.

## 2024-02-05 NOTE — GROUP NOTE
Group Therapy Documentation    PATIENT'S NAME: Jerome Flanagan  MRN:   [id number]  :   1975  [facility] NUMBER: [id number]  DATE OF SERVICE: 24  START TIME: 12:30 PM  END TIME:  2:30 PM  FACILITATOR(S): Pratik Maldonado LADC; Tosha Beltran  TOPIC: BEH Group Therapy  Number of patients attending the group:  9  Group Length:  2 Hours    Group Therapy Type: Health and wellbeing     Summary of Group / Topics Discussed:    Spiritual Care      Group Attendance:  Attended group session    Patient's response to the group topic/interactions:  cooperative with task    Patient appeared to be Actively participating.        Client specific details:  Jerome participated and interacted appropriately with peers and staff in spiritual group. No triggers to use noted or discussed.

## 2024-02-05 NOTE — GROUP NOTE
Group Therapy Documentation    PATIENT'S NAME: Jerome Flanagan  MRN:   [id number]  [facility]   1975  [id number]  DATE OF SERVICE: 24  START TIME:  9:00 AM  END TIME: 11:00 AM  FACILITATOR(S): Travis Davies LADC  TOPIC: BEH Group Therapy  Number of patients attending the group:  10  Group Length:  2 Hours    Group Therapy Type: Recovery strategies, Emotion processing, and Daily living/independence skills    Summary of Group / Topics Discussed:    Sober coping skills and Balanced lifestyle    Group began with check-ins, followed by graduations for two group members. Group then continued discussion regarding an on heavy thoughts. Feedback was provided by participants throughout group session.       Group Attendance:  Attended group session    Patient's response to the group topic/interactions:  cooperative with task    Patient appeared to be .        Client specific details:  Patient actively engaged in group discussion.

## 2024-02-06 NOTE — GROUP NOTE
Group Therapy Documentation    PATIENT'S NAME: Jerome Flanagan  MRN:   [id number]  :   1975  [facility] NUMBER: [id number]  DATE OF SERVICE: 24  START TIME:  3:00 PM  END TIME:  4:00 PM  FACILITATOR(S): Lalita Marquez LADC; Quinten Glaser LADC; Katya Canseco LADC  TOPIC: BEH Group Therapy  Number of patients attending the group:  27  Group Length:  1 Hours    Group Therapy Type: Recovery strategies    Summary of Group / Topics Discussed:    Recovery Principles    Guest Lecturer Dr. Arce presented a 60 minute presentation on Addiction Research. Exploring ( What is research? What's the point of research in the Addiction Field? What are the benefits? How long does it take? What is the overall goal of Addiction Study Research.) Patients were allowed to asked questions and process the presentation with peers and staff.         Group Attendance:  Attended group session    Patient's response to the group topic/interactions:  cooperative with task    Patient appeared to be Actively participating.        Client specific details:  Marlon attended afternoon skills group and participated in processing discussion. Patient remained engaged and participated throughout group session.       FRANCISCO Godfrey  .

## 2024-02-06 NOTE — GROUP NOTE
Group Therapy Documentation    PATIENT'S NAME: Jerome Flanagan  MRN:   [id number]  :   1975  [facility] NUMBER: [id number]  DATE OF SERVICE: 24  START TIME:  9:00 AM  END TIME: 11:00 AM  FACILITATOR(S): Pratik Maldonado LADC  TOPIC: BEH Group Therapy  Number of patients attending the group:  10  Group Length:  2 Hours    Group Therapy Type: Recovery strategies    Summary of Group / Topics Discussed:    Recovery Principles      Group Attendance:  Attended group session    Patient's response to the group topic/interactions:  cooperative with task    Patient appeared to be Actively participating.        Client specific details:  Marlon participated and interacted appropriately with peers and staff in AM group. No triggers to use noted or discussed.

## 2024-02-06 NOTE — ADDENDUM NOTE
Encounter addended by: Travis Davies LADC on: 2/6/2024 8:54 AM   Actions taken: Charge Capture section accepted

## 2024-02-06 NOTE — GROUP NOTE
Group Therapy Documentation    PATIENT'S NAME: Jerome Flanagan  MRN:   [id number]  :   1975  [id number]  DATE OF SERVICE: 24  START TIME: 12:30 PM  END TIME:  2:30 PM  FACILITATOR(S): Travis Davies LADC  TOPIC: BEH Group Therapy  Number of patients attending the group:  10  Group Length:  2 Hours    Group Therapy Type: Recovery strategies, Emotion processing, Daily living/independence skills, and Health and wellbeing     Summary of Group / Topics Discussed:    Sober coping skills, Mindfulness/Relaxation, Trauma informed care, Disease of addiction, and Self-care activities    Group began with a reflection reading regarding change, followed by a a discussion regarding breathing techniques to help cope with stress. Feedback was provided by participants throughout group session.       Group Attendance:  Attended group session    Patient's response to the group topic/interactions:  cooperative with task    Patient appeared to be Actively participating, Attentive, and Engaged.        Client specific details:  Patient actively engaged in group discussion.

## 2024-02-07 NOTE — PROGRESS NOTES
Writer and patient discussed various medication-related issues earlier this afternoon.  Patient had recently missed occasional doses of a few of his medications.  He is in the process of adjusting some of his medications, including colchicine and prednisone.  He now better understands the importance of adhering to a more strict schedule--particularly while these dosing changes are taking place.  He states he is willing to take any medications related to his gout, and those related to maintaining his sobriety. While initially appearing mildly annoyed by the line of questioning, he eventually warmed to the discussion, and to the notion that improved medication compliance may well lead to his feeling better, and to his overall improved health.  Will continue to monitor as prudent.

## 2024-02-07 NOTE — GROUP NOTE
Group Therapy Documentation    PATIENT'S NAME: Jerome Flanagan  MRN:   [id number]  :   1975  [facility] NUMBER: [id number]  DATE OF SERVICE: 24  START TIME: 12:30 PM  END TIME:  2:30 PM  FACILITATOR(S): Pratik Maldonado LADC  TOPIC: BEH Group Therapy  Number of patients attending the group:  11  Group Length:  2 Hours    Group Therapy Type: Recovery strategies    Summary of Group / Topics Discussed:    Sober coping skills      Group Attendance:  Attended group session    Patient's response to the group topic/interactions:  cooperative with task    Patient appeared to be Actively participating.        Client specific details:  Marlon participated and interacted appropriately with peers and staff in PM group. No triggers to use noted or discussed.

## 2024-02-07 NOTE — GROUP NOTE
Psychoeducation Group Documentation    PATIENT'S NAME: Jerome Flanagan  MRN:   [id number]  :   1975  [facility] NUMBER: [id number]  DATE OF SERVICE: 24  START TIME:  8:30 AM  END TIME:  9:30 AM  FACILITATOR(S): Pratik Maldonado LADC; Leah Quigley LADC; Rashid Amezcua LADC  TOPIC: BEH Pyschoeducation  Number of patients attending the group:  30  Group Length:  1 Hours    Skills Group Therapy Type: Recovery skills    Summary of Group / Topics Discussed:    Balanced lifestyle skills        Group Attendance:  Attended group session    Patient's response to the group topic/interactions:  cooperative with task and listened actively    Patient appeared to be Attentive.         Client specific details:  Marlon gave appropriate feedback. .

## 2024-02-08 NOTE — ADDENDUM NOTE
Encounter addended by: Berry Mariscal, [facility], L[facility] on: 2/8/2024 3:07 PM   Actions taken: Charge Capture section accepted

## 2024-02-08 NOTE — GROUP NOTE
Group Therapy Documentation    PATIENT'S NAME: Jerome Flanagan  MRN:   [id number]  :   1975  [id number]  DATE OF SERVICE: 24  START TIME:  9:00 AM  END TIME: 11:00 AM  FACILITATOR(S): Travis Davies LADC  TOPIC: BEH Group Therapy  Number of patients attending the group:  11  Group Length:  2 Hours    Group Therapy Type: Recovery strategies, Emotion processing, and Daily living/independence skills    Summary of Group / Topics Discussed:    Sober coping skills, Relationship/socialization, and Emotions/expression    Group began with check-ins, followed by a discussion reading regarding flaws and forgiveness. Group then discussed the topic of emotional intelligence. Feedback was provided throughout group session.       Group Attendance:  Attended group session    Patient's response to the group topic/interactions:  cooperative with task    Patient appeared to be Actively participating, Attentive, and Engaged.        Client specific details:  Patient actively engaged in group discussion.

## 2024-02-08 NOTE — ADDENDUM NOTE
Encounter addended by: Travis Davies [facility] SystemJESS[facility] on: 2/8/2024 11:26 AM   Actions taken: Clinical Note Signed, Charge Capture section accepted

## 2024-02-08 NOTE — PROGRESS NOTES
"[facility]      PATIENT'S NAME: Jerome Flanagan  PREFERRED NAME: Jerome  PRONOUNS:   he/him    MRN: [id number]  : 1975  ADDRESS: [address] [facility] [address]  ACCT. NUMBER:  [id number]  DATE OF SERVICE: 24  START TIME: 1:23 PM  END TIME: 2:50 PM  PREFERRED PHONE: [phone]  May we leave a program related message: Yes  EMERGENCY CONTACT: was obtained for Tisha Flanagan (Mother) P: [phone]  .  SERVICE MODALITY:  In-person    UNIVERSAL ADULT Mental Health DIAGNOSTIC ASSESSMENT    Identifying Information:  Patient is a 48 year old,    individual.  Patient was referred for an assessment by [facility] .  Patient attended the session alone.    Chief Complaint:   The reason for seeking services at this time is: \" been up and down for a while due to financially hardship. Had a job but got fired in early 2023 due to his drinking \"   The problem(s) began a while ago. Patient has not attempted to resolve these concerns in the past.    Social/Family History:  Patient reported they grew up in  [place], [place].  They were raised by biological parents.  Parents  20 years ago when the patient was 28 years old. The patient's  father  1994 years ago. The patient's mother did not remarry and remains single.   Patient reported that their childhood was good.  Patient described their current relationships with family of origin as very good.      The patient describes their cultural background as .  Cultural influences and impact on patient's life structure, values, norms, and healthcare:  none identified .  Contextual influences on patient's health include: Contextual Factors: Individual Factors   .  Cultural, Contextual, and socioeconomic factors do not affect the patient's access to services.  These factors will be addressed in the Preliminary Treatment plan.  Patient identified their preferred language to be English. Patient " "reported that he does not  need the assistance of an  or other support involved in therapy.     Patient reported had no significant delays in developmental tasks.   Patient's highest education level was high school graduate. Patient identified the following learning problems: none reported.  Modifications will be used to assist communication in therapy.  Patient reports that  is  able to understand written materials.    Patient reported the following relationship history \"single never \".  Patient's current relationship status is single.   Patient identified his sexual orientation as heterosexual.  Patient reported having zero child(rosio). Patient identified mother as part of their support system.  Patient identified the quality of these relationships as good.     Patient's current living/housing situation involves staying in own home/apartment.  Pt reports he lives with his mother in [place] and they report that housing is stable.     Patient is currently unemployed, last worked appx 6-7 months ago as a , denies other income sources. Patient reports their finances are obtained through  none .  Patient does identify finances as a current stressor.      Patient reported that they have not been involved with the legal system.  Pt reports \"I had a DUI a lifetime ago\" est in 1997. Patient denies being on probation / parole / under the jurisdiction of the court.    Patient's Strengths and Limitations:  Patient identified the following strengths or resources that will help them succeed in treatment: commitment to health and well being, community involvement, exercise routine, friends / good social support, family support, insight, intelligence, motivation, sober support group / recovery support , and sponsor. Things that may interfere with the patient's success in treatment include: few friends, financial hardship, lack of family support, lack of social support, and physical health concerns. "     Assessments:  The following assessments were completed by patient for this visit:  PHQ9:       2/1/2024     3:00 PM 2/2/2024     2:00 PM 2/8/2024     1:00 PM   PHQ-9 SCORE   PHQ-9 Total Score MyChart  9 (Mild depression)    PHQ-9 Total Score 10 9 9     GAD7:       2/1/2024     3:00 PM 2/8/2024     1:00 PM   CAROLINE-7 SCORE   Total Score 8 5     CAGE-AID:       2/8/2024     1:00 PM   CAGE-AID Total Score   Total Score 4     PROMIS 10-Global Health (all questions and answers displayed):       2/8/2024     1:00 PM   PROMIS 10   In general, would you say your health is: 3   In general, would you say your quality of life is: 2   In general, how would you rate your physical health? 2   In general, how would you rate your mental health, including your mood and your ability to think? 2   In general, how would you rate your satisfaction with your social activities and relationships? 1   In general, please rate how well you carry out your usual social activities and roles. (This includes activities at home, at work and in your community, and responsibilities as a parent, child, spouse, employee, friend, etc.) 1   To what extent are you able to carry out your everyday physical activities such as walking, climbing stairs, carrying groceries, or moving a chair? 2   In the past 7 days, how often have you been bothered by emotional problems such as feeling anxious, depressed, or irritable? 4   In the past 7 days, how would you rate your fatigue on average? 4   In the past 7 days, how would you rate your pain on average, where 0 means no pain, and 10 means worst imaginable pain? 7   Global Mental Health Score 7   Global Physical Health Score 8   PROMIS TOTAL - SUBSCORES 15     [place] Suicide Severity Rating Scale (Lifetime/Recent)      8/13/2021    12:44 AM 2/1/2024     3:00 PM   [place] Suicide Severity Rating (Lifetime/Recent)   Q1 Wish to be Dead (Lifetime)  No   Q2 Non-Specific Active Suicidal Thoughts (Lifetime)  No   Q1  Wished to be Dead (Past Month) yes no   Q2 Suicidal Thoughts (Past Month) no no   Q3 Suicidal Thought Method no    Q4 Suicidal Intent without Specific Plan no    Q5 Suicide Intent with Specific Plan no    Q6 Suicide Behavior (Lifetime) no no   Level of Risk per Screen low risk        Personal and Family Medical History:  Patient does not report a family history of mental health concerns.  Patient reports family history is not on file..     Patient does report Mental Health Diagnosis and/or Treatment. Patient reported the following previous diagnoses which include(s): none reported. Psychiatric Hospitalizations: None.  Patient denies a history of civil commitment.  Patient is not receiving other mental health services.  These include none.       Patient has not had a physical exam to rule out medical causes for current symptoms.  Date of last physical exam was greater than a year ago and client was encouraged to schedule an exam with PCP. The patient does not have a Primary Care Provider and was encouraged to establish care with a PCP..  Patient reports the following current medical concerns: hypertension and no current dental concerns.  Patient denies any issues with pain..   There are not significant appetite / nutritional concerns / weight changes. These may include: no concerns. Patient reports the following sleep concerns:  No concerns.   Patient does not report a history of head injury / trauma / cognitive impairment.      Patient reports current meds as:   Outpatient Medications Marked as Taking for the 2/8/24 encounter (Hospital Encounter) with [facility], [facility]   Medication Sig    acetaminophen (TYLENOL) 325 MG tablet Take 3 tablets (975 mg) by mouth 3 times daily as needed for mild pain    allopurinol (ZYLOPRIM) 100 MG tablet Take 1 tablet (100 mg) by mouth daily Future refills and dose adjustments directed to primary clinic provider, review at upcoming visit    alum & mag  hydroxide-simethicone (MAALOX) 200-200-20 MG/5ML SUSP suspension Take 30 mLs by mouth every 6 hours as needed for indigestion    amLODIPine (NORVASC) 10 MG tablet Take 1 tablet (10 mg) by mouth daily Future refills and dose adjustments directed to primary clinic provider, review at upcoming visit    benzocaine-menthol (CEPACOL) 15-3.6 MG lozenge Place 1 lozenge inside cheek every 2 hours as needed for sore throat    colchicine (COLCRYS) 0.6 MG tablet Take 1 tablet (0.6 mg) by mouth See Admin Instructions Take one twice daily for one week, then once daily for one week, then stop.  Future refills and dose adjustments directed to primary clinic provider, review at upcoming visit.  For acute gout.    ferrous sulfate (FEROSUL) 325 (65 Fe) MG tablet Take 1 tablet (325 mg) by mouth daily Future refills and dose adjustments directed to primary clinic provider, review at upcoming visit    FLUoxetine (PROZAC) 40 MG capsule Take 1 capsule (40 mg) by mouth daily Future refills and dose adjustments directed to primary clinic provider, review at upcoming visit    folic acid (FOLVITE) 1 MG tablet Take 1 tablet (1 mg) by mouth daily Future refills and dose adjustments directed to primary clinic provider, review at upcoming visit    gabapentin (NEURONTIN) 300 MG capsule Take 2 capsules (600 mg) by mouth 3 times daily Future refills and dose adjustments directed to primary clinic provider, review at upcoming visit    guaiFENesin-dextromethorphan (ROBITUSSIN DM) 100-10 MG/5ML syrup Take 10 mLs by mouth every 4 hours as needed for cough    lisinopril (ZESTRIL) 20 MG tablet Take 1 tablet (20 mg) by mouth daily Future refills and dose adjustments directed to primary clinic provider, review at upcoming visit    loratadine (CLARITIN) 10 MG tablet Take 10 mg by mouth daily as needed for allergies    magnesium oxide (MAG-OX) 400 MG tablet Take 1 tablet (400 mg) by mouth daily Future refills and dose adjustments directed to primary clinic  provider, review at upcoming visit    melatonin 5 MG tablet Take 1 tablet (5 mg) by mouth every evening as needed for sleep Future refills and dose adjustments directed to primary clinic provider, review at upcoming visit    multivitamin w/minerals (THERA-VIT-M) tablet Take 1 tablet by mouth daily Future refills and dose adjustments directed to primary clinic provider, review at upcoming visit    naltrexone (DEPADE/REVIA) 50 MG tablet Take 1 tablet (50 mg) by mouth daily Take 1/2 tablet for 3-5 days to avoid side-effects (most common are nausea, headache)    pantoprazole (PROTONIX) 40 MG EC tablet Take 1 tablet (40 mg) by mouth 2 times daily (before meals) Future refills and dose adjustments directed to primary clinic provider, review at upcoming visit    predniSONE (DELTASONE) 5 MG tablet Take 1 tablet (5 mg) by mouth See Admin Instructions 20 mg daily for 3 days, then 15 mg daily for 3 days, then 10 mg daily for 3 days, then 5 mg daily for 3 days, then stop.  For acute gout.    senna-docusate (SENOKOT-S/PERICOLACE) 8.6-50 MG tablet Take 1 tablet by mouth 2 times daily Future refills and dose adjustments directed to primary clinic provider, review at upcoming visit.  Hold if loose stools, for constipation    simvastatin (ZOCOR) 20 MG tablet Take 1 tablet (20 mg) by mouth at bedtime Future refills and dose adjustments directed to primary clinic provider, review at upcoming visit    thiamine (B-1) 100 MG tablet Take 1 tablet (100 mg) by mouth daily Future refills and dose adjustments directed to primary clinic provider, review at upcoming visit       Medication Adherence:  Patient reports taking prescribed medications as prescribed.    Patient Allergies:  No Known Allergies    Medical History:  No past medical history on file.      Current Mental Status Exam:   Appearance:  Appropriate    Eye Contact:  Good   Psychomotor:  Normal       Gait / station:  no problem  Attitude / Demeanor: Pleasant  Speech      Rate /  "Production: Normal/ Responsive      Volume:  Normal  volume      Language:  no problems  Mood:   Anxious   Affect:   Appropriate    Thought Content: Clear   Thought Process: Coherent       Associations: No loosening of associations  Insight:   Fair   Judgment:  Intact   Orientation:  All  Attention/concentration: Good    Substance Use:   Patient did not report a family history of substance use concerns; see medical history section for details.  Patient has received chemical dependency treatment in the past at [facility] and most recently at [facility] in [place] around 2017  Patient has not ever been to detox.      Patient is currently receiving the following services: CD Treatment at [facility] . Patient reported the following problems as a result of his substance use:   health issues .    Patient reports using alcohol, drinking \"close to a liter\" of vodka daily for the last 6-7 months since losing his job Patient first started drinking at age 7.  Patient reported date of last use was 1/11/24.    Patient denies using tobacco.  Patient denies using cannabis.  Patient reports drinking soda  Patient denies any other substances problems.    Substance Use: Shaky / Jittery / Tremors  Headache  Fatigue / Extremely Tired  Sad / Depressed Feeling  Muscle Aches  Nausea / Vomiting  Seizures (pt reports this is his first time having a suspected withdrawal seizure)  Confused / Disrupted Speech    Based on the positive CAGE score and clinical interview there  are indications of drug or alcohol abuse. Recommendation for substance abuse disorder evaluation with a substance use professional was given. Therapist did recommend client to reduce use or abstain from alcohol or substance use. Therapist did recommend structured treatment and or community support (AA, 12 step group, etc.). NA .    Significant Losses / Trauma / Abuse / Neglect Issues:   Patient did not serve in the .  There are indications or report of significant " loss, trauma, abuse or neglect issues related to: are no indications and client denies any losses, trauma, abuse, or neglect concerns.  Concerns for possible neglect are not present.     Safety Assessment:   Patient denies current homicidal ideation and behaviors.  Patient denies current self-injurious ideation and behaviors.    Patient denied risk behaviors associated with substance use.   Patient reported substance use associated with mental health symptoms.  Patient reports the following current concerns for his personal safety: None.  Patient reports there are no firearms in the house.       There are no firearms in the home..    History of Safety Concerns:  Patient denied a history of homicidal ideation.     Patient denied a history of personal safety concerns.    Patient denied a history of assaultive behaviors.    Patient denied a history of sexual assault behaviors.     Patient denied a history of risk behaviors associated with substance use.  Patient reported a history of substance use associated with mental health symptoms.  Patient reports the following protective factors:      Risk Plan:  See Recommendations for Safety and Risk Management Plan    Review of Symptoms per patient report:   Depression: Change in sleep, Lack of interest, Excessive or inappropriate guilt, Change in energy level, Feelings of hopelessness, Feelings of helplessness, Low self-worth, Ruminations, Feeling sad, down, or depressed, and Withdrawn  Gena:  No Symptoms  Psychosis: No Symptoms  Anxiety: Excessive worry, Nervousness, Social anxiety, and avoidance of being in social situations  Panic:  Tremors and can't go to work  Post Traumatic Stress Disorder:  No Symptoms   Eating Disorder: No Symptoms  ADD / ADHD:  No symptoms  Conduct Disorder: No symptoms  Autism Spectrum Disorder: No symptoms  Obsessive Compulsive Disorder: No Symptoms    Patient reports the following compulsive behaviors and treatment history:  none reported .       Diagnostic Criteria:   Social Anxiety Disorder, Marked fear or anxiety about one or more social situations in which the individual is exposed to possible scrutiny by others. Examples include social interactions (e.g., having a conversation, meeting unfamiliar people), being observed (e.g., eating or drinking), and performing in front of others (e.g., giving a speech)., The individual fears that he or she will act in a way or show anxiety symptoms that will be negatively evaluated, The social situations almost always provoke fear or anxiety., The social situations are avoided or endured with intense fear or anxiety., The fear or anxiety is out of proportion to the actual threat posed by the social situation and to the sociocultural context., The fear, anxiety, or avoidance is persistent, typically lasting for 6 months or mo, The fear, anxiety, or avoidance causes clinically significant distress or impairment in social, occupational, or other important areas of functioning., The fear, anxiety, or avoidance is not attributable to the physiological effects of a substance (e.g., a drug of abuse, a medication) or another medical condition., The fear, anxiety, or avoidance is not better explained by the symptoms of another mental disorder, and If another medical condition is present, the fear, anxiety, or avoidance is clearly unrelated or is excessive Major Depressive Disorder  CRITERIA (A-C) REPRESENT A MAJOR DEPRESSIVE EPISODE - SELECT THESE CRITERIA  A) Recurrent episode(s) - symptoms have been present during the same 2-week period and represent a change from previous functioning 5 or more symptoms (required for diagnosis)   - Depressed mood. Note: In children and adolescents, can be irritable mood.     - Diminished interest or pleasure in all, or almost all, activities.    - Decreased sleep.    - Fatigue or loss of energy.    - Feelings of worthlessness or inappropriate guilt.    - Diminished ability to think or  concentrate, or indecisiveness.   B) The symptoms cause clinically significant distress or impairment in social, occupational, or other important areas of functioning  C) The episode is not attributable to the physiological effects of a substance or to another medical condition  D) The occurence of major depressive episode is not better explained by other thought / psychotic disorders  E) There has never been a manic episode or hypomanic episode    Functional Status:  Patient reports the following functional impairments:  chronic disease management, health maintenance, self-care, social interactions, and work / vocational responsibilities.     Nonprogrammatic care:  Patient is requesting basic services to address current mental health concerns.    Clinical Summary:  1. Psychosocial, Cultural and Contextual Factors: unemployed.  2. Principal DSM5 Diagnoses  (Sustained by DSM5 Criteria Listed Above):   Substance-Related & Addictive Disorders Alcohol Use Disorder   303.90 (F10.20) Severe In a controlled environment.  3. Other Diagnoses that is relevant to services:   296.30 (F33.9) Major Depressive Disorder, Recurrent Episode, Unspecified _ and With anxious distress  300.23 (F40.10) Social Anxiety Disorder.  4. Provisional Diagnosis: none .  5. Prognosis: Expect Improvement and Relieve Acute Symptoms.  6. Likely consequences of symptoms if not treated: patient's ongoing symptoms are more than likely to get worse and experience a decreased daily in functioning and may require a higher level of care.  7. Client strengths include:  committed to sobriety, educated, motivated, and support of family, friends and providers .     Recommendations:     1. Plan for Safety and Risk Management:   Safety and Risk: Recommended that patient call 911 or go to the local ED should there be a change in any of these risk factors..          Report to child / adult protection services was NA.     2. Patient's identified no shakeel / Yarsanism /  spiritual influences relevant to services.    3. Initial Treatment will focus on:   Depressed Mood -    Anxiety -    Functional Impairment at: work  Risk Management / Safety Concerns related to: none  Alcohol / Substance Use -   .     4. Resources/Service Plan:    services are not indicated.   Modifications to assist communication are not indicated.   Additional disability accommodations are not indicated.      5. Collaboration:   Collaboration / coordination of treatment will be initiated with the following  support professionals: Targeted Case Management (TCM).      6.  Referrals:   The following referral(s) will be initiated:  TBD .       A Release of Information has been obtained for the following: Targeted Case Management (TCM).     Clinical Substantiation/medical necessity for the above recommendations:   Patient is a 48-year-old heterosexual  male who presents with a history of alcohol use disorder severe, and periods of depression and anxiety. [facility]. Patient lacks long-term sober maintenance skills, lacks sober coping skills, lacks a sober peer support network, and has continued to drink as a coping mechanism. Patient recognizes that his drinking has negatively impacted all aspects of his life and would benefit from additional sober support and education as part of his aftercare plan. .    7. FLAKO:    FLAKO:  Discussed the general effects of drugs and alcohol on health and well-being and Attend a sober community support program including AA, [facility], [facility], etc.).. Provider gave patient printed information about the  effects of chemical use on their health and well being. Recommendations:  maintain abstinence.     8. Records:   These were reviewed at time of assessment.   Information in this assessment was obtained from the medical record and  provided by patient who is a fair historian.    Patient will  have open access to their mental health medical record.    9.   Interactive Complexity: No    10. Safety Plan:  Patient denied any current/recent/lifetime history of suicidal ideation and/or behaviors.  No safety plan indicated at this time.     Provider Name/ Credentials:  ZULLY Goodson,  [facility]   Phone: [phone]  Fax: [phone]     February 8, 2024

## 2024-02-08 NOTE — GROUP NOTE
Psychoeducation Group Documentation    PATIENT'S NAME: Jerome Flanagan  MRN:   [id number]  :   1975  ACCT. NUMBER: [id number]  DATE OF SERVICE: 24  START TIME:  3:00 PM  END TIME:  4:00 PM  FACILITATOR(S): Maribeth Rojas LADC; Martin Wolf LADC; Sidra Vega LADC  TOPIC: BEH Pyschoeducation  Number of patients attending the group:  25  Group Length:  1 Hours    Skills Group Therapy Type: Recovery skills and Healthy behaviors development    Summary of Group / Topics Discussed:    Balanced lifestyle skills and Symptom management skills    Group Attendance:  Attended group session    Patient's response to the group topic/interactions:  cooperative with task    Patient appeared to be Attentive and Engaged.         Client specific details: Pt listened respectfully to Dr. Fierro's presentation on neuroplasticity and the frontal lobe, and asked appropriate questions.

## 2024-02-08 NOTE — GROUP NOTE
Group Therapy Documentation    PATIENT'S NAME: Jerome Flanagan  MRN:   [id number]  :   1975  [id number]  DATE OF SERVICE: 24  START TIME:  9:45 AM  END TIME: 11:15 AM  FACILITATOR(S): Travis Davies LADC  TOPIC: BEH Group Therapy  Number of patients attending the group:  10  Group Length:  2 Hours    Group Therapy Type: Recovery strategies, Emotion processing, and Daily living/independence skills    Summary of Group / Topics Discussed:    Co-occurring illnesses symptom management, Disease of addiction, and Emotions/expression    Group began with check-ins, followed by a discussion led by mental health staff regarding anxiety and addiction. Feedback was provided by participants throughout group session.     Group Attendance:  Attended group session    Patient's response to the group topic/interactions:  cooperative with task    Patient appeared to be Actively participating, Attentive, and Engaged.        Client specific details:  Patient actively engaged in group discussion.

## 2024-02-08 NOTE — GROUP NOTE
Group Therapy Documentation    PATIENT'S NAME: Jerome Flanagan  MRN:   [id number]  :   1975  ACCT. NUMBER: [id number]  DATE OF SERVICE: 24  START TIME: 12:30 PM  END TIME:  2:30 PM  FACILITATOR(S): Pratik Maldonado LADC  TOPIC: BEH Group Therapy  Number of patients attending the group:  11  Group Length:  2 Hours    Group Therapy Type: Recovery strategies    Summary of Group / Topics Discussed:    Disease of addiction      Group Attendance:  Attended group session    Patient's response to the group topic/interactions:  cooperative with task    Patient appeared to be Actively participating.        Client specific details:  Marlon participated and interacted appropriately with peers and staff in PM group. No triggers to use noted or discussed. Pt attended a one hour DA during group.

## 2024-02-09 PROBLEM — F19.20 CHEMICAL DEPENDENCY (H): Status: RESOLVED | Noted: 2024-01-01 | Resolved: 2024-01-01

## 2024-02-09 NOTE — GROUP NOTE
Group Therapy Documentation    PATIENT'S NAME: Jerome Flanagan  MRN:   [id number]  :   1975  ACCT. NUMBER: [id number]  DATE OF SERVICE: 24  START TIME: 12:30 PM  END TIME:  2:30 PM  FACILITATOR(S): Pratik Maldonado LADC  TOPIC: BEH Group Therapy  Number of patients attending the group:  9  Group Length:  2 Hours    Group Therapy Type: Health and wellbeing     Summary of Group / Topics Discussed:    Self-care activities      Group Attendance:  Attended group session    Patient's response to the group topic/interactions:  cooperative with task    Patient appeared to be Actively participating.        Client specific details:  Marlon participated and interacted appropriately with peers and staff in PM group. No triggers to use noted or discussed.

## 2024-02-09 NOTE — PATIENT INSTRUCTIONS
Schedule vivitrol injection, schedule follow up injections at appointment   1 month follow up virtual

## 2024-02-09 NOTE — NURSING NOTE
Is the patient currently in the state of [place]? YES    Visit mode:TELEPHONE    If the visit is dropped, the patient can be reconnected by: TELEPHONE VISIT: Phone number: [phone]    Will anyone else be joining the visit? No  (If patient encounters technical issues they should call [phone])    How would you like to obtain your AVS? MyChart    Are changes needed to the allergy or medication list? No    Rooming Documentation: Assigned questionnaire(s) completed .    Reason for visit: KRYS Jaime

## 2024-02-09 NOTE — GROUP NOTE
Group Therapy Documentation    PATIENT'S NAME: Jerome Flanagan  MRN:   [id number]  :   1975  [id number]  DATE OF SERVICE: 24  START TIME:  9:00 AM  END TIME: 11:00 AM  FACILITATOR(S): Travis Davies LADC  TOPIC: BEH Group Therapy  Number of patients attending the group:  9  Group Length:  2 Hours    Group Therapy Type: Recovery strategies, Emotion processing, Daily living/independence skills, and Health and wellbeing     Summary of Group / Topics Discussed:    Sober coping skills, Balanced lifestyle, Disease of addiction, and Emotions/expression    Group began with check-ins, followed by a graduation for a group member. Group discussion included use vs. Abuse, as well as personal defenses and barriers between ourselves and our best health. Feedback was provided by participants throughout group session.       Group Attendance:  Attended group session    Patient's response to the group topic/interactions:  cooperative with task    Patient appeared to be Actively participating, Attentive, and Engaged.        Client specific details:  Patient actively engaged in group discussion.

## 2024-02-09 NOTE — PROGRESS NOTES
Virtual Visit Details    Type of service:  Telephone Visit   Phone call duration: 17 minutes          [facility]    A/P                                                    ASSESSMENT/PLAN  1. Alcohol use disorder, severe, dependence (H)  -needs improvement.  No alcohol use   -Continue with naltrexone at 50 mg dose  -continue with gabapentin  -LP RN's to assist with scheduling vivitrol injection  -continue with [facility] programming and follow all recommendations  -1 month follow up      2. Generalized anxiety disorder  -No change in plan of care  -continue with prozac daily  -continue with gabapentin as prescribed       Problem list updated Feb 9, 2024   No problems updated.      Feb 9, 2024  - assisted and recommending establishing PCP.       Last encounter A/P from 2/2/24 visit   1. Alcohol use disorder, severe, dependence (H)  -needs improvement  - naltrexone (DEPADE/REVIA) 50 MG tablet; Take 1 tablet (50 mg) by mouth daily Take 1/2 tablet for 3-5 days to avoid side-effects (most common are nausea, headache)  Dispense: 30 tablet; Refill: 1  -vivitrol injection (anticipate to receive after Feb 9 visit)  if tolerating oral naltrexone.   -continue with [facility] programming and follow recommendations  -follow up 1 week     2. Generalized anxiety disorder  -needs improvement  -continue with alcohol abstinence        3. Alcohol withdrawal seizure with complication (H)  -needs improvement  -counseled on need for medically managed alcohol detox due to withdrawal seizure  -continue with maintained abstinence         PDMP Review         Value Time User    State PDMP site checked  Yes 2/9/2024 11:20 AM Stella Pabon NP              RTC  Return in about 4 weeks (around 3/8/2024).      Counseled the patient on the importance of having a recovery program in addition to medication to manage recovery.  Components include avoiding isolating, having willingness to change, avoiding triggers and  "managing cravings. Encouraged having some type of sober network and practicing honesty with trusted support person(s). Encouraged other services such as counseling, 12 step or other self-help organizations.          SUBJECTIVE                                                    Jerome Flanagan is a 48 year old male who presents to clinic today for follow up    Visit performed In Person, face-to-face      FLAKO History:  Substance Use History:   \"Have you ever had any history with [...] use?\" And \"When was your last use?  ALCOHOL - Last use Aime 11   CANNABIS - experimented as teen  PRESCRIPTION STIMULANTS (includes Ritalin, Adderall, Vyvanse) - denies  COCAINE/CRACK - couple months, had an episode \"Did too much\" had panick attacks,  mid 90's   METH/AMPHETAMINES (includes ecstacy, MDMA/jigar) - denies  OPIATES - denies  BENZODIAZEPINES (includes Ativan, Klonopin, Xanax) - prescribed  KRATOM (mild opioid and stimulant effects) - denies  KETAMINE - denies  HALLUCINOGENS (includes DXM) -   BEHAVIORAL (Gambling, Eating d/o, Compulsivity) - denies  History of treatment - outpatient 6 times, inpatient 1 prior    NICOTINE  Cigarettes: denies  Chew/snus: denies  Vaping: denies  Past NRT/medication use: denies        Previous withdrawal treatment episodes (e.g. detox): none  Previous [facility] treatment programs: outpatient 6 times, inpatient 1 prior    Hospitalizations or overdose: 1/13,   Medical complications from substance use: alcohol withdrawal seizure. Hyponatremia  IV Drug use?:   Previous Medication for Addiction Tx: tried naltrexone.   (Drank through it)   Longest period of full abstinence: 2-3 days outside the treatment setting   Activities that have previously supported abstinence: treatment or \"being too hungover to drink\"   Current Recovery Activities: [facility]       Recent HPI Details: From 2/2/24  HPI:   Jerome Flanagan is a 48 year old male with history of essential hypertension, generalized anxiety, " "hyponatremia, thrombocytopenia,  use who presents for further evaluation of possible substance use disorder and management options.     Recently admitted to [facility] 1/13/24 after having a suspected alcohol withdrawal seizure.  During hospital stay a CD stay of commitment was signed 1/29/24.  Marlon was discharged from hospital to  treatment through [facility].       Marlon started drinking in his teenage years and reports drinking on a regular basis when he was 25-25.  Marlon notes his drinking being problematic with his first DUI in 1998. He has been to several treatment programs, outpatient six times, and previous inpatient and now [facility].  Marlon has tried naltrexone in the past but notes \"I drank right through it\"     TODAY'S VISIT  HPI Feb 9, 2024  - Started Naltrexone, has been taking 1/2 dose, has not yet noticed a difference but may be due to the fact he is in treatment.   Treatment going well, sleeping well, feeling positive  Worried about his memory struggles.  Has a regular schedule at  and does not remember where to go.  Has been worsening over the years.    PCP listed as Dr. Philip, Marlon would like to establish PCP elsewhere.         OBJECTIVE  PHYSICAL EXAM:  There were no vitals taken for this visit.    GENERAL: healthy, alert and no distress  RESP: No respiratory distress  MENTAL STATUS EXAM  Appearance/Behavior: No appearant distress  Speech: Normal  Mood/Affect: normal affect  Insight: Adequate      PHQ-9 Score:       2/1/2024     3:00 PM 2/2/2024     2:00 PM 2/8/2024     1:00 PM   PHQ   PHQ-9 Total Score 10 9 9   Q9: Thoughts of better off dead/self-harm past 2 weeks Not at all Not at all Not at all       CAROLINE-7 Score:      2/1/2024     3:00 PM 2/8/2024     1:00 PM   CAROLINE-7 SCORE   Total Score 8 5       LABS (may not contain today's labs)                                                        Today's lab data  No results found for any visits on 02/09/24.        HISTORY               "                                      Problem list reviewed & adjusted, as indicated.  Patient Active Problem List   Diagnosis    Thrombocytopenia (H24)    Alcoholic hepatitis without ascites (H28)    Alcohol withdrawal seizure with complication (H)    Alcohol use disorder, severe, dependence (H)    Essential hypertension    Generalized anxiety disorder    Hyponatremia    Chemical dependency (H)         MEDICATION LIST (prior to visit)  acetaminophen (TYLENOL) 325 MG tablet, Take 3 tablets (975 mg) by mouth 3 times daily as needed for mild pain  allopurinol (ZYLOPRIM) 100 MG tablet, Take 1 tablet (100 mg) by mouth daily Future refills and dose adjustments directed to primary clinic provider, review at upcoming visit  alum & mag hydroxide-simethicone (MAALOX) 200-200-20 MG/5ML SUSP suspension, Take 30 mLs by mouth every 6 hours as needed for indigestion  amLODIPine (NORVASC) 10 MG tablet, Take 1 tablet (10 mg) by mouth daily Future refills and dose adjustments directed to primary clinic provider, review at upcoming visit  benzocaine-menthol (CEPACOL) 15-3.6 MG lozenge, Place 1 lozenge inside cheek every 2 hours as needed for sore throat  colchicine (COLCRYS) 0.6 MG tablet, Take 1 tablet (0.6 mg) by mouth See Admin Instructions Take one twice daily for one week, then once daily for one week, then stop.  Future refills and dose adjustments directed to primary clinic provider, review at upcoming visit.  For acute gout.  ferrous sulfate (FEROSUL) 325 (65 Fe) MG tablet, Take 1 tablet (325 mg) by mouth daily Future refills and dose adjustments directed to primary clinic provider, review at upcoming visit  FLUoxetine (PROZAC) 40 MG capsule, Take 1 capsule (40 mg) by mouth daily Future refills and dose adjustments directed to primary clinic provider, review at upcoming visit  folic acid (FOLVITE) 1 MG tablet, Take 1 tablet (1 mg) by mouth daily Future refills and dose adjustments directed to primary clinic provider, review at  upcoming visit  gabapentin (NEURONTIN) 300 MG capsule, Take 2 capsules (600 mg) by mouth 3 times daily Future refills and dose adjustments directed to primary clinic provider, review at upcoming visit  guaiFENesin-dextromethorphan (ROBITUSSIN DM) 100-10 MG/5ML syrup, Take 10 mLs by mouth every 4 hours as needed for cough  lisinopril (ZESTRIL) 20 MG tablet, Take 1 tablet (20 mg) by mouth daily Future refills and dose adjustments directed to primary clinic provider, review at upcoming visit  loratadine (CLARITIN) 10 MG tablet, Take 10 mg by mouth daily as needed for allergies  magnesium oxide (MAG-OX) 400 MG tablet, Take 1 tablet (400 mg) by mouth daily Future refills and dose adjustments directed to primary clinic provider, review at upcoming visit  melatonin 5 MG tablet, Take 1 tablet (5 mg) by mouth every evening as needed for sleep Future refills and dose adjustments directed to primary clinic provider, review at upcoming visit  multivitamin w/minerals (THERA-VIT-M) tablet, Take 1 tablet by mouth daily Future refills and dose adjustments directed to primary clinic provider, review at upcoming visit  naltrexone (DEPADE/REVIA) 50 MG tablet, Take 1 tablet (50 mg) by mouth daily Take 1/2 tablet for 3-5 days to avoid side-effects (most common are nausea, headache)  pantoprazole (PROTONIX) 40 MG EC tablet, Take 1 tablet (40 mg) by mouth 2 times daily (before meals) Future refills and dose adjustments directed to primary clinic provider, review at upcoming visit  predniSONE (DELTASONE) 5 MG tablet, Take 1 tablet (5 mg) by mouth See Admin Instructions 20 mg daily for 3 days, then 15 mg daily for 3 days, then 10 mg daily for 3 days, then 5 mg daily for 3 days, then stop.  For acute gout.  senna-docusate (SENOKOT-S/PERICOLACE) 8.6-50 MG tablet, Take 1 tablet by mouth 2 times daily Future refills and dose adjustments directed to primary clinic provider, review at upcoming visit.  Hold if loose stools, for  constipation  simvastatin (ZOCOR) 20 MG tablet, Take 1 tablet (20 mg) by mouth at bedtime Future refills and dose adjustments directed to primary clinic provider, review at upcoming visit  thiamine (B-1) 100 MG tablet, Take 1 tablet (100 mg) by mouth daily Future refills and dose adjustments directed to primary clinic provider, review at upcoming visit    Self Administer Medications: Behavioral Services        MEDICATION LIST (after visit)  Current Outpatient Medications   Medication    acetaminophen (TYLENOL) 325 MG tablet    allopurinol (ZYLOPRIM) 100 MG tablet    alum & mag hydroxide-simethicone (MAALOX) 200-200-20 MG/5ML SUSP suspension    amLODIPine (NORVASC) 10 MG tablet    benzocaine-menthol (CEPACOL) 15-3.6 MG lozenge    colchicine (COLCRYS) 0.6 MG tablet    ferrous sulfate (FEROSUL) 325 (65 Fe) MG tablet    FLUoxetine (PROZAC) 40 MG capsule    folic acid (FOLVITE) 1 MG tablet    gabapentin (NEURONTIN) 300 MG capsule    guaiFENesin-dextromethorphan (ROBITUSSIN DM) 100-10 MG/5ML syrup    lisinopril (ZESTRIL) 20 MG tablet    loratadine (CLARITIN) 10 MG tablet    magnesium oxide (MAG-OX) 400 MG tablet    melatonin 5 MG tablet    multivitamin w/minerals (THERA-VIT-M) tablet    naltrexone (DEPADE/REVIA) 50 MG tablet    pantoprazole (PROTONIX) 40 MG EC tablet    predniSONE (DELTASONE) 5 MG tablet    senna-docusate (SENOKOT-S/PERICOLACE) 8.6-50 MG tablet    simvastatin (ZOCOR) 20 MG tablet    thiamine (B-1) 100 MG tablet     No current facility-administered medications for this visit.     Facility-Administered Medications Ordered in Other Visits   Medication    Self Administer Medications: Behavioral Services         No Known Allergies    Stella Pabon NP  [facility]  [phone]     Statement Selected

## 2024-02-10 NOTE — GROUP NOTE
Group Therapy Documentation    PATIENT'S NAME: Jerome Flanagan  MRN:   [id number]  :   1975  ACCT. NUMBER: [id number]  DATE OF SERVICE: 2/10/24  START TIME: 12:30 PM  END TIME:  2:30 PM  FACILITATOR(S): Maribeth Rojas LADC; Rashid Amezcua LADC; Lalita Marquez LADC  TOPIC: BEH Group Therapy  Number of patients attending the group:  24  Group Length:  2 Hours    Group Therapy Type: Recovery strategies and Daily living/independence skills    Summary of Group / Topics Discussed:    Relationship/socialization, Emotions/expression, and Relapse prevention    Group Attendance:  Attended group session    Patient's response to the group topic/interactions:  cooperative with task    Patient appeared to be Actively participating, Attentive, and Engaged.        Client specific details: Pt listened respectfully to presentations on communication types and styles, and took part in the related activities.

## 2024-02-10 NOTE — GROUP NOTE
Psychoeducation Group Documentation    PATIENT'S NAME: Jerome Flanagan  MRN:   [id number]  :   1975  ACCT. NUMBER: [id number]  DATE OF SERVICE: 2/10/24  START TIME:  9:00 AM  END TIME: 11:00 AM  FACILITATOR(S): Rashid Amezcua LADC; Maribeth Rojas LADC; Lalita Marquez LADC  TOPIC: BEH Pyschoeducation  Number of patients attending the group:  26  Group Length:  2 Hours    Skills Group Therapy Type: Recovery skills    Summary of Group / Topics Discussed:    Relapse prevention skills        Group Attendance:  Attended group session    Patient's response to the group topic/interactions:  cooperative with task and listened actively    Patient appeared to be Attentive and Engaged.         Client specific details:  Marlon gave appropriate feedback. .

## 2024-02-11 NOTE — GROUP NOTE
Psychoeducation Group Documentation    PATIENT'S NAME: Jerome Flanagan  MRN:   [id number]  :   1975  ACCT. NUMBER: [id number]  DATE OF SERVICE: 24  START TIME:  7:36 AM  END TIME:  1:30 PM  FACILITATOR(S): Rashid Amezcua LADC; Maribeth Rojas LADC  TOPIC: BEH Pyschoeducation  Number of patients attending the group:  25  Group Length:  1 Hours    Skills Group Therapy Type: Healthy behaviors development    Summary of Group / Topics Discussed:    Relationship/social skills        Group Attendance:  Attended group session    Patient's response to the group topic/interactions:  cooperative with task and listened actively    Patient appeared to be Attentive and Engaged.         Client specific details:  Marlon gave appropriate feedback. .

## 2024-02-11 NOTE — GROUP NOTE
Psychoeducation Group Documentation    PATIENT'S NAME: Jerome Flanagan  MRN:   [id number]  :   1975  ACCT. NUMBER: [id number]  DATE OF SERVICE: 24  START TIME:  8:40 AM  END TIME: 10:30 AM  FACILITATOR(S): Rashid Amezcua LADC; Montserrat Nguyen RN; Maribeth Rojas LADC  TOPIC: BEH Pyschoeducation  Number of patients attending the group:  25  Group Length:  2 Hours    Skills Group Therapy Type: Healthy behaviors development    Summary of Group / Topics Discussed:    Balanced lifestyle skills        Group Attendance:  Attended group session    Patient's response to the group topic/interactions:  cooperative with task and listened actively    Patient appeared to be Attentive and Engaged.         Client specific details:  Marlon gave appropriate feedback. .

## 2024-02-12 PROBLEM — E87.5 HYPERKALEMIA: Status: ACTIVE | Noted: 2024-01-01

## 2024-02-12 NOTE — TELEPHONE ENCOUNTER
Recent Results (from the past 240 hour(s))   Comprehensive metabolic panel (BMP + Alb, Alk Phos, ALT, AST, Total. Bili, TP)    Collection Time: 02/08/24  3:11 PM   Result Value Ref Range    Sodium 141 135 - 145 mmol/L    Potassium 5.2 3.4 - 5.3 mmol/L    Carbon Dioxide (CO2) 25 22 - 29 mmol/L    Anion Gap 13 7 - 15 mmol/L    Urea Nitrogen 44.2 (H) 6.0 - 20.0 mg/dL    Creatinine 1.36 (H) 0.67 - 1.17 mg/dL    GFR Estimate 65 >60 mL/min/1.73m2    Calcium 9.4 8.6 - 10.0 mg/dL    Chloride 103 98 - 107 mmol/L    Glucose 137 (H) 70 - 99 mg/dL    Alkaline Phosphatase 72 40 - 150 U/L    AST 12 0 - 45 U/L    ALT 16 0 - 70 U/L    Protein Total 7.1 6.4 - 8.3 g/dL    Albumin 3.9 3.5 - 5.2 g/dL    Bilirubin Total <0.2 <=1.2 mg/dL   CBC with platelets and differential    Collection Time: 02/08/24  3:11 PM   Result Value Ref Range    WBC Count 11.8 (H) 4.0 - 11.0 10e3/uL    RBC Count 2.89 (L) 4.40 - 5.90 10e6/uL    Hemoglobin 9.0 (L) 13.3 - 17.7 g/dL    Hematocrit 28.6 (L) 40.0 - 53.0 %    MCV 99 78 - 100 fL    MCH 31.1 26.5 - 33.0 pg    MCHC 31.5 31.5 - 36.5 g/dL    RDW 15.6 (H) 10.0 - 15.0 %    Platelet Count 430 150 - 450 10e3/uL    % Neutrophils 64 %    % Lymphocytes 24 %    % Monocytes 7 %    % Eosinophils 2 %    % Basophils 1 %    % Immature Granulocytes 2 %    NRBCs per 100 WBC 0 <1 /100    Absolute Neutrophils 7.7 1.6 - 8.3 10e3/uL    Absolute Lymphocytes 2.8 0.8 - 5.3 10e3/uL    Absolute Monocytes 0.9 0.0 - 1.3 10e3/uL    Absolute Eosinophils 0.2 0.0 - 0.7 10e3/uL    Absolute Basophils 0.1 0.0 - 0.2 10e3/uL    Absolute Immature Granulocytes 0.2 <=0.4 10e3/uL    Absolute NRBCs 0.0 10e3/uL   Comprehensive metabolic panel    Collection Time: 02/12/24  8:10 AM   Result Value Ref Range    Sodium 137 135 - 145 mmol/L    Potassium 6.1 (HH) 3.4 - 5.3 mmol/L    Carbon Dioxide (CO2) 22 22 - 29 mmol/L    Anion Gap 12 7 - 15 mmol/L    Urea Nitrogen 43.8 (H) 6.0 - 20.0 mg/dL    Creatinine 1.44 (H) 0.67 - 1.17 mg/dL    GFR Estimate  60 (L) >60 mL/min/1.73m2    Calcium 10.2 (H) 8.6 - 10.0 mg/dL    Chloride 103 98 - 107 mmol/L    Glucose 165 (H) 70 - 99 mg/dL    Alkaline Phosphatase 73 40 - 150 U/L    AST 12 0 - 45 U/L    ALT 13 0 - 70 U/L    Protein Total 7.9 6.4 - 8.3 g/dL    Albumin 4.3 3.5 - 5.2 g/dL    Bilirubin Total 0.3 <=1.2 mg/dL   CBC with platelets and differential    Collection Time: 02/12/24  8:10 AM   Result Value Ref Range    WBC Count 10.9 4.0 - 11.0 10e3/uL    RBC Count 3.42 (L) 4.40 - 5.90 10e6/uL    Hemoglobin 10.4 (L) 13.3 - 17.7 g/dL    Hematocrit 33.4 (L) 40.0 - 53.0 %    MCV 98 78 - 100 fL    MCH 30.4 26.5 - 33.0 pg    MCHC 31.1 (L) 31.5 - 36.5 g/dL    RDW 15.1 (H) 10.0 - 15.0 %    Platelet Count 426 150 - 450 10e3/uL    % Neutrophils 65 %    % Lymphocytes 24 %    % Monocytes 6 %    % Eosinophils 2 %    % Basophils 1 %    % Immature Granulocytes 2 %    NRBCs per 100 WBC 0 <1 /100    Absolute Neutrophils 7.1 1.6 - 8.3 10e3/uL    Absolute Lymphocytes 2.6 0.8 - 5.3 10e3/uL    Absolute Monocytes 0.7 0.0 - 1.3 10e3/uL    Absolute Eosinophils 0.2 0.0 - 0.7 10e3/uL    Absolute Basophils 0.1 0.0 - 0.2 10e3/uL    Absolute Immature Granulocytes 0.2 <=0.4 10e3/uL    Absolute NRBCs 0.0 10e3/uL

## 2024-02-12 NOTE — GROUP NOTE
Group Therapy Documentation    PATIENT'S NAME: Jerome Flanagan  MRN:   [id number]  :   1975  ACCT. NUMBER: [id number]  DATE OF SERVICE: 24  START TIME: 12:30 PM  END TIME:  2:30 PM  FACILITATOR(S): Pratik Maldonado LADC; Tosha Beltran  TOPIC: BEH Group Therapy  Number of patients attending the group:  8  Group Length:  2 Hours    Group Therapy Type: Health and wellbeing     Summary of Group / Topics Discussed:    Spiritual Care      Group Attendance:  Excused from group session    Patient's response to the group topic/interactions:   Excused    Patient appeared to be: Excused    Client specific details: Pt was admitted to the ED/hospital during group.

## 2024-02-12 NOTE — ED NOTES
Called pharmacy to verify meds and per pharmacy no meds scheduled at 16:05 due today, will verify and change to tomorrow.

## 2024-02-12 NOTE — ED PROVIDER NOTES
"ED Provider Note  [facility]      History     Chief Complaint   Patient presents with    Abnormal Labs     Pt had routine blood work done today with high potassium, denies any signs and symptoms except for some short term memory problems      HPI  Jerome Flanagan is a 48 year old male with PMHx of EtOH-ic Hepatitis with Ascites, Alcohol Use Disorder, severe (currently in remission as of 3 weeks ago, but who drank 1L of vodka daily), Essential HTN managed with Lisinopril & Amlodipine, Gout (well controlled on Allopurinol & Colchicine) and CAROLINE who was brought to  ED for recently identified Hyperkalemia (6.1 mmol/L).    He denies any symptoms at this time including: weakness, palpitations nor any SOB beyond his normal baseline. He denies any urinary symptoms, denies any recent illnesses or exposure to sick contacts. He denies fevers/chills, denies any sore throat, HA, cough.     He does endorse a single seizure episode which he reports prompted his recent admission to inpatient rehabilitation program and has successfully abstained from EtOH since that time (approximately \"3 weeks\"), in addition to some anxiety which he attributes to psychosocial & financial stressors.    Past Medical History  Past Medical History:   Diagnosis Date    Alcoholic hepatitis without ascites (H28)     Anemia      Past Surgical History:   Procedure Laterality Date    ESOPHAGOSCOPY, GASTROSCOPY, DUODENOSCOPY (EGD), COMBINED N/A 1/16/2024    Procedure: Esophagoscopy, gastroscopy, duodenoscopy (EGD), combined;  Surgeon: Yo Kuamr MD;  Location:  GI     acetaminophen (TYLENOL) 325 MG tablet  allopurinol (ZYLOPRIM) 100 MG tablet  amLODIPine (NORVASC) 10 MG tablet  colchicine (COLCRYS) 0.6 MG tablet  ferrous sulfate (FEROSUL) 325 (65 Fe) MG tablet  FLUoxetine (PROZAC) 40 MG capsule  folic acid (FOLVITE) 1 MG tablet  gabapentin (NEURONTIN) 300 MG capsule  hydrOXYzine HCl (ATARAX) 25 MG " tablet  lisinopril (ZESTRIL) 20 MG tablet  magnesium oxide (MAG-OX) 400 MG tablet  melatonin 5 MG tablet  multivitamin w/minerals (THERA-VIT-M) tablet  naltrexone (DEPADE/REVIA) 50 MG tablet  pantoprazole (PROTONIX) 40 MG EC tablet  predniSONE (DELTASONE) 5 MG tablet  senna-docusate (SENOKOT-S/PERICOLACE) 8.6-50 MG tablet  simvastatin (ZOCOR) 20 MG tablet  thiamine (B-1) 100 MG tablet      No Known Allergies  Family History  No family history on file.  Social History   Social History     Tobacco Use    Smoking status: Never    Smokeless tobacco: Never   Substance Use Topics    Alcohol use: Yes      Past medical history, past surgical history, medications, allergies, family history, and social history were reviewed with the patient. No additional pertinent items.      A medically appropriate review of systems was performed with pertinent positives and negatives noted in the HPI, and all other systems negative.    Physical Exam   BP: 107/68  Pulse: 100  Temp: 98  F (36.7  C)  Resp: 16  SpO2: 99 %  Physical Exam  Vitals reviewed.   Constitutional:       General: He is awake. He is not in acute distress.     Appearance: Normal appearance. He is not ill-appearing, toxic-appearing or diaphoretic.   HENT:      Head: Normocephalic and atraumatic.      Mouth/Throat:      Mouth: Mucous membranes are moist.      Pharynx: Oropharynx is clear.   Eyes:      Extraocular Movements: Extraocular movements intact.      Pupils: Pupils are equal, round, and reactive to light.   Cardiovascular:      Rate and Rhythm: Normal rate and regular rhythm.      Heart sounds: Normal heart sounds. No murmur heard.     No friction rub. No gallop.   Pulmonary:      Effort: Pulmonary effort is normal. No respiratory distress.      Breath sounds: Normal breath sounds. No stridor. No wheezing, rhonchi or rales.   Chest:      Chest wall: No tenderness.   Abdominal:      General: Bowel sounds are normal. There is distension.      Tenderness: There is no  abdominal tenderness. There is no guarding or rebound.   Musculoskeletal:         General: Normal range of motion.      Cervical back: Normal range of motion and neck supple. No rigidity or tenderness.      Right lower leg: No edema.      Left lower leg: No edema.   Lymphadenopathy:      Cervical: No cervical adenopathy.   Skin:     General: Skin is warm and dry.      Capillary Refill: Capillary refill takes less than 2 seconds.      Coloration: Skin is not jaundiced or pale.      Findings: No bruising, lesion or rash.   Neurological:      General: No focal deficit present.      Mental Status: He is alert and oriented to person, place, and time. Mental status is at baseline.   Psychiatric:         Mood and Affect: Mood normal.         Behavior: Behavior normal. Behavior is cooperative.         Thought Content: Thought content normal.         Judgment: Judgment normal.           ED Course, Procedures, & Data     ED Course as of 02/12/24 1416   Mon Feb 12, 2024   1140 Potassium(!!): 6.8                            Results for orders placed or performed in visit on 02/12/24   Comprehensive metabolic panel     Status: Abnormal   Result Value Ref Range    Sodium 137 135 - 145 mmol/L    Potassium 6.1 (HH) 3.4 - 5.3 mmol/L    Carbon Dioxide (CO2) 22 22 - 29 mmol/L    Anion Gap 12 7 - 15 mmol/L    Urea Nitrogen 43.8 (H) 6.0 - 20.0 mg/dL    Creatinine 1.44 (H) 0.67 - 1.17 mg/dL    GFR Estimate 60 (L) >60 mL/min/1.73m2    Calcium 10.2 (H) 8.6 - 10.0 mg/dL    Chloride 103 98 - 107 mmol/L    Glucose 165 (H) 70 - 99 mg/dL    Alkaline Phosphatase 73 40 - 150 U/L    AST 12 0 - 45 U/L    ALT 13 0 - 70 U/L    Protein Total 7.9 6.4 - 8.3 g/dL    Albumin 4.3 3.5 - 5.2 g/dL    Bilirubin Total 0.3 <=1.2 mg/dL   CBC with platelets and differential     Status: Abnormal   Result Value Ref Range    WBC Count 10.9 4.0 - 11.0 10e3/uL    RBC Count 3.42 (L) 4.40 - 5.90 10e6/uL    Hemoglobin 10.4 (L) 13.3 - 17.7 g/dL    Hematocrit 33.4 (L) 40.0 -  53.0 %    MCV 98 78 - 100 fL    MCH 30.4 26.5 - 33.0 pg    MCHC 31.1 (L) 31.5 - 36.5 g/dL    RDW 15.1 (H) 10.0 - 15.0 %    Platelet Count 426 150 - 450 10e3/uL    % Neutrophils 65 %    % Lymphocytes 24 %    % Monocytes 6 %    % Eosinophils 2 %    % Basophils 1 %    % Immature Granulocytes 2 %    NRBCs per 100 WBC 0 <1 /100    Absolute Neutrophils 7.1 1.6 - 8.3 10e3/uL    Absolute Lymphocytes 2.6 0.8 - 5.3 10e3/uL    Absolute Monocytes 0.7 0.0 - 1.3 10e3/uL    Absolute Eosinophils 0.2 0.0 - 0.7 10e3/uL    Absolute Basophils 0.1 0.0 - 0.2 10e3/uL    Absolute Immature Granulocytes 0.2 <=0.4 10e3/uL    Absolute NRBCs 0.0 10e3/uL   CBC with Platelets & Differential     Status: Abnormal    Narrative    The following orders were created for panel order CBC with Platelets & Differential.  Procedure                               Abnormality         Status                     ---------                               -----------         ------                     CBC with platelets and d...[[id number]]  Abnormal            Final result                 Please view results for these tests on the individual orders.   Results for orders placed or performed during the hospital encounter of 02/12/24   Basic metabolic panel     Status: Abnormal   Result Value Ref Range    Sodium 135 135 - 145 mmol/L    Potassium 6.8 (HH) 3.4 - 5.3 mmol/L    Chloride 101 98 - 107 mmol/L    Carbon Dioxide (CO2) 23 22 - 29 mmol/L    Anion Gap 11 7 - 15 mmol/L    Urea Nitrogen 46.1 (H) 6.0 - 20.0 mg/dL    Creatinine 1.42 (H) 0.67 - 1.17 mg/dL    GFR Estimate 61 >60 mL/min/1.73m2    Calcium 10.4 (H) 8.6 - 10.0 mg/dL    Glucose 99 70 - 99 mg/dL   Thomas Draw     Status: None    Narrative    The following orders were created for panel order Thomas Draw.  Procedure                               Abnormality         Status                     ---------                               -----------         ------                     Extra Green Top  (Lithium...[[id number]]                      Final result               Extra Purple Top Tube[[id number]]                            Final result                 Please view results for these tests on the individual orders.   Extra Green Top (Lithium Heparin) Tube     Status: None   Result Value Ref Range    Hold Specimen JIC    Extra Purple Top Tube     Status: None   Result Value Ref Range    Hold Specimen JIC      Medications   glucose gel 15-30 g (has no administration in time range)     Or   dextrose 50 % injection 25-50 mL (has no administration in time range)     Or   glucagon injection 1 mg (has no administration in time range)   dextrose 10% BOLUS 300 mL (300 mLs Intravenous $New Bag 2/12/24 1302)   sodium zirconium cyclosilicate (LOKELMA) packet 10 g (10 g Oral $Given 2/12/24 1354)     Followed by   sodium zirconium cyclosilicate (LOKELMA) packet 10 g (has no administration in time range)   lidocaine 1 % 0.1-1 mL (has no administration in time range)   lidocaine (LMX4) cream (has no administration in time range)   sodium chloride (PF) 0.9% PF flush 3 mL (3 mLs Intracatheter Not Given 2/12/24 1353)   sodium chloride (PF) 0.9% PF flush 3 mL (has no administration in time range)   senna-docusate (SENOKOT-S/PERICOLACE) 8.6-50 MG per tablet 1 tablet (has no administration in time range)     Or   senna-docusate (SENOKOT-S/PERICOLACE) 8.6-50 MG per tablet 2 tablet (has no administration in time range)   acetaminophen (TYLENOL) tablet 650 mg (has no administration in time range)     Or   acetaminophen (TYLENOL) Suppository 650 mg (has no administration in time range)   ondansetron (ZOFRAN ODT) ODT tab 4 mg (has no administration in time range)     Or   ondansetron (ZOFRAN) injection 4 mg (has no administration in time range)   calcium gluconate 10 % injection 1 g (has no administration in time range)   sodium chloride 0.9% BOLUS 1,000 mL (0 mLs Intravenous Stopped 2/12/24 1255)   sodium chloride 0.9% BOLUS 1,000 mL  (1,000 mLs Intravenous $New Bag 2/12/24 1255)   dextrose 50 % injection 25 g (25 g Intravenous $Given 2/12/24 1303)   insulin regular 1 unit/mL injection 8.4 Units (8.4 Units Intravenous $Given 2/12/24 1309)   furosemide (LASIX) injection 20 mg (20 mg Intravenous $Given 2/12/24 1409)     Labs Ordered and Resulted from Time of ED Arrival to Time of ED Departure   BASIC METABOLIC PANEL - Abnormal       Result Value    Sodium 135      Potassium 6.8 (*)     Chloride 101      Carbon Dioxide (CO2) 23      Anion Gap 11      Urea Nitrogen 46.1 (*)     Creatinine 1.42 (*)     GFR Estimate 61      Calcium 10.4 (*)     Glucose 99     GLUCOSE MONITOR NURSING POCT   BASIC METABOLIC PANEL   POTASSIUM   BLOOD GAS VENOUS   ROUTINE UA WITH MICROSCOPIC REFLEX TO CULTURE     No orders to display            Assessment & Plan    Jerome Flanagan is a 48 year old male with PMHx of EtOH-ic Hepatitis with Ascites, Alcohol Use Disorder, severe (currently in remission as of 3 weeks ago, but who drank 1L of vodka daily), Essential HTN managed with Lisinopril & Amlodipine who was brought to  ED for recently identified Hyperkalemia (6.1 mmol/L).    He denies any symptoms at this time including: weakness, palpitations nor any SOB beyond his normal baseline. Despite this, his potassium level is sufficiently high such that he meets criteria for inpatient admission at this time in order that he may be stabilized from a medical standpoint -- specifically such that his K+ levels can normalized back within normal range.    I have reviewed the nursing notes. I have reviewed the findings, diagnosis, plan and need for follow up with the patient.    New Prescriptions    No medications on file       Final diagnoses:   Hyperkalemia     MD Gaurang Padilla  [facility] EMERGENCY DEPARTMENT  2/12/2024    ED Attending Physician Attestation    I Gaurang Gaitan DO, cared for this patient with the medical student. I  have performed a history and physical examination of the patient and discussed management with the medical student. I reviewed the medical student's documentation above and agree with the documented findings and plan of care.    Summary of HPI, PE, ED Course   Patient evaluated in the emergency department for elevated potassium as well as creatinine.  Exam is unremarkable and patient is asymptomatic.  EKG shows no concerning peaked T waves.  We did recheck the potassium in the initial value of 6.1 had gone up to 6.8.  Based on that he was placed on hyperkalemia protocol.  He received glucose, insulin, fluids, Lokelma in the emergency department.  He was admitted to the hospital    Critical Care & Procedures  Not applicable.        Medical Decision Making  The patient's presentation is strongly suggestive of high complexity (an acute health issue posing potential threat to life or bodily function).    The patient's evaluation involved:  review of external note(s) from 3+ sources (see separate area of note for details)  ordering and/or review of 3+ test(s) in this encounter (see separate area of note for details)  review of 3+ test result(s) ordered prior to this encounter (see separate area of note for details)    The patient's management involved high risk (a decision regarding hospitalization).      Gaurang Gaitan,   Emergency Medicine        Gaurang Gaitan DO  02/15/24 0737

## 2024-02-12 NOTE — PROGRESS NOTES
Critical Potassium level of 6.1.  PT escorted to ED immediately. LP counselors and ED charge nurse notified    Montserrat Crawford    [facility]  Nurse Liaison / CD [facility] (LP)  [address]  O:[phone]  F:[phone]  RN Pool [id number]  LP After hours(UC):[phone]  LP admin counselor:[phone]  CD assess:[phone]

## 2024-02-12 NOTE — MEDICATION SCRIBE - ADMISSION MEDICATION HISTORY
Medication Scribe Admission Medication History    Admission medication history is complete. The information provided in this note is only as accurate as the sources available at the time of the update.    Information Source(s): Facility ([facility]/) medication list/MAR and CareEverywhere/SureScripts via phone    Pertinent Information: [facility] can reach them at [phone]. Pt has two days of prednisone left, scheduled to finish 2/14. Nurse states Pt has been compliant and that he started off with 30 tablets, but she observed 9 tablets left. There is some confusion as to what could have happened. Pt took his 5mg dose this morning. Pt also taking Colcrys daily not BID.     Changes made to PTA medication list:  Added: None  Deleted:   Maalox  Cepacol   Guaifenesin   Claratin   Changed: None    Allergies reviewed with patient and updates made in EHR: yes    Medication History Completed By: Alem Biswas 2/12/2024 1:35 PM    PTA Med List   Medication Sig Note Last Dose    acetaminophen (TYLENOL) 325 MG tablet Take 3 tablets (975 mg) by mouth 3 times daily as needed for mild pain  More than a month    allopurinol (ZYLOPRIM) 100 MG tablet Take 1 tablet (100 mg) by mouth daily Future refills and dose adjustments directed to primary clinic provider, review at upcoming visit  2/12/2024    amLODIPine (NORVASC) 10 MG tablet Take 1 tablet (10 mg) by mouth daily Future refills and dose adjustments directed to primary clinic provider, review at upcoming visit  2/12/2024    colchicine (COLCRYS) 0.6 MG tablet Take 1 tablet (0.6 mg) by mouth See Admin Instructions Take one twice daily for one week, then once daily for one week, then stop.  Future refills and dose adjustments directed to primary clinic provider, review at upcoming visit.  For acute gout.  2/12/2024    ferrous sulfate (FEROSUL) 325 (65 Fe) MG tablet Take 1 tablet (325 mg) by mouth daily Future refills and dose adjustments directed to primary  clinic provider, review at upcoming visit  2/12/2024    FLUoxetine (PROZAC) 40 MG capsule Take 1 capsule (40 mg) by mouth daily Future refills and dose adjustments directed to primary clinic provider, review at upcoming visit  Past Month    folic acid (FOLVITE) 1 MG tablet Take 1 tablet (1 mg) by mouth daily Future refills and dose adjustments directed to primary clinic provider, review at upcoming visit  Past Month    gabapentin (NEURONTIN) 300 MG capsule Take 2 capsules (600 mg) by mouth 3 times daily Future refills and dose adjustments directed to primary clinic provider, review at upcoming visit  2/12/2024    hydrOXYzine HCl (ATARAX) 25 MG tablet Take 25 mg by mouth 3 times daily as needed  Unknown    lisinopril (ZESTRIL) 20 MG tablet Take 1 tablet (20 mg) by mouth daily Future refills and dose adjustments directed to primary clinic provider, review at upcoming visit  2/12/2024    magnesium oxide (MAG-OX) 400 MG tablet Take 1 tablet (400 mg) by mouth daily Future refills and dose adjustments directed to primary clinic provider, review at upcoming visit  2/12/2024    melatonin 5 MG tablet Take 1 tablet (5 mg) by mouth every evening as needed for sleep Future refills and dose adjustments directed to primary clinic provider, review at upcoming visit  More than a month    multivitamin w/minerals (THERA-VIT-M) tablet Take 1 tablet by mouth daily Future refills and dose adjustments directed to primary clinic provider, review at upcoming visit  2/12/2024    naltrexone (DEPADE/REVIA) 50 MG tablet Take 1 tablet (50 mg) by mouth daily Take 1/2 tablet for 3-5 days to avoid side-effects (most common are nausea, headache)  2/11/2024    pantoprazole (PROTONIX) 40 MG EC tablet Take 1 tablet (40 mg) by mouth 2 times daily (before meals) Future refills and dose adjustments directed to primary clinic provider, review at upcoming visit  Past Week    predniSONE (DELTASONE) 5 MG tablet Take 1 tablet (5 mg) by mouth See Admin  Instructions 20 mg daily for 3 days, then 15 mg daily for 3 days, then 10 mg daily for 3 days, then 5 mg daily for 3 days, then stop.  For acute gout. 2/12/2024: Pt has two days left. Should finish 2/14 2/12/2024    senna-docusate (SENOKOT-S/PERICOLACE) 8.6-50 MG tablet Take 1 tablet by mouth 2 times daily Future refills and dose adjustments directed to primary clinic provider, review at upcoming visit.  Hold if loose stools, for constipation  2/12/2024    simvastatin (ZOCOR) 20 MG tablet Take 1 tablet (20 mg) by mouth at bedtime Future refills and dose adjustments directed to primary clinic provider, review at upcoming visit  2/11/2024    thiamine (B-1) 100 MG tablet Take 1 tablet (100 mg) by mouth daily Future refills and dose adjustments directed to primary clinic provider, review at upcoming visit  2/12/2024

## 2024-02-12 NOTE — DISCHARGE INSTRUCTIONS
FOLLOW UP AT [facility] ON 2/19 AT 8:50 AM.     ADDRESS: [address] [place] [id number]

## 2024-02-12 NOTE — H&P
[facility]    History and Physical - [facility]       Date of Admission:  2/12/2024    Assessment & Plan         Jerome Flanagan is a 48-year-old male with PMH AUD in remission (in remission 3 weeks; no known cirrhosis), HTN, gout, CAROLINE admitted for hyperkalemia.     # Hyperkalemic emergency, improving, suspect iatrogenic   Patient admitted with hyperkalemia (highest K 7.0) noted incidentally at treatment facility. Asymptomatic. Labs with YOLY as below, normal anion gap and pH. EKG with NSR, no peaked T-waves. Potassium currently improving after shifting in ED. Most suspicious for iatrogenic hyperkalemia given that patient has consistently been taking ACEi for the first time in the past few weeks. Otherwise, low concern for pseudohyperkalemia (multiple repeated labs); trauma without consistent history; tumor lysis without recent chemotherapy; DKA without elevated glucose or AGMA; RTA (no history of hyperkalemia in past).   Management:  - Repeat BMP 1730:   - If K >6.5, plan to repeat EKG, calcium gluconate, and shifting with insulin; repeat BMP in 2 hours    - If K 5.5-6.5, plan for additional Lokelma 10 g x1 dose; repeat BMP in 4 hours    - If K <5.5, no additional intervention at this time; repeat BMP AM   - HOLD Lisinopril  - Low K diet   Monitoring:  - Cardiac telemetry   - Daily CBC, CMP     # YOLY, suspect related to hyperkalemia   Baseline Cr 0.9, elevated to 1.4 on admission with BUN:Cr >20. UA unremarkable. Suspect pre-renal etiology related to ACEi. Given significant response to ACEi, will evaluate for renal artery stenosis. Unclear if Colchicine and Allopurinol may have also contributed to YOLY- recommended to hold for now by PharmD. No record of frequent NSAID use- but will clarify with patient. Patient does not appear dehydrated on exam, and history not consistent with poor PO intake. Less concern for post-renal etiology without  consistent history; pre-renal without casts, hematuria, or proteinuria.   Workup:  - Bilateral renal artery US with dopplers  Management:  - HOLD Lisinopril, Colchicine, and Allopurinol  - Avoid nephrotoxic agents   Monitoring:  - Repeat BMP 1530    - Daily CMP     # Tophaceous gout   Patient on Allopurinol and Colchicine for gout suppression. Per review of hospital records, has quickly decompensated to gout flare without suppressive medications. Will hold at this time in the setting of YOLY, but will restart as able with preference to Colchicine.   - HOLD PTA Allopurinol 100 mg daily   - HOLD PTA Colchicine 0.6 mg daily   - Reintroduce colchicine once YOLY resolves  - Hold PTA Prednisone as patient not taking     # Superficial thrombophlebitis   Noted in [facility]. Will need follow up imaging as below.   - Follow-up venous ultrasound right arm is recommended in the next 1 to 2 weeks to rule out superficial thrombophlebitis extension or progression or development of DVT     Chronic/resolved/stable:    # Alcohol use disorder, severe, in remission   # History of alcohol withdrawal seizure   # Commitment for CD treatment   Last drink three weeks PTA. Previously drank 1L vodka daily. Not endorsing withdrawal symptoms currently. LFT's normal on admission. Currently on commitment for CD treatment. Likely will return to inpatient treatment once medically stabilized.   - CIWA not indicated   - PTA Gabapentin 600 mg TID   - PTA Naltrexone 50 mg daily   - PTA Thiamine 100 mg daily     # Anemia, chronic   Baseline 9-12. At baseline on admission. Work up during [facility] consistent with anemia related to alcohol use.   - Daily CBC  - PTA iron supplement   - PTA folic acid     # HTN  Patient has been prescribed ACEi for several years, but endorsed medication non-adherence in the setting of substance use. Has been receiving ACEi scheduled during inpatient treatment. Suspect ACEi contributed to hyperkalemia/YOLY as above.    - HOLD PTA Lisinopril   - Continue PTA Amlodipine 10 mg daily     # CAROLINE   - PTA Prozac 40 mg daily   - PTA Gabapentin 600 mg TID   - PTA Hydroxyzine 20 mg daily     # History of hypomagnesemia   - PTA Magnesium 400 mg daily     # GERD  - PTA Pantoprazole 40 mg BID     # HLD  - PTA Simvastatin 20 mg daily     Diet: Combination Diet Regular Diet Adult    DVT Prophylaxis: PADUA 0; mechanical   Heard Catheter: Not present  Fluids: PO  Lines: None     Cardiac Monitoring: ACTIVE order. Indication: severe hyperkalemia  Code Status: Full Code      Clinically Significant Risk Factors Present on Admission        # Hyperkalemia: Highest K = 7 mmol/L in last 2 days, will monitor as appropriate    # Hypercalcemia: Highest Ca = 10.4 mg/dL in last 2 days, will monitor as appropriate         # Acute Kidney Injury, unspecified: based on a >150% or 0.3 mg/dL increase in last creatinine compared to past 90 day average, will monitor renal function  # Hypertension: Noted on problem list          # Financial/Environmental Concerns:           Disposition Plan      Expected Discharge Date: 02/14/2024                The patient's care was discussed with the Attending Physician, Dr. Diamond .      Allison Boyce MD  [facility]  [facility]  Securely message with Wil[employer]on (more info)  Text page via [employer] Paging/Directory   See signed in provider for up to date coverage information  ______________________________________________________________________    Chief Complaint   Severe hyperkalemia    History is obtained from the patient, EMR    History of Present Illness   Jerome Flanagan is a 48-year-old male with PMH AUD in remission (in remission 3 weeks; no known cirrhosis), HTN, gout, CAROLINE admitted for hyperkalemia. The patient was transferred from [facility] due to hyperkalemia (K 6.3) noted incidentally on labs. On my evaluation, the patient reports feeling well. He denies   weakness, palpitations, shortness of breath, numbness and tingling, chest pain, disorientation, peripheral edema, abdominal swelling. He is noted to be tremulous and reports this being chronic.      Of note, the patient was admitted to [facility] (1/13/24- 2/1/24) for alcohol withdrawal seizure. During that incident, he sustained posterior scalp hematoma and was noted to have T12-L2 compression fracture of indeterminate age. He had an acute-on-chronic anemia and received 2u pRBC (1/15, 1/28). He was suspected to have a gout flare and completed colchicine treatment (1/20- 1/28), then restarted colchicine 1/30. Of note, the patient is prescribed Lisinopril. He states that he has been prescribed this medication for years, but has never taken it regularly until recently while in treatment. The patient otherwise denies any recent crush injuries or trauma. He does not have a history of hyperkalemia. He has not noticed hematuria, frothy urine, or tea-colored urine. He was diagnosed with COVID during [facility] hospitalization, but no other URI/strep symptoms.     Regarding alcohol use, the patient's last drink was about 3 weeks ago. He previously drank 1L vodka daily. He denies symptoms of alcohol withdrawal. He attributes his drinking to psychosocial and financial stressors.  He lives with his mother in [place].  He is not employed currently.      ED Course:    Vitals: Afebrile. Normal vitals on room air.     Labs:  CBC: WBC 10.9 (WNL), HGB 10.4 (chronic; baseline 9; normocytic),   BMP: Cr 1.42 (baseline 0.9), BUN 46, calcium 10.4   K: 6.1 --> 6.8 --> 7.0 --> 6.3  VBG: pH 7.33, bicarbonate normal (21)   Glucose 74-95   UA: Negative     Imaging: None    EKG: NSR. T-wave inversions V1- V2. No significant peaked T waves. .     Interventions:  1055: NS 2L   1300: Dextrose with insulin 8.4 units   1354: Lokelma 10 g   1409: Lasix 20 mg IV   1417: Calcium gluconate 1g IV      Past Medical History     Past Medical History:   Diagnosis Date    Alcoholic hepatitis without ascites (H28)     Anemia        Past Surgical History   Past Surgical History:   Procedure Laterality Date    ESOPHAGOSCOPY, GASTROSCOPY, DUODENOSCOPY (EGD), COMBINED N/A 1/16/2024    Procedure: Esophagoscopy, gastroscopy, duodenoscopy (EGD), combined;  Surgeon: Yo Kumar MD;  Location:  GI       Prior to Admission Medications   Prior to Admission Medications   Prescriptions Last Dose Informant Patient Reported? Taking?   FLUoxetine (PROZAC) 40 MG capsule Past Month  No Yes   Sig: Take 1 capsule (40 mg) by mouth daily Future refills and dose adjustments directed to primary clinic provider, review at upcoming visit   acetaminophen (TYLENOL) 325 MG tablet More than a month  No Yes   Sig: Take 3 tablets (975 mg) by mouth 3 times daily as needed for mild pain   allopurinol (ZYLOPRIM) 100 MG tablet 2/12/2024  No Yes   Sig: Take 1 tablet (100 mg) by mouth daily Future refills and dose adjustments directed to primary clinic provider, review at upcoming visit   amLODIPine (NORVASC) 10 MG tablet 2/12/2024  No Yes   Sig: Take 1 tablet (10 mg) by mouth daily Future refills and dose adjustments directed to primary clinic provider, review at upcoming visit   colchicine (COLCRYS) 0.6 MG tablet 2/12/2024  No Yes   Sig: Take 1 tablet (0.6 mg) by mouth See Admin Instructions Take one twice daily for one week, then once daily for one week, then stop.  Future refills and dose adjustments directed to primary clinic provider, review at upcoming visit.  For acute gout.   ferrous sulfate (FEROSUL) 325 (65 Fe) MG tablet 2/12/2024  No Yes   Sig: Take 1 tablet (325 mg) by mouth daily Future refills and dose adjustments directed to primary clinic provider, review at upcoming visit   folic acid (FOLVITE) 1 MG tablet Past Month  No Yes   Sig: Take 1 tablet (1 mg) by mouth daily Future refills and dose adjustments directed to primary clinic provider, review at  upcoming visit   gabapentin (NEURONTIN) 300 MG capsule 2/12/2024  No Yes   Sig: Take 2 capsules (600 mg) by mouth 3 times daily Future refills and dose adjustments directed to primary clinic provider, review at upcoming visit   hydrOXYzine HCl (ATARAX) 25 MG tablet Unknown  Yes Yes   Sig: Take 25 mg by mouth 3 times daily as needed   lisinopril (ZESTRIL) 20 MG tablet 2/12/2024  No Yes   Sig: Take 1 tablet (20 mg) by mouth daily Future refills and dose adjustments directed to primary clinic provider, review at upcoming visit   magnesium oxide (MAG-OX) 400 MG tablet 2/12/2024  No Yes   Sig: Take 1 tablet (400 mg) by mouth daily Future refills and dose adjustments directed to primary clinic provider, review at upcoming visit   melatonin 5 MG tablet More than a month  No Yes   Sig: Take 1 tablet (5 mg) by mouth every evening as needed for sleep Future refills and dose adjustments directed to primary clinic provider, review at upcoming visit   multivitamin w/minerals (THERA-VIT-M) tablet 2/12/2024  No Yes   Sig: Take 1 tablet by mouth daily Future refills and dose adjustments directed to primary clinic provider, review at upcoming visit   naltrexone (DEPADE/REVIA) 50 MG tablet 2/11/2024  No Yes   Sig: Take 1 tablet (50 mg) by mouth daily Take 1/2 tablet for 3-5 days to avoid side-effects (most common are nausea, headache)   pantoprazole (PROTONIX) 40 MG EC tablet Past Week  No Yes   Sig: Take 1 tablet (40 mg) by mouth 2 times daily (before meals) Future refills and dose adjustments directed to primary clinic provider, review at upcoming visit   predniSONE (DELTASONE) 5 MG tablet 2/12/2024  No Yes   Sig: Take 1 tablet (5 mg) by mouth See Admin Instructions 20 mg daily for 3 days, then 15 mg daily for 3 days, then 10 mg daily for 3 days, then 5 mg daily for 3 days, then stop.  For acute gout.   senna-docusate (SENOKOT-S/PERICOLACE) 8.6-50 MG tablet 2/12/2024  No Yes   Sig: Take 1 tablet by mouth 2 times daily Future  refills and dose adjustments directed to primary clinic provider, review at upcoming visit.  Hold if loose stools, for constipation   simvastatin (ZOCOR) 20 MG tablet 2/11/2024  No Yes   Sig: Take 1 tablet (20 mg) by mouth at bedtime Future refills and dose adjustments directed to primary clinic provider, review at upcoming visit   thiamine (B-1) 100 MG tablet 2/12/2024  No Yes   Sig: Take 1 tablet (100 mg) by mouth daily Future refills and dose adjustments directed to primary clinic provider, review at upcoming visit      Facility-Administered Medications: None           Physical Exam   Vital Signs: Temp: 98  F (36.7  C) Temp src: Oral BP: 112/79 Pulse: 70   Resp: 10 SpO2: 99 % O2 Device: None (Room air)    Weight: 0 lbs 0 oz        Medical Decision Making   Please see A&P for additional details of medical decision making.      Data   ------------------------- PAST 24 HR DATA REVIEWED -----------------------------------------------    I have personally reviewed the following data over the past 24 hrs:    10.9  \   10.4 (L)   / 426     132 (L) 100 45.8 (H) /  95   6.3 (HH); 6.3 (HH) 24 1.40 (H) \     ALT: 13 AST: 12 AP: 73 TBILI: 0.3   ALB: 4.3 TOT PROTEIN: 7.9 LIPASE: N/A       Imaging results reviewed over the past 24 hrs:   No results found for this or any previous visit (from the past 24 hour(s)).

## 2024-02-12 NOTE — GROUP NOTE
"Group Therapy Documentation    PATIENT'S NAME: Jerome Flanagan  MRN:   [id number]  :   1975  ACCT. NUMBER: [id number]  DATE OF SERVICE: 24  START TIME:  9:00 AM  END TIME: 11:00 AM  FACILITATOR(S): Travis Davies LADC  TOPIC: BEH Group Therapy  Number of patients attending the group:  9  Group Length:  2 Hours    Group Therapy Type: Recovery strategies, Emotion processing, and Daily living/independence skills    Summary of Group / Topics Discussed:    Disease of addiction and Emotions/expression    Group began with check-ins, followed by a reflection reading regarding problem solving. A group member then shared his \"Use History\" assignment. Participants provided feedback throughout group session.       Group Attendance:  Attended group session    Patient's response to the group topic/interactions:  cooperative with task    Patient appeared to be Actively participating, Attentive, and Engaged.        Client specific details:  Patient actively engaged in group discussion. Patient was with nursing staff and excused from the second half of group session.    "

## 2024-02-13 NOTE — CONSULTS
Care Management Initial Consult    General Information  Assessment completed with: Patient  Type of CM/SW Visit: Initial Assessment  Primary Care Provider verified and updated as needed:  (No PCP on file. Asked patient if he was interested in having me set up a PCP through [facility] and he stated he would do it himself as he would like to go through [facility].)   Readmission within the last 30 days: current reason for admission unrelated to previous admission  Reason for Consult: discharge planning  Advance Care Planning: other (see comments) (Did not want to complete a HCD at this time.)        Communication Assessment  Patient's communication style: spoken language (English or Bilingual)    Hearing Difficulty or Deaf: no   Wear Glasses or Blind: no    Cognitive  Cognitive/Neuro/Behavioral: WDL                      Living Environment  People in home: parent(s)     Current living Arrangements: condominium      Able to return to prior arrangements: yes     Family/Social Support  Care provided by: self  Provides care for: no one  Marital Status: Single  Support System: Parent(s)          Description of Support System: Supportive, Involved    Support Assessment: Adequate family and caregiver support, Adequate social supports    Current Resources  Patient receiving home care services: No  Community Resources: None  Equipment currently used at home: none  Supplies currently used at home: None    Employment/Financial  Employment Status: unemployed     Financial Concerns: unemployed   Referral to Financial Worker: No  Does the patient's insurance plan have a 3 day qualifying hospital stay waiver?  No    Lifestyle & Psychosocial Needs  Social Determinants of Health     Food Insecurity: Not on file   Depression: Not at risk (2/9/2024)    PHQ-2     PHQ-2 Score: 2   Recent Concern: Depression - At risk (2/8/2024)    PHQ-2     PHQ-2 Score: 3   Housing Stability: Not on file   Tobacco Use: Low Risk  (2/9/2024)     Patient History     Smoking Tobacco Use: Never     Smokeless Tobacco Use: Never     Passive Exposure: Not on file   Financial Resource Strain: Not on file   Alcohol Use: Not on file   Transportation Needs: Not on file   Physical Activity: Not on file   Interpersonal Safety: Not on file   Stress: Not on file   Social Connections: Not on file     Functional Status  Prior to admission patient needed assistance: No  Dependent ADLs: Independent  Dependent IADLs: Independent  Assesssment of Functional Status: Not at  functional baseline    Mental Health Status  Mental Health Status: Current Concern       Chemical Dependency Status  Chemical Dependency Status: Current Concern    Chemical Dependency Management: Previous treatment        Values/Beliefs  Spiritual, Cultural Beliefs, Voodoo Practices, Values that affect care: No        Additional Information  Jerome Flanagan is a 48-year-old male with PMH AUD in remission (in remission 3 weeks; no known cirrhosis), HTN, gout, CAROLINE admitted for hyperkalemia.     Met with patient at bedside to complete initial assessment. Patient reports that he was medically hospitalized and then discharged to [facility]. While at , he  high potassium and was readmitted to the hospital. He is planning to discharge back to  once medically stable. TIFFANIE is 1-2 days. Patient appears motivated for CD tx. He reports he has been to approx 6 OP CD programs and 2 IP programs. This is his second IP program. He reports struggling with alcohol use. He lives with his mother and can return to her home at discharge. LP is aware patient will be returning once stable. Social work will continue to follow and assist with discharge planning.    HILLARY Hopkins, LGSW  5 Med Surg    [facility]  Phone: [phone]  Pager: [phone]

## 2024-02-13 NOTE — ED NOTES
Pt being transported to [address] [place] [address] via stretcher. Admission discussed, pt has no questions or concerns at this time. VSS. A&O x4. Ambulatory with steady gait. Ivs saline locked. Belongings sent with pt.

## 2024-02-13 NOTE — GROUP NOTE
Group Therapy Documentation    PATIENT'S NAME: Jerome Flanagan  MRN:   [id number]  :   1975  ACCT. NUMBER: [id number]  DATE OF SERVICE: 24  START TIME:  9:00 AM  END TIME: 11:00 AM  FACILITATOR(S): Travis Davies LADC  TOPIC: BEH Group Therapy  Number of patients attending the group:  9  Group Length:  2 Hours    Group Therapy Type: Recovery strategies, Emotion processing, and Daily living/independence skills    Summary of Group / Topics Discussed:    Relationship/socialization, Balanced lifestyle, and Emotions/expression    Group began with check-ins, followed by a reflection reading regarding gratitude and staying present in the moment. Group then discussed unhealthy relationship qualities. Participants provided feedback throughout group session.       Group Attendance:  Attended group session    Patient's response to the group topic/interactions:  cooperative with task    Patient appeared to be Actively participating, Attentive, and Engaged.        Client specific details:  Patient actively engaged in group discussion.

## 2024-02-13 NOTE — GROUP NOTE
Group Therapy Documentation    PATIENT'S NAME: Jerome Flanagan  MRN:   [id number]  :   1975  ACCT. NUMBER: [id number]  DATE OF SERVICE: 24  START TIME: 12:30 PM  END TIME:  2:30 PM  FACILITATOR(S): Pratik Maldonado LADC  TOPIC: BEH Group Therapy  Number of patients attending the group:  9  Group Length:  2 Hours    Group Therapy Type: Recovery strategies    Summary of Group / Topics Discussed:    Recovery Principles      Group Attendance:  Excused     Patient's response to the group topic/interactions:   Excused    Patient appeared to be Excused.        Client specific details: Pt is currently admitted to a hospital medical unit.

## 2024-02-13 NOTE — PROGRESS NOTES
Social Work Progress Note    Social work informed by [facility] team that patient can discharge back to [facility] today vs tomorrow. Call out to LP and spoke with GUERO Stevens. Hilda states their team is fine with patient returning any time today before 3pm. They can also take patient tomorrow if necessary. Paged [facility] team to see if they want to send patient today or tomorrow. Will await follow up on the plan. Care management continues to follow.     Marina Medeiros, HILLARY, LGSW  5 Med Surg   [facility]  Phone: [phone]  Pager: [phone]

## 2024-02-13 NOTE — GROUP NOTE
Group Therapy Documentation    PATIENT'S NAME: Jerome Flanagan  MRN:   [id number]  :   1975  ACCT. NUMBER: [id number]  DATE OF SERVICE: 24  START TIME:  3:00 PM  END TIME:  4:00 PM  FACILITATOR(S): Quinten Glaser LADC; Lalita Marquez LADC; Katya Canseco LADC  TOPIC: BEH Group Therapy  Number of patients attending the group:  27  Group Length:  1 Hours    Group Therapy Type: Recovery strategies    Summary of Group / Topics Discussed:    Recovery Principles, Sober coping skills, and Leisure explorations/use of leisure time        Group Attendance:  Excused from group session due to being in ED.

## 2024-02-13 NOTE — PROGRESS NOTES
[facility]    Progress Note - [facility]       Date of Admission:  2/12/2024    Main Plans:  Repeat Lokelma 10g x1 dose   Repeat BMP PM   Likely discontinue telemetry PM   Encourage PO fluids   Tentative discharge to LP 2/14-2/15     Assessment & Plan   Jerome Flanagan is a 48-year-old male with PMH AUD in remission (in remission 3 weeks; no known cirrhosis), HTN, gout, CAROLINE admitted for hyperkalemia.      # Hyperkalemic emergency, improving, suspect iatrogenic   Patient admitted with hyperkalemia (highest K 7.0) noted incidentally at treatment facility. Asymptomatic. Labs with YOLY as below, normal anion gap and pH. EKG with NSR, no peaked T-waves. Potassium improving, last 5.4. Most suspicious for iatrogenic hyperkalemia given that patient has consistently been taking ACEi for the first time in the past few weeks. Otherwise, low concern for pseudohyperkalemia (multiple repeated labs); trauma without consistent history; tumor lysis without recent chemotherapy; DKA without elevated glucose or AGMA; RTA (no history of hyperkalemia in past).   Management:  - Repeat Lokelma 10g x1 dose; repeat BMP 1600 for recheck    - HOLD Lisinopril  - Low K diet   Monitoring:  - Cardiac telemetry; likely discontinue if PM BMP reassuring    - Daily CBC, CMP      # YOLY, improving, suspect related to hyperkalemia   Baseline Cr 0.9, elevated to 1.4 on admission with BUN:Cr >20. UA unremarkable. Suspect pre-renal etiology related to ACEi. Improving at this time- will monitor closely and reintroduce gout medications as able. Less likely renal artery stenosis with reassuring US; dehydration without consistent history; no reported NSAID use; post-renal etiology without consistent history; pre-renal without casts, hematuria, or proteinuria.   Management:  - Encourage PO fluids   - HOLD Lisinopril, Colchicine, and Allopurinol  - Avoid nephrotoxic agents   Monitoring:  -  Repeat BMP 1600  - Daily CMP      # Tophaceous gout   Patient on Allopurinol and Colchicine for gout suppression. Per review of hospital records, has quickly decompensated to gout flare without suppressive medications. Holding medications in the setting of YOLY, but will restart as able with preference to Colchicine.   - HOLD PTA Allopurinol 100 mg daily   - HOLD PTA Colchicine 0.6 mg daily   - Reintroduce colchicine once YOLY resolves  - Hold PTA Prednisone as patient not taking      # HTN  Patient has been prescribed ACEi for several years, but endorsed medication non-adherence in the setting of substance use. Has been receiving ACEi scheduled during inpatient treatment. Suspect ACEi contributed to hyperkalemia/YOLY as above. Blood pressures have been controlled on CCB alone.   - HOLD PTA Lisinopril   - Continue PTA Amlodipine 10 mg daily     Chronic/resolved/stable:     # Alcohol use disorder, severe, in remission   # History of alcohol withdrawal seizure   # Commitment for CD treatment   Last drink three weeks PTA. Previously drank 1L vodka daily. Not endorsing withdrawal symptoms currently. LFT's normal on admission. Currently on commitment for CD treatment. Likely will return to inpatient treatment once medically stabilized.   - CIWA not indicated   - PTA Gabapentin 600 mg TID   - PTA Naltrexone 50 mg daily   - PTA Thiamine 100 mg daily      # Anemia, chronic   Baseline 9-12. At baseline on admission. Work up during [facility] consistent with anemia related to alcohol use.   - Daily CBC  - PTA iron supplement   - PTA folic acid       # Superficial thrombophlebitis   Noted in [facility]. Stable on follow-up imaging 1/30/24.   - Recommend follow up imaging on discharge to rule out superficial thrombophlebitis extension or progression or development of DVT     # CAROLINE   - PTA Prozac 40 mg daily   - PTA Gabapentin 600 mg TID   - PTA Hydroxyzine 20 mg daily      # History of hypomagnesemia   - PTA Magnesium 400  mg daily      # GERD  - PTA Pantoprazole 40 mg BID      # HLD  - PTA Simvastatin 20 mg daily             Diet: 3 Gram Potassium (low potassium) Diet    DVT Prophylaxis: Pneumatic Compression Devices  Heard Catheter: Not present  Fluids: PO  Lines: None     Cardiac Monitoring: ACTIVE order. Indication: severe hyperkalemia  Code Status: Full Code      Clinically Significant Risk Factors        # Hyperkalemia: Highest K = 7 mmol/L in last 2 days, will monitor as appropriate    # Hypercalcemia: Highest Ca = 10.5 mg/dL in last 2 days, will monitor as appropriate        # Hypertension: Noted on problem list              # Financial/Environmental Concerns:           Disposition Plan      Expected Discharge Date: 02/14/2024        Discharge Comments: Pending improvement in YOLY and stable resolution of hyperkalemia.        The patient's care was discussed with the Attending Physician, Dr. Laird .    Allison Boyce MD  [facility]  [facility]  Securely message with Vocera (more info)  Text page via [facility] Paging/Directory   See signed in provider for up to date coverage information  ______________________________________________________________________    Interval History     Overnight: Given Lokelma x1 for K 6.3. Improved to 5.2 on recheck.     Subjective: Patient reports feeling well. Slept some. Able to eat and drink without difficulty. Denies CP, SOB, abdominal pain. Denies significant joint pain- does not feel that he is in a gout flare.     Physical Exam   Vital Signs: Temp: 98.4  F (36.9  C) Temp src: Oral BP: 120/79 Pulse: 76   Resp: 12 SpO2: 98 % O2 Device: None (Room air)    Weight: 0 lbs 0 oz    General: Alert, well-appearing, no acute distress   HEENT: PERRL, moist oral mucus membranes  Pulm: Clear to auscultation bilaterally   CV: RRR, no murmur  Abd: soft, NTND, no masses  Ext: Warm, well perfused, 2+ BL radial pulses, no LE edema  Skin: No  rash on limited skin exam  Neuro: Fine BUE and head tremor- baseline per patient. A&Ox3.   Psych: Mood appropriate to visit content, full affect, rational thought content and process    Medical Decision Making   Please see A&P for additional details of medical decision making.      Data   ------------------------- PAST 24 HR DATA REVIEWED -----------------------------------------------    I have personally reviewed the following data over the past 24 hrs:    8.9  \   9.8 (L)   / 380     138 104 37.0 (H) /  104 (H)   5.4 (H) 21 (L) 1.23 (H) \     ALT: 12 AST: 12 AP: 67 TBILI: 0.3   ALB: 4.0 TOT PROTEIN: 7.4 LIPASE: N/A       Imaging results reviewed over the past 24 hrs:   Recent Results (from the past 24 hour(s))   US Renal Complete w Arterial Duplex    Narrative    EXAM:  1. RENAL ULTRASOUND   2. RENAL DUPLEX  LOCATION: [facility]  DATE: 2/12/2024    INDICATION: Hyperkalemia YOLY after ACEi; evaluate for renal artery stenosis  COMPARISON: CT abdomen and pelvis 01/13/2024  TECHNIQUE: Duplex imaging is performed utilizing gray-scale, two-dimensional images, and color-flow imaging. Doppler waveform analysis and spectral Doppler imaging is also performed.    FINDINGS:     RIGHT KIDNEY: 10.5 x 3.9 x 5.7 cm. Cortical thickness is 1.4 cm. Normal without hydronephrosis or masses.    LEFT KIDNEY 10.8 x 4.5 x 5.9 cm. Cortical thickness is 1.2 cm. Normal without hydronephrosis or masses..     BLADDER: 2.1 x 1.3 cm cystic structure posterior right bladder wall.    RENAL DUPLEX:  Aortic PSV: 91.2 cm/s, multiphasic  Right Renal Artery PSV at the hilum: Normal, less than 200 cm/s (Normal considered less than 200 cm/s.) The proximal and mid right renal artery were obscured by gas.   Right Intrarenal Resistive Index: Normal, 0.7 or less  Left Renal Artery PSV at the hilum: Normal, less than 200 cm/s (Normal considered less than 200 cm/s.) The proximal and mid left renal artery were  obscured by gas.  Left Intrarenal Resistive Index: Normal, 0.7 or less      Impression    IMPRESSION:   1.  The kidneys are normal.   2.  Normal renal ultrasound with Doppler.  3.  Cystic structure in the bladder may represent a ureterocele.

## 2024-02-13 NOTE — PLAN OF CARE
ADMISSION to [address] [place] [address] UNIT: @2030    Jerome Flanagan was admitted from Emergency Room for Hyperkalemia.  2 RN skin assessment: completed by CONSTANCE and PI  Result of skin assessment and interventions/actions: Skin clear intact  Patient belongings & admission documents: see Flowsheets, completed? Yes    VS: Stable  Neuro/ CMS: A&O x 4, no c/o numbness or tingling, steady gait  O2: RA @ 97%  Respiratory: LS clear, no c/o of CP, Dizziness, SOB  GI/: Voiding appropriately  Diet/Appetite: Regular  Pain: No complaint  Skin: Intact  Dressing: N/A  LDA: L-PIV and R-PIV SL  Equipment: None  Activity: Independent in room  Plan: Continue monitoring K+ levels

## 2024-02-14 NOTE — PROGRESS NOTES
[facility]    Progress Note - [facility] Medicine Service       Date of Admission:  2/12/2024    Main Plans:  NS 1L   Discontinue telemetry   Encourage PO fluids   Tentative discharge to LP AM    Assessment & Plan   Jerome Flanagan is a 48-year-old male with PMH AUD in remission (in remission 3 weeks; no known cirrhosis), HTN, gout, CAROLINE admitted for hyperkalemia and YOLY suspected 2/2 ACEi.      # Hyperkalemic emergency, improving, suspect iatrogenic   Patient admitted with hyperkalemia (highest K 7.0) noted incidentally at treatment facility. Asymptomatic. Labs with YOLY as below, normal anion gap and pH. EKG with NSR, no peaked T-waves. Potassium improving, last 5.0. Will monitor today to ensure that K remains normal without intervention. Most suspicious for iatrogenic hyperkalemia given that patient has consistently been taking ACEi for the first time in the past few weeks. Otherwise, low concern for pseudohyperkalemia (multiple repeated labs); trauma without consistent history; tumor lysis without recent chemotherapy; DKA without elevated glucose or AGMA; RTA (no history of hyperkalemia in past).   Management:  - HOLD Lisinopril  - Low K diet   Monitoring:  - Discontinue cardiac telemetry   - Daily CBC, CMP      # YOLY, improving, suspect related to hyperkalemia   Baseline Cr 0.9, elevated to 1.4 on admission with BUN:Cr >20. UA unremarkable. Suspect pre-renal etiology related to ACEi. Stable at this time- will bolster with fluids and monitor. Less likely renal artery stenosis with reassuring US; dehydration without consistent history; no reported NSAID use; post-renal etiology without consistent history; pre-renal without casts, hematuria, or proteinuria.   Management:  - NS 1L   - Encourage PO fluids   - HOLD Lisinopril, Colchicine, and Allopurinol  - Avoid nephrotoxic agents   Monitoring:  - Daily CMP      # Tophaceous gout   Patient on Allopurinol and  Colchicine for gout suppression. Per review of hospital records, has quickly decompensated to gout flare without suppressive medications. Holding medications in the setting of YOLY, but will restart as able with preference to Colchicine.   - HOLD PTA Allopurinol 100 mg daily   - HOLD PTA Colchicine 0.6 mg daily   - Reintroduce colchicine once YOLY resolves  - Hold PTA Prednisone as patient not taking      # HTN  Patient has been prescribed ACEi for several years, but endorsed medication non-adherence in the setting of substance use. Has been receiving ACEi scheduled during inpatient treatment. Suspect ACEi contributed to hyperkalemia/YOLY as above. Blood pressures have been controlled on CCB alone.   - HOLD PTA Lisinopril   - Continue PTA Amlodipine 10 mg daily     Chronic/resolved/stable:     # Alcohol use disorder, severe, in remission   # History of alcohol withdrawal seizure   # Commitment for CD treatment   Last drink three weeks PTA. Previously drank 1L vodka daily. Not endorsing withdrawal symptoms currently. LFT's normal on admission. Currently on commitment for CD treatment. Likely will return to inpatient treatment once medically stabilized.   - CIWA not indicated   - PTA Gabapentin 600 mg TID   - PTA Naltrexone 50 mg daily   - PTA Thiamine 100 mg daily      # Anemia, chronic   Baseline 9-12. At baseline on admission. Work up during [facility] consistent with anemia related to alcohol use.   - Daily CBC  - PTA iron supplement   - PTA folic acid       # Superficial thrombophlebitis   Noted in [facility]. Stable on follow-up imaging 1/30/24.   - Recommend follow up imaging on discharge to rule out superficial thrombophlebitis extension or progression or development of DVT     # CAROLINE   - PTA Prozac 40 mg daily   - PTA Gabapentin 600 mg TID   - PTA Hydroxyzine 20 mg daily      # History of hypomagnesemia   - PTA Magnesium 400 mg daily      # GERD  - PTA Pantoprazole 40 mg BID      # HLD  - PTA Simvastatin  20 mg daily             Diet: 3 Gram Potassium (low potassium) Diet    DVT Prophylaxis: Pneumatic Compression Devices  Heard Catheter: Not present  Fluids: PO  Lines: None     Cardiac Monitoring: None  Code Status: Full Code      Clinically Significant Risk Factors        # Hyperkalemia: Highest K = 7 mmol/L in last 2 days, will monitor as appropriate    # Hypercalcemia: Highest Ca = 10.5 mg/dL in last 2 days, will monitor as appropriate        # Hypertension: Noted on problem list              # Financial/Environmental Concerns: unemployed         Disposition Plan      Expected Discharge Date: 02/14/2024        Discharge Comments: Pending improvement in YOLY and stable resolution of hyperkalemia.        The patient's care was discussed with the Attending Physician, Dr. Laird .    Allison Boyce MD  [facility]  [facility]  Securely message with Vocera (more info)  Text page via [facility] Paging/Directory   See signed in provider for up to date coverage information  ______________________________________________________________________    Interval History     Overnight: PM K 4.8.     Subjective: Patient reports feeling well. Able to eat and drink without difficulty. Reports drinking about 3 cups of water yesterday. Denies CP, SOB, abdominal pain, dizziness. Denies significant joint pain- does not feel that he is in a gout flare. Open to discharge today or tomorrow.     Physical Exam   Vital Signs: Temp: 99  F (37.2  C) Temp src: Oral BP: 90/57 Pulse: 78   Resp: 15 SpO2: 96 % O2 Device: None (Room air)    Weight: 0 lbs 0 oz    General: Alert, well-appearing, no acute distress   HEENT: PERRL, moist oral mucus membranes  Pulm: Clear to auscultation bilaterally   CV: RRR, no murmur  Abd: Soft, NTND, no masses  Ext: Warm, well perfused, 2+ BL radial pulses, no LE edema  Skin: No rash on limited skin exam  Neuro: Fine BUE tremor- baseline per patient. No  toby. A&Ox3.   Psych: Mood appropriate to visit content, full affect, rational thought content and process    Medical Decision Making   Please see A&P for additional details of medical decision making.      Data   ------------------------- PAST 24 HR DATA REVIEWED -----------------------------------------------    I have personally reviewed the following data over the past 24 hrs:    N/A  \   N/A   / N/A     137 102 39.3 (H) /  115 (H)   4.8 24 1.31 (H) \       Imaging results reviewed over the past 24 hrs:   No results found for this or any previous visit (from the past 24 hour(s)).

## 2024-02-14 NOTE — PLAN OF CARE
Patient is A&O x 4  Labs done @ 1700 K+ 4.8 recheck would be done AM lab  Patient is still on telemetry   VSS, on RA, regular diet  Independent in the room  No c/o SOB, CP, or dizziness   No immediate concerns  Monitoring potassium levels

## 2024-02-14 NOTE — PLAN OF CARE
/78 (BP Location: Right arm)   Pulse 76   Temp 99  F (37.2  C) (Oral)   Resp 12   SpO2 98%     A&O X 3, Denies SOB, CP, N/V, Dizziness. Afebrile, stable on RA.Denies withdrawals and pain. Continues telemetry monitoring on. NSR. 5.4 Potassium this morning. One time dose of Sodium zirconium (Lokelma)) given per MD orders, redraw scheduled for tomorrow. Continent of B/B, Regular diet, good appetite. Right and Left PIV'S SL. Pt independent and has ambulated the peralta ways. No Acute changes. Continue with POC

## 2024-02-14 NOTE — PROGRESS NOTES
Care Management Follow Up    Length of Stay (days): 2    Expected Discharge Date: 2/15/24     Concerns to be Addressed: Discharge planning, substance/tobacco abuse/use     Patient plan of care discussed at interdisciplinary rounds: Yes    Anticipated Discharge Disposition: Inpatient Chemical Dependency     Anticipated Discharge Services: None  Anticipated Discharge DME: None    Patient/family educated on Medicare website which has current facility and service quality ratings: No  Education Provided on the Discharge Plan: Yes  Patient/Family in Agreement with the Plan: Yes    Referrals Placed by CM/SW: Not applicable  Private pay costs discussed: Not applicable    Additional Information:  Social work informed that patient is not yet medically stable to discharge back to [facility]. Anticipate he will be appropriate to discharge tomorrow. Patient would need to arrive no later than 3pm. Updated [facility]. Social work will continue to follow.     HILLARY Hopkins, LGSW  5 Med Surg   [facility]  Phone: [phone]  Pager: [phone]

## 2024-02-15 NOTE — PLAN OF CARE
Goal Outcome Evaluation:      Plan of Care Reviewed With: patient    Overall Patient Progress: improving    Date/Time: February 14, 2024 6:08 PM   Reason for Admission: Hyperkalemia     Cognitive/Mentation: Pt is A &O x4, but forgetful.    Neuros/CMS: Pt denies numbness/tingling.   VS:VSS, on RA. B/P: 134/82, T: 98.4, P: 75, R: 18     GI: Denies n/v.   : Voiding well.   Pain: Pt denied pain during this shift.     Drains:   Skin:WNL.   Activity: Independent.   Diet: 3g K+    Discharge: Pending discharge back to [facility].    End of shift summary: No other events during this shift.

## 2024-02-15 NOTE — PLAN OF CARE
Patient A&O x 4, previous shift noted pt have some intermittent confusion or forgetfulness- not witnessed on this shift. LS clear - No c/o of dizziness, nausea, vomiting, CP, SOB, pain or N/T. Independent in room, taking medications appropriately.  Patient is awaiting discharge pending placement

## 2024-02-15 NOTE — GROUP NOTE
Group Therapy Documentation    PATIENT'S NAME: Jerome Flanagan  MRN:   [id number]  :   1975  ACCT. NUMBER: [id number]  DATE OF SERVICE: 2/15/24  START TIME: 12:30 PM  END TIME:  2:30 PM  FACILITATOR(S): Maribeth Rojas LADC  TOPIC: BEH Group Therapy  Number of patients attending the group:  8  Group Length:  2 Hours    Group Therapy Type: Recovery strategies and Emotion processing    Summary of Group / Topics Discussed:    Sober coping skills, Cognitive behavioral therapy skills, and Emotions/expression    Group Attendance:  Attended group session and Excused from group session    Patient's response to the group topic/interactions:  cooperative with task    Patient appeared to be Actively participating, Attentive, and Engaged.        Client specific details: Pt was excused from the first half of group. He took part in a group discussion of treatment modalities for trauma, and common thought distortions & how to challenge them.

## 2024-02-15 NOTE — DISCHARGE SUMMARY
[facility]  Discharge Summary - Medicine & Pediatrics       Date of Admission:  2/12/2024  Date of Discharge:  2/15/2024  Discharging Provider: Tracy Laird DO  Discharge Service: [facility]    Discharge Diagnoses     # Hyperkalemic emergency, resolved, suspect iatrogenic   # YOLY, improving, suspect related to ACEi  # Tophaceous gout  # HTN  # Alcohol use disorder, severe, in remission   # History of alcohol withdrawal seizure   # Commitment for CD treatment   # Anemia, chronic   # Superficial thrombophlebitis   # CAROLINE   # History of hypomagnesemia   # GERD   # HLD    Clinically Significant Risk Factors          Follow-ups Needed After Discharge     1.) PCP: Scheduled for 2/19 at 8:50AM at [facility].   - Recommend reviewing medication changes: Discontinued Lisinopril.   - Recommend repeat BMP for Cr and K.   - Recommend rechecking right ankle as patient felt gout flare was starting.   - Patient may benefit from repeat RUE US to monitor superficial thrombophlebitis that was noted during last admission.     Discharge Disposition   Discharged to [facility]   Condition at discharge: Stable    Hospital Course   Jerome Flanagan is a 48-year-old male with PMH AUD in remission (in remission 3 weeks; no known cirrhosis), HTN, gout, CAROLINE admitted for hyperkalemia and YOLY suspected 2/2 ACEi. The following problems were addressed during his hospitalization:    # Hyperkalemic emergency, resolved, suspect iatrogenic   Patient admitted with hyperkalemia (highest K 7.0) noted incidentally at treatment facility. Asymptomatic. EKG with NSR, no peaked T-waves. Received IV calcium gluconate. Initially shifted with insulin. Excreted with Lokelma. Potassium remained stable for 24-hours without intervention. Most suspicious for iatrogenic hyperkalemia given that patient has consistently been taking ACEi for the first time. Otherwise, low concern for  pseudohyperkalemia with multiple repeated labs; trauma without consistent history; tumor lysis without recent chemotherapy; DKA without elevated glucose or AGMA; RTA with no history of hyperkalemia in past.   - Discontinued Lisinopril   - Follow up with PCP in 3-5 days for repeat BMP      # YOLY, improving, suspect related to ACEi  Baseline Cr 0.9, elevated to 1.4 on admission with BUN:Cr >20. UA unremarkable. Suspect pre-renal etiology related to ACEi. Stable at this time at 1.28. Suspect it will take time for kidney function to return to baseline. Less likely renal artery stenosis with reassuring US; dehydration without consistent history; no reported NSAID use; post-renal etiology without consistent history; pre-renal without casts, hematuria, or proteinuria.   - Discontinued Lisinopril   - Encourage fluids   - Follow up with PCP in 3-5 days for repeat BMP      # Tophaceous gout   # Right ankle pain  Patient on Allopurinol and Colchicine for gout suppression. Initially held medications in the setting of YOLY. On day of discharge, patient reported new right ankle pain. No trauma. Joint without appreciable warmth/edema/erythema on exam- but did have tenderness to palpation. Suspect patient starting to have gout flare. Cleared to restart medications by PharmD. Treated with flare dosing of Colchicine. Will need close follow up for recheck.   - S/P Colchicine 1.2 mg with 0.6 mg one hour later 2/15  - PTA Allopurinol 100 mg daily   - PTA Colchicine 0.6 mg daily   - Reintroduce colchicine once YOLY resolves  - Follow up with PCP in 3-5 days for recheck      # HTN  Patient has been prescribed ACEi for several years, but endorsed medication non-adherence in the setting of substance use. Has been receiving ACEi scheduled during inpatient treatment. Suspect ACEi contributed to hyperkalemia/YOLY as above. Blood pressures have been controlled on CCB alone.   - Discontinued Lisinopril   - Continue PTA Amlodipine 10 mg daily      #  Alcohol use disorder, severe, in remission   # History of alcohol withdrawal seizure   # Commitment for CD treatment   Last drink three weeks PTA. Previously drank 1L vodka daily. No withdrawal symptoms. LFT's normal on admission. Discharged back to inpatient CD treatment.  - PTA Gabapentin 600 mg TID   - PTA Naltrexone 50 mg daily   - PTA Thiamine 100 mg daily      # Anemia, chronic   Baseline 9-12. At baseline on admission. Work up during [facility] consistent with anemia related to alcohol use.   - PTA iron supplement   - PTA folic acid       # Superficial thrombophlebitis   Noted in [facility]. Stable on follow-up imaging 1/30/24.   - Recommend follow up imaging on discharge to rule out superficial thrombophlebitis extension or progression or development of DVT      # CAROLINE   - PTA Prozac 40 mg daily   - PTA Gabapentin 600 mg TID   - PTA Hydroxyzine 20 mg daily      # History of hypomagnesemia   - PTA Magnesium 400 mg daily      # GERD  - PTA Pantoprazole 40 mg BID      # HLD  - PTA Simvastatin 20 mg daily         Consultations This Hospital Stay   CARE MANAGEMENT / SOCIAL WORK IP CONSULT    Code Status   Full Code       The patient was discussed with Dr. Sisi Boyce MD  [facility]  [facility] MED SURG  [address] [place] [id number]  Phone: [phone]  Fax: [phone]  ______________________________________________________________________    Physical Exam   Vital Signs: Temp: 99  F (37.2  C) Temp src: Oral BP: (!) 117/90 Pulse: 77   Resp: 18 SpO2: 98 % O2 Device: None (Room air)    Weight: 0 lbs 0 oz    General: Alert, well-appearing, no acute distress   HEENT: PERRL, moist oral mucus membranes  Pulm: Clear to auscultation bilaterally   CV: RRR, no murmur  Abd: Soft, NTND, no masses  Ext: Right ankle without warmth/erythema/swelling. Tenderness to deep palpation, ROM and pulses intact. Warm, well perfused, 2+ BL radial pulses, no LE edema  Skin: No  rash on limited skin exam  Neuro: Fine BUE tremor- baseline per patient. No asterixis. A&Ox3.   Psych: Mood appropriate to visit content, full affect, rational thought content and process      Primary Care Physician   Physician No Ref-Primary    Discharge Orders      Primary Care Referral      General info for SNF    Length of Stay Estimate: Short Term Care: Estimated # of Days <30  Condition at Discharge: Stable  Level of care:skilled   Rehabilitation Potential: Good  Admission H&P remains valid and up-to-date: Yes  Recent Chemotherapy: N/A  Use Nursing Home Standing Orders: Yes     Mantoux instructions    Give two-step Mantoux (PPD) Per Facility Policy Yes     Activity - Up ad annie     Follow Up ([facility] [place]/[facility])    [facility]: Message sent to staff to schedule hospital follow up in 3-5 days. Will call [employer] with information about appointment. Recommend rechecking BMP for K and Cr.     Appointments on [place] and/or [facility] (with [facility] [place] or [facility] provider or service). Call [phone] if you haven't heard regarding these appointments within 7 days of discharge.     Reason for your hospital stay    You were admitted to the hospital for the following reasons:    HIGH POTASSIUM: We think this was due to your Lisinopril. We stopped this medication. Your potassium improved in the hospital and stayed in the normal range.     KIDNEY INJURY: Your kidney function was worse than it usually is. We think this was due to the Lisinopril. We stopped this medication. Your kidney function was getting better at discharge. It still had not returned to normal- this may take time.     GOUT: You were starting to have a gout flare. We treated you with Colchicine. Please follow up with your regular doctor in a few days to recheck your kidney function.     Full Code     Diet    Follow this diet upon discharge: Low potassium recommended, but OK for regular diet.       Significant Results and Procedures   Most Recent 3  CBC's:  Recent Labs   Lab Test 02/15/24  0717 02/14/24  0724 02/13/24  0550   WBC 10.5 9.1 8.9   HGB 10.1* 10.7* 9.8*   MCV 96 97 98    387 380     Most Recent 3 BMP's:  Recent Labs   Lab Test 02/15/24  0717 02/14/24  0724 02/13/24  1715    140 137   POTASSIUM 5.0 5.0 4.8   CHLORIDE 102 104 102   CO2 23 21* 24   BUN 31.5* 37.6* 39.3*   CR 1.28* 1.30* 1.31*   ANIONGAP 11 15 11   HERNANDEZ 10.2* 10.2* 9.9   * 103* 115*       Discharge Medications   Current Discharge Medication List        CONTINUE these medications which have CHANGED    Details   allopurinol (ZYLOPRIM) 100 MG tablet Take 1 tablet (100 mg) by mouth daily  Qty: 30 tablet, Refills: 0    Associated Diagnoses: Tophaceous gout      colchicine (COLCRYS) 0.6 MG tablet Take 1 tablet (0.6 mg) by mouth daily  Qty: 30 tablet, Refills: 0    Associated Diagnoses: Tophaceous gout           CONTINUE these medications which have NOT CHANGED    Details   acetaminophen (TYLENOL) 325 MG tablet Take 3 tablets (975 mg) by mouth 3 times daily as needed for mild pain  Qty: 100 tablet, Refills: 0    Associated Diagnoses: Pain      amLODIPine (NORVASC) 10 MG tablet Take 1 tablet (10 mg) by mouth daily Future refills and dose adjustments directed to primary clinic provider, review at upcoming visit  Qty: 30 tablet, Refills: 0    Associated Diagnoses: Essential hypertension      ferrous sulfate (FEROSUL) 325 (65 Fe) MG tablet Take 1 tablet (325 mg) by mouth daily Future refills and dose adjustments directed to primary clinic provider, review at upcoming visit  Qty: 30 tablet, Refills: 0    Associated Diagnoses: Iron deficiency anemia, unspecified iron deficiency anemia type      FLUoxetine (PROZAC) 40 MG capsule Take 1 capsule (40 mg) by mouth daily Future refills and dose adjustments directed to primary clinic provider, review at upcoming visit  Qty: 30 capsule, Refills: 0    Associated Diagnoses: Generalized anxiety disorder      folic acid (FOLVITE) 1 MG tablet  Take 1 tablet (1 mg) by mouth daily Future refills and dose adjustments directed to primary clinic provider, review at upcoming visit  Qty: 30 tablet, Refills: 0    Associated Diagnoses: Nutritional deficiency      gabapentin (NEURONTIN) 300 MG capsule Take 2 capsules (600 mg) by mouth 3 times daily Future refills and dose adjustments directed to primary clinic provider, review at upcoming visit  Qty: 90 capsule, Refills: 0    Associated Diagnoses: Pain; Generalized anxiety disorder      hydrOXYzine HCl (ATARAX) 25 MG tablet Take 25 mg by mouth 3 times daily as needed      magnesium oxide (MAG-OX) 400 MG tablet Take 1 tablet (400 mg) by mouth daily Future refills and dose adjustments directed to primary clinic provider, review at upcoming visit  Qty: 30 tablet, Refills: 0    Associated Diagnoses: Nutritional deficiency      melatonin 5 MG tablet Take 1 tablet (5 mg) by mouth every evening as needed for sleep Future refills and dose adjustments directed to primary clinic provider, review at upcoming visit  Qty: 30 tablet, Refills: 0    Associated Diagnoses: Insomnia, unspecified type      multivitamin w/minerals (THERA-VIT-M) tablet Take 1 tablet by mouth daily Future refills and dose adjustments directed to primary clinic provider, review at upcoming visit  Qty: 30 tablet, Refills: 0    Associated Diagnoses: Nutritional deficiency      naltrexone (DEPADE/REVIA) 50 MG tablet Take 1 tablet (50 mg) by mouth daily Take 1/2 tablet for 3-5 days to avoid side-effects (most common are nausea, headache)  Qty: 30 tablet, Refills: 1    Associated Diagnoses: Alcohol use disorder, severe, dependence (H)      pantoprazole (PROTONIX) 40 MG EC tablet Take 1 tablet (40 mg) by mouth 2 times daily (before meals) Future refills and dose adjustments directed to primary clinic provider, review at upcoming visit  Qty: 60 tablet, Refills: 0    Associated Diagnoses: Gastroesophageal reflux disease with esophagitis, unspecified whether  hemorrhage      predniSONE (DELTASONE) 5 MG tablet Take 1 tablet (5 mg) by mouth See Admin Instructions 20 mg daily for 3 days, then 15 mg daily for 3 days, then 10 mg daily for 3 days, then 5 mg daily for 3 days, then stop.  For acute gout.  Qty: 30 tablet, Refills: 0    Associated Diagnoses: Gout, unspecified cause, unspecified chronicity, unspecified site      senna-docusate (SENOKOT-S/PERICOLACE) 8.6-50 MG tablet Take 1 tablet by mouth 2 times daily Future refills and dose adjustments directed to primary clinic provider, review at upcoming visit.  Hold if loose stools, for constipation  Qty: 60 tablet, Refills: 0    Associated Diagnoses: Constipation, unspecified constipation type      simvastatin (ZOCOR) 20 MG tablet Take 1 tablet (20 mg) by mouth at bedtime Future refills and dose adjustments directed to primary clinic provider, review at upcoming visit  Qty: 30 tablet, Refills: 0    Associated Diagnoses: Hyperlipidemia LDL goal <100      thiamine (B-1) 100 MG tablet Take 1 tablet (100 mg) by mouth daily Future refills and dose adjustments directed to primary clinic provider, review at upcoming visit  Qty: 30 tablet, Refills: 0    Associated Diagnoses: Nutritional deficiency           STOP taking these medications       lisinopril (ZESTRIL) 20 MG tablet Comments:   Reason for Stopping:             Allergies   No Known Allergies

## 2024-02-15 NOTE — GROUP NOTE
Psychoeducation Group Documentation    PATIENT'S NAME: Jerome Flanagan  MRN:   [id number]  :   1975  ACCT. NUMBER: [id number]  DATE OF SERVICE: 2/15/24  START TIME:  3:00 PM  END TIME:  4:00 PM  FACILITATOR(S): Janell Marcum LADC; Leah Quigley LADC; Travis Davies [facility]  TOPIC: BEH Pyschoeducation  Number of patients attending the group:  7  Group Length:  1 Hours    Skills Group Therapy Type: Emotion regulation skills and Daily living/independence skills    Summary of Group / Topics Discussed:      Skills group was presented by: Dr. Pritchard  on  Dual Diagnosis . The patient's were engaged in Q&A  during his presentation and resources were provided for future assistance that would be beneficial and can assist in abstinence, goals, and support recovery.        Group Attendance:  Attended group session    Patient's response to the group topic/interactions:  cooperative with task    Patient appeared to be Attentive.         Client specific details:  Marlon, was attentive during skills group. .

## 2024-02-15 NOTE — GROUP NOTE
Group Therapy Documentation    PATIENT'S NAME: Jerome Flanagan  MRN:   [id number]  :   1975  ACCT. NUMBER: [id number]  DATE OF SERVICE: 2/15/24  START TIME:  9:00 AM  END TIME: 11:00 AM  FACILITATOR(S): Travis Davies LADC  TOPIC: BEH Group Therapy  Number of patients attending the group:  9  Group Length:  2 Hours    Group Therapy Type: Recovery strategies, Emotion processing, and Daily living/independence skills    Summary of Group / Topics Discussed:    Sober coping skills, Relationship/socialization, and Balanced lifestyle    Group began with check-ins, followed by a reflection reading regarding vulnerability. Group then discussed the topics of boundaries and assertive communication. Feedback was provided by participants throughout group session.       Group Attendance:  Excused from group session    Patient's response to the group topic/interactions:   N/A    Patient appeared to be N/A.        Client specific details:  Patient is currently in the medical unit for observations. Patient was excused from group. session.

## 2024-02-15 NOTE — PROGRESS NOTES
Name: Jerome Flanagan  Date: 2/15/2024  Medical Record: [id number]  Envelope Number: [id number]  List of Contents (List each item separately in new row):     Prednisone 5 mg Tabs, Colchicine 0.6 mg Tabs.    Admission:  I am responsible for any personal items that are not sent to the safe or pharmacy.  [facility] is not responsible for loss, theft or damage of any property in my possession.    Patient Signature:  ___________________________________________       Date/Time:__________________________    Staff Signature: __________________________________       Date/Time:__________________________    [facility] Staff person, if patient is unable/unwilling to sign:    __________________________________________________________       Date/Time: __________________________    **All medications are packaged by [facility] staff and securely stored on [employer]. Medications left by patients at discharge will be packaged by [facility] staff and transported by [facility] staff to inpatient pharmacy for storage.**    Discharge:  [facility] has returned all of my personal belongings:    Patient Signature: ________________________________________     Date/Time: ____________________________________    Staff Signature: ______________________________________     Date/Time:_____________________________________

## 2024-02-15 NOTE — PROGRESS NOTES
Care Management Discharge Note    Discharge Date: 02/15/2024     Discharge Disposition: Inpatient Chemical Dependency    Discharge Services: None    Discharge DME: None    Discharge Transportation: Family or friend will provide, other (see comments) (Walk over to [facility])    Private pay costs discussed: Not applicable    Does the patient's insurance plan have a 3 day qualifying hospital stay waiver?  No    PAS Confirmation Code: Not applicable   Patient/family educated on Medicare website which has current facility and service quality ratings: No    Education Provided on the Discharge Plan: Yes  Persons Notified of Discharge Plans: Yes  Patient/Family in Agreement with the Plan: Yes    Handoff Referral Completed: No hand off to be completed due to no PCP on file    Additional Information:  Patient will discharge back to [facility] today. Patient needs to arrive by 3pm. He will walk over from [facility] (may need to call transport to assist with this). No handoff to be completed due to no PCP on file. Patient was not interested in getting set up with an [facility] PCP. No additional concerns at this time.     Feel free to reach out to SW with any questions or concerns.     HILLARY Hopkins, LGSW  5 Med Surg   [facility]  Phone: [phone]  Pager: [phone]

## 2024-02-16 NOTE — GROUP NOTE
Group Therapy Documentation    PATIENT'S NAME: Jerome Flanagan  MRN:   [id number]  :   1975  [id number]  DATE OF SERVICE: 24  START TIME:  9:00 AM  END TIME: 11:00 AM  FACILITATOR(S): Travis Davies LADC  TOPIC: BEH Group Therapy  Number of patients attending the group:  8  Group Length:  2 Hours    Group Therapy Type: Recovery strategies, Emotion processing, and Daily living/independence skills    Summary of Group / Topics Discussed:    Sober coping skills, Disease of addiction, Emotions/expression, and Relapse prevention      Group began with check-ins, followed by a reflection reading regarding fear and hatred. Group then discussed the topics of personal boundaries, relationships with substances, and refusal skills. Feedback was provided by participants throughout group session.       Group Attendance:  Attended group session    Patient's response to the group topic/interactions:  cooperative with task    Patient appeared to be Actively participating, Attentive, and Engaged.        Client specific details:  Patient actively engaged in group discussion.

## 2024-02-16 NOTE — GROUP NOTE
Group Therapy Documentation    PATIENT'S NAME: Jerome Flanagan  MRN:   [id number]  :   1975  [id number]  DATE OF SERVICE: 24  START TIME: 12:30 PM  END TIME:  2:30 PM  FACILITATOR(S): Lalita Marquez LADC; Sidra Vega LADC; Maribeth Rojas LADC  TOPIC: BEH Group Therapy  Number of patients attending the group:  27  Group Length:  2 Hours    Group Therapy Type: Emotion processing    Summary of Group / Topics Discussed:    Recovery Principles, Relationship/socialization, and Emotions/expression    Group Attendance:  Attended group session, excused from group session    Patient's response to the group topic/interactions:  cooperative with task    Patient appeared to be Attentive and Engaged.        Client specific details: Pt was excused from the first half of session for an appt. watched a recovery-related film with peers and participated in subsequent discussion.

## 2024-02-16 NOTE — PROGRESS NOTES
Connected Care Resource Center: Connected Care Resource [place]    Background: Transitional Care Management program identified per system criteria and reviewed by Conne[facility] Resource [facility] team for possible outreach.    Assessment: Upon chart review, CCRC Team member will not proceed with patient outreach related to this episode of Transitional Care Management program due to reason below: Patient discharged to CD treatment.         Plan: Transitional Care Management episode addressed appropriately per reason noted above.      SHERINE Guevara  [facility] Resource [facility]    *Connected Care Resource Team does NOT follow patient ongoing. Referrals are identified based on internal discharge reports and the outreach is to ensure patient has an understanding of their discharge instructions.

## 2024-02-16 NOTE — PROGRESS NOTES
"D: Writer met with patient from 2:45 pm to 3:30 pm for an individual psychotherapy session. The patient is currently [facility] to the [facility].    Presenting Concerns:  Pt presented to  after having an alcohol withdrawal seizure. He was in [facility] for approximatelyl 2 weeks prior to admitting to [facility].     Social History:  Pt reports he has a \"great\" childhood with no abuse. He is the youngest of 4 siblings, with 2 sisters and one brother. Pt reports his mother worked \"crazy hours\" at a bank and would often come home after pt went to bed for the night.     Pt is single with no children. He works as a  for approximately 30 years. He did not attend college and worked his way up in his industry. He was fired twice from the same company and has not worked in 3-4 years.     Mental Health History:  Pt reports he has no experience with OP therapy. He has met with a therapist while at treatment in [facility]. He states he met with the therapist twice in 30 days. Pt denies any MH diagnoses. He reported symptoms of panic attacks and takes MH medications from a general practitioner.     Chemical Health History:  Pt's drug of choice is alcohol. He experimented with cocaine, mushrooms, acid, and cannabis while he was a teenager. No drug use since. Pt reports he drinks about 1 liter per day of alcohol in the past 3-4 years. When he was working he would drink a pint every night after work until about 2 am. He called in sick a lot to work and was fired for attendance issues. Pt has had 2 DUIs.     Significant Losses/Abuse/Neglect/Trauma:  Pt reports he has lost all of his grandparents, his father in 1994 when pt was 19. Pt's nephew who is like a son to pt lost his 2 year old son due to choking on grapes at  approximately 2 months ago.     Medical Concerns:  Pt reports he had an alcohol withdrawal seizure which landed him in the hospital for approximately 2 weeks. Shortly after " admitting to LP, pt was sent to ED and was admitted to the hospital for 3 days for high potassium. Pt also reported another hospitalization due to hemoglobin levels.     Strengths and Limitations:  Pt reports his strengths as being compassionate, hard worker, generous, helps out with others, fast learner and faces challenges.    Pt reports his limitations as self esteem, depression, alcohol, and social skills.     I: Writer offered support, compassion, and validation to patient. Writer emphasized using a sponsor, expanding their sober support network, attending 12 step meetings, performing daily rituals, and setting goals as these are beneficial to a recovery program. Writer also worked on self-compassion, vulnerability, and accepting support from others with patient.     A: Pt appeared to have a flat affect but was open and willing to share his experiences. Pt appears to be in the preparation stage of change.    P: Write will meet with pt next week for a follow up session.

## 2024-02-17 NOTE — GROUP NOTE
Group Therapy Documentation    PATIENT'S NAME: Jerome Flanagan  MRN:   [id number]  :   1975  ACCT. NUMBER: [id number]  DATE OF SERVICE: 24  START TIME:  9:00 AM  END TIME: 11:00 AM  FACILITATOR(S): Quinten Glaser LADC  TOPIC: BEH Group Therapy  Number of patients attending the group:  26  Group Length:  2 Hours    Group Therapy Type: Recovery strategies    Summary of Group / Topics Discussed:    Recovery Principles, Mindfulness/Relaxation, Coping/DBT informed care, Trauma informed care, Disease of addiction, and Emotions/expression      Group Attendance:  Attended group session    Patient's response to the group topic/interactions:  cooperative with task    Patient appeared to be Attentive and Engaged.        Client specific details:  The patient participated in the morning lecture on recovery resources.

## 2024-02-17 NOTE — GROUP NOTE
Psychoeducation Group Documentation    PATIENT'S NAME: Jerome Flanagan  MRN:   [id number]  :   1975  ACCT. NUMBER: [id number]  DATE OF SERVICE: 24  START TIME: 12:30 PM  END TIME:  2:40 PM  FACILITATOR(S): Martin Wolf LADC  TOPIC: BEH Pyschoeducation  Number of patients attending the group:  25  Group Length:  2 Hours    Skills Group Therapy Type: Recovery skills and Relationship skills development    Summary of Group / Topics Discussed:    Relationship/social skills and Balanced lifestyle skills        Group Attendance:  Attended group session    Patient's response to the group topic/interactions:  listened actively    Patient appeared to be Attentive and Engaged.         Client specific details:   Pt was attentive and engaged.

## 2024-02-19 NOTE — GROUP NOTE
Group Therapy Documentation    PATIENT'S NAME: Jerome Flanagan  MRN:   [id number]  :   1975  ACCT. NUMBER: [id number]  DATE OF SERVICE: 24  START TIME: 12:30 PM  END TIME:  2:30 PM  FACILITATOR(S): Travis Davies LADC  TOPIC: BEH Group Therapy  Number of patients attending the group:  8  Group Length:  2 Hours    Group Therapy Type: Recovery strategies, Emotion processing, and Daily living/independence skills    Summary of Group / Topics Discussed:    Spiritual Care    Group was led by staff  regarding spiritual care. Participants provided feedback throughout group session.       Group Attendance:  Attended group session    Patient's response to the group topic/interactions:  cooperative with task    Patient appeared to be Actively participating, Attentive, and Engaged.        Client specific details:  Patient actively engaged in group discussion.

## 2024-02-19 NOTE — PROGRESS NOTES
[facility] Weekly Treatment Plan Review    Treatment plan review for the following date span:  2/12/24 through 2/18/24    ATTENDANCE  Patient did have any absences during this time period (list absence dates and reason for absence).  Pt was admitted to the medical on 2/12/24, then returned to  on 2/15/24.       Weekly Treatment Plan Review     Treatment Plan initiated on: 2/6/24    Dimension1: Acute Intoxication/Withdrawal Potential -   Date of Last Use: 1/13/24  Any reports of withdrawal symptoms - No      Dimension 2: Biomedical Conditions & Complications -   Medical Concerns: Pt reports memory loss.  Vitals:   BP Readings from Last 3 Encounters:   02/15/24 (!) 117/90   02/01/24 132/88   08/09/23 122/62     Pulse Readings from Last 3 Encounters:   02/15/24 77   02/01/24 76   08/09/23 89     Wt Readings from Last 3 Encounters:   02/01/24 83.9 kg (185 lb)   01/20/24 72.9 kg (160 lb 12.8 oz)   08/20/21 81.6 kg (180 lb)     Temp Readings from Last 3 Encounters:   02/15/24 99  F (37.2  C) (Oral)   02/01/24 97.8  F (36.6  C) (Oral)   08/08/23 97.8  F (36.6  C) (Temporal)      Current Medications & Medication Changes:  Current Outpatient Medications   Medication    acetaminophen (TYLENOL) 325 MG tablet    allopurinol (ZYLOPRIM) 100 MG tablet    amLODIPine (NORVASC) 10 MG tablet    colchicine (COLCRYS) 0.6 MG tablet    ferrous sulfate (FEROSUL) 325 (65 Fe) MG tablet    FLUoxetine (PROZAC) 40 MG capsule    folic acid (FOLVITE) 1 MG tablet    gabapentin (NEURONTIN) 300 MG capsule    hydrOXYzine HCl (ATARAX) 25 MG tablet    magnesium oxide (MAG-OX) 400 MG tablet    melatonin 5 MG tablet    multivitamin w/minerals (THERA-VIT-M) tablet    naltrexone (DEPADE/REVIA) 50 MG tablet    pantoprazole (PROTONIX) 40 MG EC tablet    predniSONE (DELTASONE) 5 MG tablet    senna-docusate (SENOKOT-S/PERICOLACE) 8.6-50 MG tablet    simvastatin (ZOCOR) 20 MG tablet    thiamine (B-1) 100 MG tablet     No current facility-administered  "medications for this encounter.     Facility-Administered Medications Ordered in Other Encounters   Medication    Self Administer Medications: Behavioral Services     Taking meds as prescribed? Yes  Medication side effects or concerns: Pt denied  Outside medical appointments this week (list provider and reason for visit):      Pt denied.      Dimension 3: Emotional/Behavioral Conditions & Complications -   Mental health diagnosis: Generalized Anxiety Disorder     Date of last SIB: None reported  Date of  last SI:  None reported  Date of last HI: None reported  Behavioral Targets: Understand the relationship between addiction and mental health issues. Patient will remain safe in treatment.  Current MH Assignments:     I will meet with staff therapist as scheduled.      I will complete the \"Understanding Major Anxiety Disorders and Addiction\" assignment, and share in group.      I will complete the \"Feeling Better/ Nurturing Self-Esteem\" assignment, and share in group.     I will complete the \"Grief and Loss\" assignment, and share in group.     PHQ2:       2/19/2024    11:00 AM 2/9/2024    10:50 AM   PHQ-2 ( 1999 [employer])   Q1: Little interest or pleasure in doing things 1 1   Q2: Feeling down, depressed or hopeless 1 1   PHQ-2 Score 2 2   Q1: Little interest or pleasure in doing things  Several days   Q2: Feeling down, depressed or hopeless  Several days   PHQ-2 Score  2      GAD2:       2/9/2024    10:49 AM 2/19/2024    11:00 AM   CAROLINE-2   Feeling nervous, anxious, or on edge 0 2   Not being able to stop or control worrying 0 1   CAROLINE-2 Total Score 0 3       Additional Narrative:  Current Mental Health symptoms include: Generalized Anxiety.  Active interventions to stabilize mental health symptoms this week : Active interventions to stabilize mental health symptoms this week: Tx plan assignments, group therapy, individual therapy, lectures, workshops, spiritual care, and peer support. Pt denied significant changes in " "his mood this past week. Pt reported no change in  stress level this past week. Pt reported using \"meditation and relating to peers\" as his coping skills to manage stress and difficult emotions this past week.       Dimension 4: Treatment Acceptance / Resistance -   FLAKO Diagnosis:  Alcohol Use Disorder   303.90 (F10.20) Severe In early remission,   Commitment to tx process/Stage of change- Pt reports \"getting back to life\" and feeling better.   FLAKO assignments -     \"Drug Use History\" assignment, and share in group.     \"First Step\" assignment, and share in group.      Additional Narrative -  Pt reports their motivation this week is \"Life\".  Pt report that they have gotten along with peers and staff this week. They engaged in the following recreational activities: \"interacting with peers:\"       Dimension 5: Relapse / Continued Problem Potential -   Relapses this week - None  Urges to use - Pt reports having cravings 4/10 with 10 being the highest, pt reports \"meditation, group, and breathing helps the thoughts and feeling to subside.   UA results - Pos for-BZO, on 2/15/24 neg for all others.  Recent Results (from the past 168 hour(s))   UA with Microscopic reflex to Culture    Collection Time: 02/12/24  2:03 PM    Specimen: Urine, Clean Catch   Result Value Ref Range    Color Urine Straw Colorless, Straw, Light Yellow, Yellow    Appearance Urine Clear Clear    Glucose Urine Negative Negative mg/dL    Bilirubin Urine Negative Negative    Ketones Urine Negative Negative mg/dL    Specific Gravity Urine 1.012 1.003 - 1.035    Blood Urine Negative Negative    pH Urine 7.0 5.0 - 7.0    Protein Albumin Urine Negative Negative mg/dL    Urobilinogen Urine Normal Normal, 2.0 mg/dL    Nitrite Urine Negative Negative    Leukocyte Esterase Urine Negative Negative    RBC Urine 1 <=2 /HPF    WBC Urine 1 <=5 /HPF    Squamous Epithelials Urine <1 <=1 /HPF   Potassium    Collection Time: 02/12/24  2:42 PM   Result Value Ref Range    " Potassium 6.3 (HH) 3.4 - 5.3 mmol/L   Blood gas venous    Collection Time: 02/12/24  2:42 PM   Result Value Ref Range    pH Venous 7.33 7.32 - 7.43    pCO2 Venous 41 40 - 50 mm Hg    pO2 Venous 43 25 - 47 mm Hg    Bicarbonate Venous 21 21 - 28 mmol/L    Base Excess/Deficit Venous -4.5 (L) -3.0 - 3.0 mmol/L    FIO2 21     Oxyhemoglobin Venous 73 70 - 75 %    O2 Sat, Venous 75.0 70.0 - 75.0 %    Potassium Whole Blood 6.3 (HH) 3.4 - 5.3 mmol/L   Ionized Calcium    Collection Time: 02/12/24  2:42 PM   Result Value Ref Range    Calcium Ionized Whole Blood 5.3 (H) 4.4 - 5.2 mg/dL   Glucose by meter    Collection Time: 02/12/24  2:46 PM   Result Value Ref Range    GLUCOSE BY METER POCT 74 70 - 99 mg/dL   Glucose by meter    Collection Time: 02/12/24  3:16 PM   Result Value Ref Range    GLUCOSE BY METER POCT 95 70 - 99 mg/dL   Glucose by meter    Collection Time: 02/12/24  3:45 PM   Result Value Ref Range    GLUCOSE BY METER POCT 102 (H) 70 - 99 mg/dL   Glucose by meter    Collection Time: 02/12/24  4:13 PM   Result Value Ref Range    GLUCOSE BY METER POCT 99 70 - 99 mg/dL   Basic metabolic panel    Collection Time: 02/12/24  5:20 PM   Result Value Ref Range    Sodium 135 135 - 145 mmol/L    Potassium 6.0 (H) 3.4 - 5.3 mmol/L    Chloride 102 98 - 107 mmol/L    Carbon Dioxide (CO2) 22 22 - 29 mmol/L    Anion Gap 11 7 - 15 mmol/L    Urea Nitrogen 41.0 (H) 6.0 - 20.0 mg/dL    Creatinine 1.28 (H) 0.67 - 1.17 mg/dL    GFR Estimate 69 >60 mL/min/1.73m2    Calcium 9.8 8.6 - 10.0 mg/dL    Glucose 186 (H) 70 - 99 mg/dL   Basic metabolic panel    Collection Time: 02/12/24 10:27 PM   Result Value Ref Range    Sodium 138 135 - 145 mmol/L    Potassium 6.3 (HH) 3.4 - 5.3 mmol/L    Chloride 102 98 - 107 mmol/L    Carbon Dioxide (CO2) 27 22 - 29 mmol/L    Anion Gap 9 7 - 15 mmol/L    Urea Nitrogen 42.2 (H) 6.0 - 20.0 mg/dL    Creatinine 1.36 (H) 0.67 - 1.17 mg/dL    GFR Estimate 65 >60 mL/min/1.73m2    Calcium 10.5 (H) 8.6 - 10.0 mg/dL     Glucose 91 70 - 99 mg/dL   Basic metabolic panel    Collection Time: 02/13/24  3:49 AM   Result Value Ref Range    Sodium 137 135 - 145 mmol/L    Potassium 5.2 3.4 - 5.3 mmol/L    Chloride 103 98 - 107 mmol/L    Carbon Dioxide (CO2) 22 22 - 29 mmol/L    Anion Gap 12 7 - 15 mmol/L    Urea Nitrogen 39.1 (H) 6.0 - 20.0 mg/dL    Creatinine 1.27 (H) 0.67 - 1.17 mg/dL    GFR Estimate 70 >60 mL/min/1.73m2    Calcium 10.0 8.6 - 10.0 mg/dL    Glucose 103 (H) 70 - 99 mg/dL   Comprehensive metabolic panel    Collection Time: 02/13/24  5:50 AM   Result Value Ref Range    Sodium 138 135 - 145 mmol/L    Potassium 5.4 (H) 3.4 - 5.3 mmol/L    Carbon Dioxide (CO2) 21 (L) 22 - 29 mmol/L    Anion Gap 13 7 - 15 mmol/L    Urea Nitrogen 37.0 (H) 6.0 - 20.0 mg/dL    Creatinine 1.23 (H) 0.67 - 1.17 mg/dL    GFR Estimate 73 >60 mL/min/1.73m2    Calcium 10.0 8.6 - 10.0 mg/dL    Chloride 104 98 - 107 mmol/L    Glucose 104 (H) 70 - 99 mg/dL    Alkaline Phosphatase 67 40 - 150 U/L    AST 12 0 - 45 U/L    ALT 12 0 - 70 U/L    Protein Total 7.4 6.4 - 8.3 g/dL    Albumin 4.0 3.5 - 5.2 g/dL    Bilirubin Total 0.3 <=1.2 mg/dL   CBC with platelets    Collection Time: 02/13/24  5:50 AM   Result Value Ref Range    WBC Count 8.9 4.0 - 11.0 10e3/uL    RBC Count 3.24 (L) 4.40 - 5.90 10e6/uL    Hemoglobin 9.8 (L) 13.3 - 17.7 g/dL    Hematocrit 31.7 (L) 40.0 - 53.0 %    MCV 98 78 - 100 fL    MCH 30.2 26.5 - 33.0 pg    MCHC 30.9 (L) 31.5 - 36.5 g/dL    RDW 14.7 10.0 - 15.0 %    Platelet Count 380 150 - 450 10e3/uL   Basic metabolic panel    Collection Time: 02/13/24  5:15 PM   Result Value Ref Range    Sodium 137 135 - 145 mmol/L    Potassium 4.8 3.4 - 5.3 mmol/L    Chloride 102 98 - 107 mmol/L    Carbon Dioxide (CO2) 24 22 - 29 mmol/L    Anion Gap 11 7 - 15 mmol/L    Urea Nitrogen 39.3 (H) 6.0 - 20.0 mg/dL    Creatinine 1.31 (H) 0.67 - 1.17 mg/dL    GFR Estimate 68 >60 mL/min/1.73m2    Calcium 9.9 8.6 - 10.0 mg/dL    Glucose 115 (H) 70 - 99 mg/dL    Comprehensive metabolic panel    Collection Time: 02/14/24  7:24 AM   Result Value Ref Range    Sodium 140 135 - 145 mmol/L    Potassium 5.0 3.4 - 5.3 mmol/L    Carbon Dioxide (CO2) 21 (L) 22 - 29 mmol/L    Anion Gap 15 7 - 15 mmol/L    Urea Nitrogen 37.6 (H) 6.0 - 20.0 mg/dL    Creatinine 1.30 (H) 0.67 - 1.17 mg/dL    GFR Estimate 68 >60 mL/min/1.73m2    Calcium 10.2 (H) 8.6 - 10.0 mg/dL    Chloride 104 98 - 107 mmol/L    Glucose 103 (H) 70 - 99 mg/dL    Alkaline Phosphatase 68 40 - 150 U/L    AST 15 0 - 45 U/L    ALT 11 0 - 70 U/L    Protein Total 7.8 6.4 - 8.3 g/dL    Albumin 4.2 3.5 - 5.2 g/dL    Bilirubin Total 0.3 <=1.2 mg/dL   CBC with platelets    Collection Time: 02/14/24  7:24 AM   Result Value Ref Range    WBC Count 9.1 4.0 - 11.0 10e3/uL    RBC Count 3.48 (L) 4.40 - 5.90 10e6/uL    Hemoglobin 10.7 (L) 13.3 - 17.7 g/dL    Hematocrit 33.9 (L) 40.0 - 53.0 %    MCV 97 78 - 100 fL    MCH 30.7 26.5 - 33.0 pg    MCHC 31.6 31.5 - 36.5 g/dL    RDW 14.7 10.0 - 15.0 %    Platelet Count 387 150 - 450 10e3/uL   Comprehensive metabolic panel    Collection Time: 02/15/24  7:17 AM   Result Value Ref Range    Sodium 136 135 - 145 mmol/L    Potassium 5.0 3.4 - 5.3 mmol/L    Carbon Dioxide (CO2) 23 22 - 29 mmol/L    Anion Gap 11 7 - 15 mmol/L    Urea Nitrogen 31.5 (H) 6.0 - 20.0 mg/dL    Creatinine 1.28 (H) 0.67 - 1.17 mg/dL    GFR Estimate 69 >60 mL/min/1.73m2    Calcium 10.2 (H) 8.6 - 10.0 mg/dL    Chloride 102 98 - 107 mmol/L    Glucose 107 (H) 70 - 99 mg/dL    Alkaline Phosphatase 67 40 - 150 U/L    AST 10 0 - 45 U/L    ALT 10 0 - 70 U/L    Protein Total 7.7 6.4 - 8.3 g/dL    Albumin 4.2 3.5 - 5.2 g/dL    Bilirubin Total 0.4 <=1.2 mg/dL   CBC with platelets    Collection Time: 02/15/24  7:17 AM   Result Value Ref Range    WBC Count 10.5 4.0 - 11.0 10e3/uL    RBC Count 3.25 (L) 4.40 - 5.90 10e6/uL    Hemoglobin 10.1 (L) 13.3 - 17.7 g/dL    Hematocrit 31.2 (L) 40.0 - 53.0 %    MCV 96 78 - 100 fL    MCH 31.1 26.5 - 33.0  pg    MCHC 32.4 31.5 - 36.5 g/dL    RDW 14.4 10.0 - 15.0 %    Platelet Count 348 150 - 450 10e3/uL   Ionized Calcium    Collection Time: 02/15/24  8:38 AM   Result Value Ref Range    Calcium Ionized Whole Blood 5.3 (H) 4.4 - 5.2 mg/dL     Identified triggers - None reported  Coping skills identified - Meditation.  Patient is able to utilize these skills when needed.    Additional Narrative- Pt participated in the spirituality group, facilitated by Tosha Khalil and  counseling staff was present during group. They participated in weekend workshop, nightly AA meetings, and weekend lectures on  relationships and relapse prevention and completed all activities.      Dimension 6: Recovery Environment -   Family Involvement - Pt reports his mother.   Community support group attendance - 3-12-Step meetings per week while in tx.  Patient is attending nightly sober support meetings/lectures.  Recreational activities - Pt participate in weekly activities offered.     Additional Narrative - Pt is spending free time with same-gender peers and attending at least 3 virtual 12-step meetings weekly while in LP. Patient expressed ideas for aftercare this past week. Pt met with counselor to review options for aftercare and discuss the need for sober living.  Pt and counselor will continue to meet and establish aftercare plans. Pt is spending safe and sober time with peers creating structure and engagement during non-group hours.      Progress made on transition planning goals: Pt reported being interested in sober housing and attending an IOP after discharge from the [facility].     Justification for Continued Treatment at this Level of Care:    Pt has minimal time abstinent from substances. Pt lacks awareness of addiction and has not been willing till now to explore change and lacks understanding of relapse and is at high risk for going back to use. Pt lacks prevention skills and has not been able to implement coping  skills to avoid use and prevent further consequences.     Treatment coordination activities this week:   None reported  Need for peer recovery support referral? No    Discharge Planning:  Target Discharge Date/Timeframe:  2/29/24   Med Mgmt Provider/Appt:  CHANA   Ind therapy Provider/Appt:  CHANA   Other referrals:  TBD    Dimension Scale Review     Prior ratings: Dim1 - 1 DIM2 - 1 DIM3 - 2 DIM4 - 4 DIM5 - 4 DIM6 -3     Current ratings: Dim1 - 0 DIM2 - 1 DIM3 - 2 DIM4 - 4 DIM5 - 4 DIM6 -3       If client is 18 or older, has vulnerable adult status change? No    Are Treatment Plan goals/objectives effective? Yes  *If no, list changes to treatment plan:    Are the current goals meeting client's needs? Yes  *If no, list the changes to treatment plan.      *Client agrees with any changes to the treatment plan: N/A  *Client received copy of changes: N/A  *Client is aware of right to access a treatment plan review: Yes       FRANCISCO Long on 2/19/2024 at 11:06 AM

## 2024-02-19 NOTE — ADDENDUM NOTE
Encounter addended by: Quinten Glaser LADC on: 2/19/2024 8:21 AM   Actions taken: Charge Capture section accepted

## 2024-02-19 NOTE — GROUP NOTE
"Group Therapy Documentation    PATIENT'S NAME: Jerome Flanagan  MRN:   [id number]  :   1975  ACCT. NUMBER: [id number]  DATE OF SERVICE: 24  START TIME:  9:00 AM  END TIME: 11:00 AM  FACILITATOR(S): Travis Davies LADC  TOPIC: BEH Group Therapy  Number of patients attending the group:  8  Group Length:  2 Hours    Group Therapy Type: Recovery strategies, Emotion processing, and Daily living/independence skills    Summary of Group / Topics Discussed:    Recovery Principles and Disease of addiction    Group began with check-ins, followed by a reflection reading regarding finding purpose. Group also included a \"Drug Use History\" presentation, as a a well a a discussion on the Twelve Step of [place]. Feedback ws provided by  participants throughout group session.       Group Attendance:  Attended group session    Patient's response to the group topic/interactions:  cooperative with task    Patient appeared to be Actively participating, Attentive, and Engaged.        Client specific details:  Patient actively engaged in group discussion.    "

## 2024-02-20 NOTE — GROUP NOTE
Group Therapy Documentation    PATIENT'S NAME: Jerome Flanagan  MRN:   [id number]  :   1975  ACCT. NUMBER: [id number]  DATE OF SERVICE: 24  START TIME: 12:30 PM  END TIME:  2:30 PM  FACILITATOR(S): Pratik Maldonado LADC  TOPIC: BEH Group Therapy  Number of patients attending the group:  8  Group Length:  2 Hours    Group Therapy Type: Recovery strategies    Summary of Group / Topics Discussed:    Recovery Principles      Group Attendance:  Attended group session    Patient's response to the group topic/interactions:  cooperative with task    Patient appeared to be Actively participating.        Client specific details:  Marlon participated and interacted appropriately with peers and staff in PM group. No triggers to use noted or discussed.

## 2024-02-20 NOTE — GROUP NOTE
Group Therapy Documentation    PATIENT'S NAME: Jerome Flanagan  MRN:   [id number]  :   1975  [facility] NUM[facility]: [id number]  DATE OF SERVICE: 24  START TIME:  9:00 AM  END TIME: 11:00 AM  FACILITATOR(S): Travis Davies LADC  TOPIC: BEH Group Therapy  Number of patients attending the group:  8  Group Length:  2 Hours    Group Therapy Type: Recovery strategies, Emotion processing, and Daily living/independence skills    Summary of Group / Topics Discussed:    Sober coping skills, Balanced lifestyle, and Self-care activities    Group began with check-ins, followed by a reflection reading regarding anger. Group then discussed the importance of goals and routines in recovery. Feedback was provided by participants throughout group session.       Group Attendance:  Attended group session    Patient's response to the group topic/interactions:  cooperative with task    Patient appeared to be Actively participating, Attentive, and Engaged.        Client specific details:  Patient actively engaged in group discussion.

## 2024-02-20 NOTE — GROUP NOTE
Group Therapy Documentation    PATIENT'S NAME: Jerome Flanagan  MRN:   [id number]  :   1975  ACCT. NUMBER: [id number]  DATE OF SERVICE: 24  START TIME:  3:00 PM  END TIME:  4:00 PM  FACILITATOR(S): Pratik Maldonado LADC; Rhett Olsen [facility]  TOPIC: BEH Group Therapy  Number of patients attending the group:  9  Group Length:  1 Hours    Group Therapy Type: Daily living/independence skills    Summary of Group / Topics Discussed:    Relationship/socialization      Group Attendance:  Attended group session    Patient's response to the group topic/interactions:  cooperative with task    Patient appeared to be Actively participating.        Client specific details:  Marlon participated and interacted appropriately with peers and staff in skills group. No triggers to use noted or discussed.

## 2024-02-21 NOTE — GROUP NOTE
Group Therapy Documentation    PATIENT'S NAME: Jerome Flanagan  MRN:   [id number]  :   1975  ACCT. NUMBER: [id number]  DATE OF SERVICE: 24  START TIME:  9:00 AM  END TIME: 11:00 AM  FACILITATOR(S): Travis Davies LADC; Mei Courtney LADC  TOPIC: BEH Group Therapy  Number of patients attending the group:  7  Group Length:  2 Hours    Group Therapy Type: Emotion processing, Daily living/independence skills, and Health and wellbeing     Summary of Group / Topics Discussed:    Sober coping skills and Co-occurring illnesses symptom management    Group began with check-ins, followed by a discussion led by mental health staff regarding anxiety, depression, and how it relates to substance abuse. Feedback was provided by participants throughout group session.       Group Attendance:  Attended group session    Patient's response to the group topic/interactions:  cooperative with task    Patient appeared to be Actively participating, Attentive, and Engaged.        Client specific details:  Patient actively engaged in group discussion.     Class II - visualization of the soft palate, fauces, and uvula

## 2024-02-21 NOTE — ADDENDUM NOTE
Encounter addended by: Travis Davies LADC on: 2/21/2024 3:06 PM   Actions taken: Charge Capture section accepted

## 2024-02-21 NOTE — GROUP NOTE
Group Therapy Documentation    PATIENT'S NAME: Jerome Flanagan  MRN:   [id number]  :   1975  ACCT. NUMBER: [id number]  DATE OF SERVICE: 24  START TIME: 12:30 PM  END TIME:  2:30 PM  FACILITATOR(S): Pratik Maldonado LADC  TOPIC: BEH Group Therapy  Number of patients attending the group:  7  Group Length:  2 Hours    Group Therapy Type: Emotion processing    Summary of Group / Topics Discussed:    Relapse prevention      Group Attendance:  Attended group session    Patient's response to the group topic/interactions:  cooperative with task    Patient appeared to be Actively participating.        Client specific details:  Jerome participated and interacted appropriately with peers and staff in PM group. No triggers to use noted or discussed.

## 2024-02-21 NOTE — GROUP NOTE
Psychoeducation Group Documentation    PATIENT'S NAME: Jerome Flanagan  MRN:   [id number]  :   1975  ACCT. NUMBER: [id number]  DATE OF SERVICE: 24  START TIME:  8:30 AM  END TIME:  9:30 AM  FACILITATOR(S): Rashid Amezcua LADC; Jossue Tim MD  TOPIC: BEH Pyschoeducation  Number of patients attending the group:  29  Group Length:  1 Hours    Skills Group Therapy Type: Recovery skills and Emotion regulation skills    Summary of Group / Topics Discussed:    Balanced lifestyle skills and Symptom management skills        Group Attendance:  Attended group session    Patient's response to the group topic/interactions:  cooperative with task and listened actively    Patient appeared to be Attentive.         Client specific details:  Marlon gave appropriate feedback. .

## 2024-02-21 NOTE — PROGRESS NOTES
D: Writer met with patient from 3:00 pm to 3:25 pm for an individual psychotherapy session. The patient is currently admitted to the [facility]. Pt reported he has been attending groups and completing homework assignments. Pt reports he is unsure if he wants to step down to an IOP or OP treatment. Writer advised pt about Phase II here at . Pt stated he would consider it. Pt stated he will not drink now that his health depends on it. Pt reports he would like to find a job as soon as possible to care for his finances and writer pointed out that pt would benefit from the structure and support from a job. Pt shared his family is in [place], a vacation his great-nephew was to be a part of. He expressed his feelings have been up and down as he thinks about his great-nephew. Pt shared he is concerned about his memory and is concerned he may have early onset dementia.     I: Writer offered support, compassion, and validation to patient. Writer emphasized using a sponsor,  attending 12 step meetings, and setting goals as these are beneficial to a recovery program. Writer also worked on self-compassion, vulnerability, and accepting support from others with patient.     A: Pt appeared to have a flat affect. He showed little emotion even when discussing his great-nephew.     P: Writer will meet with pt on 2/28 for a final therapy session before he discharges from .

## 2024-02-22 NOTE — GROUP NOTE
Group Therapy Documentation    PATIENT'S NAME: Jerome Flanagan  MRN:   [id number]  :   1975  ACCT. NUMBER: [id number]  DATE OF SERVICE: 24  START TIME:  3:00 PM  END TIME:  4:00 PM  FACILITATOR(S): Janell Marcum LADC; Travis Davies LADC  TOPIC: BEH Group Therapy  Number of patients attending the group:  23  Group Length:  1 Hours    Group Therapy Type: Recovery strategies and Daily living/independence skills    Summary of Group / Topics Discussed:    Sober coping skills      Group lecture was presented by counseling staff regarding the topic of exploring personal strengths. Participants provided feedback throughout group session.       Group Attendance:  Attended group session    Patient's response to the group topic/interactions:  cooperative with task    Patient appeared to be Actively participating.        Client specific details:  Patient actively engaged in group discussion.

## 2024-02-22 NOTE — GROUP NOTE
Group Therapy Documentation    PATIENT'S NAME: Jerome Flanagan  MRN:   [id number]  :   1975  [id number]  DATE OF SERVICE: 24  START TIME:  9:00 AM  END TIME: 11:00 AM  FACILITATOR(S): Travis Davies LADC  TOPIC: BEH Group Therapy  Number of patients attending the group:  7  Group Length:  2 Hours    Group Therapy Type: Recovery strategies, Emotion processing, and Daily living/independence skills    Summary of Group / Topics Discussed:    Sober coping skills, Cognitive behavioral therapy skills, Emotions/expression, and Leisure explorations/use of leisure time    Group began with check-ins, followed by a reflection reading regarding working through difficulties. Group then discussed common thinking errors and strategies to utilize when dealing with them. Feedback was provided by participants throughout group session.       Group Attendance:  Attended group session    Patient's response to the group topic/interactions:  cooperative with task    Patient appeared to be Actively participating, Attentive, and Engaged.        Client specific details:  Patient actively engaged in group discussion.

## 2024-02-22 NOTE — GROUP NOTE
Group Therapy Documentation    PATIENT'S NAME: Jerome Flanagan  MRN:   [id number]  :   1975  ACCT. NUMBER: [id number]  DATE OF SERVICE: 24  START TIME: 12:30 PM  END TIME:  2:30 PM  FACILITATOR(S): Pratik Maldonado LADC  TOPIC: BEH Group Therapy  Number of patients attending the group:  6  Group Length:  2 Hours    Group Therapy Type: Recovery strategies    Summary of Group / Topics Discussed:    Recovery Principles      Group Attendance:  Attended group session    Patient's response to the group topic/interactions:  cooperative with task    Patient appeared to be Actively participating.        Client specific details:  Marlon participated and interacted appropriately with peers and staff in PM group. No triggers to use noted or discussed.

## 2024-02-22 NOTE — PROGRESS NOTES
Writer and primary counselors met with pt to discuss 2 days of pt missing all his meds. Pt was made aware that if he continued to miss meds he would not be able to stay in this level of care. He was a;so told that as part of his stay of commitment paperwork he had agreed to take his meds as prescribed. Pt denied any barriers to getting to the med room to take his meds. He katt he just forgets, but verbalized understanding that he needs to start taking meds as prescribed in order to stay in this level of care.

## 2024-02-23 NOTE — GROUP NOTE
Group Therapy Documentation    PATIENT'S NAME: Jerome Flanagan  MRN:   [id number]  :   1975  ACCT. NUMBER: [id number]  DATE OF SERVICE: 24  START TIME: 12:30 PM  END TIME:  2:30 PM  FACILITATOR(S): Maribeth Rojas LADC; Lalita Marquez LADC; Pratik Maldonado LADC  TOPIC: BEH Group Therapy  Number of patients attending the group:  21  Group Length:  2 Hours    Group Therapy Type: Recovery strategies and Emotion processing    Summary of Group / Topics Discussed:    Relationship/socialization, Emotions/expression, and Leisure explorations/use of leisure time    Group Attendance:  Attended group session    Patient's response to the group topic/interactions:  cooperative with task    Patient appeared to be Attentive and Engaged.        Client specific details: Pt watched a recovery-related film with peers and participated in the subsequent discussion.

## 2024-02-23 NOTE — GROUP NOTE
"Group Therapy Documentation    PATIENT'S NAME: Jerome Flanagan  MRN:   [id number]  :   1975  ACCT. NUMBER: [id number]  DATE OF SERVICE: 24  START TIME:  9:00 AM  END TIME: 11:00 AM  FACILITATOR(S): Janell Marcum LADC; Pratik Maldonado LADC  TOPIC: BEH Group Therapy  Number of patients attending the group:  5  Group Length:  2 Hours    Group Therapy Type: Recovery strategies, Emotion processing, Daily living/independence skills, and Health and wellbeing     Summary of Group / Topics Discussed:    Patients engaged in a thorough discussion about  self-acceptance , and shared personal experiences, and how this can help with their process of  self-awareness, coping skills, with life events, recovery, and daily challenges.       Group Attendance:  Attended group session    Patient's response to the group topic/interactions:  cooperative with task, discussed personal experience with topic, and listened actively    Patient appeared to be Actively participating.        Client specific details:  Marlon, engaged in group discussion on \"Change\".     "

## 2024-02-24 NOTE — GROUP NOTE
Group Therapy Documentation    PATIENT'S NAME: Jerome Flanagan  MRN:   [id number]  :   1975  ACCT. NUMBER: [id number]  DATE OF SERVICE: 24  START TIME:  9:00 AM  END TIME: 11:00 AM  FACILITATOR(S): Sidra Vega LADC; Ida Benson LADC  TOPIC: BEH Group Therapy  Number of patients attending the group:  21  Group Length:  2 Hours    Group Therapy Type: Recovery strategies    Summary of Group / Topics Discussed:    Relationship/socialization      Group Attendance:  Attended group session    Patient's response to the group topic/interactions:  cooperative with task    Patient appeared to be Attentive and Engaged.        Client specific details: Marlon was an active participant in lecture on healthy relationship characteristics.

## 2024-02-24 NOTE — GROUP NOTE
Group Therapy Documentation    PATIENT'S NAME: Jerome Flanagan  MRN:   [id number]  :   1975  ACCT. NUMBER: [id number]  DATE OF SERVICE: 24  START TIME: 12:30 PM  END TIME:  2:30 PM  FACILITATOR(S): Ida Benson; Sidra Vega LADC  TOPIC: BEH Group Therapy  Number of patients attending the group:  20  Group Length:  2 Hours    Group Therapy Type: Recovery strategies    Summary of Group / Topics Discussed:    Recovery Principles, Sober coping skills, Relationship/socialization, Balanced lifestyle, Emotions/expression, and Relapse prevention      Group Attendance:  Attended group session    Patient's response to the group topic/interactions:  cooperative with task, expressed understanding of topic, gave appropriate feedback to peers, and listened actively    Patient appeared to be Actively participating, Attentive, and Engaged.        Client specific details:  Client was engaged throughout the duration of group by asking questions, engaging peers in conversations, and sharing experiences as appropriate to the topic. Pt gave and received feedback/support to group members.

## 2024-02-25 NOTE — GROUP NOTE
Group Therapy Documentation    PATIENT'S NAME: Jerome Flangaan  MRN:   [id number]  :   1975  ACCT. NUMBER: [id number]  DATE OF SERVICE: 24  START TIME:  8:45 AM  END TIME: 10:30 AM  FACILITATOR(S): Sidra Vega LADC; Montserrat Nguyen RN; Quinten Glaser LADC  TOPIC: BEH Group Therapy  Number of patients attending the group:  18  Group Length:  2 Hours    Group Therapy Type: Health and wellbeing     Summary of Group / Topics Discussed:    Hepatitis C      Group Attendance:  Attended group session    Patient's response to the group topic/interactions:  cooperative with task    Patient appeared to be Actively participating, Attentive, and Engaged.        Client specific details: Marlon was an active participant in RN lecture on Hepatitis C.       LVM to confirm and epre checkin for eAWV, kathya.

## 2024-02-25 NOTE — GROUP NOTE
Group Therapy Documentation    PATIENT'S NAME: Jerome Flanagan  MRN:   [id number]  :   1975  ACCT. NUMBER: [id number]  DATE OF SERVICE: 24  START TIME: 12:30 PM  END TIME:  1:30 PM  FACILITATOR(S): Quinten Glaser LADC  TOPIC: BEH Group Therapy  Number of patients attending the group:  18  Group Length:  1 Hours    Group Therapy Type: Recovery strategies    Summary of Group / Topics Discussed:    Recovery Principles and Relationship/socialization      Group Attendance:  Attended group session    Patient's response to the group topic/interactions:  cooperative with task    Patient appeared to be Attentive and Engaged.        Client specific details:  The patient participated in the afternoon lecture on Family and Addiction.

## 2024-02-26 NOTE — PATIENT INSTRUCTIONS
Dear Jerome Flanagan    Thank you for choosing [facility] ([facility]) for your injection.  The following information is a summary of our appointment as well as important reminders.          If you are a transplant patient and require transplant education, please click on this link: [email or web address].    We look forward in seeing you on your next appointment here at  [facility] ([facility]).  Please don t hesitate to call us at [phone] to reschedule any of your appointments or to speak with one of the [facility] registered nurses.  It was a pleasure taking care of you today.    Sincerely,    [facility]  [address] [place] [address]  [address]  Phone:  [phone]

## 2024-02-26 NOTE — PROGRESS NOTES
Infusion Nursing Note:  Jerome Flanagan presents today for Vivitrol.    Patient seen by provider today: No   present during visit today: Not Applicable.    Note: 1st dose Vivitrol injection given IM on the Right Dorsogluteal. Observed for about 15-20 minutes post injection.  Administrations This Visit       naltrexone (VIVITROL) injection 380 mg       Admin Date  02/26/2024 Action  $Given Dose  380 mg Route  Intramuscular Documented By  Olesya Voss, GUERO                     Intravenous Access:  N/A.    Treatment Conditions:  None.      Post Infusion Assessment:  Patient tolerated injection without incident.  Site patent and intact, free from redness, edema or discomfort.       Discharge Plan:   Discharge instructions reviewed with: Patient.  Patient and/or family verbalized understanding of discharge instructions and all questions answered.  AVS to patient via [employer].  Patient will return after 28 days for for next appointment. Per patient he will call to book his next appointment. Given a number of the .  Patient discharged in stable condition accompanied by: self.  Departure Mode: Ambulatory.    /75 (BP Location: Left arm, Patient Position: Sitting, Cuff Size: Adult Regular)   Pulse 79   Temp 98.4  F (36.9  C) (Oral)   Resp 18   SpO2 98%      Olesya Voss, RN

## 2024-02-26 NOTE — PROGRESS NOTES
Name: Jerome Flanagan  Date: 2/26/2024  Medical Record: [id number]  Envelope Number: [id number]  List of Contents (List each item separately in new row):     Naltrexone HCL 50 mg Tabs    Admission:  I am responsible for any personal items that are not sent to the safe or pharmacy.  [facility] is not responsible for loss, theft or damage of any property in my possession.      Patient Signature:  ___________________________________________       Date/Time:__________________________    Staff Signature: __________________________________       Date/Time:__________________________    [facility] Staff person, if patient is unable/unwilling to sign:      __________________________________________________________       Date/Time: __________________________    **All medications are packaged by [facility] staff and securely stored on [facility]. Medications left by patients at discharge will be packaged by [facility] staff and transported by [facility] staff to inpatient pharmacy for storage.**    Discharge:  [facility] has returned all of my personal belongings:    Patient Signature: ________________________________________     Date/Time: ____________________________________    Staff Signature: ______________________________________     Date/Time:_____________________________________

## 2024-02-26 NOTE — PROGRESS NOTES
[facility] Addiction Medicine    A/P                                                    ASSESSMENT/PLAN  1. Alcohol use disorder, severe, dependence (H)  -showing improvement  -continue with gabapentin  -start monthly vivitrol injection 2/26/2024, discontinue naltrexone oral once injection starts  -continue with [facility] programming and follow recommendations at discharge  -establish 12 step meeting home group, sponsor and continue with regular attendance  -follow up March 8 telephone visit (Scheduled)     2. Insomnia, unspecified type  -needs improvement   -re-start gabapentin and mi on regular basis  -Trazodone  mg at bedtime as needed  -Sleep hygiene discussed  -keep a regular schedule  -limit or quit caffeine  -avoid alcohol  -increase activity/exercise  -keep room cool quiet and dark  -avoid evening eating  -avoid checking the clock      3. Generalized anxiety disorder  -Needs improvement  -Anxiety coinciding with Marlon's self decreasing dose of gabapentin. Marlon's been rationing and not taking consistently due to fear of running out early  -Gabapentin reordered for discharge.  Reassured that medications will continue and not necessary to ration. Encouraged consistently dosing. In addition has PRN hydroxyzine ordered for times of increased anxiety       Problem list updated Feb 23, 2024   No problems updated.      Feb 23, 2024  - Start monthly vivitrol injections  -continue with gabapentin        Last encounter A/P from 2/9/24 visit   1. Alcohol use disorder, severe, dependence (H)  -needs improvement.  No alcohol use   -Continue with naltrexone at 50 mg dose  -continue with gabapentin  -LP RN's to assist with scheduling vivitrol injection  -continue with [facility] programming and follow all recommendations  -1 month follow up        2. Generalized anxiety disorder  -No change in plan of care  -continue with prozac daily  -continue with gabapentin as prescribed       PDMP Review          "Value Time User    State PDMP site checked  Yes 2/9/2024 11:20 AM Stella Pabon NP              RTC  No follow-ups on file.      Counseled the patient on the importance of having a recovery program in addition to medication to manage recovery.  Components include avoiding isolating, having willingness to change, avoiding triggers and managing cravings. Encouraged having some type of sober network and practicing honesty with trusted support person(s). Encouraged other services such as counseling, 12 step or other self-help organizations.              SUBJECTIVE                                                    Jerome Flanagan is a 48 year old male who presents to clinic today for follow up    Visit performed In Person, face-to-face      FLAKO History:  Substance Use History:   \"Have you ever had any history with [...] use?\" And \"When was your last use?  ALCOHOL - Last use Aime 10   CANNABIS - experimented as teen  PRESCRIPTION STIMULANTS (includes Ritalin, Adderall, Vyvanse) - denies  COCAINE/CRACK - couple months, had an episode \"Did too much\" had panick attacks,  mid 90's   METH/AMPHETAMINES (includes ecstacy, MDMA/jigar) - denies  OPIATES - denies  BENZODIAZEPINES (includes Ativan, Klonopin, Xanax) - prescribed  KRATOM (mild opioid and stimulant effects) - denies  KETAMINE - denies  HALLUCINOGENS (includes DXM) -   BEHAVIORAL (Gambling, Eating d/o, Compulsivity) - denies  History of treatment - outpatient 6 times, inpatient 1 prior    NICOTINE  Cigarettes: denies  Chew/snus: denies  Vaping: denies  Past NRT/medication use: denies        Previous withdrawal treatment episodes (e.g. detox): none  Previous FLAKO treatment programs: outpatient 6 times, inpatient 1 prior    Hospitalizations or overdose: 1/13,   Medical complications from substance use: alcohol withdrawal seizure. Hyponatremia  IV Drug use?:   Previous Medication for Addiction Tx: tried naltrexone.   (Drank through it)   Longest period of full " "abstinence: 2-3 days outside the treatment setting   Activities that have previously supported abstinence: treatment or \"being too hungover to drink\"   Current Recovery Activities: lodging plus    Recent HPI Details:  HPI Feb 9, 2024  - Started Naltrexone, has been taking 1/2 dose, has not yet noticed a difference but may be due to the fact he is in treatment.   Treatment going well, sleeping well, feeling positive  Worried about his memory struggles.  Has a regular schedule at  and does not remember where to go.  Has been worsening over the years.    PCP listed as Dr. Philip, Marlon would like to establish PCP elsewhere.     TODAY'S VISIT  HPI Feb 23, 2024  - Contacted by  nursing Staff Hilda regarding increase in anxiety and difficulty sleeping.  Marlon is about to graduate [employer], for the past 3-4 days has been having increasing anxiety and difficulty sleeping.  Needs medications for discharge transition.  Of note, he has not been consistently taking his gabapentin, he is nervous about running out.  Has vivitrol injection visit scheduled 2/26.        OBJECTIVE  PHYSICAL EXAM:  There were no vitals taken for this visit.    GENERAL: healthy, alert and no distress  EYES: Eyes grossly normal to inspection, PERRL and conjunctivae and sclerae normal  RESP: No respiratory distress  MENTAL STATUS EXAM  Appearance/Behavior: No appearant distress  Speech: Normal  Mood/Affect: normal affect  Insight: Fair      PHQ-9 Score:       2/1/2024     3:00 PM 2/2/2024     2:00 PM 2/8/2024     1:00 PM   PHQ   PHQ-9 Total Score 10 9 9   Q9: Thoughts of better off dead/self-harm past 2 weeks Not at all Not at all Not at all       CAROLINE-7 Score:      2/1/2024     3:00 PM 2/8/2024     1:00 PM   CAROLINE-7 SCORE   Total Score 8 5       LABS (may not contain today's labs)                                                        Today's lab data  No results found for any visits on 02/23/24.        HISTORY                                             "        Problem list reviewed & adjusted, as indicated.  Patient Active Problem List   Diagnosis    Thrombocytopenia (H24)    Alcoholic hepatitis without ascites (H28)    Alcohol withdrawal seizure with complication (H)    Alcohol use disorder, severe, dependence (H)    Essential hypertension    Generalized anxiety disorder    Hyponatremia    Hyperkalemia         MEDICATION LIST (prior to visit)  acetaminophen (TYLENOL) 325 MG tablet, Take 3 tablets (975 mg) by mouth 3 times daily as needed for mild pain  allopurinol (ZYLOPRIM) 100 MG tablet, Take 1 tablet (100 mg) by mouth daily  amLODIPine (NORVASC) 10 MG tablet, Take 1 tablet (10 mg) by mouth daily Future refills and dose adjustments directed to primary clinic provider, review at upcoming visit  colchicine (COLCRYS) 0.6 MG tablet, Take 1 tablet (0.6 mg) by mouth daily  ferrous sulfate (FEROSUL) 325 (65 Fe) MG tablet, Take 1 tablet (325 mg) by mouth daily Future refills and dose adjustments directed to primary clinic provider, review at upcoming visit  FLUoxetine (PROZAC) 40 MG capsule, Take 1 capsule (40 mg) by mouth daily Future refills and dose adjustments directed to primary clinic provider, review at upcoming visit  folic acid (FOLVITE) 1 MG tablet, Take 1 tablet (1 mg) by mouth daily Future refills and dose adjustments directed to primary clinic provider, review at upcoming visit  gabapentin (NEURONTIN) 300 MG capsule, Take 2 capsules (600 mg) by mouth 3 times daily for 30 days Future refills and dose adjustments directed to primary clinic provider, review at upcoming visit  hydrOXYzine HCl (ATARAX) 25 MG tablet, Take 1-2 tablets (25-50 mg) by mouth 3 times daily as needed for anxiety  hydrOXYzine HCl (ATARAX) 25 MG tablet, Take 25 mg by mouth 3 times daily as needed  magnesium oxide (MAG-OX) 400 MG tablet, Take 1 tablet (400 mg) by mouth daily Future refills and dose adjustments directed to primary clinic provider, review at upcoming visit  melatonin 5 MG  tablet, Take 1 tablet (5 mg) by mouth every evening as needed for sleep Future refills and dose adjustments directed to primary clinic provider, review at upcoming visit  multivitamin w/minerals (THERA-VIT-M) tablet, Take 1 tablet by mouth daily Future refills and dose adjustments directed to primary clinic provider, review at upcoming visit  naltrexone (DEPADE/REVIA) 50 MG tablet, Take 1 tablet (50 mg) by mouth daily Take 1/2 tablet for 3-5 days to avoid side-effects (most common are nausea, headache)  pantoprazole (PROTONIX) 40 MG EC tablet, Take 1 tablet (40 mg) by mouth 2 times daily (before meals) Future refills and dose adjustments directed to primary clinic provider, review at upcoming visit  predniSONE (DELTASONE) 5 MG tablet, Take 1 tablet (5 mg) by mouth See Admin Instructions 20 mg daily for 3 days, then 15 mg daily for 3 days, then 10 mg daily for 3 days, then 5 mg daily for 3 days, then stop.  For acute gout.  senna-docusate (SENOKOT-S/PERICOLACE) 8.6-50 MG tablet, Take 1 tablet by mouth 2 times daily Future refills and dose adjustments directed to primary clinic provider, review at upcoming visit.  Hold if loose stools, for constipation  simvastatin (ZOCOR) 20 MG tablet, Take 1 tablet (20 mg) by mouth at bedtime Future refills and dose adjustments directed to primary clinic provider, review at upcoming visit  thiamine (B-1) 100 MG tablet, Take 1 tablet (100 mg) by mouth daily Future refills and dose adjustments directed to primary clinic provider, review at upcoming visit  traZODone (DESYREL) 50 MG tablet, Take 1-2 tablets ( mg) by mouth at bedtime    Self Administer Medications: Behavioral Services        MEDICATION LIST (after visit)  Current Outpatient Medications   Medication    acetaminophen (TYLENOL) 325 MG tablet    allopurinol (ZYLOPRIM) 100 MG tablet    amLODIPine (NORVASC) 10 MG tablet    colchicine (COLCRYS) 0.6 MG tablet    ferrous sulfate (FEROSUL) 325 (65 Fe) MG tablet    FLUoxetine  (PROZAC) 40 MG capsule    folic acid (FOLVITE) 1 MG tablet    gabapentin (NEURONTIN) 300 MG capsule    hydrOXYzine HCl (ATARAX) 25 MG tablet    hydrOXYzine HCl (ATARAX) 25 MG tablet    magnesium oxide (MAG-OX) 400 MG tablet    melatonin 5 MG tablet    multivitamin w/minerals (THERA-VIT-M) tablet    naltrexone (DEPADE/REVIA) 50 MG tablet    pantoprazole (PROTONIX) 40 MG EC tablet    predniSONE (DELTASONE) 5 MG tablet    senna-docusate (SENOKOT-S/PERICOLACE) 8.6-50 MG tablet    simvastatin (ZOCOR) 20 MG tablet    thiamine (B-1) 100 MG tablet    traZODone (DESYREL) 50 MG tablet     No current facility-administered medications for this visit.     Facility-Administered Medications Ordered in Other Visits   Medication    Self Administer Medications: Behavioral Services         No Known Allergies      Stella Pabon NP  [facility] Addiction Medicine  [phone]

## 2024-02-26 NOTE — GROUP NOTE
Group Therapy Documentation    PATIENT'S NAME: Jerome Flanagan  MRN:   [id number]  :   1975  ACCT. NUMBER: [id number]  DATE OF SERVICE: 24  START TIME: 12:30 PM  END TIME:  2:30 PM  FACILITATOR(S): Pratik Maldonado LADC; Tosha Beltran  TOPIC: BEH Group Therapy  Number of patients attending the group:  5  Group Length:  2 Hours    Group Therapy Type: Health and wellbeing     Summary of Group / Topics Discussed:    Spiritual Care      Group Attendance:  Attended group session    Patient's response to the group topic/interactions:  cooperative with task    Patient appeared to be Actively participating.        Client specific details:  Marlon participated and interacted appropriately with peers and staff in spiritual group. No triggers to use noted or discussed.

## 2024-02-26 NOTE — PROGRESS NOTES
[facility] Weekly Treatment Plan Review    Treatment plan review for the following date span:  2/18/24 through 2/26/24    ATTENDANCE  Patient did have any absences during this time period (list absence dates and reason for absence).  Pt was admitted to the medical on 2/12/24, then returned to  on 2/15/24.       Weekly Treatment Plan Review     Treatment Plan initiated on: 2/6/24    Dimension1: Acute Intoxication/Withdrawal Potential -   Date of Last Use: 1/13/24  Any reports of withdrawal symptoms - No      Dimension 2: Biomedical Conditions & Complications -   Medical Concerns: Pt reports memory loss.  Vitals:   BP Readings from Last 3 Encounters:   02/26/24 110/75   02/15/24 (!) 117/90   02/01/24 132/88     Pulse Readings from Last 3 Encounters:   02/26/24 79   02/15/24 77   02/01/24 76     Wt Readings from Last 3 Encounters:   02/01/24 83.9 kg (185 lb)   01/20/24 72.9 kg (160 lb 12.8 oz)   08/20/21 81.6 kg (180 lb)     Temp Readings from Last 3 Encounters:   02/26/24 98.4  F (36.9  C) (Oral)   02/15/24 99  F (37.2  C) (Oral)   02/01/24 97.8  F (36.6  C) (Oral)      Current Medications & Medication Changes:  Current Outpatient Medications   Medication    acetaminophen (TYLENOL) 325 MG tablet    allopurinol (ZYLOPRIM) 100 MG tablet    amLODIPine (NORVASC) 10 MG tablet    colchicine (COLCRYS) 0.6 MG tablet    ferrous sulfate (FEROSUL) 325 (65 Fe) MG tablet    FLUoxetine (PROZAC) 40 MG capsule    folic acid (FOLVITE) 1 MG tablet    gabapentin (NEURONTIN) 300 MG capsule    hydrOXYzine HCl (ATARAX) 25 MG tablet    hydrOXYzine HCl (ATARAX) 25 MG tablet    magnesium oxide (MAG-OX) 400 MG tablet    melatonin 5 MG tablet    multivitamin w/minerals (THERA-VIT-M) tablet    naltrexone (DEPADE/REVIA) 50 MG tablet    pantoprazole (PROTONIX) 40 MG EC tablet    predniSONE (DELTASONE) 5 MG tablet    senna-docusate (SENOKOT-S/PERICOLACE) 8.6-50 MG tablet    simvastatin (ZOCOR) 20 MG tablet    thiamine (B-1) 100 MG tablet  "   traZODone (DESYREL) 50 MG tablet     No current facility-administered medications for this encounter.     Facility-Administered Medications Ordered in Other Encounters   Medication    Self Administer Medications: Behavioral Services     Taking meds as prescribed? Yes  Medication side effects or concerns: Pt denied  Outside medical appointments this week (list provider and reason for visit):      Pt denied.      Dimension 3: Emotional/Behavioral Conditions & Complications -   Mental health diagnosis: Generalized Anxiety Disorder     Date of last SIB: None reported  Date of  last SI:  None reported  Date of last HI: None reported  Behavioral Targets: Understand the relationship between addiction and mental health issues. Patient will remain safe in treatment.  Current MH Assignments:     Patient met with staff therapist as scheduled as scheduled.     Patient completed the \"Understanding Major Anxiety Disorders and Addiction\" assignment, and shared in group.      Patient completed the \"Feeling Better/ Nurturing Self-Esteem\" assignment, and shared in group.     Patient completed the \"Grief and Loss\" assignment, and shared in group.     PHQ2:       2/19/2024    11:00 AM 2/9/2024    10:50 AM   PHQ-2 ( 1999 [employer])   Q1: Little interest or pleasure in doing things 1 1   Q2: Feeling down, depressed or hopeless 1 1   PHQ-2 Score 2 2   Q1: Little interest or pleasure in doing things  Several days   Q2: Feeling down, depressed or hopeless  Several days   PHQ-2 Score  2      GAD2:       2/9/2024    10:49 AM 2/19/2024    11:00 AM   CAROLINE-2   Feeling nervous, anxious, or on edge 0 2   Not being able to stop or control worrying 0 1   CAROLINE-2 Total Score 0 3       Additional Narrative: Patient is continuing to work on gaining awareness regarding how his substance abuse negatively affects his mental and emotional well being. Patient met with staff psychotherapist as scheduled to address  his mental and emotional concerns. Patient " "reports having short term memory issues. Patient reports that he's dealt with his stressful situations by meditating. Patient has completed all of his assignments in this dimension. Patient reports no suicidal ideation at this time.       Dimension 4: Treatment Acceptance / Resistance -   FLAKO Diagnosis:  Alcohol Use Disorder   303.90 (F10.20) Severe In early remission,   Commitment to tx process/Stage of change- Pt reports \"getting back to life\" and feeling better.   FLAKO assignments -     Patient completed \"Drug Use History\" assignment, and shared in group.     Patient completed \"First Step\" assignment, and shared in group.      Additional Narrative - Patient is continuing to work on his internal motivation for change.  Patient is attending groups, meetings an lectures on time and offers meaningful feedback to his peers regarding home work assignments and topics for group discussion. Patient reports that he is motivated by wanting to get back into really living his life. Patient has completed all of his assignments in this dimension.      Dimension 5: Relapse / Continued Problem Potential -   Relapses this week - None  Urges to use - Pt reports having cravings 4/10 with 10 being the highest, pt reports \"meditation, group, and breathing helps the thoughts and feeling to subside.   UA results - Pos for-BZO, on 2/15/24 neg for all others.  Recent Results (from the past 168 hour(s))   Magnesium    Collection Time: 02/20/24  2:42 PM   Result Value Ref Range    Magnesium 2.3 1.7 - 2.3 mg/dL   Comprehensive metabolic panel    Collection Time: 02/20/24  2:42 PM   Result Value Ref Range    Sodium 138 135 - 145 mmol/L    Potassium 4.5 3.4 - 5.3 mmol/L    Carbon Dioxide (CO2) 24 22 - 29 mmol/L    Anion Gap 14 7 - 15 mmol/L    Urea Nitrogen 30.0 (H) 6.0 - 20.0 mg/dL    Creatinine 1.66 (H) 0.67 - 1.17 mg/dL    GFR Estimate 51 (L) >60 mL/min/1.73m2    Calcium 9.9 8.6 - 10.0 mg/dL    Chloride 100 98 - 107 mmol/L    Glucose 123 (H) " "70 - 99 mg/dL    Alkaline Phosphatase 63 40 - 150 U/L    AST 18 0 - 45 U/L    ALT 12 0 - 70 U/L    Protein Total 8.2 6.4 - 8.3 g/dL    Albumin 4.4 3.5 - 5.2 g/dL    Bilirubin Total 0.3 <=1.2 mg/dL     Identified triggers - None reported  Coping skills identified - Meditation.  Patient is able to utilize these skills when needed.    Additional Narrative- Patient is continuing to work on gaining awareness and insight regarding his triggers, cues and early warning signs for relapse. Patient reports no cravings at this time. Patient completed his \"How To Practice My Best When Stuck in Feeling Down On Myself\" and \"Relapse Prevention Plan\" assignments and presented both in group. Patient also participated in the spirituality group, facilitated by Tosha Khalil and  counseling staff was present during group. Patient participated in a weekend \"Relapse Prevention Workshop\" this past weekend and completed all activities..      Dimension 6: Recovery Environment -   Family Involvement - Pt reports his mother.   Community support group attendance - 3-12-Step meetings per week while in tx.  Patient is attending nightly sober support meetings/lectures.  Recreational activities - Pt participate in weekly activities offered.     Additional Narrative - Patient attended 12 Step meetings this week in compliance with his treatment plan. Patient is working on his sober support network by spending free time with same gender peers. Patient will continue to work with counselors and support staff to develop an aftercare treatment protocol.    Progress made on transition planning goals: Pt reported being interested in sober housing and attending an IOP after discharge from the [facility] program.     Justification for Continued Treatment at this Level of Care:    Pt has minimal time abstinent from substances. Pt lacks awareness of addiction and has not been willing till now to explore change and lacks understanding of relapse and is at " high risk for going back to use. Pt lacks prevention skills and has not been able to implement coping skills to avoid use and prevent further consequences.     Treatment coordination activities this week:   None reported  Need for peer recovery support referral? No    Discharge Planning:  Target Discharge Date/Timeframe:  2/29/24   Med Mgmt Provider/Appt:  TBD   Ind therapy Provider/Appt:  TBD   Other referrals:  TBD    Dimension Scale Review     Prior ratings: Dim1 - 1 DIM2 - 1 DIM3 - 2 DIM4 - 4 DIM5 - 4 DIM6 -3     Current ratings: Dim1 - 0 DIM2 - 1 DIM3 - 2 DIM4 - 4 DIM5 - 4 DIM6 -3       If client is 18 or older, has vulnerable adult status change? No    Are Treatment Plan goals/objectives effective? Yes  *If no, list changes to treatment plan:    Are the current goals meeting client's needs? Yes  *If no, list the changes to treatment plan.      *Client agrees with any changes to the treatment plan: N/A  *Client received copy of changes: N/A  *Client is aware of right to access a treatment plan review: Yes       FRANCISCO Hankins on 2/2624 1:32 PM

## 2024-02-26 NOTE — GROUP NOTE
Group Therapy Documentation    PATIENT'S NAME: Jerome Flanagan  MRN:   [id number]  :   1975  ACCT. NUMBER: [id number]  DATE OF SERVICE: 24  START TIME:  9:00 AM  END TIME: 11:00 AM  FACILITATOR(S): Katya Canseco LADC  TOPIC: BEH Group Therapy  Number of patients attending the group:  5    Group Length:  2 Hours    Group Therapy Type: Recovery strategies, Emotion processing, and Daily living/independence skills    Summary of Group / Topics Discussed:    Recovery Principles, Sober coping skills, and Relapse prevention      Group Attendance:  Attended group session    Patient's response to the group topic/interactions:  cooperative with task    Patient appeared to be Actively participating, Attentive, and Engaged.        Client specific details: Patient attended group session and was attentive and participative.

## 2024-02-27 NOTE — PROGRESS NOTES
Nursing Discharge Planning Meeting    Writer completed discharge planning meeting with patient. Discharge is planned for 2/29/24.    Discussed appropriate follow up care to manage FLAKO/M and to obtain medication refills. Patient given a copy of their current medications for reference. Questions were answered at this time and the patient verbalized an understanding of the post-discharge follow up plan.    Patient will ESTABLISH CARE with PCP and is aware of upcoming appt:  [facility]  [address], [place] [id number] [phone]  Appt date and time: March 1, 2024 at 3PM  Provider: Janeth Vann MD    Pt will ask provider above the following:  GI referral (if needed)  Memory  Medications refills  Inform provider last Vivitrol infusion 2/26/24    Patient will f/u with Addiction Medicine Provider as recommended and/or is aware of upcoming appt:  [facility]  [address], [address]  [place]  [address] [phone]  Appt date and time: 3/8/24 at 3:30pm  Provider: Stella Hunter NP  Last Vivitrol Infusion 2/26/24    Continue to support patient in discharge planning as needed to assure appropriate continuity of care.     Tobacco Cessation  Patient participated in the nicotine replacement therapy for tobacco cessation or reduction during their treatment programming: No. Pt does not use tobacco    [facility]  Nurse Liaison / [facility]  O: [phone]  Fax:[phone]  [facility] [id number]  M-F: 7AM to 5:30PM   Sat-Sun: 7AM to 3PM  After hours: [phone]

## 2024-02-27 NOTE — GROUP NOTE
Psychoeducation Group Documentation    PATIENT'S NAME: Jerome Flanagan  MRN:   [id number]  :   1975  ACCT. NUMBER: [id number]  DATE OF SERVICE: 24  START TIME:  3:00 PM  END TIME:  4:00 PM  FACILITATOR(S): Maribeth Rojas LADC; Katya Canseco LADC; Quinten Glaser LADC  TOPIC: BEH Pyschoeducation  Number of patients attending the group:  20  Group Length:  1 Hours    Skills Group Therapy Type: Recovery skills and Healthy behaviors development    Summary of Group / Topics Discussed:    Balanced lifestyle skills and Coping/DBT skills    Group Attendance:  Attended group session    Patient's response to the group topic/interactions:  cooperative with task    Patient appeared to be Attentive and Engaged.        Client specific details: Pt respectfully watched a presentation on DBT skills and asked appropriate questions.

## 2024-02-27 NOTE — GROUP NOTE
Group Therapy Documentation    PATIENT'S NAME: Jerome Flanagan  MRN:   [id number]  :   1975  ACCT. NUMBER: [id number]  DATE OF SERVICE: 24  START TIME: 12:30 PM  END TIME:  2:30 PM  FACILITATOR(S): Pratik Maldonado LADC  TOPIC: BEH Group Therapy  Number of patients attending the group:  5  Group Length:  2 Hours    Group Therapy Type: Emotion processing    Summary of Group / Topics Discussed:    Sober coping skills      Group Attendance:  Attended group session    Patient's response to the group topic/interactions:  cooperative with task    Patient appeared to be Actively participating.        Client specific details:  Marlon participated and interacted appropriately with peers and staff in PM group. No triggers to use noted or discussed.

## 2024-02-27 NOTE — GROUP NOTE
Group Therapy Documentation    PATIENT'S NAME: Jerome Flanagan  MRN:   [id number]  :   1975  [id number]  DATE OF SERVICE: 24  START TIME:  9:00 AM  END TIME: 11:00 AM  FACILITATOR(S): Katya Canseco LADC  TOPIC: BEH Group Therapy  Number of patients attending the group:  5    Group Length:  2 Hours    Group Therapy Type: Recovery strategies, Emotion processing, and Daily living/independence skills    Summary of Group / Topics Discussed:    Recovery Principles, Sober coping skills, Relationship/socialization, and Relapse prevention      Group Attendance:  Attended group session    Patient's response to the group topic/interactions:  cooperative with task    Patient appeared to be Actively participating, Attentive, and Engaged.        Client specific details: Patient attended group session and was attentive and participative.

## 2024-02-28 NOTE — GROUP NOTE
Group Therapy Documentation    PATIENT'S NAME: Jerome Flanagan  MRN:   [id number]  :   1975  ACCT. NUMBER: [id number]  DATE OF SERVICE: 24  START TIME:  9:45 AM  END TIME: 11:15 AM  FACILITATOR(S): Katya Canseco LADC  TOPIC: BEH Group Therapy  Number of patients attending the group:  5    Group Length:  1.5 Hours    Group Therapy Type: Recovery strategies, Emotion processing, and Daily living/independence skills    Summary of Group / Topics Discussed:    Recovery Principles, Sober coping skills, Relationship/socialization, and Relapse prevention      Group Attendance:  Attended group session    Patient's response to the group topic/interactions:  cooperative with task    Patient appeared to be Actively participating, Attentive, and Engaged.        Client specific details:  Patient attended group session and was attentive and participative.

## 2024-02-28 NOTE — GROUP NOTE
Psychoeducation Group Documentation    PATIENT'S NAME: Jerome Flanagan  MRN:   [id number]  :   1975  ACCT. NUMBER: [id number]  DATE OF SERVICE: 24  START TIME:  8:30 AM  END TIME:  9:30 AM  FACILITATOR(S): Rashid Amezcua LADC; Pratik Maldonado LADC; Leah Quigley LADC  TOPIC: BEH Pyschoeducation  Number of patients attending the group:  26  Group Length:  1 Hours    Skills Group Therapy Type: Recovery skills    Summary of Group / Topics Discussed:    Balanced lifestyle skills        Group Attendance:  Attended group session    Patient's response to the group topic/interactions:  listened actively    Patient appeared to be Attentive.         Client specific details:  Marlon gave appropriate feedback. .

## 2024-02-28 NOTE — GROUP NOTE
Group Therapy Documentation    PATIENT'S NAME: Jerome Flanagan  MRN:   [id number]  :   1975  ACCT. NUMBER: [id number]  DATE OF SERVICE: 24  START TIME: 12:30 PM  END TIME:  2:30 PM  FACILITATOR(S): Pratik Maldonado LADC  TOPIC: BEH Group Therapy  Number of patients attending the group:  5  Group Length:  2 Hours    Group Therapy Type: Recovery strategies    Summary of Group / Topics Discussed:    Sober coping skills      Group Attendance:  Attended group session    Patient's response to the group topic/interactions:  became angry or agitated    Patient appeared to be Actively participating.        Client specific details:  Marlon participated and interacted appropriately with peers and staff in PM group. No triggers to use noted or discussed.

## 2024-02-29 NOTE — PROGRESS NOTES
[address]  [place] [address]        Jerome Flanagan, 1975, was admitted for evaluation/treatment of a Substance Use Disorder at [facility].  This person took part in these program(s):    ______ The Inpatient Program   ______ The Outpatient Program   ___X___ The [facility]   ______ [facility] Outpatient       Date admitted: 2/1/2024  Date discharged: 2/29/2024    Type of discharge:   ___X___ Satisfactory - completed evaluation / treatment   ______ Discharged without completing   ______ Behavioral discharge   ______ Transferred to another chemical dependency program   ______ Transferred to another type of service   ______ Left against medical advice (AMA) / Eloped            Counselor: FRANCISCO Cornejo and FRANCISCO oLpez                        Date: 2/29/2024             Time: 8:52 AM

## 2024-02-29 NOTE — ADDENDUM NOTE
Encounter addended by: Pratik Maldonado [facility] on: 2/29/2024 9:44 AM   Actions taken: Pend clinical note, Clinical Note Signed, Episode resolved

## 2024-02-29 NOTE — ADDENDUM NOTE
Encounter addended by: Pratik Maldonado LADC on: 2/29/2024 8:57 AM   Actions taken: Clinical Note Signed

## 2024-02-29 NOTE — PROGRESS NOTES
COUNSELOR S DISCHARGE SUMMARY    Date: 2024     Program Name:  [facility]    Client Name: Jerome Flanagan YOB: 1975      MR#:  [id number]    Referred by: [facility]        Release copies to: N/A      Admit date: 2024   Discharge Date: 2024     # of Days Attended: 28   Date Last Attended: 2024    Discharge Status:  With Staff Approval    PROBLEMS PRESENTED AT ADMISSION:  (Include reasons & circumstances for admission)  Jerome Flanagan is a 48 year old year old male Identified at Birth.      Admitting diagnosis: Alcohol Use Disorder Severe (10.20) (303.90)       PROGRAM PARTICIPATION:  While at [facility], Jerome Flanagan received the following services to address substance use and mental health disorders.  he participated in:  Group Therapy, Individual Therapy, Psychiatric Medication Management, Recreation Activities, Spirituality Groups, DBT Skills Groups, AA/NA meetings , Life Skills Groups, and Psych-education Groups      PROGRESS:     Dimension 1 - Acute Intoxication/Withdrawal Potential:  Admission ASAM Risk Ratin Client displays full functioning with good ability to tolerate and cope with withdrawal discomfort. No signs or symptoms of intoxication or withdrawal or resolving signs or symptoms.  Discharge ASAM Risk Ratin Client displays full functioning with good ability to tolerate and cope with withdrawal discomfort. No signs or symptoms of intoxication or withdrawal or resolving signs or symptoms.    Treatment goal(s): Develop effective strategies to maintain sobriety.   and Be able to manage mild to moderate withdrawal symptoms.    Progress toward goal(s): I will self-monitor, and notify staff of any increase in withdrawal symptoms while in programming.     Complete    Dimension 2 - Biomedical Conditions & Complications:  Admission ASAM Risk Ratin Client tolerates and stanley with physical discomfort  "and is able to get the services that the client needs.  Discharge ASAM Risk Ratin Client tolerates and stanley with physical discomfort and is able to get the services that the client needs.    Treatment goal(s): Maintain medication compliance. Obtain a primary care physician in the community.    Progress toward goal(s): Report date of scheduled appointment to counselor, Report change in severity of symptoms to staff. , and Continue to take prescribed medications and follow-up with medical interventions while in program.    Complete    Dimension 3 - Emotional/Behavioral Conditions & Complications:  Admission ASAM Risk Ratin Client has difficulty with impulse control and lacks coping skills. Client has thoughts of suicide or harm to others without means; however, the thoughts may interfere with participation in some treatment activities. Client has difficulty functioning in significant life areas. Client has moderate symptoms of emotional, behavioral, or cognitive problems. Client is able to participate in most treatment activities.  Discharge ASAM Risk Ratin Client has difficulty with impulse control and lacks coping skills. Client has thoughts of suicide or harm to others without means; however, the thoughts may interfere with participation in some treatment activities. Client has difficulty functioning in significant life areas. Client has moderate symptoms of emotional, behavioral, or cognitive problems. Client is able to participate in most treatment activities.    Treatment goal(s): Improve self-esteem., Understand the relationship between addiction and mental health issues., and Learn skills to express emotions in a healthy and appropriate way.    Progress toward goal(s):    I complete weekly assessments while in programming.    I will meet with staff therapist as scheduled.     I will complete the \"Understanding Major Anxiety Disorders and Addiction\" assignment, and share in group.     I will " "complete the \"Feeling Better/ Nurturing Self-Esteem\" assignment, and share in group.    I will complete the \"Grief and Loss\" assignment, and share in group.     Complete    Dimension 4 - Treatment Acceptance/Resistance:  Admission ASAM Risk Ratin The client is: (A) non-compliant with treatment and has no awareness of addiction or mental disorder and does not want or is unwilling to explore change or is in total denial of the illness and its implications, or (B) dangerously oppositional to the extent that the client is a threat of imminent harm to self and others.  Discharge ASAM Risk Ratin Client displays verbal compliance, but lacks consistent behaviors; has low motivation for change; and is passively involved in treatment.    Treatment goal(s): Understand the impact your substance use has had on you., Understand the impact your substance use has had on your family and significant relationships., and Increase internal motivation.    Progress toward goal(s):    I will participate in schedule programming.    I will complete a \"Drug Use History\" assignment, and share in group.    I will compete the \"First Step\" assignment, and share in group.     Complete    Dimension 5 - Relapse/Continued Problem Potential:  Admission ASAM Risk Ratin No awareness of the negative impact of mental health problems or substance abuse. No coping skills to arrest mental health or addiction illnesses, or prevent relapse.  Discharge ASAM Risk Ratin Client recognizes relapse issues and prevention strategies, but displays some vulnerability for further substance use or mental health problems.    Treatment goal(s): Develop sober coping and living skills., Identify personal triggers and relapse warning signs., Identify what led to your last relapse., and Understand your feelings of guilt and shame.    Progress toward goal(s):    I will attend weekend lectures regarding relapse prevention while in programming.     I will read the " "\"How to Practice My Best When Stuck in Feeling Down On Myself\" assignment, and share.     I will complete the \"Relapse Prevention Plan\" assignment, and share in group.     Complete    Dimension 6 - Recovery Environment: (family, recreation, legal, education, etc.)  Admission ASAM Risk Rating: 3 Client is not engaged in structured, meaningful activity and the client's peers, family, significant other, and living environment are unsupportive, or there is significant criminal justice system involvement.  Discharge ASAM Risk Ratin Client is engaged in structured, meaningful activity, but peers, family, significant other, and living environment are unsupportive, or there is criminal justice involvement by the client or among the client's peers, significant others, or in the client's living environment.    Treatment goal(s): Explore options for aftercare. Develop supportive relationships in the community.     Progress toward goal(s):    I will attend weekly AA/NA sobriety support group meetings while in programming.    I will meet with counselor to explore options for OP/IOP, as well as options for sober housing.    I will secure aftercare plans by the time of program completion.    Complete    Client strengths identified during treatment were: Patient maintained a positive attitude while in treatment. Patient was an active participant in group therapy and was always open for feedback from his counselors and peers. Patient appears motivated for recovery at this time and willing to incorporate positive changes into his life.    Client needs identified during treatment were: Sobriety, stabilize mental and physical health, address and manage difficult emotions, avoid relapse, identify and manage triggers to use, and find IOP and sober housing placement.    Discharge Diagnosis: Alcohol Use Disorder Severe (10.20) (303.90)    Discharge Medications:   Current Outpatient Medications   Medication    acetaminophen (TYLENOL) " 325 MG tablet    allopurinol (ZYLOPRIM) 100 MG tablet    amLODIPine (NORVASC) 10 MG tablet    colchicine (COLCRYS) 0.6 MG tablet    ferrous sulfate (FEROSUL) 325 (65 Fe) MG tablet    FLUoxetine (PROZAC) 40 MG capsule    folic acid (FOLVITE) 1 MG tablet    gabapentin (NEURONTIN) 300 MG capsule    hydrOXYzine HCl (ATARAX) 25 MG tablet    hydrOXYzine HCl (ATARAX) 25 MG tablet    magnesium oxide (MAG-OX) 400 MG tablet    melatonin 5 MG tablet    multivitamin w/minerals (THERA-VIT-M) tablet    naltrexone (DEPADE/REVIA) 50 MG tablet    pantoprazole (PROTONIX) 40 MG EC tablet    predniSONE (DELTASONE) 5 MG tablet    senna-docusate (SENOKOT-S/PERICOLACE) 8.6-50 MG tablet    simvastatin (ZOCOR) 20 MG tablet    thiamine (B-1) 100 MG tablet    traZODone (DESYREL) 50 MG tablet     No current facility-administered medications for this encounter.     Facility-Administered Medications Ordered in Other Encounters   Medication    Self Administer Medications: Behavioral Services       Discharge Plan and Recommendations:  1.  Abstain from all mood-altering chemicals unless prescribed by a licensed medical provider, and take all medications as prescribed.  2.  Attend a minimum of three AA/NA/Sober support groups weekly in the community.  3.  Obtain a permanent male sponsor and maintain regular contact with him.  4.  Admit immediately into AA and therapy and follow all recommendations.  5.  Continue to invest in building a sober support network.  6.  Continue to monitor and understand relapse triggers and stressors through the use and development of healthy coping skills.  7.  Obtain a mental health therapist and attend all scheduled programming.  8. Attend all scheduled medical appointments.  9. Take medication as prescribed.    Living arrangements at discharge: Pt reports that he would be returning to his mother's home after discharge from the [facility] program.  Patient terminated services due program completion. The following  continued care recommendations have been made: AA and therapy    Prognosis: Favorable      Pratik Maldonado [facility] on 2/29/2024 at 8:58 AM

## 2024-02-29 NOTE — PROGRESS NOTES
[facility] Discharge Summary/Instructions     Patient: Jerome Flanagan  MRN: [id number]   : 1975 Age: 48 year old Sex: male   -  Focus of Treatment / Discharge Recommendations    Personal Safety/ Management of Symptoms    * Follow your safety plan.  Report increased symptoms to your care team and /or go to the nearest Emergency Department.    * Call crisis lines as needed    [facility] [place] [phone]               [facility] [phone]  [facility] [phone]              [facility] [phone]  [facility] [phone]              [facility] [phone]  [facility] [phone]          National Suicide Prevention [phone]  [facility] [phone]            Suicide Prevention [phone]  [facility] [phone]    Abstinence/Relapse Prevention  * Take all medicines as directed.  Carry a current list of medicines with you.  * Use coping skills.  * Do not use illicit (street) drugs, controlled substances (narcotics) or alcohol.    Develop/Improve Independent Living/Socialization Skills.    Community Resources/Supports and Discharge Planning:    Go to group therapy and / or support groups at: AA and therapy        Client Signature:_______________________   Date / Time:___________  Staff Signature:________________________   Date / Time:___________

## 2024-03-25 NOTE — PROGRESS NOTES
Patient presents to the [facility] for Vivitrol injecton.  Order written by MELISSA Pabon was completed today. Name and  verified with patient. See MAR for medication details. Patient noted having new back/neck pain since beginning of March, provider is aware of this. Patient also noted having some new swelling in legs, per patient provider told him to go get checked out at the ED. Patient has not done this yet. Charge nurse notified. Medication was divided into 1 syringes by pharmacy and given in the following sites left ventrogluteal. Patient tolerated injection well and was discharged to home. Patient to return back in 4 weeks.    DEBBIE Daivla LPN    Administrations This Visit       naltrexone (VIVITROL) injection 380 mg       Admin Date  2024 Action  $Given Dose  380 mg Route  Intramuscular Documented By  Edwin Davila LPN

## (undated) RX ORDER — FENTANYL CITRATE 50 UG/ML
INJECTION, SOLUTION INTRAMUSCULAR; INTRAVENOUS
Status: DISPENSED
Start: 2024-01-01